# Patient Record
Sex: FEMALE | Race: BLACK OR AFRICAN AMERICAN | Employment: OTHER | URBAN - METROPOLITAN AREA
[De-identification: names, ages, dates, MRNs, and addresses within clinical notes are randomized per-mention and may not be internally consistent; named-entity substitution may affect disease eponyms.]

---

## 2017-01-13 ENCOUNTER — ED HISTORICAL/CONVERTED ENCOUNTER (OUTPATIENT)
Dept: OTHER | Age: 82
End: 2017-01-13

## 2017-02-21 ENCOUNTER — OP HISTORICAL/CONVERTED ENCOUNTER (OUTPATIENT)
Dept: OTHER | Age: 82
End: 2017-02-21

## 2017-05-13 ENCOUNTER — ED HISTORICAL/CONVERTED ENCOUNTER (OUTPATIENT)
Dept: OTHER | Age: 82
End: 2017-05-13

## 2017-07-24 ENCOUNTER — OP HISTORICAL/CONVERTED ENCOUNTER (OUTPATIENT)
Dept: OTHER | Age: 82
End: 2017-07-24

## 2017-08-09 ENCOUNTER — IP HISTORICAL/CONVERTED ENCOUNTER (OUTPATIENT)
Dept: OTHER | Age: 82
End: 2017-08-09

## 2017-08-18 ENCOUNTER — OP HISTORICAL/CONVERTED ENCOUNTER (OUTPATIENT)
Dept: OTHER | Age: 82
End: 2017-08-18

## 2018-04-11 ENCOUNTER — OP HISTORICAL/CONVERTED ENCOUNTER (OUTPATIENT)
Dept: OTHER | Age: 83
End: 2018-04-11

## 2018-04-19 ENCOUNTER — OP HISTORICAL/CONVERTED ENCOUNTER (OUTPATIENT)
Dept: OTHER | Age: 83
End: 2018-04-19

## 2018-12-05 ENCOUNTER — OP HISTORICAL/CONVERTED ENCOUNTER (OUTPATIENT)
Dept: OTHER | Age: 83
End: 2018-12-05

## 2018-12-18 ENCOUNTER — OP HISTORICAL/CONVERTED ENCOUNTER (OUTPATIENT)
Dept: OTHER | Age: 83
End: 2018-12-18

## 2019-01-18 ENCOUNTER — OP HISTORICAL/CONVERTED ENCOUNTER (OUTPATIENT)
Dept: OTHER | Age: 84
End: 2019-01-18

## 2019-01-23 ENCOUNTER — OP HISTORICAL/CONVERTED ENCOUNTER (OUTPATIENT)
Dept: OTHER | Age: 84
End: 2019-01-23

## 2019-03-29 ENCOUNTER — OP HISTORICAL/CONVERTED ENCOUNTER (OUTPATIENT)
Dept: OTHER | Age: 84
End: 2019-03-29

## 2019-05-09 ENCOUNTER — OP HISTORICAL/CONVERTED ENCOUNTER (OUTPATIENT)
Dept: OTHER | Age: 84
End: 2019-05-09

## 2019-05-20 ENCOUNTER — OP HISTORICAL/CONVERTED ENCOUNTER (OUTPATIENT)
Dept: OTHER | Age: 84
End: 2019-05-20

## 2019-06-03 ENCOUNTER — OP HISTORICAL/CONVERTED ENCOUNTER (OUTPATIENT)
Dept: OTHER | Age: 84
End: 2019-06-03

## 2019-10-15 ENCOUNTER — OP HISTORICAL/CONVERTED ENCOUNTER (OUTPATIENT)
Dept: OTHER | Age: 84
End: 2019-10-15

## 2019-10-21 ENCOUNTER — OP HISTORICAL/CONVERTED ENCOUNTER (OUTPATIENT)
Dept: OTHER | Age: 84
End: 2019-10-21

## 2020-01-31 ENCOUNTER — OP HISTORICAL/CONVERTED ENCOUNTER (OUTPATIENT)
Dept: OTHER | Age: 85
End: 2020-01-31

## 2020-01-31 LAB — MAMMOGRAPHY, EXTERNAL: NORMAL

## 2020-06-16 VITALS
BODY MASS INDEX: 23.68 KG/M2 | DIASTOLIC BLOOD PRESSURE: 71 MMHG | HEART RATE: 65 BPM | HEIGHT: 61 IN | WEIGHT: 125.4 LBS | SYSTOLIC BLOOD PRESSURE: 116 MMHG | TEMPERATURE: 97.4 F | OXYGEN SATURATION: 98 %

## 2020-06-16 PROBLEM — Z87.898 HX OF DIZZINESS: Status: ACTIVE | Noted: 2020-06-16

## 2020-06-16 PROBLEM — C50.312: Status: ACTIVE | Noted: 2018-12-18

## 2020-06-16 RX ORDER — METOPROLOL SUCCINATE 100 MG/1
TABLET, EXTENDED RELEASE ORAL DAILY
COMMUNITY

## 2020-06-16 RX ORDER — TRAZODONE HYDROCHLORIDE 50 MG/1
TABLET ORAL
COMMUNITY
End: 2021-04-09 | Stop reason: ALTCHOICE

## 2020-06-16 RX ORDER — ALENDRONATE SODIUM 70 MG/1
70 TABLET ORAL
COMMUNITY

## 2020-06-16 RX ORDER — ASCORBIC ACID 250 MG
TABLET ORAL
COMMUNITY

## 2020-06-16 RX ORDER — CALCIUM CARBONATE 600 MG
600 TABLET ORAL 2 TIMES DAILY
COMMUNITY
End: 2020-10-30 | Stop reason: ALTCHOICE

## 2020-06-16 RX ORDER — LEVOTHYROXINE SODIUM 50 UG/1
TABLET ORAL
COMMUNITY

## 2020-06-16 RX ORDER — CLONAZEPAM 0.5 MG/1
TABLET ORAL
COMMUNITY

## 2020-06-16 RX ORDER — ASPIRIN 81 MG/1
TABLET ORAL DAILY
COMMUNITY
End: 2022-06-09 | Stop reason: ALTCHOICE

## 2020-06-16 RX ORDER — LOSARTAN POTASSIUM 25 MG/1
TABLET ORAL 2 TIMES DAILY
COMMUNITY

## 2020-06-16 RX ORDER — LETROZOLE 2.5 MG/1
2.5 TABLET, FILM COATED ORAL DAILY
COMMUNITY
End: 2021-06-05 | Stop reason: ALTCHOICE

## 2020-06-16 RX ORDER — INDAPAMIDE 2.5 MG/1
TABLET, FILM COATED ORAL DAILY
Status: ON HOLD | COMMUNITY
End: 2020-10-07 | Stop reason: SDUPTHER

## 2020-07-15 ENCOUNTER — OP HISTORICAL/CONVERTED ENCOUNTER (OUTPATIENT)
Dept: OTHER | Age: 85
End: 2020-07-15

## 2020-08-21 ENCOUNTER — OFFICE VISIT (OUTPATIENT)
Dept: SURGERY | Age: 85
End: 2020-08-21
Payer: MEDICARE

## 2020-08-21 VITALS
BODY MASS INDEX: 21.22 KG/M2 | HEIGHT: 61 IN | SYSTOLIC BLOOD PRESSURE: 158 MMHG | WEIGHT: 112.4 LBS | TEMPERATURE: 98.6 F | HEART RATE: 69 BPM | DIASTOLIC BLOOD PRESSURE: 87 MMHG

## 2020-08-21 DIAGNOSIS — C50.312 MALIGNANT NEOPLASM OF LOWER-INNER QUADRANT OF LEFT FEMALE BREAST, UNSPECIFIED ESTROGEN RECEPTOR STATUS (HCC): Primary | ICD-10-CM

## 2020-08-21 PROCEDURE — 99213 OFFICE O/P EST LOW 20 MIN: CPT | Performed by: SURGERY

## 2020-08-21 RX ORDER — ROSUVASTATIN CALCIUM 5 MG/1
TABLET, COATED ORAL
COMMUNITY
End: 2020-10-30 | Stop reason: ALTCHOICE

## 2020-08-21 RX ORDER — CHOLECALCIFEROL TAB 125 MCG (5000 UNIT) 125 MCG
TAB ORAL DAILY
Status: ON HOLD | COMMUNITY
End: 2021-02-21

## 2020-08-21 RX ORDER — MULTIVITAMIN
1 TABLET ORAL DAILY
COMMUNITY
End: 2021-11-05 | Stop reason: ALTCHOICE

## 2020-08-21 RX ORDER — VITAMIN E 1000 UNIT
CAPSULE ORAL
COMMUNITY
End: 2020-10-30 | Stop reason: ALTCHOICE

## 2020-08-21 NOTE — PROGRESS NOTES
1. Have you been to the ER, urgent care clinic since your last visit? Hospitalized since your last visit? No    2. Have you seen or consulted any other health care providers outside of the [address] [place] [address] since your last visit? Include any pap smears or colon screening. No     Patient is here for f/u breast ca. Has had sudden weight loss since last seen. No other complaints.

## 2020-08-21 NOTE — LETTER
8/24/20 Patient: Shaila Koch YOB: 1935 Date of Visit: 8/21/2020 Eloy Calix MD 
[address] [place] [address] VIA Facsimile: [phone] MD Luly Hamilton [address] [id number] VIA Facsimile: [phone] MD [facility] [address] [address] VIA Facsimile: [phone] Dear MD Leandra Bush MD Timoteo Francois, MD, Thank you for referring Ms. George Nunez to [address] for evaluation. My notes for this consultation are attached. If you have questions, please do not hesitate to call me. I look forward to following your patient along with you. Sincerely, Marilia North MD

## 2020-08-25 NOTE — PROGRESS NOTES
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 undergoing endocrine therapy    Medical Oncologist: Miranda Lau    Radiation Oncologist: Oliver Stone    Patient is doing well. no new masses, nipple discharge, adenopathy. underwent left breast radiation. finished it 06.14.2019. complains of left latissimus dorsi pain. no other new breast problems. no new masses. no nipple discharge, adenopathy. No new cancers in the family. says her 's dementia is getting worse. He has become more antagonistic. ROS  Patient reports weight loss (12 lbs - attributes it possibly to stress from taking care of her ) and decreased appetite but reports no fever. She reports muscle aches, muscle weakness, and difficulty walking but reports no arthralgias/joint pain and no back pain. She reports no visual changes. She reports no difficulty hearing. She reports no chest pain. She reports no shortness of breath. She reports no nausea and no vomiting. She reports no hematuria. She reports no abnormal mole. She reports no loss of balance or falls. She reports feeling safe in relationship. She reports no fatigue. She reports no bleeding disorders. She reports no runny nose. She reports no breast pain. She reports no breast mass. She reports no nipple abnormalites. Additionally reports: I personally performed the ROS.       Physical Exam  General Appearance: healthy-appearing. Level of Distress: chronically ill. Ambulation: limited ambulation. Head: normocephalic. Neck: supple and no masses. Lymph Nodes: no cervical LAD, supraclavicular LAD, or axillary LAD. Breast: no masses or abnormal secretions and normal appearance.  Right Breast: no erythema, induration, tenderness, ecchymosis, skin changes, distinct masses, abnormal secretions, or axillary lymphadenopathy and normal nipple appearance. Left Breast: postradiation hyperpigmentation, skin thickening. left latissimus dorsi tender to palpation. Lungs: Respiratory effort: no dyspnea. Back: Thoracolumbar Appearance: normal curvature. Abdomen: soft and no tenderness. Liver: no hepatomegaly. Spleen: no splenomegaly. Skin: Inspection and palpation: no jaundice. Musculoskeletal: no edema. normal motor strength. normal movement of all extremities. Neurologic: Cranial Nerves: grossly intact. Sensation: grossly intact. Psychiatric: good judgement and insight. active and alert and normal mood. 7.15.2020 left dx mmg: BIRADS-2    1.23.2019 Diagnosis  A. Left breast, deep fascia, excision:  No evidence of neoplasm (the specimen includes skeletal muscle). B. Left breast, superior margin, excision:  Breast tissue with no evidence of neoplasm. C. Left breast, medial margin, excision:  Fibroadipose tissue, no evidence of neoplasm. D. Left breast, lateral margin, excision:  Fibroadipose tissue, no evidence of neoplasm. E. Left breast inferior margin, excision:  Fibroadipose tissue, no evidence of neoplasm. F. Left breast, anterior margin, excision:  Fibroadipose tissue, no evidence of neoplasm. G. 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 H. Left axillary sentinel lymph node, biopsy:  No evidence of neoplasm in two lymph nodes. Specimen identification: Left breast, segmental resection with marginal  sampling and left axillary sentinel lymph node biopsy  Tumor size (mm): 9.5 mm  Histologic type: Invasive carcinoma of no special type, ductal NOS .   Histologic grade ([place]):  Glandular/Tubular differentiation: 2  Nuclear pleomorphism: 2  Mitotic rate: 1  Overall Grade: 5 (grade 1)  Tumor Focality: Unifocal  Duct carcinoma in-situ: Present  Extensive DCIS: Not present  Tumor extension:  Skin: Not submitted  Nipple: Not submitted  Skeletal muscle: Present, not involved by neoplasm (specimen A)  Margins:  Invasive carcinoma:  Distance to closest margin (mm): 6 mm (medial)  Duct carcinoma in-situ:  Distance to closest margin (mm): 6 mm (medial)  Lymph nodes:  # of sentinel nodes: 2  # of sentinel/non-sentinel nodes: 2  # with macrometastasis(>2mm): 0  # with micrometastasis(0.2-2mm): 0  # with isolated tumor cells(<0.2mm) 0  # with no tumor cells: 2  Total # nodes with metastasis: 0  Treatment effect: No presurgical treatment given    Lymphovascular invasion: Not identified  Pathologic Stage (AJCC 8 th Edition): pT1b, pN0(sn)    Estrogen receptors (ER-SP1, vent[place]) 99%, strong Positive: a?Y1%  Progesterone receptors (PgR: 1E2, [place]) 98%, strong Positive: a?Y1%  Proliferation marker Ki67 (Ki-67: 30-9, [place]) 3%  Her2 (Pathway 4B5, [place], core biopsy) 1+ Negative: 0,1+  Equivocal: 2+  Positive: 3+  Her2 (Pathway 4B5, [place], current specimen) 1+ Negative: 0,1+  Equivocal: 2+  Positive: 3+  Summary of results:  Positive for estrogen receptors  Positive for progesterone receptors  Low proliferation. Negative for Her2 (by immunohistochemistry, on core biopsy)  Negative for Her2 (by immunohistochemistry, on this specimen)      Assessment / Plan  Status: TIMOTEO, over 1.5 years from diagnosis    A/P: 82 y.o. woman w left breast IDC, ER/NH positive, HER2 negative. Clinical stage I. doing well. She is being followed by Dr Radha Palma with Medical Oncology and Dr Jessica Guerrero with Radiation Oncology. she will need b/l annual mmg in six months. I have ordered the imaging studies. She will return to see me in six months on the same day as the imaging. She was advised to call the office w any questions or concerns. All questions were answered to her satisfaction.  This encounter lasted 20 minutes, and over 50% of this visit was spent in counseling the patient and coordination of care.

## 2020-10-01 ENCOUNTER — TRANSCRIBE ORDER (OUTPATIENT)
Dept: REGISTRATION | Age: 85
End: 2020-10-01

## 2020-10-01 ENCOUNTER — HOSPITAL ENCOUNTER (OUTPATIENT)
Dept: GENERAL RADIOLOGY | Age: 85
Discharge: HOME | End: 2020-10-01
Payer: MEDICARE

## 2020-10-01 DIAGNOSIS — R06.02 SOB (SHORTNESS OF BREATH): ICD-10-CM

## 2020-10-01 DIAGNOSIS — R06.02 SOB (SHORTNESS OF BREATH): Primary | ICD-10-CM

## 2020-10-01 PROCEDURE — 71046 X-RAY EXAM CHEST 2 VIEWS: CPT

## 2020-10-04 ENCOUNTER — HOSPITAL ENCOUNTER (OUTPATIENT)
Age: 85
Setting detail: OBSERVATION
Discharge: HOME | End: 2020-10-07
Attending: EMERGENCY MEDICINE | Admitting: HOSPITALIST
Payer: MEDICARE

## 2020-10-04 DIAGNOSIS — R53.1 GENERALIZED WEAKNESS: ICD-10-CM

## 2020-10-04 DIAGNOSIS — R06.02 SOB (SHORTNESS OF BREATH): ICD-10-CM

## 2020-10-04 DIAGNOSIS — R26.9 GAIT ABNORMALITY: ICD-10-CM

## 2020-10-04 DIAGNOSIS — R07.9 CHEST PAIN, UNSPECIFIED TYPE: Primary | ICD-10-CM

## 2020-10-04 LAB
ALBUMIN SERPL-MCNC: 3.6 G/DL (ref 3.5–5)
ALBUMIN/GLOB SERPL: 0.9 {RATIO} (ref 1.1–2.2)
ALP SERPL-CCNC: 69 U/L (ref 45–117)
ALT SERPL-CCNC: 34 U/L (ref 12–78)
ANION GAP SERPL CALC-SCNC: 6 MMOL/L (ref 5–15)
AST SERPL W P-5'-P-CCNC: 30 U/L (ref 15–37)
BILIRUB SERPL-MCNC: 0.2 MG/DL (ref 0.2–1)
BUN SERPL-MCNC: 50 MG/DL (ref 6–20)
BUN/CREAT SERPL: 52 (ref 12–20)
CA-I BLD-MCNC: 10.7 MG/DL (ref 8.5–10.1)
CHLORIDE SERPL-SCNC: 101 MMOL/L (ref 97–108)
CO2 SERPL-SCNC: 33 MMOL/L (ref 21–32)
CREAT SERPL-MCNC: 0.97 MG/DL (ref 0.55–1.02)
GLOBULIN SER CALC-MCNC: 4.2 G/DL (ref 2–4)
GLUCOSE SERPL-MCNC: 135 MG/DL (ref 65–100)
POTASSIUM SERPL-SCNC: 3.8 MMOL/L (ref 3.5–5.1)
PROT SERPL-MCNC: 7.8 G/DL (ref 6.4–8.2)
SODIUM SERPL-SCNC: 140 MMOL/L (ref 136–145)

## 2020-10-04 PROCEDURE — 85025 COMPLETE CBC W/AUTO DIFF WBC: CPT

## 2020-10-04 PROCEDURE — 83880 ASSAY OF NATRIURETIC PEPTIDE: CPT

## 2020-10-04 PROCEDURE — 80053 COMPREHEN METABOLIC PANEL: CPT

## 2020-10-04 PROCEDURE — 99285 EMERGENCY DEPT VISIT HI MDM: CPT

## 2020-10-04 PROCEDURE — 36415 COLL VENOUS BLD VENIPUNCTURE: CPT

## 2020-10-04 PROCEDURE — 84484 ASSAY OF TROPONIN QUANT: CPT

## 2020-10-05 ENCOUNTER — APPOINTMENT (OUTPATIENT)
Dept: GENERAL RADIOLOGY | Age: 85
End: 2020-10-05
Attending: EMERGENCY MEDICINE
Payer: MEDICARE

## 2020-10-05 PROBLEM — R00.2 PALPITATIONS: Status: ACTIVE | Noted: 2020-10-05

## 2020-10-05 PROBLEM — R42 DIZZINESS: Status: ACTIVE | Noted: 2020-10-05

## 2020-10-05 LAB
BASOPHILS # BLD: 0 K/UL (ref 0–0.1)
BASOPHILS NFR BLD: 0 % (ref 0–1)
BNP SERPL-MCNC: 427 PG/ML
DIFFERENTIAL METHOD BLD: ABNORMAL
EOSINOPHIL # BLD: 0 K/UL (ref 0–0.4)
EOSINOPHIL NFR BLD: 0 % (ref 0–7)
ERYTHROCYTE [DISTWIDTH] IN BLOOD BY AUTOMATED COUNT: 14.6 % (ref 11.5–14.5)
HCT VFR BLD AUTO: 33.6 % (ref 35–47)
HGB BLD-MCNC: 10.7 G/DL (ref 11.5–16)
IMM GRANULOCYTES # BLD AUTO: 0 K/UL (ref 0–0.04)
IMM GRANULOCYTES NFR BLD AUTO: 1 % (ref 0–0.5)
LYMPHOCYTES # BLD: 0.4 K/UL (ref 0.8–3.5)
LYMPHOCYTES NFR BLD: 7 % (ref 12–49)
MCH RBC QN AUTO: 28.1 PG (ref 26–34)
MCHC RBC AUTO-ENTMCNC: 31.8 G/DL (ref 30–36.5)
MCV RBC AUTO: 88.2 FL (ref 80–99)
MONOCYTES # BLD: 0.2 K/UL (ref 0–1)
MONOCYTES NFR BLD: 4 % (ref 5–13)
NEUTS SEG # BLD: 4.8 K/UL (ref 1.8–8)
NEUTS SEG NFR BLD: 88 % (ref 32–75)
PLATELET # BLD AUTO: 308 K/UL (ref 150–400)
PMV BLD AUTO: 10.1 FL (ref 8.9–12.9)
RBC # BLD AUTO: 3.81 M/UL (ref 3.8–5.2)
TROPONIN I SERPL-MCNC: <0.05 NG/ML
WBC # BLD AUTO: 5.4 K/UL (ref 3.6–11)

## 2020-10-05 PROCEDURE — 74011250637 HC RX REV CODE- 250/637: Performed by: EMERGENCY MEDICINE

## 2020-10-05 PROCEDURE — 71045 X-RAY EXAM CHEST 1 VIEW: CPT

## 2020-10-05 PROCEDURE — 74011250636 HC RX REV CODE- 250/636: Performed by: HOSPITALIST

## 2020-10-05 PROCEDURE — 74011250637 HC RX REV CODE- 250/637: Performed by: HOSPITALIST

## 2020-10-05 PROCEDURE — 36415 COLL VENOUS BLD VENIPUNCTURE: CPT

## 2020-10-05 PROCEDURE — 99218 HC RM OBSERVATION: CPT

## 2020-10-05 PROCEDURE — 83540 ASSAY OF IRON: CPT

## 2020-10-05 PROCEDURE — 74011000250 HC RX REV CODE- 250: Performed by: EMERGENCY MEDICINE

## 2020-10-05 PROCEDURE — 82607 VITAMIN B-12: CPT

## 2020-10-05 RX ORDER — LETROZOLE 2.5 MG/1
2.5 TABLET, FILM COATED ORAL DAILY
Status: DISCONTINUED | OUTPATIENT
Start: 2020-10-05 | End: 2020-10-07 | Stop reason: HOSPADM

## 2020-10-05 RX ORDER — LIDOCAINE HYDROCHLORIDE 20 MG/ML
15 SOLUTION OROPHARYNGEAL
Status: COMPLETED | OUTPATIENT
Start: 2020-10-05 | End: 2020-10-05

## 2020-10-05 RX ORDER — TRAZODONE HYDROCHLORIDE 50 MG/1
50 TABLET ORAL
Status: DISCONTINUED | OUTPATIENT
Start: 2020-10-05 | End: 2020-10-07 | Stop reason: HOSPADM

## 2020-10-05 RX ORDER — ASPIRIN 81 MG/1
81 TABLET ORAL DAILY
Status: DISCONTINUED | OUTPATIENT
Start: 2020-10-05 | End: 2020-10-07 | Stop reason: HOSPADM

## 2020-10-05 RX ORDER — ASCORBIC ACID 500 MG
500 TABLET ORAL DAILY
Status: DISCONTINUED | OUTPATIENT
Start: 2020-10-05 | End: 2020-10-07 | Stop reason: HOSPADM

## 2020-10-05 RX ORDER — MAG HYDROX/ALUMINUM HYD/SIMETH 200-200-20
30 SUSPENSION, ORAL (FINAL DOSE FORM) ORAL
Status: COMPLETED | OUTPATIENT
Start: 2020-10-05 | End: 2020-10-05

## 2020-10-05 RX ORDER — CLONAZEPAM 0.5 MG/1
0.5 TABLET ORAL
Status: DISCONTINUED | OUTPATIENT
Start: 2020-10-05 | End: 2020-10-07 | Stop reason: HOSPADM

## 2020-10-05 RX ORDER — SODIUM CHLORIDE 9 MG/ML
100 INJECTION, SOLUTION INTRAVENOUS CONTINUOUS
Status: DISCONTINUED | OUTPATIENT
Start: 2020-10-05 | End: 2020-10-05

## 2020-10-05 RX ORDER — LOSARTAN POTASSIUM 25 MG/1
25 TABLET ORAL 2 TIMES DAILY
Status: DISCONTINUED | OUTPATIENT
Start: 2020-10-05 | End: 2020-10-07 | Stop reason: HOSPADM

## 2020-10-05 RX ORDER — SODIUM CHLORIDE 9 MG/ML
125 INJECTION, SOLUTION INTRAVENOUS CONTINUOUS
Status: DISPENSED | OUTPATIENT
Start: 2020-10-05 | End: 2020-10-05

## 2020-10-05 RX ORDER — CALCIUM CARBONATE/VITAMIN D3 600MG-5MCG
1 TABLET ORAL 2 TIMES DAILY WITH MEALS
Status: DISCONTINUED | OUTPATIENT
Start: 2020-10-05 | End: 2020-10-07 | Stop reason: HOSPADM

## 2020-10-05 RX ORDER — LEVOTHYROXINE SODIUM 25 UG/1
50 TABLET ORAL
Status: DISCONTINUED | OUTPATIENT
Start: 2020-10-05 | End: 2020-10-07 | Stop reason: HOSPADM

## 2020-10-05 RX ORDER — METOPROLOL SUCCINATE 50 MG/1
100 TABLET, EXTENDED RELEASE ORAL DAILY
Status: DISCONTINUED | OUTPATIENT
Start: 2020-10-05 | End: 2020-10-07 | Stop reason: HOSPADM

## 2020-10-05 RX ORDER — ATORVASTATIN CALCIUM 10 MG/1
10 TABLET, FILM COATED ORAL
Status: DISCONTINUED | OUTPATIENT
Start: 2020-10-05 | End: 2020-10-07 | Stop reason: HOSPADM

## 2020-10-05 RX ORDER — SODIUM CHLORIDE 0.9 % (FLUSH) 0.9 %
5-40 SYRINGE (ML) INJECTION EVERY 8 HOURS
Status: DISCONTINUED | OUTPATIENT
Start: 2020-10-05 | End: 2020-10-07 | Stop reason: HOSPADM

## 2020-10-05 RX ORDER — SODIUM CHLORIDE 0.9 % (FLUSH) 0.9 %
5-40 SYRINGE (ML) INJECTION AS NEEDED
Status: DISCONTINUED | OUTPATIENT
Start: 2020-10-05 | End: 2020-10-07 | Stop reason: HOSPADM

## 2020-10-05 RX ADMIN — Medication 1 TABLET: at 11:35

## 2020-10-05 RX ADMIN — ATORVASTATIN CALCIUM 10 MG: 10 TABLET, FILM COATED ORAL at 21:11

## 2020-10-05 RX ADMIN — OXYCODONE HYDROCHLORIDE AND ACETAMINOPHEN 500 MG: 500 TABLET ORAL at 09:00

## 2020-10-05 RX ADMIN — LEVOTHYROXINE SODIUM 50 MCG: 0.03 TABLET ORAL at 11:34

## 2020-10-05 RX ADMIN — METOPROLOL SUCCINATE 100 MG: 50 TABLET, EXTENDED RELEASE ORAL at 17:27

## 2020-10-05 RX ADMIN — Medication 10 ML: at 21:12

## 2020-10-05 RX ADMIN — LETROZOLE 2.5 MG: 2.5 TABLET ORAL at 11:35

## 2020-10-05 RX ADMIN — LETROZOLE 2.5 MG: 2.5 TABLET ORAL at 17:27

## 2020-10-05 RX ADMIN — LIDOCAINE HYDROCHLORIDE 15 ML: 20 SOLUTION ORAL; TOPICAL at 02:49

## 2020-10-05 RX ADMIN — ALUMINUM HYDROXIDE, MAGNESIUM HYDROXIDE, AND SIMETHICONE 30 ML: 200; 200; 20 SUSPENSION ORAL at 02:49

## 2020-10-05 RX ADMIN — ASPIRIN 81 MG: 81 TABLET, COATED ORAL at 11:34

## 2020-10-05 RX ADMIN — Medication 1 TABLET: at 17:27

## 2020-10-05 RX ADMIN — TRAZODONE HYDROCHLORIDE 50 MG: 50 TABLET ORAL at 21:11

## 2020-10-05 NOTE — H&P
History and Physical              Subjective :   Chief Complaint : Dizziness, shortness of breath, palpitations. Source of information : Patient, reliable historian    History of present illness:   82 y.o. female with a recent history of carcinoma breast on treatment, hypertension, hyperlipidemia, hypothyroidism presents to the emergency room complaining of feeling dizzy, shortness of breath. She was doing fine until this evening, she started feeling dizzy when she got up from sitting position, felt short of breath. Feeling palpitations occasionally they do not last long but she feels she did not feel dizzy at that time. Denies any chest pain or tightness but in the emergency room she complained of chest tightness when she tried to get up. Most of the symptoms are when she is doing something, rest improves. States she was a started on Medrol Dosepak, she just started taking today, she thinks that may be causing the problem. She denies any nausea, vomiting but she feels like funny taste in the mouth. She is feeling weak levels also complains of tremors in the hands. States she is feeling better in the emergency room, thought of discharging in the emergency room but when they tried to walk her she was a little unsteady. Also feeling very weak and tired so admitted for evaluation. 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     Past Medical History:   Diagnosis Date    Anxiety 6/16/2020    Arthritis     Depression 6/16/2020    Diverticulitis 6/16/2020    GERD (gastroesophageal reflux disease) 6/16/2020    High cholesterol 6/16/2020    Hx of dizziness 6/16/2020    Hypertension 6/16/2020    Primary cancer of lower-inner quadrant of left female breast ([facility] [address] 12/18/2018    Thyroid disease 6/16/2020     Past Surgical History:   Procedure Laterality Date    HX BREAST LUMPECTOMY Left 01/23/2019 with SLNB    HX HEENT  06/25/2018    Cataract    HX HYSTERECTOMY  1976    HX ORTHOPAEDIC  2014    Left knee    HX ORTHOPAEDIC  08/09/2017    Right knee    HX ORTHOPAEDIC  2013    Right Shoulder     Family History   Problem Relation Age of Onset    Cancer Mother 80        Esophagas    Heart Disease Father     Prostate Cancer Brother     Breast Cancer Maternal Grandmother 71    Breast Cancer Maternal Cousin 58      Social History     Tobacco Use    Smoking status: Never Smoker    Smokeless tobacco: Never Used   Substance Use Topics    Alcohol use: Never     Frequency: Never       Prior to Admission medications    Medication Sig Start Date End Date Taking? Authorizing Provider   rosuvastatin (CRESTOR) 5 mg tablet Take  by mouth nightly. Yes Provider, Historical   calcium-cholecalciferol, D3, (CALTRATE 600+D) tablet Take 1 Tab by mouth daily. Yes Provider, Historical   multivit-min/iron/folic/lutein (CENTRUM SILVER WOMEN PO) Take  by mouth. Yes Provider, Historical   ascorbic acid, vitamin C, (Vitamin C With Precious Hips) 1,000 mg tablet Take  by mouth. Yes Provider, Historical   elderberry fruit (ELDERBERRY PO) Take  by mouth. Yes Provider, Historical   cholecalciferol (Vitamin D3) (5000 Units/125 mcg) tab tablet Take  by mouth daily. Yes Provider, Historical   ZINC PICOLINATE PO Take  by mouth. Yes Provider, Historical   alendronate (FOSAMAX) 70 mg tablet Take  by mouth. Yes Provider, Historical   aspirin delayed-release 81 mg tablet Take  by mouth daily. Yes Provider, Historical   calcium carbonate (CALTREX) 600 mg calcium (1,500 mg) tablet Take 600 mg by mouth two (2) times a day. Yes Provider, Historical   multivitamin, tx-iron-ca-min (THERA-M w/ IRON) 9 mg iron-400 mcg tab tablet Take 1 Tab by mouth daily. Yes Provider, Historical   clonazePAM (KlonoPIN) 0.5 mg tablet Take  by mouth two (2) times daily as needed for Anxiety.    Yes Provider, Historical   indapamide (LOZOL) 2.5 mg tablet Take  by mouth daily. Yes Provider, Historical   letrozole (FEMARA) 2.5 mg tablet Take 2.5 mg by mouth daily. Yes Provider, Historical   losartan (COZAAR) 25 mg tablet Take  by mouth two (2) times a day. Yes Provider, Historical   metoprolol succinate (TOPROL-XL) 100 mg tablet Take  by mouth daily. Yes Provider, Historical   levothyroxine (synthroid) 50 mcg tablet Take  by mouth Daily (before breakfast). Yes Provider, Historical   traZODone (DESYREL) 50 mg tablet Take  by mouth nightly. Yes Provider, Historical   ascorbic acid, vitamin C, (Vitamin C) 250 mg tablet Take  by mouth. Yes Provider, Historical     Allergies   Allergen Reactions    Iodinated Contrast Media Shortness of Breath             Review of Systems:  Constitutional: Appetite is fair, denies weight loss, no fever, no chills, no night sweats. Eye: No recent visual disturbances, no discharge, no double vision. Ear/nose/mouth/throat : No hearing disturbance, no ear pain, no nasal congestion, no sore throat, no trouble swallowing. Respiratory : No trouble breathing, no cough, ++ shortness of breath, no hemoptysis, no wheezing. Cardiovascular : No chest pain, +++ episodic palpitation with activity, ++ racing of heart, no orthopnea, no paroxysmal nocturnal dyspnea, No peripheral edema. Gastrointestinal : No nausea, no vomiting, no diarrhea, No constipation, No heartburn, No abdominal pain. Genitourinary : Has bladder prolapse, sometimes feels like she is not able to urinate. .  Lymphatics : No swollen glands -Neck, axillary, inguinal.  Endocrine : No excessive thirst, no polyuria no cold intolerance, no heat intolerance. Immunologic : No hives, urticaria, no seasonal allergies. Musculoskeletal : She has significant apparatus use, multiple surgeries on the joints. No she has with both ankles with pain, diagnosed with Achilles tendinitis. Integumentary : No rash, no pruritus, no ecchymosis.   Hematology : No petechiae, No easy bruising,  No tendency to bleed easy. Neurology : Denies change in mental status, no headache, no confusion, numbness, tingling. Psychiatric : No mood swings, no anxiety, depression. Vitals:     Patient Vitals for the past 12 hrs:   Temp Pulse Resp BP SpO2   10/04/20 2159 98.4 °F (36.9 °C) 94 18 (!) 177/82 98 %       Physical Exam:   General : Small built, tired but no respiratory distress noted. HEENT : PERRLA, normal oral mucosa, atraumatic normocephalic, Normal ear and nose. Neck : Supple, no JVD, no masses noted, no carotid bruit. Lungs : Breath sounds with good air entry bilaterally, no wheezes or rales, no accessory muscle use. CVS : Rhythm rate regular, S1+, S2+, no murmur or gallop. Abdomen : Soft, nontender, no organomegaly, bowel sounds active. Extremities : No edema noted,  pedal pulses palpable. Musculoskeletal : Fair range of motion, no joint swelling or effusion, muscle tone and power appears normal.   Skin : Moist, warm, skin turgor, no pathological rash. Lymphatic : No cervical lymphadenopathy. Neurological : Awake, alert, oriented to time place person. Likely tremors in both hands, with his light appears tremulous. Psychiatric : Mood and affect appears appropriate to the situation. Data Review:   Recent Results (from the past 24 hour(s))   CBC WITH AUTOMATED DIFF    Collection Time: 10/04/20 11:15 PM   Result Value Ref Range    WBC 5.4 3.6 - 11.0 K/uL    RBC 3.81 3.80 - 5.20 M/uL    HGB 10.7 (L) 11.5 - 16.0 g/dL    HCT 33.6 (L) 35.0 - 47.0 %    MCV 88.2 80.0 - 99.0 FL    MCH 28.1 26.0 - 34.0 PG    MCHC 31.8 30.0 - 36.5 g/dL    RDW 14.6 (H) 11.5 - 14.5 %    PLATELET 915 983 - 270 K/uL    MPV 10.1 8.9 - 12.9 FL    NEUTROPHILS 88 (H) 32 - 75 %    LYMPHOCYTES 7 (L) 12 - 49 %    MONOCYTES 4 (L) 5 - 13 %    EOSINOPHILS 0 0 - 7 %    BASOPHILS 0 0 - 1 %    IMMATURE GRANULOCYTES 1 (H) 0.0 - 0.5 %    ABS. NEUTROPHILS 4.8 1.8 - 8.0 K/UL    ABS. LYMPHOCYTES 0.4 (L) 0.8 - 3.5 K/UL    ABS. MONOCYTES 0.2 0.0 - 1.0 K/UL    ABS. EOSINOPHILS 0.0 0.0 - 0.4 K/UL    ABS. BASOPHILS 0.0 0.0 - 0.1 K/UL    ABS. IMM. GRANS. 0.0 0.00 - 0.04 K/UL    DF AUTOMATED     METABOLIC PANEL, COMPREHENSIVE    Collection Time: 10/04/20 11:15 PM   Result Value Ref Range    Sodium 140 136 - 145 mmol/L    Potassium 3.8 3.5 - 5.1 mmol/L    Chloride 101 97 - 108 mmol/L    CO2 33 (H) 21 - 32 mmol/L    Anion gap 6 5 - 15 mmol/L    Glucose 135 (H) 65 - 100 mg/dL    BUN 50 (H) 6 - 20 mg/dL    Creatinine 0.97 0.55 - 1.02 mg/dL    BUN/Creatinine ratio 52 (H) 12 - 20      GFR est AA >60 >60 ml/min/1.73m2    GFR est non-AA 55 (L) >60 ml/min/1.73m2    Calcium 10.7 (H) 8.5 - 10.1 mg/dL    Bilirubin, total 0.2 0.2 - 1.0 mg/dL    AST (SGOT) 30 15 - 37 U/L    ALT (SGPT) 34 12 - 78 U/L    Alk. phosphatase 69 45 - 117 U/L    Protein, total 7.8 6.4 - 8.2 g/dL    Albumin 3.6 3.5 - 5.0 g/dL    Globulin 4.2 (H) 2.0 - 4.0 g/dL    A-G Ratio 0.9 (L) 1.1 - 2.2     TROPONIN I    Collection Time: 10/04/20 11:15 PM   Result Value Ref Range    Troponin-I, Qt. <0.05 <0.05 ng/mL   BNP    Collection Time: 10/04/20 11:15 PM   Result Value Ref Range    NT pro- <450 pg/mL       Radiologic Studies :   CT Results  (Last 48 hours)    None        CXR Results  (Last 48 hours)               10/05/20 0055  XR CHEST SNGL V Final result    Impression:  Impression:   No acute findings. Narrative:  Study: XR CHEST SNGL V       Clinical indication: chest pain       Comparison: 10/1/2020. Findings:       No consolidative airspace disease, pleural effusion or pneumothorax. Cardiomediastinal contours are within normal limits. No pulmonary edema. No acute osseous abnormality identified. Assessment and Plan :   Dizziness, palpitations associated with ambulation: Etiology unclear, need cardiac evaluation. Also she seems to have signs of dehydration.   Ordered for 2D echocardiogram for cardiac evaluation    Dehydration: Associated with prerenal azotemia and hypercalcemia mild. .  Started on IV fluids normal saline for 10 hours, we will encourage oral fluid intake. Acute kidney injury prerenal azotemia. BUN is 50 with a slightly elevated creatinine. No recent labs but has normal renal functions October 2019. Most likely due to steroid use and poor oral intake. I do not see any diuretics. Benign essential hypertension: With wide pulse pressure, she is only on metoprolol XL and Cozaar. We will continue metoprolol and hold Cozaar until renal functions improved. Will monitor closely. We will check orthostatic changes. Anemia mild: With hydration it may drop further. Most likely related to her recent history of carcinoma breast are chronic disease are vitamin deficiencies. Hypothyroidism: On supplementation, will check TSH. We will also check vitamin D to evaluate if there is any deficiency    Admitted for observation, if her condition does not improve we may need to change to inpatient. Full CODE STATUS, daughter will make decisions in case if she cannot. Home medications reviewed with the nursing staff. Signed By: Ede Matthew MD     October 5, 2020          This dictation was done by dragon, computer voice recognition software. Often unanticipated grammatical, syntax, [place] phones and other interpretive errors are inadvertently transcribed. Please excuse errors that have escaped final proofreading.

## 2020-10-05 NOTE — ED NOTES
Patient stated [facility]. She has had a \"funny taste\" in her mouth and no energy. She also states she wakes up and has \"difficulty breathing\" until she stands up.

## 2020-10-05 NOTE — PROGRESS NOTES
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 She was in her normal health until she started feeling dizzy after standing up from a seated position. She also reports abnormal aftertaste in her mouth and hand tremors. She was recently started on albuterol inhaler by her primary care provider. Also recently finished Z-Grant prescribed from her orthopedist for Achilles tendinitis. She believes this was causing her dizziness. She also reports 15 pound weight loss since February of this year. She reports history of bilateral thyroid lobectomy for nodules and also has a family history of esophageal cancer. She reports hoarse voice-quality in the mornings, but denies dysphagia. In the ED, lab work-up showed mildly elevated proBNP of 427, otherwise unremarkable. Chest x-ray did not show acute cardiopulmonary infiltrates. Cardiology consult placed. TTE ordered, pending. Iron studies, B12 and folate, and TSH pending. Subjective:    Patient seen and examined at bedside in the ED. She reports improvement in shortness of breath and dizziness. She has had an intermittent dry cough for several months. She denies headache, visual disturbances, chest pain, palpitations, productive cough, wheezing, abdominal pain, N/V, diarrhea, or extremity edema.     Current Facility-Administered Medications   Medication Dose Route Frequency    ascorbic acid (vitamin C) (VITAMIN C) tablet 500 mg  500 mg Oral DAILY    aspirin delayed-release tablet 81 mg  81 mg Oral DAILY    calcium-vitamin D 600 mg(1,500mg) -200 unit per tablet 1 Tab  1 Tab Oral BID WITH MEALS    clonazePAM (KlonoPIN) tablet 0.5 mg  0.5 mg Oral BID PRN    letrozole (FEMARA) tablet 2.5 mg  2.5 mg Oral DAILY    levothyroxine (SYNTHROID) tablet 50 mcg  50 mcg Oral ACB    [Held by provider] losartan (COZAAR) tablet 25 mg  25 mg Oral BID    metoprolol succinate (TOPROL-XL) XL tablet 100 mg  100 mg Oral DAILY    atorvastatin (LIPITOR) tablet 10 mg  10 mg Oral QHS    traZODone (DESYREL) tablet 50 mg  50 mg Oral QHS    sodium chloride (NS) flush 5-40 mL  5-40 mL IntraVENous Q8H    sodium chloride (NS) flush 5-40 mL  5-40 mL IntraVENous PRN    acetaminophen (TYLENOL) solution 650 mg  650 mg Oral Q4H PRN    0.9% sodium chloride infusion  125 mL/hr IntraVENous CONTINUOUS     Current Outpatient Medications   Medication Sig    rosuvastatin (CRESTOR) 5 mg tablet Take  by mouth nightly.  calcium-cholecalciferol, D3, (CALTRATE 600+D) tablet Take 1 Tab by mouth daily.  multivit-min/iron/folic/lutein (CENTRUM SILVER WOMEN PO) Take  by mouth.  ascorbic acid, vitamin C, (Vitamin C With Precious Hips) 1,000 mg tablet Take  by mouth.  elderberry fruit (ELDERBERRY PO) Take  by mouth.  cholecalciferol (Vitamin D3) (5000 Units/125 mcg) tab tablet Take  by mouth daily.  ZINC PICOLINATE PO Take  by mouth.  alendronate (FOSAMAX) 70 mg tablet Take  by mouth.  aspirin delayed-release 81 mg tablet Take  by mouth daily.  calcium carbonate (CALTREX) 600 mg calcium (1,500 mg) tablet Take 600 mg by mouth two (2) times a day.  multivitamin, tx-iron-ca-min (THERA-M w/ IRON) 9 mg iron-400 mcg tab tablet Take 1 Tab by mouth daily.  clonazePAM (KlonoPIN) 0.5 mg tablet Take  by mouth two (2) times daily as needed for Anxiety.  indapamide (LOZOL) 2.5 mg tablet Take  by mouth daily.  letrozole (FEMARA) 2.5 mg tablet Take 2.5 mg by mouth daily.  losartan (COZAAR) 25 mg tablet Take  by mouth two (2) times a day.  metoprolol succinate (TOPROL-XL) 100 mg tablet Take  by mouth daily.     levothyroxine (synthroid) 50 mcg tablet Take  by mouth Daily (before breakfast).  traZODone (DESYREL) 50 mg tablet Take  by mouth nightly.  ascorbic acid, vitamin C, (Vitamin C) 250 mg tablet Take  by mouth. Review of Systems  Constitutional: Weight loss, No fatigue, No Weakness  Eyes: No visual disturbances  ENT: No sore throat, voice change  Respiratory: Shortness of Breath, dry cough, No wheezing  Cardiovascular: No chest pain, No palpitations, No extremity edema  Gastrointestinal: No nausea, No vomiting, No diarrhea, No abdominal pain  Genitourinary: No frequency, No dysuria  Integument/breast: No skin lesions  Neurological: No headaches, No dizziness      Objective:     Visit Vitals  BP (!) 177/82 (BP 1 Location: Left arm, BP Patient Position: At rest)   Pulse 94   Temp 98.4 °F (36.9 °C)   Resp 18   Ht 5' 3\" (1.6 m)   Wt 53.1 kg (117 lb)   SpO2 98%   BMI 20.73 kg/m²      O2 Device: Room air    Temp (24hrs), Av.4 °F (36.9 °C), Min:98.4 °F (36.9 °C), Max:98.4 °F (36.9 °C)      No intake/output data recorded. No intake/output data recorded. PHYSICAL EXAM:  Constitutional: Cachectic AA female. In no acute distress. On room air. Skin: Extremities and face reveal no rashes or lesions. HEENT: Atraumatic, normocephalic. Sclerae anicteric. PERRL. No oral ulcers. The neck is supple and no masses. No JVD  Cardiovascular: Regular rate and rhythm. Normal S1/S2. No  Murmur appreciated. Respiratory:  Symmetric chest wall expansion. Clear breath sounds bilaterally with no wheezes, rales, or rhonchi. GI: Abdomen nondistended, soft, and nontender. Normal active bowel sounds. Rectal: Deferred   Musculoskeletal: No pitting edema of the lower legs. Able to move all ext  Neurological:  Patient is alert and oriented x3.  Cranial nerves II-XII grossly intact  Psychiatric: Mood appears appropriate       Data Review    Recent Results (from the past 24 hour(s))   CBC WITH AUTOMATED DIFF    Collection Time: 10/04/20 11:15 PM   Result Value Ref Range    WBC 5.4 3.6 - 11.0 K/uL    RBC 3.81 3.80 - 5.20 M/uL    HGB 10.7 (L) 11.5 - 16.0 g/dL    HCT 33.6 (L) 35.0 - 47.0 %    MCV 88.2 80.0 - 99.0 FL    MCH 28.1 26.0 - 34.0 PG    MCHC 31.8 30.0 - 36.5 g/dL    RDW 14.6 (H) 11.5 - 14.5 %    PLATELET 935 076 - 088 K/uL    MPV 10.1 8.9 - 12.9 FL    NEUTROPHILS 88 (H) 32 - 75 %    LYMPHOCYTES 7 (L) 12 - 49 %    MONOCYTES 4 (L) 5 - 13 %    EOSINOPHILS 0 0 - 7 %    BASOPHILS 0 0 - 1 %    IMMATURE GRANULOCYTES 1 (H) 0.0 - 0.5 %    ABS. NEUTROPHILS 4.8 1.8 - 8.0 K/UL    ABS. LYMPHOCYTES 0.4 (L) 0.8 - 3.5 K/UL    ABS. MONOCYTES 0.2 0.0 - 1.0 K/UL    ABS. EOSINOPHILS 0.0 0.0 - 0.4 K/UL    ABS. BASOPHILS 0.0 0.0 - 0.1 K/UL    ABS. IMM. GRANS. 0.0 0.00 - 0.04 K/UL    DF AUTOMATED     METABOLIC PANEL, COMPREHENSIVE    Collection Time: 10/04/20 11:15 PM   Result Value Ref Range    Sodium 140 136 - 145 mmol/L    Potassium 3.8 3.5 - 5.1 mmol/L    Chloride 101 97 - 108 mmol/L    CO2 33 (H) 21 - 32 mmol/L    Anion gap 6 5 - 15 mmol/L    Glucose 135 (H) 65 - 100 mg/dL    BUN 50 (H) 6 - 20 mg/dL    Creatinine 0.97 0.55 - 1.02 mg/dL    BUN/Creatinine ratio 52 (H) 12 - 20      GFR est AA >60 >60 ml/min/1.73m2    GFR est non-AA 55 (L) >60 ml/min/1.73m2    Calcium 10.7 (H) 8.5 - 10.1 mg/dL    Bilirubin, total 0.2 0.2 - 1.0 mg/dL    AST (SGOT) 30 15 - 37 U/L    ALT (SGPT) 34 12 - 78 U/L    Alk. phosphatase 69 45 - 117 U/L    Protein, total 7.8 6.4 - 8.2 g/dL    Albumin 3.6 3.5 - 5.0 g/dL    Globulin 4.2 (H) 2.0 - 4.0 g/dL    A-G Ratio 0.9 (L) 1.1 - 2.2     TROPONIN I    Collection Time: 10/04/20 11:15 PM   Result Value Ref Range    Troponin-I, Qt. <0.05 <0.05 ng/mL   BNP    Collection Time: 10/04/20 11:15 PM   Result Value Ref Range    NT pro- <450 pg/mL       XR CHEST SNGL V   Final Result   Impression:   No acute findings.              Active Problems:    Palpitations (10/5/2020)      Dizziness (10/5/2020)        Assessment/Plan: Assessment:  1. Dizziness, palpitations  2. Dehydration  3. LESLYE prerenal azotemia  4. Hypertension  5. Anemia  6. Hypothyroidism  7. Weight loss    Plan:  1. Dizziness, palpitations  Resolved. Etiology unclear. Differential includes orthostatic hypotension versus cardiac etiology. Also seems to have signs and symptoms dehydration. 2D echocardiogram for further eval  Cardiology consult placed  Continue cardiac monitoring. 2.  Dehydration  Continue on IV fluids. 3.  LESLYE prerenal azotemia  BUN 50 with slightly elevated creatinine. Most likely secondary to steroid use and poor p.o. intake. Continue to monitor renal functions. 4.  Hypertension  Home Cozaar held until renal functions improved. Continue home metoprolol. Evaluate for orthostatic changes. 5.  Anemia  On multiple supplement. Monitor H/H. We will get vitamin B12 and folate levels with iron panel. 6.  Hypothyroidism  Continue Synthroid. Follow-up TSH level. 7.  Weight loss  15 pound weight loss over the last 7 months. She does have a history of breast cancer. DVT Prophylaxis: Lovenox  Code Status: Full    Care Plan discussed with Patient. Total time spent with patient: >35 minutes.

## 2020-10-05 NOTE — CONSULTS
Consult    NAME: Alan Cobian   :  1935   MRN:  [id number]     Date/Time:  10/5/2020 3:02 PM    Patient PCP: Rebekah Kumari MD  ________________________________________________________________________    This is an inpatient cardiology consultation requested by Dr. Deepa Freeman for dizziness and palpitations. Assessment:     1. Postural dizziness which appears to be due to dehydration/hypovolemia. MI ruled out  2. Palpitations  3. Hypertension        Plan:     1. IV fluids or oral fluids for hydration. 2. Agree with observation on telemetry bed for any cardiac arrhythmias. 3. Echocardiogram for LV function and valvular function. 4. Lexiscan stress Cardiolite scan myocardial ischemia due to patient cardiac risk factors. []        High complexity decision making was performed        Subjective:   CHIEF COMPLAINT:     HISTORY OF PRESENT ILLNESS:     Jelena Vyas is a 82 y.o.  female  with a recent history of left breast carcinoma breast, S/P XRT and chemo therapy treatment, hypertension, hyperlipidemia, hypothyroidism presents to the emergency room complaining of feeling dizzy, shortness of breath and intermittent palpitations. She was doing fine until day of admission when she felt postural illness she tried to get up from sitting position. Patient also felt somewhat short of breath but denied any chest pain nausea, vomiting diaphoresis.       Past Medical History:   Diagnosis Date    Anxiety 2020    Arthritis     Depression 2020    Diverticulitis 2020    GERD (gastroesophageal reflux disease) 2020    High cholesterol 2020    Hx of dizziness 2020    Hypertension 2020    Primary cancer of lower-inner quadrant of left female breast ([address] 2018    Thyroid disease 2020      Past Surgical History:   Procedure Laterality Date    HX BREAST LUMPECTOMY Left 2019    with SLNB    HX HEENT  2018    Cataract    HX HYSTERECTOMY 1976    HX ORTHOPAEDIC  2014    Left knee    HX ORTHOPAEDIC  08/09/2017    Right knee    HX ORTHOPAEDIC  2013    Right Shoulder     Allergies   Allergen Reactions    Iodinated Contrast Media Shortness of Breath      Meds:  See below  Social History     Tobacco Use    Smoking status: Never Smoker    Smokeless tobacco: Never Used   Substance Use Topics    Alcohol use: Never     Frequency: Never      Family History   Problem Relation Age of Onset    Cancer Mother 80        Esophagas    Heart Disease Father     Prostate Cancer Brother     Breast Cancer Maternal Grandmother 71    Breast Cancer Maternal Cousin 58       REVIEW OF SYSTEMS:     []         Unable to obtain  ROS due to ---   [x]         Total of 12 systems reviewed as follows:    Constitutional: negative fever, negative chills, negative weight loss  Eyes:   negative visual changes  ENT:   negative sore throat, tongue or lip swelling  Respiratory:  negative cough, negative wheezing  Cards: As per history of present illness  GI:   negative for nausea, vomiting, diarrhea, and abdominal pain  Genitourinary: negative for frequency, dysuria  Integument:  negative for rash   Hematologic:  negative for easy bruising and gum/nose bleeding  Musculoskel: negative for myalgias,  back pain  Neurological:  negative for headaches, dizziness, vertigo, weakness  Behavl/Psych: negative for feelings of anxiety, depression     Pertinent Positives include :    Objective:      Physical Exam:    Last 24hrs VS reviewed since prior progress note. Most recent are:    Visit Vitals  /65 (BP 1 Location: Left arm, BP Patient Position: Sitting)   Pulse 86   Temp 97.8 °F (36.6 °C)   Resp 17   Ht 5' 3\" (1.6 m)   Wt 53 kg (116 lb 13.5 oz)   SpO2 95%   Breastfeeding No   BMI 20.70 kg/m²     No intake or output data in the 24 hours ending 10/05/20 1502     General Appearance: Well developed, well nourished, alert & oriented x 3,    no acute distress.   Ears/Nose/Mouth/Throat: Pupils equal and round, Hearing grossly normal.  Neck: Supple. JVP within normal limits. Carotids good upstrokes, with no bruit. Chest: Lungs clear to auscultation bilaterally. Cardiovascular: Regular rate and rhythm, S1S2 normal, no murmur, rubs, gallops. Abdomen: Soft, non-tender, bowel sounds are active. No organomegaly. Extremities: No edema bilaterally. Femoral pulses +2, Distal Pulses +1. Skin: Warm and dry. Neuro: CN II-XII grossly intact, Strength and sensation grossly intact. []         Post-cath site without hematoma, bruit, tenderness, or thrill. Distal pulses intact. Data:      Telemetry:    EKG:  []  No new EKG for review. XR CHEST SNGL V   Final Result   Impression:   No acute findings. Prior to Admission medications    Medication Sig Start Date End Date Taking? Authorizing Provider   rosuvastatin (CRESTOR) 5 mg tablet Take  by mouth nightly. Yes Provider, Historical   calcium-cholecalciferol, D3, (CALTRATE 600+D) tablet Take 1 Tab by mouth daily. Yes Provider, Historical   multivit-min/iron/folic/lutein (CENTRUM SILVER WOMEN PO) Take  by mouth. Yes Provider, Historical   ascorbic acid, vitamin C, (Vitamin C With Precious Hips) 1,000 mg tablet Take  by mouth. Yes Provider, Historical   elderberry fruit (ELDERBERRY PO) Take  by mouth. Yes Provider, Historical   cholecalciferol (Vitamin D3) (5000 Units/125 mcg) tab tablet Take  by mouth daily. Yes Provider, Historical   ZINC PICOLINATE PO Take  by mouth. Yes Provider, Historical   alendronate (FOSAMAX) 70 mg tablet Take  by mouth. Yes Provider, Historical   aspirin delayed-release 81 mg tablet Take  by mouth daily. Yes Provider, Historical   calcium carbonate (CALTREX) 600 mg calcium (1,500 mg) tablet Take 600 mg by mouth two (2) times a day. Yes Provider, Historical   multivitamin, tx-iron-ca-min (THERA-M w/ IRON) 9 mg iron-400 mcg tab tablet Take 1 Tab by mouth daily.    Yes Provider, Historical clonazePAM (KlonoPIN) 0.5 mg tablet Take  by mouth two (2) times daily as needed for Anxiety. Yes Provider, Historical   indapamide (LOZOL) 2.5 mg tablet Take  by mouth daily. Yes Provider, Historical   letrozole (FEMARA) 2.5 mg tablet Take 2.5 mg by mouth daily. Yes Provider, Historical   losartan (COZAAR) 25 mg tablet Take  by mouth two (2) times a day. Yes Provider, Historical   metoprolol succinate (TOPROL-XL) 100 mg tablet Take  by mouth daily. Yes Provider, Historical   levothyroxine (synthroid) 50 mcg tablet Take  by mouth Daily (before breakfast). Yes Provider, Historical   traZODone (DESYREL) 50 mg tablet Take  by mouth nightly. Yes Provider, Historical   ascorbic acid, vitamin C, (Vitamin C) 250 mg tablet Take  by mouth. Yes Provider, Historical       Recent Results (from the past 24 hour(s))   CBC WITH AUTOMATED DIFF    Collection Time: 10/04/20 11:15 PM   Result Value Ref Range    WBC 5.4 3.6 - 11.0 K/uL    RBC 3.81 3.80 - 5.20 M/uL    HGB 10.7 (L) 11.5 - 16.0 g/dL    HCT 33.6 (L) 35.0 - 47.0 %    MCV 88.2 80.0 - 99.0 FL    MCH 28.1 26.0 - 34.0 PG    MCHC 31.8 30.0 - 36.5 g/dL    RDW 14.6 (H) 11.5 - 14.5 %    PLATELET 103 529 - 793 K/uL    MPV 10.1 8.9 - 12.9 FL    NEUTROPHILS 88 (H) 32 - 75 %    LYMPHOCYTES 7 (L) 12 - 49 %    MONOCYTES 4 (L) 5 - 13 %    EOSINOPHILS 0 0 - 7 %    BASOPHILS 0 0 - 1 %    IMMATURE GRANULOCYTES 1 (H) 0.0 - 0.5 %    ABS. NEUTROPHILS 4.8 1.8 - 8.0 K/UL    ABS. LYMPHOCYTES 0.4 (L) 0.8 - 3.5 K/UL    ABS. MONOCYTES 0.2 0.0 - 1.0 K/UL    ABS. EOSINOPHILS 0.0 0.0 - 0.4 K/UL    ABS. BASOPHILS 0.0 0.0 - 0.1 K/UL    ABS. IMM.  GRANS. 0.0 0.00 - 0.04 K/UL    DF AUTOMATED     METABOLIC PANEL, COMPREHENSIVE    Collection Time: 10/04/20 11:15 PM   Result Value Ref Range    Sodium 140 136 - 145 mmol/L    Potassium 3.8 3.5 - 5.1 mmol/L    Chloride 101 97 - 108 mmol/L    CO2 33 (H) 21 - 32 mmol/L    Anion gap 6 5 - 15 mmol/L    Glucose 135 (H) 65 - 100 mg/dL    BUN 50 (H) 6 - 20 mg/dL    Creatinine 0.97 0.55 - 1.02 mg/dL    BUN/Creatinine ratio 52 (H) 12 - 20      GFR est AA >60 >60 ml/min/1.73m2    GFR est non-AA 55 (L) >60 ml/min/1.73m2    Calcium 10.7 (H) 8.5 - 10.1 mg/dL    Bilirubin, total 0.2 0.2 - 1.0 mg/dL    AST (SGOT) 30 15 - 37 U/L    ALT (SGPT) 34 12 - 78 U/L    Alk. phosphatase 69 45 - 117 U/L    Protein, total 7.8 6.4 - 8.2 g/dL    Albumin 3.6 3.5 - 5.0 g/dL    Globulin 4.2 (H) 2.0 - 4.0 g/dL    A-G Ratio 0.9 (L) 1.1 - 2.2     TROPONIN I    Collection Time: 10/04/20 11:15 PM   Result Value Ref Range    Troponin-I, Qt. <0.05 <0.05 ng/mL   BNP    Collection Time: 10/04/20 11:15 PM   Result Value Ref Range    NT pro- <450 pg/mL        Echo Results  (Last 48 hours)    None          Patient EKG's laboratory data and echocardiogram was individually reviewed by me. Please send a copy of this dictation to above referring physician.     Fausto Hairston MD

## 2020-10-05 NOTE — ED TRIAGE NOTES
X 2 brief episodes of SOB today, witness states that pt sat up suddenly clutching her chest saying she couldn't breathe.  NO hx lung disorders

## 2020-10-05 NOTE — ROUTINE PROCESS
Report called to ? [facility]  Who is receiving  to rm [phone]. Reported recent VS, alert &oriented, lab and EKG results. Informed her that pt is still eating at this time and will need AM meds then come to room.

## 2020-10-06 ENCOUNTER — APPOINTMENT (OUTPATIENT)
Dept: NUCLEAR MEDICINE | Age: 85
End: 2020-10-06
Attending: INTERNAL MEDICINE
Payer: MEDICARE

## 2020-10-06 ENCOUNTER — APPOINTMENT (OUTPATIENT)
Dept: NON INVASIVE DIAGNOSTICS | Age: 85
End: 2020-10-06
Attending: INTERNAL MEDICINE
Payer: MEDICARE

## 2020-10-06 LAB
ANION GAP SERPL CALC-SCNC: 7 MMOL/L (ref 5–15)
BUN SERPL-MCNC: 29 MG/DL (ref 6–20)
BUN/CREAT SERPL: 30 (ref 12–20)
CA-I BLD-MCNC: 10.6 MG/DL (ref 8.5–10.1)
CHLORIDE SERPL-SCNC: 101 MMOL/L (ref 97–108)
CO2 SERPL-SCNC: 31 MMOL/L (ref 21–32)
CREAT SERPL-MCNC: 0.98 MG/DL (ref 0.55–1.02)
FOLATE SERPL-MCNC: 57.8 NG/ML (ref 5–21)
GLUCOSE SERPL-MCNC: 85 MG/DL (ref 65–100)
IRON SATN MFR SERPL: 19 % (ref 20–50)
IRON SERPL-MCNC: 75 UG/DL (ref 35–150)
POTASSIUM SERPL-SCNC: 4 MMOL/L (ref 3.5–5.1)
SODIUM SERPL-SCNC: 139 MMOL/L (ref 136–145)
STRESS BASELINE DIAS BP: 90 MMHG
STRESS BASELINE HR: 80 BPM
STRESS BASELINE SYS BP: 169 MMHG
STRESS PERCENT HR ACHIEVED: 85 %
STRESS POST PEAK HR: 117 BPM
STRESS STAGE 1 BP: NORMAL MMHG
STRESS STAGE 1 DURATION: NORMAL MIN:SEC
STRESS STAGE 1 HR: 126 BPM
STRESS STAGE 2 DURATION: NORMAL MIN:SEC
STRESS STAGE 2 HR: 118 BPM
STRESS STAGE 3 BP: NORMAL MMHG
STRESS STAGE 3 DURATION: NORMAL MIN:SEC
STRESS STAGE 3 HR: 111 BPM
STRESS STAGE 4 DURATION: NORMAL MIN:SEC
STRESS STAGE 4 HR: 107 BPM
STRESS STAGE 5 BP: NORMAL MMHG
STRESS STAGE 5 DURATION: NORMAL MIN:SEC
STRESS STAGE 5 HR: 98 BPM
STRESS TARGET HR: 138 BPM
TIBC SERPL-MCNC: 392 UG/DL (ref 250–450)
TROPONIN I SERPL-MCNC: <0.05 NG/ML
TSH SERPL DL<=0.05 MIU/L-ACNC: 0.79 UIU/ML (ref 0.36–3.74)
VIT B12 SERPL-MCNC: >2000 PG/ML (ref 193–986)

## 2020-10-06 PROCEDURE — 84443 ASSAY THYROID STIM HORMONE: CPT

## 2020-10-06 PROCEDURE — A9500 TC99M SESTAMIBI: HCPCS

## 2020-10-06 PROCEDURE — 74011250636 HC RX REV CODE- 250/636: Performed by: HOSPITALIST

## 2020-10-06 PROCEDURE — 80048 BASIC METABOLIC PNL TOTAL CA: CPT

## 2020-10-06 PROCEDURE — 82306 VITAMIN D 25 HYDROXY: CPT

## 2020-10-06 PROCEDURE — 84484 ASSAY OF TROPONIN QUANT: CPT

## 2020-10-06 PROCEDURE — 74011250636 HC RX REV CODE- 250/636

## 2020-10-06 PROCEDURE — 36415 COLL VENOUS BLD VENIPUNCTURE: CPT

## 2020-10-06 PROCEDURE — 99218 HC RM OBSERVATION: CPT

## 2020-10-06 PROCEDURE — 74011250637 HC RX REV CODE- 250/637: Performed by: HOSPITALIST

## 2020-10-06 RX ADMIN — Medication 10 ML: at 22:43

## 2020-10-06 RX ADMIN — REGADENOSON 0.4 MG: 0.08 INJECTION, SOLUTION INTRAVENOUS at 12:07

## 2020-10-06 RX ADMIN — OXYCODONE HYDROCHLORIDE AND ACETAMINOPHEN 500 MG: 500 TABLET ORAL at 09:02

## 2020-10-06 RX ADMIN — LEVOTHYROXINE SODIUM 50 MCG: 0.03 TABLET ORAL at 09:02

## 2020-10-06 RX ADMIN — TRAZODONE HYDROCHLORIDE 50 MG: 50 TABLET ORAL at 22:32

## 2020-10-06 RX ADMIN — ATORVASTATIN CALCIUM 10 MG: 10 TABLET, FILM COATED ORAL at 22:32

## 2020-10-06 RX ADMIN — Medication 10 ML: at 14:00

## 2020-10-06 RX ADMIN — Medication 1 TABLET: at 17:11

## 2020-10-06 RX ADMIN — ASPIRIN 81 MG: 81 TABLET, COATED ORAL at 09:02

## 2020-10-06 RX ADMIN — Medication 10 ML: at 06:12

## 2020-10-06 RX ADMIN — Medication 1 TABLET: at 09:02

## 2020-10-06 RX ADMIN — LETROZOLE 2.5 MG: 2.5 TABLET ORAL at 17:12

## 2020-10-06 NOTE — PROGRESS NOTES
Hospitalist Progress Note    Subjective:   Daily Progress Note: 10/6/2020 10:56 AM    Hospital Course: This is an 82-year-old -American female hypertension with a recent history of carcinoma of left breast s/p lumpectomy and radiation, hypertension, hyperlipidemia, anemia, GERD, and recent weight loss who presented to the emergency department with a primary complaint of shortness of breath on exertion and dizziness. She was in her normal health until she started feeling dizzy after standing up from a seated position. She also reports abnormal aftertaste in her mouth and hand tremors. She was recently started on albuterol inhaler by her primary care provider. Also recently finished Z-Grant prescribed from her orthopedist for Achilles tendinitis. She believes this was causing her dizziness. She also reports 15 pound weight loss since February of this year. She reports history of bilateral thyroid lobectomy for nodules and also has a family history of esophageal cancer. She reports hoarse voice-quality in the mornings, but denies dysphagia. In the ED, lab work-up showed mildly elevated proBNP of 427, otherwise unremarkable. Chest x-ray did not show acute cardiopulmonary infiltrates. Cardiology consult placed. TTE ordered, pending. Iron studies, B12 and folate, and TSH pending. Subjective:    Patient seen and examined at bedside. She is feeling much better today. Going for echocardiogram and stress test. Denies headache, dizziness, shortness of breath, cough, chest pain, or palpitations.      Current Facility-Administered Medications   Medication Dose Route Frequency    technetium sestamibi (CARDIOLITE) injection 30 millicurie  30 millicurie IntraVENous ONCE    ascorbic acid (vitamin C) (VITAMIN C) tablet 500 mg  500 mg Oral DAILY    aspirin delayed-release tablet 81 mg  81 mg Oral DAILY    calcium-vitamin D 600 mg(1,500mg) -200 unit per tablet 1 Tab  1 Tab Oral BID WITH MEALS    clonazePAM (KlonoPIN) tablet 0.5 mg  0.5 mg Oral BID PRN    letrozole (FEMARA) tablet 2.5 mg  2.5 mg Oral DAILY    levothyroxine (SYNTHROID) tablet 50 mcg  50 mcg Oral ACB    [Held by provider] losartan (COZAAR) tablet 25 mg  25 mg Oral BID    metoprolol succinate (TOPROL-XL) XL tablet 100 mg  100 mg Oral DAILY    atorvastatin (LIPITOR) tablet 10 mg  10 mg Oral QHS    traZODone (DESYREL) tablet 50 mg  50 mg Oral QHS    sodium chloride (NS) flush 5-40 mL  5-40 mL IntraVENous Q8H    sodium chloride (NS) flush 5-40 mL  5-40 mL IntraVENous PRN    acetaminophen (TYLENOL) solution 650 mg  650 mg Oral Q4H PRN        Review of Systems  Constitutional: No chills, No sweats, No fatigue  Eyes: No visual disturbances  ENT:  No sore throat  Respiratory: No Shortness of Breath, No cough, No wheezing  Cardiovascular: No chest pain, No palpitations, No extremity edema  Gastrointestinal: No nausea, No vomiting, No diarrhea, No abdominal pain  Integument/breast: No skin lesions   Neurological: No headaches, No dizziness      Objective:     Visit Vitals  BP (!) 149/74 (BP 1 Location: Left arm, BP Patient Position: At rest)   Pulse 66   Temp 98 °F (36.7 °C)   Resp 20   Ht 5' 3\" (1.6 m)   Wt 53 kg (116 lb 13.5 oz)   SpO2 97%   Breastfeeding No   BMI 20.70 kg/m²      O2 Device: Room air    Temp (24hrs), Av °F (36.7 °C), Min:97.8 °F (36.6 °C), Max:98.2 °F (36.8 °C)      No intake/output data recorded. No intake/output data recorded. PHYSICAL EXAM:  Constitutional: Frail, elderly AA female. No acute distress  Skin: Extremities and face reveal no rashes. HEENT: Sclerae anicteric. PERRL. No oral ulcers. The neck is supple and no masses. Cardiovascular: Regular rate and rhythm. Normal S1/S2. No murmur or rub. No JVD. Respiratory:  Clear breath sounds bilaterally with no wheezes, rales, or rhonchi. GI: Abdomen nondistended, soft, and nontender. Normal active bowel sounds.   Rectal: Deferred   Musculoskeletal: No pitting edema of the lower legs. Able to move all extremities. Neurological:  Patient is alert and oriented. Cranial nerves II-XII grossly intact  Psychiatric: Mood appears appropriate       Data Review    Recent Results (from the past 24 hour(s))   NUCLEAR CARDIAC STRESS TEST    Collection Time: 10/06/20  9:33 AM   Result Value Ref Range    Target  bpm       XR CHEST SNGL V   Final Result   Impression:   No acute findings. Active Problems:    Palpitations (10/5/2020)      Dizziness (10/5/2020)        Assessment/Plan:     Assessment:  1. Dizziness, palpitations  2. Dehydration  3. LESLYE prerenal azotemia  4. Hypertension  5. Anemia  6. Hypothyroidism  7. Weight loss     Plan:  1. Dizziness, palpitations  Resolved. Etiology unclear. Differential includes orthostatic hypotension versus cardiac etiology. Also seems to have signs and symptoms dehydration. 2D echocardiogram for further eval  Lexiscan Stress test today  Cardiology input appreciated  Continue cardiac monitoring.     2.  Dehydration  Continue on IV fluids.     3. LESLYE prerenal azotemia  BUN 50 with slightly elevated creatinine. Most likely secondary to steroid use and poor p.o. intake. Continue to monitor renal functions.     4. Hypertension  Home Cozaar held until renal functions improved. Continue home metoprolol. Evaluate for orthostatic changes.     5. Anemia  On multiple supplement. Monitor H/H. We will get vitamin B12 and folate levels with iron panel.     6. Hypothyroidism  Continue Synthroid. Follow-up TSH level.     7.  Weight loss  15 pound weight loss over the last 7 months. She does have a history of breast cancer.     DVT Prophylaxis: Lovenox  Code Status: Full     Care Plan discussed with Patient.     Total time spent with patient: >35 minutes.

## 2020-10-06 NOTE — ED PROVIDER NOTES
EMERGENCY DEPARTMENT HISTORY AND PHYSICAL EXAM      Date: 10/4/2020  Patient Name: Cynthia Gonzalez    History of Presenting Illness     Chief Complaint   Patient presents with    Shortness of Breath       History Provided By: Patient    HPI: Cynthia Gonzalez, 82 y.o. female with a past medical history significant Thyroid disease, hypertension, GERD, depression presents to the ED with cc of general fatigue epigastric abdominal discomfort and shortness of breath. Patient states she was started on azithromycin 3 times a day for what she states was an Achilles tendon issue and states she started taking it today. After the second dose she began noticing a queasy feeling in her stomach and mid chest and shortness of breath with exertion. Feels like she cannot get a deep breath in. She took the evening dose and the symptoms worsened again. Overall she states she feels weak and drained. Denies perri pain. Denies nausea or vomiting. There are no other complaints, changes, or physical findings at this time.     PCP: Federica Ling MD    Current Facility-Administered Medications   Medication Dose Route Frequency Provider Last Rate Last Dose    ascorbic acid (vitamin C) (VITAMIN C) tablet 500 mg  500 mg Oral DAILY Lake Fall MD   500 mg at 10/05/20 0900    aspirin delayed-release tablet 81 mg  81 mg Oral DAILY Lake Fall MD   81 mg at 10/05/20 1134    calcium-vitamin D 600 mg(1,500mg) -200 unit per tablet 1 Tab  1 Tab Oral BID WITH MEALS Lake Fall MD   1 Tab at 10/05/20 1727    clonazePAM (KlonoPIN) tablet 0.5 mg  0.5 mg Oral BID PRN Lake Flal MD        letrozole [facility]) tablet 2.5 mg  2.5 mg Oral DAILY Lake Fall MD   2.5 mg at 10/05/20 1727    levothyroxine (SYNTHROID) tablet 50 mcg  50 mcg Oral ACB Lake Fall MD   50 mcg at 10/05/20 1134    [Held by provider] losartan (COZAAR) tablet 25 mg  25 mg Oral BID Lake Fall MD       [address] [place] [address] metoprolol succinate (TOPROL-XL) XL tablet 100 mg  100 mg Oral DAILY Matthew James MD   100 mg at 10/05/20 1727    atorvastatin (LIPITOR) tablet 10 mg  10 mg Oral QHS Matthew James MD   10 mg at 10/05/20 2111    traZODone (DESYREL) tablet 50 mg  50 mg Oral QHS Matthew James MD   50 mg at 10/05/20 2111    sodium chloride (NS) flush 5-40 mL  5-40 mL IntraVENous Q8H Matthew James MD   10 mL at 10/06/20 0612    sodium chloride (NS) flush 5-40 mL  5-40 mL IntraVENous PRN Matthew James MD        acetaminophen (TYLENOL) solution 650 mg  650 mg Oral Q4H PRN Matthew James MD           Past History     Past Medical History:  Past Medical History:   Diagnosis Date    Anxiety 6/16/2020    Arthritis     Depression 6/16/2020    Diverticulitis 6/16/2020    GERD (gastroesophageal reflux disease) 6/16/2020    High cholesterol 6/16/2020    Hx of dizziness 6/16/2020    Hypertension 6/16/2020    Primary cancer of lower-inner quadrant of left female breast ([address] 12/18/2018    Thyroid disease 6/16/2020       Past Surgical History:  Past Surgical History:   Procedure Laterality Date    HX BREAST LUMPECTOMY Left 01/23/2019    with SLNB    HX HEENT  06/25/2018    Cataract    HX HYSTERECTOMY  1976    HX ORTHOPAEDIC  2014    Left knee    HX ORTHOPAEDIC  08/09/2017    Right knee    HX ORTHOPAEDIC  2013    Right Shoulder       Family History:  Family History   Problem Relation Age of Onset    Cancer Mother 80        Esophagas    Heart Disease Father     Prostate Cancer Brother     Breast Cancer Maternal Grandmother 71    Breast Cancer Maternal Cousin 58       Social History:  Social History     Tobacco Use    Smoking status: Never Smoker    Smokeless tobacco: Never Used   Substance Use Topics    Alcohol use: Never     Frequency: Never    Drug use: Never       Allergies:   Allergies   Allergen Reactions    Iodinated Contrast Media Shortness of Breath Review of Systems   *    Review of Systems   Constitutional: Positive for activity change and fatigue. Negative for appetite change and fever. HENT: Negative for congestion, ear pain, sinus pressure, sinus pain and sore throat. Eyes: Negative for redness and visual disturbance. Respiratory: Positive for shortness of breath. Negative for cough and chest tightness. Cardiovascular: Positive for chest pain. Negative for palpitations and leg swelling. Gastrointestinal: Positive for abdominal pain and nausea. Negative for diarrhea and vomiting. Genitourinary: Negative for dysuria and flank pain. Musculoskeletal: Negative for arthralgias, back pain, gait problem and myalgias. Skin: Negative for rash. Neurological: Positive for weakness. Negative for dizziness, speech difficulty, light-headedness, numbness and headaches. Psychiatric/Behavioral: Negative for suicidal ideas. The patient is not nervous/anxious. Physical Exam   *    Physical Exam  Vitals signs and nursing note reviewed. Constitutional:       General: She is not in acute distress. Appearance: Normal appearance. She is not ill-appearing. Comments: Thin, elderly well-appearing female in no distress   HENT:      Head: Normocephalic and atraumatic. Nose: Nose normal.      Mouth/Throat:      Mouth: Mucous membranes are moist.   Eyes:      Pupils: Pupils are equal, round, and reactive to light. Neck:      Musculoskeletal: Normal range of motion and neck supple. Cardiovascular:      Rate and Rhythm: Normal rate and regular rhythm. Pulmonary:      Effort: Pulmonary effort is normal. No tachypnea, bradypnea, accessory muscle usage or respiratory distress. Breath sounds: Normal breath sounds. No decreased breath sounds, wheezing or rhonchi. Chest:      Chest wall: No mass, deformity or tenderness. Abdominal:      General: Abdomen is flat. Bowel sounds are normal.      Palpations: Abdomen is soft. Musculoskeletal: Normal range of motion. Skin:     General: Skin is warm and dry. Capillary Refill: Capillary refill takes less than 2 seconds. Neurological:      General: No focal deficit present. Mental Status: She is alert. Psychiatric:         Mood and Affect: Mood normal.         Diagnostic Study Results     Labs -   No results found for this or any previous visit (from the past 12 hour(s)). Radiologic Studies -   [unfilled]  CT Results  (Last 48 hours)    None        CXR Results  (Last 48 hours)               10/05/20 0055  XR CHEST SNGL V Final result    Impression:  Impression:   No acute findings. Narrative:  Study: XR CHEST SNGL V       Clinical indication: chest pain       Comparison: 10/1/2020. Findings:       No consolidative airspace disease, pleural effusion or pneumothorax. Cardiomediastinal contours are within normal limits. No pulmonary edema. No acute osseous abnormality identified. Medical Decision Making and ED Course   I am the first provider for this patient. I reviewed the vital signs, available nursing notes, past medical history, past surgical history, family history and social history. Vital Signs-Reviewed the patient's vital signs. Patient Vitals for the past 12 hrs:   Temp Pulse Resp BP SpO2   10/06/20 0744 98 °F (36.7 °C) 72 20 (!) 149/74 97 %   10/06/20 0500 98 °F (36.7 °C) 72 20 (!) 154/77 98 %   10/06/20 0400  72      10/06/20 0038 98.1 °F (36.7 °C) 89 18 (!) 143/74 96 %   10/06/20 0000  89      10/05/20 2040     93 %   10/05/20 2000 98.2 °F (36.8 °C) 87 17 (!) 153/77 98 %       Provider Notes (Medical Decision Making):   Patient is a 82-year-old female presenting with generalized weakness and some epigastric discomfort and shortness of breath she associates this with taking azithromycin antibiotic. Clinically seems like potentially gastritis.   However we had discussed symptomatic treatment and follow-up but patient very weak and unsteady on her feet. We will test for COVID will admit for observation. [facility]       ED Course:   Initial assessment performed. The patients presenting problems have been discussed, and they are in agreement with the care plan formulated and outlined with them. I have encouraged them to ask questions as they arise throughout their visit. ED Course as of Oct 06 0751   Mon Oct 05, 2020   0217 Still waiting troponin result. Pt feeling much better. []   4650 Troponin-I, Qt.: <0.05 []   8276 NT pro-BNP: 427 []   0356 NT pro-BNP: 427 [MC]      ED Course User Index  [MC] Tirso House MD               Disposition         Admitted        Diagnosis     Clinical Impression: Weakness, chest pain, angina equivalent, acute viral infection. Attestations:    Mora Barnett MD    Please note that this dictation was completed with Pictorious, the computer voice recognition software. Quite often unanticipated grammatical, syntax, homophones, and other interpretive errors are inadvertently transcribed by the computer software. Please disregard these errors. Please excuse any errors that have escaped final proofreading. Thank you.

## 2020-10-06 NOTE — PROGRESS NOTES
Bedside shift change report given to Cruzito Lim RN (oncoming nurse) by Kelsy Rodney RN (offgoing nurse). Report included the following information SBAR.

## 2020-10-07 VITALS
HEIGHT: 63 IN | RESPIRATION RATE: 20 BRPM | OXYGEN SATURATION: 98 % | BODY MASS INDEX: 20.55 KG/M2 | HEART RATE: 95 BPM | WEIGHT: 116 LBS | SYSTOLIC BLOOD PRESSURE: 141 MMHG | DIASTOLIC BLOOD PRESSURE: 79 MMHG | TEMPERATURE: 98 F

## 2020-10-07 PROBLEM — R00.2 PALPITATIONS: Status: RESOLVED | Noted: 2020-10-05 | Resolved: 2020-10-07

## 2020-10-07 LAB
25(OH)D3 SERPL-MCNC: 82.2 NG/ML (ref 30–100)
ALBUMIN SERPL-MCNC: 3.3 G/DL (ref 3.5–5)
ALBUMIN/GLOB SERPL: 0.8 {RATIO} (ref 1.1–2.2)
ALP SERPL-CCNC: 64 U/L (ref 45–117)
ALT SERPL-CCNC: 31 U/L (ref 12–78)
ANION GAP SERPL CALC-SCNC: 4 MMOL/L (ref 5–15)
AST SERPL W P-5'-P-CCNC: 22 U/L (ref 15–37)
BASOPHILS # BLD: 0.1 K/UL (ref 0–0.1)
BASOPHILS NFR BLD: 1 % (ref 0–1)
BILIRUB SERPL-MCNC: 0.6 MG/DL (ref 0.2–1)
BUN SERPL-MCNC: 30 MG/DL (ref 6–20)
BUN/CREAT SERPL: 30 (ref 12–20)
CA-I BLD-MCNC: 10 MG/DL (ref 8.5–10.1)
CHLORIDE SERPL-SCNC: 102 MMOL/L (ref 97–108)
CO2 SERPL-SCNC: 33 MMOL/L (ref 21–32)
CREAT SERPL-MCNC: 1 MG/DL (ref 0.55–1.02)
DIFFERENTIAL METHOD BLD: ABNORMAL
EOSINOPHIL # BLD: 0 K/UL (ref 0–0.4)
EOSINOPHIL NFR BLD: 1 % (ref 0–7)
ERYTHROCYTE [DISTWIDTH] IN BLOOD BY AUTOMATED COUNT: 14 % (ref 11.5–14.5)
GLOBULIN SER CALC-MCNC: 3.9 G/DL (ref 2–4)
GLUCOSE SERPL-MCNC: 76 MG/DL (ref 65–100)
HCT VFR BLD AUTO: 35.8 % (ref 35–47)
HGB BLD-MCNC: 11.4 G/DL (ref 11.5–16)
IMM GRANULOCYTES # BLD AUTO: 0 K/UL (ref 0–0.04)
IMM GRANULOCYTES NFR BLD AUTO: 0 % (ref 0–0.5)
LYMPHOCYTES # BLD: 0.8 K/UL (ref 0.8–3.5)
LYMPHOCYTES NFR BLD: 19 % (ref 12–49)
MAGNESIUM SERPL-MCNC: 2.1 MG/DL (ref 1.6–2.4)
MCH RBC QN AUTO: 28.6 PG (ref 26–34)
MCHC RBC AUTO-ENTMCNC: 31.8 G/DL (ref 30–36.5)
MCV RBC AUTO: 89.9 FL (ref 80–99)
MONOCYTES # BLD: 0.7 K/UL (ref 0–1)
MONOCYTES NFR BLD: 17 % (ref 5–13)
NEUTS SEG # BLD: 2.6 K/UL (ref 1.8–8)
NEUTS SEG NFR BLD: 62 % (ref 32–75)
PLATELET # BLD AUTO: 305 K/UL (ref 150–400)
PMV BLD AUTO: 10.5 FL (ref 8.9–12.9)
POTASSIUM SERPL-SCNC: 3.6 MMOL/L (ref 3.5–5.1)
PROT SERPL-MCNC: 7.2 G/DL (ref 6.4–8.2)
RBC # BLD AUTO: 3.98 M/UL (ref 3.8–5.2)
SODIUM SERPL-SCNC: 139 MMOL/L (ref 136–145)
WBC # BLD AUTO: 4.2 K/UL (ref 3.6–11)

## 2020-10-07 PROCEDURE — 85025 COMPLETE CBC W/AUTO DIFF WBC: CPT

## 2020-10-07 PROCEDURE — 80053 COMPREHEN METABOLIC PANEL: CPT

## 2020-10-07 PROCEDURE — 74011250637 HC RX REV CODE- 250/637: Performed by: HOSPITALIST

## 2020-10-07 PROCEDURE — 83735 ASSAY OF MAGNESIUM: CPT

## 2020-10-07 PROCEDURE — 94760 N-INVAS EAR/PLS OXIMETRY 1: CPT

## 2020-10-07 PROCEDURE — 36415 COLL VENOUS BLD VENIPUNCTURE: CPT

## 2020-10-07 PROCEDURE — 99218 HC RM OBSERVATION: CPT

## 2020-10-07 RX ORDER — INDAPAMIDE 2.5 MG/1
1.25 TABLET, FILM COATED ORAL DAILY
Qty: 30 TAB | Refills: 1 | Status: SHIPPED | OUTPATIENT
Start: 2020-10-07 | End: 2021-11-05 | Stop reason: ALTCHOICE

## 2020-10-07 RX ADMIN — Medication 30 ML: at 06:43

## 2020-10-07 RX ADMIN — LEVOTHYROXINE SODIUM 50 MCG: 0.03 TABLET ORAL at 06:35

## 2020-10-07 NOTE — ROUTINE PROCESS
Bedside shift change report given to Timi Vasquez RN (oncoming nurse) by Bailee Muse RN (offgoing nurse). Report included the following information SBAR.

## 2020-10-07 NOTE — DISCHARGE SUMMARY
Hospitalist Discharge Summary     Patient ID:    Juan Riddle  [id number]  49 y.o.  1935    Admit date: 10/4/2020    Discharge date : 10/7/2020    Chronic Diagnoses:    Problem List as of 10/7/2020 Date Reviewed: 10/5/2020          Codes Class Noted - Resolved    Dizziness ICD-10-CM: R42  ICD-9-CM: 780.4  10/5/2020 - Present        Anxiety ICD-10-CM: F41.9  ICD-9-CM: 300.00  Unknown - Present        Arthritis ICD-10-CM: M19.90  ICD-9-CM: 716.90  Unknown - Present        Depression ICD-10-CM: F32.9  ICD-9-CM: 311  Unknown - Present        High cholesterol ICD-10-CM: E78.00  ICD-9-CM: 272.0  Unknown - Present        Hypertension ICD-10-CM: I10  ICD-9-CM: 401.9  Unknown - Present        Thyroid disease ICD-10-CM: E07.9  ICD-9-CM: 246. 9  Unknown - Present        GERD (gastroesophageal reflux disease) ICD-10-CM: K21.9  ICD-9-CM: 530.81  Unknown - Present        Diverticulitis ICD-10-CM: K57.92  ICD-9-CM: 562.11  Unknown - Present        Hx of dizziness ICD-10-CM: Z87.898  ICD-9-CM: V13.89  6/16/2020 - Present        Primary cancer of lower-inner quadrant of left female breast ([address] ICD-10-CM: C50.312  ICD-9-CM: 174.3  12/18/2018 - Present        RESOLVED: Palpitations ICD-10-CM: R00.2  ICD-9-CM: 785.1  10/5/2020 - 10/7/2020          22    Final Diagnoses: Active Problems:    Dizziness (10/5/2020)        Reason for Hospitalization: Dizziness, palpitations      Hospital Course:    This is an 74-year-old -American female hypertension with a recent history of carcinoma of left breast s/p lumpectomy and radiation, hypertension, hyperlipidemia, anemia, GERD, and recent weight loss who presented to the emergency department with a primary complaint of shortness of breath on exertion and dizziness.  She was in her normal health until she started feeling dizzy after standing up from a seated position.  She also reports abnormal aftertaste in her mouth and hand tremors.  She was recently started on albuterol inhaler by her primary care provider. Cali Morris recently finished Z-Grant prescribed from her orthopedist for Achilles tendinitis. Itzel Lagos believes this was causing her dizziness.  She also reports 15-pound weight loss since February of this year. Itzel Lagos reports history of bilateral thyroid lobectomy for nodules and also has a family history of esophageal cancer.  She reports hoarse voice-quality in the mornings, but denies dysphagia. In the ED, lab work-up showed mildly elevated proBNP of 427, otherwise unremarkable.  Chest x-ray did not show acute cardiopulmonary infiltrates.  Cardiology consult placed.  Patient had a normal Nuclear Cardiac Stress Test. No further workup cardiac workup per Cardiolgy with transthoracic echocardiogram. Vitamin B12 and folate levels markedly elevated on labs. Patient gets Vitamin B12 injections from her PCP. Advised to follow up with PCP to discuss further medication adjustments. Patient's dizziness and palpitations likely secondary to orthostatic hypotension. Her home Lozol dose was decreased as this is a side effect that can manifest. Patient cleared by cardiology for discharge. Patient also advised to follow up with her Oncologist for further workup regarding recent weight loss. Discharge Medications:   Current Discharge Medication List      CONTINUE these medications which have CHANGED    Details   indapamide (LOZOL) 2.5 mg tablet Take 0.5 Tabs by mouth daily. Qty: 30 Tab, Refills: 1         CONTINUE these medications which have NOT CHANGED    Details   rosuvastatin (CRESTOR) 5 mg tablet Take  by mouth nightly. calcium-cholecalciferol, D3, (CALTRATE 600+D) tablet Take 1 Tab by mouth daily. multivit-min/iron/folic/lutein (CENTRUM SILVER WOMEN PO) Take  by mouth. !! ascorbic acid, vitamin C, (Vitamin C With Precious Hips) 1,000 mg tablet Take  by mouth.      elderberry fruit (ELDERBERRY PO) Take  by mouth.       cholecalciferol (Vitamin D3) (5000 Units/125 mcg) tab tablet Take  by mouth daily. ZINC PICOLINATE PO Take  by mouth. alendronate (FOSAMAX) 70 mg tablet Take  by mouth. aspirin delayed-release 81 mg tablet Take  by mouth daily. calcium carbonate (CALTREX) 600 mg calcium (1,500 mg) tablet Take 600 mg by mouth two (2) times a day. multivitamin, tx-iron-ca-min (THERA-M w/ IRON) 9 mg iron-400 mcg tab tablet Take 1 Tab by mouth daily. clonazePAM (KlonoPIN) 0.5 mg tablet Take  by mouth two (2) times daily as needed for Anxiety. letrozole (FEMARA) 2.5 mg tablet Take 2.5 mg by mouth daily. losartan (COZAAR) 25 mg tablet Take  by mouth two (2) times a day. metoprolol succinate (TOPROL-XL) 100 mg tablet Take  by mouth daily. levothyroxine (synthroid) 50 mcg tablet Take  by mouth Daily (before breakfast). traZODone (DESYREL) 50 mg tablet Take  by mouth nightly. !! ascorbic acid, vitamin C, (Vitamin C) 250 mg tablet Take  by mouth. !! - Potential duplicate medications found. Please discuss with provider. Follow up Care:    1. Rebekah Kumari MD in 1-2 weeks. Follow-up Information     Follow up With Specialties Details Why Contact Info    Rebekah Kumari MD Internal Medicine In 1 week F/u hospital care and to discuss decreasing frequency of Vitamin B12 injections [facility]  [phone]              Patient Follow Up Instructions: Activity: Activity as tolerated  Diet:  Regular Diet    Condition at Discharge:  Stable  __________________________________________________________________    Disposition  Home or Self Care  ____________________________________________________________________    Code Status:  Full Code  ___________________________________________________________________    Discharge Exam:  Patient seen and examined by me on discharge day. Pertinent Findings:  Gen:    Not in distress  Chest: Clear lungs  CVS:   Regular rhythm.   No edema  Abd:  Soft, not distended, not tender  Neuro:  Alert    CONSULTATIONS: Cardiology    Significant Diagnostic Studies:   Recent Results (from the past 24 hour(s))   NUCLEAR CARDIAC STRESS TEST    Collection Time: 10/06/20  1:37 PM   Result Value Ref Range    Target  bpm    Baseline HR 80 bpm    Baseline  mmHg    Percent HR 85 %    Post peak  bpm    Stress Base Diastolic BP 90 mmHg    Stress Stage 1 Duration 1 min min:sec    Stress Stage 1  bpm    Stress Stage 1 /101 mmHg    Stress Stage 2 Duration 2 min min:sec    Stress Stage 2  bpm    Stress Stage 3 Duration 3 min min:sec    Stress Stage 3  bpm    Stress Stage 3 /86 mmHg    Stress Stage 4 Duration 4 min min:sec    Stress Stage 4  bpm    Stress Stage 5 Duration 5 min min:sec    Stress Stage 5 HR 98 bpm    Stress Stage 5 /98 mmHg   TSH 3RD GENERATION    Collection Time: 10/06/20  4:36 PM   Result Value Ref Range    TSH 0.79 0.36 - 1.77 uIU/mL   METABOLIC PANEL, BASIC    Collection Time: 10/06/20  4:36 PM   Result Value Ref Range    Sodium 139 136 - 145 mmol/L    Potassium 4.0 3.5 - 5.1 mmol/L    Chloride 101 97 - 108 mmol/L    CO2 31 21 - 32 mmol/L    Anion gap 7 5 - 15 mmol/L    Glucose 85 65 - 100 mg/dL    BUN 29 (H) 6 - 20 mg/dL    Creatinine 0.98 0.55 - 1.02 mg/dL    BUN/Creatinine ratio 30 (H) 12 - 20      GFR est AA >60 >60 ml/min/1.73m2    GFR est non-AA 54 (L) >60 ml/min/1.73m2    Calcium 10.6 (H) 8.5 - 10.1 mg/dL   TROPONIN I    Collection Time: 10/06/20  4:36 PM   Result Value Ref Range    Troponin-I, Qt. <0.05 <0.05 ng/mL     XR CHEST SNGL V   Final Result   Impression:   No acute findings.                  Signed:  Ranjit Londono PA-C  10/7/2020  8:50 AM

## 2020-10-30 ENCOUNTER — OFFICE VISIT (OUTPATIENT)
Dept: UROLOGY | Age: 85
End: 2020-10-30
Payer: MEDICARE

## 2020-10-30 VITALS — WEIGHT: 120 LBS | TEMPERATURE: 98.2 F | HEIGHT: 61 IN | BODY MASS INDEX: 22.66 KG/M2

## 2020-10-30 DIAGNOSIS — N81.10 VAGINAL PROLAPSE: Primary | ICD-10-CM

## 2020-10-30 DIAGNOSIS — R39.11 HESITANCY OF MICTURITION: ICD-10-CM

## 2020-10-30 DIAGNOSIS — N39.41 URGE INCONTINENCE: ICD-10-CM

## 2020-10-30 LAB
BILIRUB UR QL STRIP: NEGATIVE
GLUCOSE UR-MCNC: NEGATIVE MG/DL
KETONES P FAST UR STRIP-MCNC: NEGATIVE MG/DL
PH UR STRIP: 7 [PH] (ref 4.6–8)
PROT UR QL STRIP: NEGATIVE
SP GR UR STRIP: 1.02 (ref 1–1.03)
UA UROBILINOGEN AMB POC: NORMAL (ref 0.2–1)
URINALYSIS CLARITY POC: CLEAR
URINALYSIS COLOR POC: YELLOW
URINE BLOOD POC: NEGATIVE
URINE LEUKOCYTES POC: NORMAL
URINE NITRITES POC: NEGATIVE

## 2020-10-30 PROCEDURE — 81003 URINALYSIS AUTO W/O SCOPE: CPT | Performed by: UROLOGY

## 2020-10-30 PROCEDURE — 99204 OFFICE O/P NEW MOD 45 MIN: CPT | Performed by: UROLOGY

## 2020-10-30 PROCEDURE — G8427 DOCREV CUR MEDS BY ELIG CLIN: HCPCS | Performed by: UROLOGY

## 2020-10-30 PROCEDURE — G8420 CALC BMI NORM PARAMETERS: HCPCS | Performed by: UROLOGY

## 2020-10-30 NOTE — PROGRESS NOTES
HISTORY OF PRESENT ILLNESS  Kristan Patel is a 82 y.o. female. Chief Complaint   Patient presents with    New Patient    Other     bladder prolapse      She has vaginal prolapse 14 years. She managed with a pessary for 9 years but could no longer manage with her arthritis. She feels a bulge and dryness. It is uncomfortable. She has incontinence, related to urge. She can have hesitancy and be unable to void. It has been about 18 months. It is about the same. She has nocturia x2 and wears a depends in case she cannot get to the toilet in time. It can be every other week when she has an accident. She had a heart evaluation about 3 weeks ago at the hospital.  She can have some shortness of breath with walking. She sees Dr. Salvador Herman for cardiology with an echo. She states they did not find problems with her heart. She thinks she may have anxiety, especially with her  with memory issues. Chronic Conditions Addressed Today     1. Vaginal prolapse - Primary    2. Urge incontinence    3. Hesitancy of micturition            Review of Systems   Respiratory: Positive for shortness of breath (occ dyspnea with walking). Musculoskeletal: Positive for joint pain (Foot pain, torn tendon). All other systems reviewed and are negative.       Past Medical History:   Diagnosis Date    Anxiety 6/16/2020    Arthritis     Depression 6/16/2020    Diverticulitis 6/16/2020    GERD (gastroesophageal reflux disease) 6/16/2020    High cholesterol 6/16/2020    Hx of dizziness 6/16/2020    Hypertension 6/16/2020    Primary cancer of lower-inner quadrant of left female breast ([facility] [address] 12/18/2018    breast     Thyroid disease 6/16/2020      Past Surgical History:   Procedure Laterality Date    HX BREAST LUMPECTOMY Left 01/23/2019    with SLNB    HX HEENT  06/25/2018    Cataract    HX HYSTERECTOMY  1976    HX ORTHOPAEDIC  2014    Left knee    HX ORTHOPAEDIC  08/09/2017    Right knee    HX ORTHOPAEDIC 2013    Right Shoulder     Family History   Problem Relation Age of Onset    Cancer Mother 80        Esophagas    Heart Disease Father     Prostate Cancer Brother     Breast Cancer Maternal Grandmother 71    Breast Cancer Maternal Cousin 58        Physical Exam  Vitals signs reviewed. Constitutional:       General: She is not in acute distress. Appearance: Normal appearance. She is obese. She is not ill-appearing, toxic-appearing or diaphoretic. HENT:      Head: Normocephalic and atraumatic. Nose: Nose normal.      Mouth/Throat:      Mouth: Mucous membranes are moist.      Pharynx: Oropharynx is clear. Eyes:      Extraocular Movements: Extraocular movements intact. Conjunctiva/sclera: Conjunctivae normal.      Pupils: Pupils are equal, round, and reactive to light. Neck:      Musculoskeletal: Normal range of motion. Cardiovascular:      Rate and Rhythm: Normal rate and regular rhythm. Pulmonary:      Effort: Pulmonary effort is normal. No respiratory distress. Breath sounds: Normal breath sounds. Abdominal:      General: Abdomen is flat. Bowel sounds are normal.      Palpations: Abdomen is soft. Genitourinary:     General: Normal vulva. Exam position: Lithotomy position. Pubic Area: No rash. Labia:         Right: No rash, tenderness, lesion or injury. Left: No rash, tenderness or lesion. Urethra: No prolapse, urethral pain, urethral swelling or urethral lesion. Vagina: No erythema or tenderness. Uterus: Absent. Comments: Urethra appears well supported. No obvious descent or prolapse in the lithotomy position. Bladder is recently empty so difficult to assess for cystocele. No obvious enterocele or rectocele  Musculoskeletal: Normal range of motion. General: No swelling or deformity. Lymphadenopathy:      Cervical: No cervical adenopathy. Upper Body:      Right upper body: No supraclavicular adenopathy.       Left upper body: No supraclavicular adenopathy. Skin:     General: Skin is warm and dry. Neurological:      General: No focal deficit present. Mental Status: She is alert and oriented to person, place, and time. Psychiatric:         Mood and Affect: Mood normal.         Behavior: Behavior normal.                     ASSESSMENT and PLAN  Diagnoses and all orders for this visit:    1. Vaginal prolapse  Comments:  She reports significant prolapse intermittently. It was difficult to assess today. Plan on evaluation with cystoscopy. 2. Urge incontinence  Comments:  Plan on cystoscopy and CMG to evaluate further. 3. Hesitancy of micturition  Comments:  Voiding studies to assess for obstruction. Uroflow/PVR.     Other orders  -     AMB POC URINALYSIS DIP STICK AUTO W/O MICRO               Larissa Lopez MD

## 2020-11-04 LAB — BACTERIA UR CULT: NO GROWTH

## 2020-12-10 ENCOUNTER — OFFICE VISIT (OUTPATIENT)
Dept: UROLOGY | Age: 85
End: 2020-12-10
Payer: MEDICARE

## 2020-12-10 VITALS — TEMPERATURE: 97.5 F

## 2020-12-10 DIAGNOSIS — N39.3 STRESS INCONTINENCE: Primary | ICD-10-CM

## 2020-12-10 DIAGNOSIS — R39.11 HESITANCY OF MICTURITION: ICD-10-CM

## 2020-12-10 DIAGNOSIS — N81.10 VAGINAL PROLAPSE: ICD-10-CM

## 2020-12-10 DIAGNOSIS — N36.42 INTRINSIC SPHINCTER DEFICIENCY (ISD): ICD-10-CM

## 2020-12-10 DIAGNOSIS — N39.41 URGE INCONTINENCE: ICD-10-CM

## 2020-12-10 LAB
AVG FLOW RATE POC: 14 ML/SECONDS
BILIRUB UR QL STRIP: NEGATIVE
BILIRUB UR QL STRIP: NEGATIVE
FLOW TIME POC: 13 SECONDS
GLUCOSE UR-MCNC: NEGATIVE MG/DL
GLUCOSE UR-MCNC: NEGATIVE MG/DL
KETONES P FAST UR STRIP-MCNC: NEGATIVE MG/DL
KETONES P FAST UR STRIP-MCNC: NEGATIVE MG/DL
MAX FLOW RATE POC: 22 ML/SECONDS
PH UR STRIP: 6.5 [PH] (ref 4.6–8)
PH UR STRIP: 7 [PH] (ref 4.6–8)
PROT UR QL STRIP: NEGATIVE
PROT UR QL STRIP: NORMAL
PVR POC: 0 CC
SP GR UR STRIP: 1.02 (ref 1–1.03)
SP GR UR STRIP: 1.02 (ref 1–1.03)
TIME TO MAX, POC: 5 SECONDS
UA UROBILINOGEN AMB POC: NORMAL (ref 0.2–1)
UA UROBILINOGEN AMB POC: NORMAL (ref 0.2–1)
URINALYSIS CLARITY POC: CLEAR
URINALYSIS CLARITY POC: CLEAR
URINALYSIS COLOR POC: YELLOW
URINALYSIS COLOR POC: YELLOW
URINE BLOOD POC: NORMAL
URINE BLOOD POC: NORMAL
URINE LEUKOCYTES POC: NEGATIVE
URINE LEUKOCYTES POC: NORMAL
URINE NITRITES POC: NEGATIVE
URINE NITRITES POC: NEGATIVE
VOIDED VOLUME POC: 176 ML
VOIDING TIME POC: 13 SECONDS

## 2020-12-10 PROCEDURE — 51741 ELECTRO-UROFLOWMETRY FIRST: CPT | Performed by: UROLOGY

## 2020-12-10 PROCEDURE — 52000 CYSTOURETHROSCOPY: CPT | Performed by: UROLOGY

## 2020-12-10 PROCEDURE — 51798 US URINE CAPACITY MEASURE: CPT | Performed by: UROLOGY

## 2020-12-10 PROCEDURE — 51725 SIMPLE CYSTOMETROGRAM: CPT | Performed by: UROLOGY

## 2020-12-10 PROCEDURE — 81003 URINALYSIS AUTO W/O SCOPE: CPT | Performed by: UROLOGY

## 2020-12-10 RX ORDER — OXYBUTYNIN CHLORIDE 10 MG/1
10 TABLET, EXTENDED RELEASE ORAL DAILY
Qty: 30 TAB | Refills: 2 | Status: SHIPPED | OUTPATIENT
Start: 2020-12-10 | End: 2021-04-09 | Stop reason: ALTCHOICE

## 2020-12-10 NOTE — PROGRESS NOTES
HISTORY OF PRESENT ILLNESS  Tammy Holt is a 82 y.o. female. Chief Complaint   Patient presents with    Cystoscopy    Urinary Incontinence     She is here for further evaluation of her genital prolapse, and her incontinence. She notes no changes to her urinary symptoms. She reported vaginal prolapse 14 years. She managed with a pessary for 9 years but could no longer manage with her arthritis. She noted a bulge and dryness. Exam on 10/30/2020: Urethra appeared well supported. No obvious descent or prolapse in the lithotomy position. Bladder was recently emptied at the time of the exam, so it was difficult to assess for cystocele. No obvious enterocele or rectocele. She has incontinence, related to urge. She can have hesitancy and be unable to void at times. It has been ongoing for about 18 months. She wears a Depends in case she cannot get to the toilet in time. It can be every other week when she has an accident. Chronic Conditions Addressed Today     1. Vaginal prolapse     Overview      She reports vaginal prolapse 14 years. She managed with a pessary for 9 years but could no longer manage with her arthritis. She feels a bulge and dryness. 10/30/2020: Urethra appears well supported. No obvious descent or prolapse in the lithotomy position. Bladder is recently empty so difficult to assess for cystocele. No obvious enterocele or rectocele. 2. Urge incontinence     Overview      She has incontinence, related to urge. She can have hesitancy and be unable to void. It has been about 18 months. She wears a Depends in case she cannot get to the toilet in time. It can be every other week when she has an accident. Review of Systems   All other systems reviewed and are negative.       Past Medical History:   Diagnosis Date    Anxiety 6/16/2020    Arthritis     Depression 6/16/2020    Diverticulitis 6/16/2020    GERD (gastroesophageal reflux disease) 6/16/2020    High cholesterol 6/16/2020    Hx of dizziness 6/16/2020    Hypertension 6/16/2020    Primary cancer of lower-inner quadrant of left female breast ([address] 12/18/2018    breast     Thyroid disease 6/16/2020      Past Surgical History:   Procedure Laterality Date    HX BREAST LUMPECTOMY Left 01/23/2019    with SLNB    HX HEENT  06/25/2018    Cataract    HX HYSTERECTOMY  1976    HX ORTHOPAEDIC  2014    Left knee    HX ORTHOPAEDIC  08/09/2017    Right knee    HX ORTHOPAEDIC  2013    Right Shoulder    HX UROLOGICAL  12/10/2020    cystoscopy      Family History   Problem Relation Age of Onset    Cancer Mother 80        Esophagas    Heart Disease Father     Prostate Cancer Brother     Breast Cancer Maternal Grandmother 71    Breast Cancer Maternal Cousin 58        Physical Exam  Vitals signs reviewed. Constitutional:       General: She is not in acute distress. Appearance: Normal appearance. She is obese. She is not ill-appearing, toxic-appearing or diaphoretic. HENT:      Head: Normocephalic and atraumatic. Nose: Nose normal.   Eyes:      Conjunctiva/sclera: Conjunctivae normal.      Pupils: Pupils are equal, round, and reactive to light. Neck:      Musculoskeletal: Normal range of motion. Pulmonary:      Effort: Pulmonary effort is normal. No respiratory distress. Breath sounds: Normal breath sounds. Abdominal:      General: Abdomen is flat. Bowel sounds are normal.      Palpations: Abdomen is soft. Genitourinary:     General: Normal vulva. Exam position: Lithotomy position. Pubic Area: No rash. Labia:         Right: No rash, tenderness, lesion or injury. Left: No rash, tenderness or lesion. Urethra: Prolapse (some prolapse and flat caruncle) present. No urethral pain, urethral swelling or urethral lesion. Vagina: Prolapsed vaginal walls (Out the introitus at rest with squamous changes. Reduces well) present.       Uterus: Absent. Comments: No urethral hypermobility. Trace leak with cough and strain  Skin:     General: Skin is warm and dry. Neurological:      General: No focal deficit present. Mental Status: She is alert and oriented to person, place, and time. Psychiatric:         Mood and Affect: Mood normal.           Cystoscopy - Female    Findings:  Initial residual: minimal, 40cc  Anterior urethra: prolapse noted and caruncle noted, flat  Bladder neck: Normal appearing  Bladder mucosa: Intact, no bas fond deformity, did not descend with strain  Trabeculation: Mild  Diverticula: none  Ureteral orifices: normal, efflux clear urine    CMG    Initial urge at (cc): 150   Strong urge at (cc): 175  Findings: Leakage noted around the scope with uninhibited contraction    Uroflow/ PVR    Max Flow: 24  ml/sec    Avg flow: 13 ml/sec    Voided Volume:  176ml    Residual Volume: 0ml    Shape of the curve: Normal bell shaped curve              ASSESSMENT and PLAN  Diagnoses and all orders for this visit:    1. Stress incontinence  Assessment & Plan:  She has mild week with stress or strain. She is advised to do Kegel exercises. 2. Vaginal prolapse  Assessment & Plan:  She has significant vaginal vault prolapse with a mild cystocele and possible enterocele. Her prolapse is out the introitus at rest after standing and walking around. If this bothers her we discussed a robotic sacrocolpopexy. Discussed the risks and benefits. She will consider this. 3. Urge incontinence  Assessment & Plan:  She has urge incontinence. She appears to be emptying her bladder. We discussed starting Ditropan XL. The patient was instructed on the dosing of the medication and reason for taking it. We discussed the common and serious risks. The patient is advised to be cautious of side effects with a new medication. She has some baseline dry eyes. If the side effects are intolerable she can stop the medication.   Rx given for Ditropan XL. 4. Intrinsic sphincter deficiency (ISD)  Assessment & Plan:  Mild YEIMI without hypermobility. I encouraged Kegels      5. Hesitancy of micturition  Assessment & Plan:  She had similar symptoms this morning but had a relatively empty bladder.       Other orders  -     AMB POC URINALYSIS DIP STICK AUTO W/O MICRO             William Yu MD

## 2020-12-10 NOTE — PATIENT INSTRUCTIONS
Kegel Exercise For Men     Kegel exercises in men are used to strengthen the pelvic floor just as they are for women. They are referred to as kegels or pelvic floor exercises. The muscle is similar in both men and women, stretching from the pubic bone to the tailbone and forming a hammock- like floor that supports the organs of the pelvis and contributes to the function of the sphincter. The exercise is recommended as a first step for treating urinary incontinence. Once you have become accustomed to tightening the muscle, Kegel exercises can be done anytime, anywhere. How do I locate the right muscles?  One of the easiest ways to locate your muscles is during urination. Once your flow begins, try to stop it completely. The muscle that you feel tightening is the urinary sphincter muscle or pelvic floor. Don't tense the muscles in your buttocks, legs or abdomen and don't hold your breath.  When you can slow or stop the flow of urine, you've successfully located these muscles. When you perform the exercise correctly, You should be able to feel or see penis or the testicles lift.  Some men find these muscles by imaging that they are trying to stop the passage of gas. If you don't get it, you may try inserting a finger (use lubrication) into the anus and try to  your finger. If you are able to do that, you've found the right muscle     When you're first starting, it may be easier to do Kegel exercises lying down, so your muscles aren't fighting against gravity. It may also be easier to contract the muscles for just two or three seconds at first. There are several ways to perform Kegel exercises. At first you may need to perform these exercises while sitting or lying down. As the muscles strengthen you can progress to exercise standing up. Like any activity, start with what you can achieve and progress from there.  Remember to use your muscles whenever you exert yourself during your daily activities.  Do NOT hold your breath   Do NOT push down instead of squeeze and lift    Do NOT pull your tummy in tightly    Do NOT tighten your buttocks and thighs     In order to improve strength of the muscle, you use a squeeze, hold, release pattern. You hold for several seconds and release. Gradually, you would like to be able to build up to a hold of  squeeze, hold and release 5 to 10 times in a row. When you perform the exercise correctly, you should be able to feel or see penis or the testicles lift. This takes time for some men. But, if you practice the routine regularly, you should notice an improvement in 4-6 weeks. A few good contractions are more beneficial than many half- hearted ones and good results take time and effort. Your doctor recommends a goal of 5 sets of 5 contractions daily. Remember them when you wake, when you sleep and every meal.     Remember when to use the muscles when you need them most. That is, always tighten before you cough, sneeze, lift, bend or get up out of a chair, etc.     Your doctor does not recommend interrupting your flow as a way to practice kegels. It is only a method to help identify the muscles. The urinary sphincter muscles matter more than the anal sphincter muscles. Some men can isolate these forward muscles rather than squeezing the entire pelvic floor. Hints:    Keep your weight within a healthy range for your height and age   Drink enough water to keep the urine light in color    Seek medical advice for chronic cough    Develop good bowel habits     If you have trouble performing Kegel exercises on your own, you may need help. Improved results can be achieved by seeking help from physiotherapist (with training in continence) who will design an individual training program especially suited to you.  Call your doctor if you would like to participate in a training program with a physical therapist.

## 2020-12-10 NOTE — ASSESSMENT & PLAN NOTE
She has significant vaginal vault prolapse with a mild cystocele and possible enterocele. Her prolapse is out the introitus at rest after standing and walking around. If this bothers her we discussed a robotic sacrocolpopexy. Discussed the risks and benefits. She will consider this.

## 2020-12-10 NOTE — ASSESSMENT & PLAN NOTE
She has urge incontinence. She appears to be emptying her bladder. We discussed starting Ditropan XL. The patient was instructed on the dosing of the medication and reason for taking it. We discussed the common and serious risks. The patient is advised to be cautious of side effects with a new medication. She has some baseline dry eyes. If the side effects are intolerable she can stop the medication. Rx given for Ditropan XL.

## 2021-01-06 ENCOUNTER — TRANSCRIBE ORDER (OUTPATIENT)
Dept: SCHEDULING | Age: 86
End: 2021-01-06

## 2021-01-06 DIAGNOSIS — R06.02 SOB (SHORTNESS OF BREATH): Primary | ICD-10-CM

## 2021-01-06 DIAGNOSIS — J11.00 INFLUENZA AND PNEUMONIA: ICD-10-CM

## 2021-01-06 DIAGNOSIS — R07.9 CHEST PAIN: ICD-10-CM

## 2021-01-18 ENCOUNTER — HOSPITAL ENCOUNTER (OUTPATIENT)
Dept: CT IMAGING | Age: 86
Discharge: HOME | End: 2021-01-18
Attending: INTERNAL MEDICINE
Payer: MEDICARE

## 2021-01-18 DIAGNOSIS — R06.02 SOB (SHORTNESS OF BREATH): ICD-10-CM

## 2021-01-18 DIAGNOSIS — R07.9 CHEST PAIN: ICD-10-CM

## 2021-01-18 PROCEDURE — 71250 CT THORAX DX C-: CPT

## 2021-02-11 PROBLEM — N36.8 URETHRAL PROLAPSE: Status: ACTIVE | Noted: 2021-02-11

## 2021-02-20 ENCOUNTER — HOSPITAL ENCOUNTER (OUTPATIENT)
Age: 86
Setting detail: OBSERVATION
Discharge: HOME HEALTH | End: 2021-02-28
Attending: EMERGENCY MEDICINE | Admitting: INTERNAL MEDICINE
Payer: MEDICARE

## 2021-02-20 ENCOUNTER — APPOINTMENT (OUTPATIENT)
Dept: GENERAL RADIOLOGY | Age: 86
End: 2021-02-20
Attending: EMERGENCY MEDICINE
Payer: MEDICARE

## 2021-02-20 DIAGNOSIS — N39.0 URINARY TRACT INFECTION WITHOUT HEMATURIA, SITE UNSPECIFIED: ICD-10-CM

## 2021-02-20 DIAGNOSIS — N17.9 AKI (ACUTE KIDNEY INJURY) (HCC): Primary | ICD-10-CM

## 2021-02-20 LAB
ALBUMIN SERPL-MCNC: 3.9 G/DL (ref 3.5–5)
ALBUMIN/GLOB SERPL: 1 {RATIO} (ref 1.1–2.2)
ALP SERPL-CCNC: 69 U/L (ref 45–117)
ALT SERPL-CCNC: 29 U/L (ref 12–78)
ANION GAP SERPL CALC-SCNC: 8 MMOL/L (ref 5–15)
APPEARANCE UR: ABNORMAL
APTT PPP: 26.1 SEC (ref 23–35.7)
AST SERPL W P-5'-P-CCNC: 21 U/L (ref 15–37)
BACTERIA URNS QL MICRO: ABNORMAL /HPF
BASOPHILS # BLD: 0 K/UL (ref 0–0.1)
BASOPHILS NFR BLD: 0 % (ref 0–1)
BILIRUB SERPL-MCNC: 0.4 MG/DL (ref 0.2–1)
BILIRUB UR QL: NEGATIVE
BNP SERPL-MCNC: 216 PG/ML
BUN SERPL-MCNC: 49 MG/DL (ref 6–20)
BUN/CREAT SERPL: 31 (ref 12–20)
CA-I BLD-MCNC: 10.9 MG/DL (ref 8.5–10.1)
CHLORIDE SERPL-SCNC: 105 MMOL/L (ref 97–108)
CK SERPL-CCNC: 156 NG/ML (ref 26–192)
CO2 SERPL-SCNC: 27 MMOL/L (ref 21–32)
COLOR UR: ABNORMAL
CREAT SERPL-MCNC: 1.56 MG/DL (ref 0.55–1.02)
DIFFERENTIAL METHOD BLD: ABNORMAL
EOSINOPHIL # BLD: 0 K/UL (ref 0–0.4)
EOSINOPHIL NFR BLD: 0 % (ref 0–7)
ERYTHROCYTE [DISTWIDTH] IN BLOOD BY AUTOMATED COUNT: 14.4 % (ref 11.5–14.5)
GLOBULIN SER CALC-MCNC: 4.1 G/DL (ref 2–4)
GLUCOSE SERPL-MCNC: 189 MG/DL (ref 65–100)
GLUCOSE UR STRIP.AUTO-MCNC: NEGATIVE MG/DL
HCT VFR BLD AUTO: 35.3 % (ref 35–47)
HGB BLD-MCNC: 11.1 G/DL (ref 11.5–16)
HGB UR QL STRIP: NEGATIVE
HYALINE CASTS URNS QL MICRO: ABNORMAL /LPF (ref 0–5)
IMM GRANULOCYTES # BLD AUTO: 0 K/UL (ref 0–0.04)
IMM GRANULOCYTES NFR BLD AUTO: 0 % (ref 0–0.5)
INR PPP: 1 (ref 0.9–1.1)
KETONES UR QL STRIP.AUTO: 5 MG/DL
LACTATE SERPL-SCNC: 1.7 MMOL/L (ref 0.4–2)
LEUKOCYTE ESTERASE UR QL STRIP.AUTO: ABNORMAL
LYMPHOCYTES # BLD: 0.7 K/UL (ref 0.8–3.5)
LYMPHOCYTES NFR BLD: 12 % (ref 12–49)
MAGNESIUM SERPL-MCNC: 2.4 MG/DL (ref 1.6–2.4)
MCH RBC QN AUTO: 28.4 PG (ref 26–34)
MCHC RBC AUTO-ENTMCNC: 31.4 G/DL (ref 30–36.5)
MCV RBC AUTO: 90.3 FL (ref 80–99)
MONOCYTES # BLD: 0.1 K/UL (ref 0–1)
MONOCYTES NFR BLD: 2 % (ref 5–13)
MUCOUS THREADS URNS QL MICRO: ABNORMAL /LPF
NEUTS SEG # BLD: 4.9 K/UL (ref 1.8–8)
NEUTS SEG NFR BLD: 86 % (ref 32–75)
NITRITE UR QL STRIP.AUTO: NEGATIVE
PH UR STRIP: 5 [PH] (ref 5–8)
PLATELET # BLD AUTO: 372 K/UL (ref 150–400)
PMV BLD AUTO: 10.7 FL (ref 8.9–12.9)
POTASSIUM SERPL-SCNC: 3.8 MMOL/L (ref 3.5–5.1)
PROCALCITONIN SERPL-MCNC: <0.05 NG/ML
PROT SERPL-MCNC: 8 G/DL (ref 6.4–8.2)
PROT UR STRIP-MCNC: 30 MG/DL
PROTHROMBIN TIME: 13.7 SEC (ref 11.9–14.7)
RBC # BLD AUTO: 3.91 M/UL (ref 3.8–5.2)
RBC #/AREA URNS HPF: ABNORMAL /HPF (ref 0–5)
SODIUM SERPL-SCNC: 140 MMOL/L (ref 136–145)
SP GR UR REFRACTOMETRY: 1.02 (ref 1–1.03)
THERAPEUTIC RANGE,PTTT: NORMAL SEC (ref 68–109)
TROPONIN I SERPL-MCNC: <0.05 NG/ML
TSH SERPL DL<=0.05 MIU/L-ACNC: 0.74 UIU/ML (ref 0.36–3.74)
UA: UC IF INDICATED,UAUC: ABNORMAL
UROBILINOGEN UR QL STRIP.AUTO: 0.1 EU/DL (ref 0.1–1)
WBC # BLD AUTO: 5.7 K/UL (ref 3.6–11)
WBC URNS QL MICRO: ABNORMAL /HPF (ref 0–4)

## 2021-02-20 PROCEDURE — 85730 THROMBOPLASTIN TIME PARTIAL: CPT

## 2021-02-20 PROCEDURE — 84443 ASSAY THYROID STIM HORMONE: CPT

## 2021-02-20 PROCEDURE — 87086 URINE CULTURE/COLONY COUNT: CPT

## 2021-02-20 PROCEDURE — 85025 COMPLETE CBC W/AUTO DIFF WBC: CPT

## 2021-02-20 PROCEDURE — 80053 COMPREHEN METABOLIC PANEL: CPT

## 2021-02-20 PROCEDURE — 93005 ELECTROCARDIOGRAM TRACING: CPT

## 2021-02-20 PROCEDURE — 74011000250 HC RX REV CODE- 250: Performed by: EMERGENCY MEDICINE

## 2021-02-20 PROCEDURE — 87040 BLOOD CULTURE FOR BACTERIA: CPT

## 2021-02-20 PROCEDURE — 82550 ASSAY OF CK (CPK): CPT

## 2021-02-20 PROCEDURE — 83880 ASSAY OF NATRIURETIC PEPTIDE: CPT

## 2021-02-20 PROCEDURE — 99218 HC RM OBSERVATION: CPT

## 2021-02-20 PROCEDURE — 81001 URINALYSIS AUTO W/SCOPE: CPT

## 2021-02-20 PROCEDURE — 84484 ASSAY OF TROPONIN QUANT: CPT

## 2021-02-20 PROCEDURE — 96374 THER/PROPH/DIAG INJ IV PUSH: CPT

## 2021-02-20 PROCEDURE — 74011250636 HC RX REV CODE- 250/636: Performed by: EMERGENCY MEDICINE

## 2021-02-20 PROCEDURE — 65270000029 HC RM PRIVATE

## 2021-02-20 PROCEDURE — 99285 EMERGENCY DEPT VISIT HI MDM: CPT

## 2021-02-20 PROCEDURE — 71045 X-RAY EXAM CHEST 1 VIEW: CPT

## 2021-02-20 PROCEDURE — 84145 PROCALCITONIN (PCT): CPT

## 2021-02-20 PROCEDURE — 83605 ASSAY OF LACTIC ACID: CPT

## 2021-02-20 PROCEDURE — 36415 COLL VENOUS BLD VENIPUNCTURE: CPT

## 2021-02-20 PROCEDURE — 83735 ASSAY OF MAGNESIUM: CPT

## 2021-02-20 PROCEDURE — 74011250637 HC RX REV CODE- 250/637: Performed by: HOSPITALIST

## 2021-02-20 PROCEDURE — 85610 PROTHROMBIN TIME: CPT

## 2021-02-20 RX ORDER — LOSARTAN POTASSIUM 50 MG/1
25 TABLET ORAL 2 TIMES DAILY
Status: DISCONTINUED | OUTPATIENT
Start: 2021-02-20 | End: 2021-02-28 | Stop reason: HOSPADM

## 2021-02-20 RX ORDER — LEVOTHYROXINE SODIUM 25 UG/1
50 TABLET ORAL
Status: DISCONTINUED | OUTPATIENT
Start: 2021-02-21 | End: 2021-02-23

## 2021-02-20 RX ORDER — ACETAMINOPHEN 650 MG/1
650 SUPPOSITORY RECTAL
Status: DISCONTINUED | OUTPATIENT
Start: 2021-02-20 | End: 2021-02-28 | Stop reason: HOSPADM

## 2021-02-20 RX ORDER — LETROZOLE 2.5 MG/1
2.5 TABLET, FILM COATED ORAL DAILY
Status: DISCONTINUED | OUTPATIENT
Start: 2021-02-21 | End: 2021-02-28 | Stop reason: HOSPADM

## 2021-02-20 RX ORDER — ACETAMINOPHEN 325 MG/1
650 TABLET ORAL
Status: DISCONTINUED | OUTPATIENT
Start: 2021-02-20 | End: 2021-02-28 | Stop reason: HOSPADM

## 2021-02-20 RX ORDER — METOPROLOL SUCCINATE 50 MG/1
100 TABLET, EXTENDED RELEASE ORAL DAILY
Status: DISCONTINUED | OUTPATIENT
Start: 2021-02-21 | End: 2021-02-28 | Stop reason: HOSPADM

## 2021-02-20 RX ORDER — ASPIRIN 81 MG/1
81 TABLET ORAL DAILY
Status: DISCONTINUED | OUTPATIENT
Start: 2021-02-21 | End: 2021-02-28 | Stop reason: HOSPADM

## 2021-02-20 RX ORDER — ONDANSETRON 2 MG/ML
4 INJECTION INTRAMUSCULAR; INTRAVENOUS
Status: DISCONTINUED | OUTPATIENT
Start: 2021-02-20 | End: 2021-02-28 | Stop reason: HOSPADM

## 2021-02-20 RX ORDER — ONDANSETRON 4 MG/1
4 TABLET, ORALLY DISINTEGRATING ORAL
Status: DISCONTINUED | OUTPATIENT
Start: 2021-02-20 | End: 2021-02-28 | Stop reason: HOSPADM

## 2021-02-20 RX ORDER — TRAZODONE HYDROCHLORIDE 50 MG/1
50 TABLET ORAL
Status: DISCONTINUED | OUTPATIENT
Start: 2021-02-20 | End: 2021-02-28 | Stop reason: HOSPADM

## 2021-02-20 RX ORDER — SODIUM CHLORIDE 0.9 % (FLUSH) 0.9 %
5-40 SYRINGE (ML) INJECTION EVERY 8 HOURS
Status: DISCONTINUED | OUTPATIENT
Start: 2021-02-20 | End: 2021-02-28 | Stop reason: HOSPADM

## 2021-02-20 RX ORDER — ZINC GLUCONATE 50 MG
1 TABLET ORAL DAILY
Status: DISCONTINUED | OUTPATIENT
Start: 2021-02-21 | End: 2021-02-28 | Stop reason: HOSPADM

## 2021-02-20 RX ORDER — SODIUM CHLORIDE 9 MG/ML
50 INJECTION, SOLUTION INTRAVENOUS CONTINUOUS
Status: DISCONTINUED | OUTPATIENT
Start: 2021-02-20 | End: 2021-02-24

## 2021-02-20 RX ORDER — SODIUM CHLORIDE 0.9 % (FLUSH) 0.9 %
5-40 SYRINGE (ML) INJECTION AS NEEDED
Status: DISCONTINUED | OUTPATIENT
Start: 2021-02-20 | End: 2021-02-28 | Stop reason: HOSPADM

## 2021-02-20 RX ORDER — CALCIUM CARBONATE/VITAMIN D3 600MG-5MCG
1 TABLET ORAL 2 TIMES DAILY WITH MEALS
Status: DISCONTINUED | OUTPATIENT
Start: 2021-02-21 | End: 2021-02-28 | Stop reason: HOSPADM

## 2021-02-20 RX ORDER — CLONAZEPAM 0.5 MG/1
0.25 TABLET ORAL
Status: DISCONTINUED | OUTPATIENT
Start: 2021-02-20 | End: 2021-02-28 | Stop reason: HOSPADM

## 2021-02-20 RX ADMIN — SODIUM CHLORIDE 50 ML/HR: 9 INJECTION, SOLUTION INTRAVENOUS at 19:54

## 2021-02-20 RX ADMIN — LOSARTAN POTASSIUM 25 MG: 50 TABLET, FILM COATED ORAL at 22:32

## 2021-02-20 RX ADMIN — CEFTRIAXONE SODIUM 1 G: 1 INJECTION, POWDER, FOR SOLUTION INTRAMUSCULAR; INTRAVENOUS at 19:14

## 2021-02-20 NOTE — ED TRIAGE NOTES
Pt sent from home by family due to increasing weakness. Pt c/o SOB x2 months states she's been seen by her PCP multiple times. Started on lasix for 3+ pitting edema in lower extremities \"a week or so ago\" per pt.

## 2021-02-20 NOTE — ED PROVIDER NOTES
EMERGENCY DEPARTMENT HISTORY AND PHYSICAL EXAM      Date: 2/20/2021  Patient Name: Xu Bess    History of Presenting Illness     Chief Complaint   Patient presents with    Fatigue       History Provided By: Patient    HPI: Xu Bess, 82 y.o. female with a past medical history significant hypertension presents to the ED with chief complaint of Fatigue  . 61-year-old female with a history of hypertension she notes that she has been feeling very fatigued. No energy. She had been prescribed diuretics for her lower extremity edema recently by her primary care physician. She denies any shortness of breath or coughing. No chest pain. Just overall feels weak and tired cannot prescribe her symptoms any further. There are no other complaints, changes, or physical findings at this time. PCP: Khushboo Lee MD    Current Facility-Administered Medications   Medication Dose Route Frequency Provider Last Rate Last Admin    cefTRIAXone (ROCEPHIN) 1 g in sterile water (preservative free) 10 mL IV syringe  1 g IntraVENous NOW Ash Dela Cruz MD        0.9% sodium chloride infusion  50 mL/hr IntraVENous CONTINUOUS Ash Dela Cruz MD         Current Outpatient Medications   Medication Sig Dispense Refill    oxybutynin chloride XL (DITROPAN XL) 10 mg CR tablet Take 1 Tab by mouth daily. 30 Tab 2    indapamide (LOZOL) 2.5 mg tablet Take 0.5 Tabs by mouth daily. 30 Tab 1    calcium-cholecalciferol, D3, (CALTRATE 600+D) tablet Take 1 Tab by mouth daily.  multivit-min/iron/folic/lutein (CENTRUM SILVER WOMEN PO) Take  by mouth.  elderberry fruit (ELDERBERRY PO) Take  by mouth.  cholecalciferol (Vitamin D3) (5000 Units/125 mcg) tab tablet Take  by mouth daily.  ZINC PICOLINATE PO Take  by mouth.  alendronate (FOSAMAX) 70 mg tablet Take  by mouth.  aspirin delayed-release 81 mg tablet Take  by mouth daily.       clonazePAM (KlonoPIN) 0.5 mg tablet Take  by mouth two (2) times daily as needed for Anxiety.  letrozole (FEMARA) 2.5 mg tablet Take 2.5 mg by mouth daily.  losartan (COZAAR) 25 mg tablet Take  by mouth two (2) times a day.  metoprolol succinate (TOPROL-XL) 100 mg tablet Take  by mouth daily.  levothyroxine (synthroid) 50 mcg tablet Take  by mouth Daily (before breakfast).  traZODone (DESYREL) 50 mg tablet Take  by mouth nightly.  ascorbic acid, vitamin C, (Vitamin C) 250 mg tablet Take  by mouth. Past History     Past Medical History:  Past Medical History:   Diagnosis Date    Anxiety 6/16/2020    Arthritis     Depression 6/16/2020    Diverticulitis 6/16/2020    GERD (gastroesophageal reflux disease) 6/16/2020    High cholesterol 6/16/2020    Hx of dizziness 6/16/2020    Hypertension 6/16/2020    Primary cancer of lower-inner quadrant of left female breast ([facility] [address] 12/18/2018    breast     Thyroid disease 6/16/2020       Past Surgical History:  Past Surgical History:   Procedure Laterality Date    HX BREAST LUMPECTOMY Left 01/23/2019    with SLNB    HX HEENT  06/25/2018    Cataract    HX HYSTERECTOMY  1976    HX ORTHOPAEDIC  2014    Left knee    HX ORTHOPAEDIC  08/09/2017    Right knee    HX ORTHOPAEDIC  2013    Right Shoulder    HX UROLOGICAL  12/10/2020    cystoscopy        Family History:  Family History   Problem Relation Age of Onset    Cancer Mother 80        Esophagas    Heart Disease Father     Prostate Cancer Brother     Breast Cancer Maternal Grandmother 71    Breast Cancer Maternal Cousin 58       Social History:  Social History     Tobacco Use    Smoking status: Never Smoker    Smokeless tobacco: Never Used   Substance Use Topics    Alcohol use: Not Currently     Frequency: Never    Drug use: Never       Allergies: Allergies   Allergen Reactions    Iodinated Contrast Media Shortness of Breath         Review of Systems   Review of Systems   Constitutional: Positive for fatigue.  Negative for chills and fever. HENT: Negative. Negative for congestion, nosebleeds and sore throat. Eyes: Negative. Negative for pain, discharge and visual disturbance. Respiratory: Negative. Negative for cough, chest tightness and shortness of breath. Cardiovascular: Negative for chest pain, palpitations and leg swelling. Gastrointestinal: Negative for abdominal pain, blood in stool, constipation, diarrhea, nausea and vomiting. Endocrine: Negative. Genitourinary: Negative. Negative for difficulty urinating, dysuria, pelvic pain and vaginal bleeding. Musculoskeletal: Negative. Negative for arthralgias, back pain and myalgias. Skin: Negative. Negative for rash and wound. Allergic/Immunologic: Negative. Neurological: Positive for weakness. Negative for dizziness, syncope, numbness and headaches. Hematological: Negative. Psychiatric/Behavioral: Negative. Negative for agitation, confusion and suicidal ideas. All other systems reviewed and are negative. Physical Exam   Physical Exam  Vitals signs and nursing note reviewed. Exam conducted with a chaperone present. Constitutional:       Appearance: Normal appearance. She is normal weight. She is ill-appearing. HENT:      Head: Normocephalic and atraumatic. Nose: Nose normal.      Mouth/Throat:      Mouth: Mucous membranes are moist.      Pharynx: Oropharynx is clear. Eyes:      Extraocular Movements: Extraocular movements intact. Conjunctiva/sclera: Conjunctivae normal.      Pupils: Pupils are equal, round, and reactive to light. Neck:      Musculoskeletal: Normal range of motion and neck supple. Cardiovascular:      Rate and Rhythm: Regular rhythm. Tachycardia present. Pulses: Normal pulses. Heart sounds: Normal heart sounds. Pulmonary:      Effort: Pulmonary effort is normal. No respiratory distress. Breath sounds: Normal breath sounds. Abdominal:      General: Abdomen is flat.  Bowel sounds are normal. There is no distension. Palpations: Abdomen is soft. Tenderness: There is no abdominal tenderness. There is no guarding. Musculoskeletal: Normal range of motion. General: No swelling, tenderness, deformity or signs of injury. Right lower leg: No edema. Left lower leg: No edema. Skin:     General: Skin is warm and dry. Capillary Refill: Capillary refill takes less than 2 seconds. Findings: No lesion or rash. Neurological:      General: No focal deficit present. Mental Status: She is alert and oriented to person, place, and time. Mental status is at baseline. Cranial Nerves: No cranial nerve deficit. Psychiatric:         Mood and Affect: Mood normal.         Behavior: Behavior normal.         Thought Content: Thought content normal.         Judgment: Judgment normal.         Diagnostic Study Results     Labs -     Recent Results (from the past 12 hour(s))   CBC WITH AUTOMATED DIFF    Collection Time: 02/20/21  4:10 PM   Result Value Ref Range    WBC 5.7 3.6 - 11.0 K/uL    RBC 3.91 3.80 - 5.20 M/uL    HGB 11.1 (L) 11.5 - 16.0 g/dL    HCT 35.3 35.0 - 47.0 %    MCV 90.3 80.0 - 99.0 FL    MCH 28.4 26.0 - 34.0 PG    MCHC 31.4 30.0 - 36.5 g/dL    RDW 14.4 11.5 - 14.5 %    PLATELET 653 023 - 453 K/uL    MPV 10.7 8.9 - 12.9 FL    NEUTROPHILS 86 (H) 32 - 75 %    LYMPHOCYTES 12 12 - 49 %    MONOCYTES 2 (L) 5 - 13 %    EOSINOPHILS 0 0 - 7 %    BASOPHILS 0 0 - 1 %    IMMATURE GRANULOCYTES 0 0.0 - 0.5 %    ABS. NEUTROPHILS 4.9 1.8 - 8.0 K/UL    ABS. LYMPHOCYTES 0.7 (L) 0.8 - 3.5 K/UL    ABS. MONOCYTES 0.1 0.0 - 1.0 K/UL    ABS. EOSINOPHILS 0.0 0.0 - 0.4 K/UL    ABS. BASOPHILS 0.0 0.0 - 0.1 K/UL    ABS. IMM.  GRANS. 0.0 0.00 - 0.04 K/UL    DF AUTOMATED     METABOLIC PANEL, COMPREHENSIVE    Collection Time: 02/20/21  4:10 PM   Result Value Ref Range    Sodium 140 136 - 145 mmol/L    Potassium 3.8 3.5 - 5.1 mmol/L    Chloride 105 97 - 108 mmol/L    CO2 27 21 - 32 mmol/L    Anion gap 8 5 - 15 mmol/L    Glucose 189 (H) 65 - 100 mg/dL    BUN 49 (H) 6 - 20 mg/dL    Creatinine 1.56 (H) 0.55 - 1.02 mg/dL    BUN/Creatinine ratio 31 (H) 12 - 20      GFR est AA 38 (L) >60 ml/min/1.73m2    GFR est non-AA 32 (L) >60 ml/min/1.73m2    Calcium 10.9 (H) 8.5 - 10.1 mg/dL    Bilirubin, total 0.4 0.2 - 1.0 mg/dL    AST (SGOT) 21 15 - 37 U/L    ALT (SGPT) 29 12 - 78 U/L    Alk.  phosphatase 69 45 - 117 U/L    Protein, total 8.0 6.4 - 8.2 g/dL    Albumin 3.9 3.5 - 5.0 g/dL    Globulin 4.1 (H) 2.0 - 4.0 g/dL    A-G Ratio 1.0 (L) 1.1 - 2.2     TROPONIN I    Collection Time: 02/20/21  4:10 PM   Result Value Ref Range    Troponin-I, Qt. <0.05 <0.05 ng/mL   CK W/ REFLX CKMB    Collection Time: 02/20/21  4:10 PM   Result Value Ref Range    .0 26 - 192 ng/mL   BNP    Collection Time: 02/20/21  4:10 PM   Result Value Ref Range    NT pro- <450 pg/mL   PROTHROMBIN TIME + INR    Collection Time: 02/20/21  4:10 PM   Result Value Ref Range    Prothrombin time 13.7 11.9 - 14.7 sec    INR 1.0 0.9 - 1.1     PTT    Collection Time: 02/20/21  4:10 PM   Result Value Ref Range    aPTT 26.1 23.0 - 35.7 sec    aPTT, therapeutic range   68 - 109 sec   PROCALCITONIN    Collection Time: 02/20/21  4:10 PM   Result Value Ref Range    Procalcitonin <0.05 (H) 0 ng/mL   LACTIC ACID    Collection Time: 02/20/21  4:10 PM   Result Value Ref Range    Lactic acid 1.7 0.4 - 2.0 mmol/L   TSH 3RD GENERATION    Collection Time: 02/20/21  4:10 PM   Result Value Ref Range    TSH 0.74 0.36 - 3.74 uIU/mL   MAGNESIUM    Collection Time: 02/20/21  4:10 PM   Result Value Ref Range    Magnesium 2.4 1.6 - 2.4 mg/dL   URINALYSIS W/ REFLEX CULTURE    Collection Time: 02/20/21  6:05 PM    Specimen: Urine   Result Value Ref Range    Color Yellow/Straw      Appearance Turbid (A) Clear      Specific gravity 1.020 1.003 - 1.030      pH (UA) 5.0 5.0 - 8.0      Protein 30 (A) Negative mg/dL    Glucose Negative Negative mg/dL    Ketone 5 (A) Negative mg/dL Bilirubin Negative Negative      Blood Negative Negative      Urobilinogen 0.1 0.1 - 1.0 EU/dL    Nitrites Negative Negative      Leukocyte Esterase Moderate (A) Negative      WBC 10-20 0 - 4 /hpf    RBC 20-50 0 - 5 /hpf    Bacteria 1+ (A) Negative /hpf    UA:UC IF INDICATED Urine Culture Ordered (A) Culture not indicated by UA result      Mucus Trace (A) Negative /lpf    Hyaline cast 5-10 0 - 5 /lpf       Radiologic Studies -   XR CHEST SNGL V   Final Result   Stable appearance of the chest compared to 5 October 2020        CT Results  (Last 48 hours)    None        CXR Results  (Last 48 hours)               02/20/21 1608  XR CHEST SNGL V Final result    Impression:  Stable appearance of the chest compared to 5 October 2020       Narrative:  History: Shortness of breath       Single view of the chest was obtained. Heart size is normal. Lungs appear clear. No effusions or pneumothorax. Surgical clips are seen in the left axilla. No   acute bony abnormality is seen. Medical Decision Making and ED Course   I am the first provider for this patient. I reviewed the vital signs, available nursing notes, past medical history, past surgical history, family history and social history. Vital Signs-Reviewed the patient's vital signs. Patient Vitals for the past 12 hrs:   Temp Pulse Resp BP SpO2   02/20/21 1810 99 °F (37.2 °C) (!) 108 18 (!) 143/64 99 %   02/20/21 1547 98.3 °F (36.8 °C) (!) 111 18 (!) 149/69 98 %       EKG interpretation:   EG at 1549. Sinus tachycardia rate of 108. No ST changes. No T wave inversions. Reason rule out dysrhythmia. Interpreted by ER physician. Records Reviewed: Previous Hospital chart. EMS run report      ED Course:   Initial assessment performed. The patients presenting problems have been discussed, and they are in agreement with the care plan formulated and outlined with them.   I have encouraged them to ask questions as they arise throughout their visit. Orders Placed This Encounter    CULTURE, BLOOD, PAIRED     Standing Status:   Standing     Number of Occurrences:   1    CULTURE, URINE     Standing Status:   Standing     Number of Occurrences:   1    XR CHEST SNGL V     Standing Status:   Standing     Number of Occurrences:   1     Order Specific Question:   Transport     Answer:   BED [2]     Order Specific Question:   Reason for Exam     Answer:   sob    CBC WITH AUTOMATED DIFF     Standing Status:   Standing     Number of Occurrences:   1    METABOLIC PANEL, COMPREHENSIVE     Standing Status:   Standing     Number of Occurrences:   1    TROPONIN I     Standing Status:   Standing     Number of Occurrences:   1    CK W/REFLEX CKMB     Standing Status:   Standing     Number of Occurrences:   1    BNP     Standing Status:   Standing     Number of Occurrences:   1    PROTHROMBIN TIME + INR     Standing Status:   Standing     Number of Occurrences:   1    PTT     Standing Status:   Standing     Number of Occurrences:   1    URINALYSIS W/ REFLEX CULTURE     Standing Status:   Standing     Number of Occurrences:   1    PROCALCITONIN     Standing Status:   Standing     Number of Occurrences:   1    LACTIC ACID     Standing Status:   Standing     Number of Occurrences:   1    TSH 3RD GENERATION     Standing Status:   Standing     Number of Occurrences:   1    MAGNESIUM     Standing Status:   Standing     Number of Occurrences:   1    NOTIFY PROVIDER: SPECIFY Notify provider on pt's arrival to floor ONE TIME STAT     Standing Status:   Standing     Number of Occurrences:   1     Order Specific Question:   Please describe the test or procedure you would like to order.      Answer:   Notify provider on pt's arrival to floor    EKG 12 LEAD INITIAL     Standing Status:   Standing     Number of Occurrences:   1     Order Specific Question:   Reason for Exam:     Answer:   weakness    EKG, 12 LEAD, INITIAL     Standing Status:   Standing     Number of Occurrences:   1     Order Specific Question:   Reason for Exam:     Answer:   sob    cefTRIAXone (ROCEPHIN) 1 g in sterile water (preservative free) 10 mL IV syringe     Order Specific Question:   Antibiotic Indications     Answer:   Urinary Tract Infection    0.9% sodium chloride infusion    IP CONSULT TO HOSPITALIST     Standing Status:   Standing     Number of Occurrences:   1     Order Specific Question:   Reason for Consult: Answer:   TO ADMIT     Order Specific Question:   Did you call or speak to the consulting provider? Answer: Yes              CONSULTANTS:  Consults  aron admit    Provider Notes (Medical Decision Making):   53-year-old female presents with fatigue. Patient does not meet sepsis criteria but does have significant weakness triggered by a urinary tract infection will need inpatient treatment with antibiotics IV. Procedures                       Disposition       Emergency Department Disposition:  Admitted      Diagnosis     Clinical Impression:   1. LESLYE (acute kidney injury) ([facility] [address]    2. Urinary tract infection without hematuria, site unspecified        Attestations:    Debbie Lopez MD    Please note that this dictation was completed with [employer], the computer voice recognition software. Quite often unanticipated grammatical, syntax, homophones, and other interpretive errors are inadvertently transcribed by the computer software. Please disregard these errors. Please excuse any errors that have escaped final proofreading. Thank you.

## 2021-02-21 ENCOUNTER — APPOINTMENT (OUTPATIENT)
Dept: GENERAL RADIOLOGY | Age: 86
End: 2021-02-21
Attending: PHYSICIAN ASSISTANT
Payer: MEDICARE

## 2021-02-21 LAB
ALBUMIN SERPL-MCNC: 3.1 G/DL (ref 3.5–5)
ALBUMIN SERPL-MCNC: 3.1 G/DL (ref 3.5–5)
ALBUMIN/GLOB SERPL: 0.9 {RATIO} (ref 1.1–2.2)
ALP SERPL-CCNC: 60 U/L (ref 45–117)
ALT SERPL-CCNC: 23 U/L (ref 12–78)
ANION GAP SERPL CALC-SCNC: 5 MMOL/L (ref 5–15)
ANION GAP SERPL CALC-SCNC: 6 MMOL/L (ref 5–15)
AST SERPL W P-5'-P-CCNC: 28 U/L (ref 15–37)
ATRIAL RATE: 108 BPM
BILIRUB SERPL-MCNC: 0.4 MG/DL (ref 0.2–1)
BUN SERPL-MCNC: 24 MG/DL (ref 6–20)
BUN SERPL-MCNC: 26 MG/DL (ref 6–20)
BUN/CREAT SERPL: 27 (ref 12–20)
BUN/CREAT SERPL: 29 (ref 12–20)
CA-I BLD-MCNC: 9.5 MG/DL (ref 8.5–10.1)
CA-I BLD-MCNC: 9.6 MG/DL (ref 8.5–10.1)
CALCULATED P AXIS, ECG09: 70 DEGREES
CALCULATED R AXIS, ECG10: 64 DEGREES
CALCULATED T AXIS, ECG11: 85 DEGREES
CHLORIDE SERPL-SCNC: 109 MMOL/L (ref 97–108)
CHLORIDE SERPL-SCNC: 110 MMOL/L (ref 97–108)
CO2 SERPL-SCNC: 29 MMOL/L (ref 21–32)
CO2 SERPL-SCNC: 30 MMOL/L (ref 21–32)
CREAT SERPL-MCNC: 0.89 MG/DL (ref 0.55–1.02)
CREAT SERPL-MCNC: 0.9 MG/DL (ref 0.55–1.02)
DIAGNOSIS, 93000: NORMAL
ERYTHROCYTE [DISTWIDTH] IN BLOOD BY AUTOMATED COUNT: 14.2 % (ref 11.5–14.5)
GLOBULIN SER CALC-MCNC: 3.6 G/DL (ref 2–4)
GLUCOSE SERPL-MCNC: 96 MG/DL (ref 65–100)
GLUCOSE SERPL-MCNC: 97 MG/DL (ref 65–100)
HCT VFR BLD AUTO: 33.3 % (ref 35–47)
HGB BLD-MCNC: 10.2 G/DL (ref 11.5–16)
MCH RBC QN AUTO: 27.6 PG (ref 26–34)
MCHC RBC AUTO-ENTMCNC: 30.6 G/DL (ref 30–36.5)
MCV RBC AUTO: 90.2 FL (ref 80–99)
P-R INTERVAL, ECG05: 144 MS
PHOSPHATE SERPL-MCNC: 2.1 MG/DL (ref 2.6–4.7)
PLATELET # BLD AUTO: 302 K/UL (ref 150–400)
PMV BLD AUTO: 10.4 FL (ref 8.9–12.9)
POTASSIUM SERPL-SCNC: 3.7 MMOL/L (ref 3.5–5.1)
POTASSIUM SERPL-SCNC: 3.7 MMOL/L (ref 3.5–5.1)
PROT SERPL-MCNC: 6.7 G/DL (ref 6.4–8.2)
Q-T INTERVAL, ECG07: 314 MS
QRS DURATION, ECG06: 66 MS
QTC CALCULATION (BEZET), ECG08: 420 MS
RBC # BLD AUTO: 3.69 M/UL (ref 3.8–5.2)
SODIUM SERPL-SCNC: 144 MMOL/L (ref 136–145)
SODIUM SERPL-SCNC: 145 MMOL/L (ref 136–145)
T4 FREE SERPL-MCNC: 1 NG/DL (ref 0.8–1.5)
URATE SERPL-MCNC: 3.9 MG/DL (ref 2.6–6)
VENTRICULAR RATE, ECG03: 108 BPM
WBC # BLD AUTO: 4.2 K/UL (ref 3.6–11)

## 2021-02-21 PROCEDURE — 96376 TX/PRO/DX INJ SAME DRUG ADON: CPT

## 2021-02-21 PROCEDURE — 74011250636 HC RX REV CODE- 250/636: Performed by: EMERGENCY MEDICINE

## 2021-02-21 PROCEDURE — 36415 COLL VENOUS BLD VENIPUNCTURE: CPT

## 2021-02-21 PROCEDURE — 80053 COMPREHEN METABOLIC PANEL: CPT

## 2021-02-21 PROCEDURE — 73590 X-RAY EXAM OF LOWER LEG: CPT

## 2021-02-21 PROCEDURE — 84439 ASSAY OF FREE THYROXINE: CPT

## 2021-02-21 PROCEDURE — 94760 N-INVAS EAR/PLS OXIMETRY 1: CPT

## 2021-02-21 PROCEDURE — 84550 ASSAY OF BLOOD/URIC ACID: CPT

## 2021-02-21 PROCEDURE — 65270000029 HC RM PRIVATE

## 2021-02-21 PROCEDURE — 74011250637 HC RX REV CODE- 250/637: Performed by: HOSPITALIST

## 2021-02-21 PROCEDURE — 74011250636 HC RX REV CODE- 250/636: Performed by: PHYSICIAN ASSISTANT

## 2021-02-21 PROCEDURE — 85027 COMPLETE CBC AUTOMATED: CPT

## 2021-02-21 PROCEDURE — 80069 RENAL FUNCTION PANEL: CPT

## 2021-02-21 PROCEDURE — 74011250637 HC RX REV CODE- 250/637: Performed by: FAMILY MEDICINE

## 2021-02-21 PROCEDURE — 99218 HC RM OBSERVATION: CPT

## 2021-02-21 PROCEDURE — 74011000250 HC RX REV CODE- 250: Performed by: EMERGENCY MEDICINE

## 2021-02-21 RX ORDER — OXYBUTYNIN CHLORIDE 10 MG/1
10 TABLET, EXTENDED RELEASE ORAL DAILY
Status: DISCONTINUED | OUTPATIENT
Start: 2021-02-22 | End: 2021-02-22 | Stop reason: CLARIF

## 2021-02-21 RX ORDER — FACIAL-BODY WIPES
10 EACH TOPICAL DAILY PRN
Status: COMPLETED | OUTPATIENT
Start: 2021-02-21 | End: 2021-02-21

## 2021-02-21 RX ORDER — ROSUVASTATIN CALCIUM 5 MG/1
5 TABLET, COATED ORAL EVERY OTHER DAY
COMMUNITY
End: 2021-04-09 | Stop reason: ALTCHOICE

## 2021-02-21 RX ADMIN — BISACODYL 10 MG: 10 SUPPOSITORY RECTAL at 22:47

## 2021-02-21 RX ADMIN — Medication 1 TABLET: at 16:30

## 2021-02-21 RX ADMIN — Medication 1 TABLET: at 08:41

## 2021-02-21 RX ADMIN — CEFTRIAXONE 1 G: 1 INJECTION, POWDER, FOR SOLUTION INTRAMUSCULAR; INTRAVENOUS at 22:11

## 2021-02-21 RX ADMIN — Medication 1 CAPSULE: at 08:38

## 2021-02-21 RX ADMIN — ASPIRIN 81 MG: 81 TABLET, COATED ORAL at 08:41

## 2021-02-21 RX ADMIN — LOSARTAN POTASSIUM 25 MG: 50 TABLET, FILM COATED ORAL at 08:38

## 2021-02-21 RX ADMIN — Medication 10 ML: at 14:00

## 2021-02-21 RX ADMIN — LOSARTAN POTASSIUM 25 MG: 50 TABLET, FILM COATED ORAL at 22:10

## 2021-02-21 RX ADMIN — LEVOTHYROXINE SODIUM 50 MCG: 0.03 TABLET ORAL at 08:38

## 2021-02-21 RX ADMIN — Medication 50 MG: at 08:41

## 2021-02-21 RX ADMIN — CEFTRIAXONE 1 G: 1 INJECTION, POWDER, FOR SOLUTION INTRAMUSCULAR; INTRAVENOUS at 08:37

## 2021-02-21 RX ADMIN — SODIUM CHLORIDE 500 ML: 9 INJECTION, SOLUTION INTRAVENOUS at 13:47

## 2021-02-21 RX ADMIN — METOPROLOL SUCCINATE 100 MG: 50 TABLET, EXTENDED RELEASE ORAL at 08:41

## 2021-02-21 RX ADMIN — LETROZOLE 2.5 MG: 2.5 TABLET ORAL at 08:41

## 2021-02-21 NOTE — PROGRESS NOTES
Reason for Admission:   UTI                  RUR Score:     N/A             PCP: First and Last name:     Rommel Horne MD        Name of Practice:    Are you a current patient: Yes/No: yes   Approximate date of last visit: 2/3/2021   Can you participate in a virtual visit if needed:     Do you (patient/family) have any concerns for transition/discharge? Plan for utilizing home health:   yes    Current Advanced Directive/Advance Care Plan:      Healthcare Decision Maker:   Click here to complete [place] including selection of the Healthcare Decision Maker Relationship (ie \"Primary\")            Transition of Care Plan:     Home with Jatin Angel    The patient takes care of her  who has Alzheimer. The patient trying to get more help at home. The patient mentioned that they are not qualified for medicaid yet. Will follow for screening the patient by [facility]  for personal care.

## 2021-02-21 NOTE — PROGRESS NOTES
Hospitalist Progress Note    Subjective:   Daily Progress Note: 2/21/2021 8:26 AM    Patient is feeling better today. Feels like she has regained some strength but wont know for sure until she gets up and starts walking. She reports that her right ankle is still sore. Current Facility-Administered Medications   Medication Dose Route Frequency    0.9% sodium chloride infusion  50 mL/hr IntraVENous CONTINUOUS    aspirin delayed-release tablet 81 mg  81 mg Oral DAILY    calcium-vitamin D 600 mg(1,500mg) -200 unit per tablet 1 Tab  1 Tab Oral BID WITH MEALS    clonazePAM (KlonoPIN) tablet 0.25 mg  0.25 mg Oral BID PRN    letrozole (FEMARA) tablet 2.5 mg  2.5 mg Oral DAILY    levothyroxine (SYNTHROID) tablet 50 mcg  50 mcg Oral ACB    losartan (COZAAR) tablet 25 mg  25 mg Oral BID    metoprolol succinate (TOPROL-XL) XL tablet 100 mg  100 mg Oral DAILY    Multivitamins with Min No.7-FA (V-C FORTE) capsule 1 Cap  1 Cap Oral DAILY    traZODone (DESYREL) tablet 50 mg  50 mg Oral QHS PRN    zinc gluconate tablet 50 mg  1 Tab Oral DAILY    sodium chloride (NS) flush 5-40 mL  5-40 mL IntraVENous Q8H    sodium chloride (NS) flush 5-40 mL  5-40 mL IntraVENous PRN    acetaminophen (TYLENOL) tablet 650 mg  650 mg Oral Q6H PRN    Or    acetaminophen (TYLENOL) suppository 650 mg  650 mg Rectal Q6H PRN    ondansetron (ZOFRAN ODT) tablet 4 mg  4 mg Oral Q6H PRN    Or    ondansetron (ZOFRAN) injection 4 mg  4 mg IntraVENous Q6H PRN    cefTRIAXone (ROCEPHIN) 1 g in sterile water (preservative free) 10 mL IV syringe  1 g IntraVENous Q12H        Review of Systems  Review of Systems   Constitutional: Positive for malaise/fatigue. Negative for chills and fever. HENT: Negative. Eyes: Negative. Respiratory: Negative for cough, shortness of breath and wheezing. Cardiovascular: Negative for chest pain, palpitations and leg swelling. Gastrointestinal: Negative for abdominal pain, diarrhea, nausea and vomiting. Genitourinary: Negative for dysuria, frequency and urgency. Neurological: Positive for weakness. Negative for dizziness and headaches. Psychiatric/Behavioral: Negative. Objective:     Visit Vitals  BP (!) 144/71   Pulse 82   Temp 98.7 °F (37.1 °C)   Resp 18   Ht 5' 1\" (1.549 m)   Wt 53.2 kg (117 lb 4.6 oz)   SpO2 99%   BMI 22.16 kg/m²      O2 Device: Room air    Temp (24hrs), Av.6 °F (37 °C), Min:98.3 °F (36.8 °C), Max:99 °F (37.2 °C)      No intake/output data recorded. No intake/output data recorded. Recent Results (from the past 24 hour(s))   CBC WITH AUTOMATED DIFF    Collection Time: 21  4:10 PM   Result Value Ref Range    WBC 5.7 3.6 - 11.0 K/uL    RBC 3.91 3.80 - 5.20 M/uL    HGB 11.1 (L) 11.5 - 16.0 g/dL    HCT 35.3 35.0 - 47.0 %    MCV 90.3 80.0 - 99.0 FL    MCH 28.4 26.0 - 34.0 PG    MCHC 31.4 30.0 - 36.5 g/dL    RDW 14.4 11.5 - 14.5 %    PLATELET 117 624 - 143 K/uL    MPV 10.7 8.9 - 12.9 FL    NEUTROPHILS 86 (H) 32 - 75 %    LYMPHOCYTES 12 12 - 49 %    MONOCYTES 2 (L) 5 - 13 %    EOSINOPHILS 0 0 - 7 %    BASOPHILS 0 0 - 1 %    IMMATURE GRANULOCYTES 0 0.0 - 0.5 %    ABS. NEUTROPHILS 4.9 1.8 - 8.0 K/UL    ABS. LYMPHOCYTES 0.7 (L) 0.8 - 3.5 K/UL    ABS. MONOCYTES 0.1 0.0 - 1.0 K/UL    ABS. EOSINOPHILS 0.0 0.0 - 0.4 K/UL    ABS. BASOPHILS 0.0 0.0 - 0.1 K/UL    ABS. IMM.  GRANS. 0.0 0.00 - 0.04 K/UL    DF AUTOMATED     METABOLIC PANEL, COMPREHENSIVE    Collection Time: 21  4:10 PM   Result Value Ref Range    Sodium 140 136 - 145 mmol/L    Potassium 3.8 3.5 - 5.1 mmol/L    Chloride 105 97 - 108 mmol/L    CO2 27 21 - 32 mmol/L    Anion gap 8 5 - 15 mmol/L    Glucose 189 (H) 65 - 100 mg/dL    BUN 49 (H) 6 - 20 mg/dL    Creatinine 1.56 (H) 0.55 - 1.02 mg/dL    BUN/Creatinine ratio 31 (H) 12 - 20      GFR est AA 38 (L) >60 ml/min/1.73m2    GFR est non-AA 32 (L) >60 ml/min/1.73m2    Calcium 10.9 (H) 8.5 - 10.1 mg/dL    Bilirubin, total 0.4 0.2 - 1.0 mg/dL    AST (SGOT) 21 15 - 37 U/L    ALT (SGPT) 29 12 - 78 U/L    Alk.  phosphatase 69 45 - 117 U/L    Protein, total 8.0 6.4 - 8.2 g/dL    Albumin 3.9 3.5 - 5.0 g/dL    Globulin 4.1 (H) 2.0 - 4.0 g/dL    A-G Ratio 1.0 (L) 1.1 - 2.2     TROPONIN I    Collection Time: 02/20/21  4:10 PM   Result Value Ref Range    Troponin-I, Qt. <0.05 <0.05 ng/mL   CK W/ REFLX CKMB    Collection Time: 02/20/21  4:10 PM   Result Value Ref Range    .0 26 - 192 ng/mL   BNP    Collection Time: 02/20/21  4:10 PM   Result Value Ref Range    NT pro- <450 pg/mL   PROTHROMBIN TIME + INR    Collection Time: 02/20/21  4:10 PM   Result Value Ref Range    Prothrombin time 13.7 11.9 - 14.7 sec    INR 1.0 0.9 - 1.1     PTT    Collection Time: 02/20/21  4:10 PM   Result Value Ref Range    aPTT 26.1 23.0 - 35.7 sec    aPTT, therapeutic range   68 - 109 sec   PROCALCITONIN    Collection Time: 02/20/21  4:10 PM   Result Value Ref Range    Procalcitonin <0.05 (H) 0 ng/mL   LACTIC ACID    Collection Time: 02/20/21  4:10 PM   Result Value Ref Range    Lactic acid 1.7 0.4 - 2.0 mmol/L   TSH 3RD GENERATION    Collection Time: 02/20/21  4:10 PM   Result Value Ref Range    TSH 0.74 0.36 - 3.74 uIU/mL   MAGNESIUM    Collection Time: 02/20/21  4:10 PM   Result Value Ref Range    Magnesium 2.4 1.6 - 2.4 mg/dL   URINALYSIS W/ REFLEX CULTURE    Collection Time: 02/20/21  6:05 PM    Specimen: Urine   Result Value Ref Range    Color Yellow/Straw      Appearance Turbid (A) Clear      Specific gravity 1.020 1.003 - 1.030      pH (UA) 5.0 5.0 - 8.0      Protein 30 (A) Negative mg/dL    Glucose Negative Negative mg/dL    Ketone 5 (A) Negative mg/dL    Bilirubin Negative Negative      Blood Negative Negative      Urobilinogen 0.1 0.1 - 1.0 EU/dL    Nitrites Negative Negative      Leukocyte Esterase Moderate (A) Negative      WBC 10-20 0 - 4 /hpf    RBC 20-50 0 - 5 /hpf    Bacteria 1+ (A) Negative /hpf    UA:UC IF INDICATED Urine Culture Ordered (A) Culture not indicated by UA result Mucus Trace (A) Negative /lpf    Hyaline cast 5-10 0 - 5 /lpf        XR CHEST SNGL V   Final Result   Stable appearance of the chest compared to 5 October 2020           PHYSICAL EXAM:    Physical Exam  Constitutional:       General: She is not in acute distress. Appearance: Normal appearance. She is normal weight. She is not ill-appearing. HENT:      Head: Normocephalic and atraumatic. Nose: Nose normal.   Eyes:      Extraocular Movements: Extraocular movements intact. Conjunctiva/sclera: Conjunctivae normal.   Neck:      Musculoskeletal: Normal range of motion. Cardiovascular:      Rate and Rhythm: Normal rate and regular rhythm. Pulses: Normal pulses. Heart sounds: Normal heart sounds. No murmur. No friction rub. No gallop. Pulmonary:      Effort: Pulmonary effort is normal. No respiratory distress. Breath sounds: Normal breath sounds. No wheezing. Abdominal:      General: Abdomen is flat. There is no distension. Palpations: Abdomen is soft. Tenderness: There is no abdominal tenderness. There is no guarding. Musculoskeletal: Normal range of motion. Skin:     General: Skin is warm and dry. Neurological:      General: No focal deficit present. Mental Status: She is alert and oriented to person, place, and time. Cranial Nerves: No cranial nerve deficit. Motor: Weakness present.       Comments: 4/5 strength lower extremities   Psychiatric:         Mood and Affect: Mood normal.         Behavior: Behavior normal.          Data Review    Recent Results (from the past 24 hour(s))   CBC WITH AUTOMATED DIFF    Collection Time: 02/20/21  4:10 PM   Result Value Ref Range    WBC 5.7 3.6 - 11.0 K/uL    RBC 3.91 3.80 - 5.20 M/uL    HGB 11.1 (L) 11.5 - 16.0 g/dL    HCT 35.3 35.0 - 47.0 %    MCV 90.3 80.0 - 99.0 FL    MCH 28.4 26.0 - 34.0 PG    MCHC 31.4 30.0 - 36.5 g/dL    RDW 14.4 11.5 - 14.5 %    PLATELET 774 942 - 721 K/uL    MPV 10.7 8.9 - 12.9 FL NEUTROPHILS 86 (H) 32 - 75 %    LYMPHOCYTES 12 12 - 49 %    MONOCYTES 2 (L) 5 - 13 %    EOSINOPHILS 0 0 - 7 %    BASOPHILS 0 0 - 1 %    IMMATURE GRANULOCYTES 0 0.0 - 0.5 %    ABS. NEUTROPHILS 4.9 1.8 - 8.0 K/UL    ABS. LYMPHOCYTES 0.7 (L) 0.8 - 3.5 K/UL    ABS. MONOCYTES 0.1 0.0 - 1.0 K/UL    ABS. EOSINOPHILS 0.0 0.0 - 0.4 K/UL    ABS. BASOPHILS 0.0 0.0 - 0.1 K/UL    ABS. IMM. GRANS. 0.0 0.00 - 0.04 K/UL    DF AUTOMATED     METABOLIC PANEL, COMPREHENSIVE    Collection Time: 02/20/21  4:10 PM   Result Value Ref Range    Sodium 140 136 - 145 mmol/L    Potassium 3.8 3.5 - 5.1 mmol/L    Chloride 105 97 - 108 mmol/L    CO2 27 21 - 32 mmol/L    Anion gap 8 5 - 15 mmol/L    Glucose 189 (H) 65 - 100 mg/dL    BUN 49 (H) 6 - 20 mg/dL    Creatinine 1.56 (H) 0.55 - 1.02 mg/dL    BUN/Creatinine ratio 31 (H) 12 - 20      GFR est AA 38 (L) >60 ml/min/1.73m2    GFR est non-AA 32 (L) >60 ml/min/1.73m2    Calcium 10.9 (H) 8.5 - 10.1 mg/dL    Bilirubin, total 0.4 0.2 - 1.0 mg/dL    AST (SGOT) 21 15 - 37 U/L    ALT (SGPT) 29 12 - 78 U/L    Alk.  phosphatase 69 45 - 117 U/L    Protein, total 8.0 6.4 - 8.2 g/dL    Albumin 3.9 3.5 - 5.0 g/dL    Globulin 4.1 (H) 2.0 - 4.0 g/dL    A-G Ratio 1.0 (L) 1.1 - 2.2     TROPONIN I    Collection Time: 02/20/21  4:10 PM   Result Value Ref Range    Troponin-I, Qt. <0.05 <0.05 ng/mL   CK W/ REFLX CKMB    Collection Time: 02/20/21  4:10 PM   Result Value Ref Range    .0 26 - 192 ng/mL   BNP    Collection Time: 02/20/21  4:10 PM   Result Value Ref Range    NT pro- <450 pg/mL   PROTHROMBIN TIME + INR    Collection Time: 02/20/21  4:10 PM   Result Value Ref Range    Prothrombin time 13.7 11.9 - 14.7 sec    INR 1.0 0.9 - 1.1     PTT    Collection Time: 02/20/21  4:10 PM   Result Value Ref Range    aPTT 26.1 23.0 - 35.7 sec    aPTT, therapeutic range   68 - 109 sec   PROCALCITONIN    Collection Time: 02/20/21  4:10 PM   Result Value Ref Range    Procalcitonin <0.05 (H) 0 ng/mL   LACTIC ACID Collection Time: 02/20/21  4:10 PM   Result Value Ref Range    Lactic acid 1.7 0.4 - 2.0 mmol/L   TSH 3RD GENERATION    Collection Time: 02/20/21  4:10 PM   Result Value Ref Range    TSH 0.74 0.36 - 3.74 uIU/mL   MAGNESIUM    Collection Time: 02/20/21  4:10 PM   Result Value Ref Range    Magnesium 2.4 1.6 - 2.4 mg/dL   URINALYSIS W/ REFLEX CULTURE    Collection Time: 02/20/21  6:05 PM    Specimen: Urine   Result Value Ref Range    Color Yellow/Straw      Appearance Turbid (A) Clear      Specific gravity 1.020 1.003 - 1.030      pH (UA) 5.0 5.0 - 8.0      Protein 30 (A) Negative mg/dL    Glucose Negative Negative mg/dL    Ketone 5 (A) Negative mg/dL    Bilirubin Negative Negative      Blood Negative Negative      Urobilinogen 0.1 0.1 - 1.0 EU/dL    Nitrites Negative Negative      Leukocyte Esterase Moderate (A) Negative      WBC 10-20 0 - 4 /hpf    RBC 20-50 0 - 5 /hpf    Bacteria 1+ (A) Negative /hpf    UA:UC IF INDICATED Urine Culture Ordered (A) Culture not indicated by UA result      Mucus Trace (A) Negative /lpf    Hyaline cast 5-10 0 - 5 /lpf        Assessment/Plan:     Active Problems:    Acute kidney injury ([facility] [address] (2/20/2021)      [facility]2/20[facility])      Hospital course: Patient is an 82year old female with a past medical history of hypertension, carcinoma of the breast, hyperlipidemia, chronic vaginal prolapse, and hypothyroidism who presented to the ED with generalized weakness/fatigue and two ground level falls with some right ankle pain. Patient was recently started on lasix as outpatient for leg edema. Preliminary labs showed LESLYE and acute cystitis. Patient started on Rocephin. Chest X-ray showed no acute pathology. Patient was started on IV fluids and her lasix was held. Urine and blood cultures pending. X-Ray of ankle, tibia, fibula pending. Impression:  1. Acute Kidney Injury  2. Acute Cystitis  3. Right ankle pain  4. Hypothyroidism  5. Essential hypertension  6.  History of breast cancer  7. Weakness    Plan    1. Acute Kidney Injury  Continue IV fluids 50ml/hr  May need additional fluids  Last creatinine 1.56, BUN 49  Continue holding lasix    2. Acute Cystitis  UA revealed moderate LE and 1+ bacteria  Urine culture pending  Continue Rocephin    3. Right ankle pain  Order right ankle X-ray   Order right tibia/fibula x-ray    4. Hypothyroidism  TSH 0.74  Continue Synthroid    5. Essential hypertension  Continue metoprolol with holding parameters of HR < 60  Continue Losartan    6. History of breast cancer  Chest x-ray showed stable appearance of the chest compared to 10/05/2020, no acute pathology  Continue Femara    7. Weakness  PT/OT  Consult case management for potential rehab following discharge    CODE STATUS: FULL CODE    DVT prophylaxis: Lovenox  Ulcer Prophylaxis: not indicated    Care Plan discussed with: Patient/Family    Total time spent with patient: 38 minutes.

## 2021-02-21 NOTE — H&P
History and Physical              Subjective :   Chief Complaint : Generalized weakness, acute kidney injury on lab work    Source of information : Patient not a great historian, previous medical records and ED provider. History of present illness:   82 y.o. female with multiple medical problems, hypertension, hyperlipidemia, carcinoma of the breast presents to the emergency room due to generalized weakness since she was started on furosemide for lower extremity edema. 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 Family noticed that she has been very weak and unable to take care of herself. She is having trouble ambulating. Also not eating drinking well. No fever or chills. No cough or trouble breathing. Laboratory data suggest mild acute kidney injury with volume contraction. She also has significant vaginal prolapse that on follow-up with urology. She does have trouble with recurrent urine infection. Today urine analysis also suggest that may be infection signs.     Past Medical History:   Diagnosis Date    Anxiety 6/16/2020    Arthritis     Depression 6/16/2020    Diverticulitis 6/16/2020    GERD (gastroesophageal reflux disease) 6/16/2020    High cholesterol 6/16/2020    Hx of dizziness 6/16/2020    Hypertension 6/16/2020    Primary cancer of lower-inner quadrant of left female breast ([facility] [address] 12/18/2018    breast     Thyroid disease 6/16/2020     Past Surgical History:   Procedure Laterality Date    HX BREAST LUMPECTOMY Left 01/23/2019    with SLNB    HX HEENT  06/25/2018    Cataract    HX HYSTERECTOMY  1976    HX ORTHOPAEDIC  2014    Left knee    HX ORTHOPAEDIC  08/09/2017    Right knee    HX ORTHOPAEDIC  2013    Right Shoulder    HX UROLOGICAL  12/10/2020    cystoscopy      Family History   Problem Relation Age of Onset    Cancer Mother 80        Esophagas    Heart Disease Father     Prostate Cancer Brother     Breast Cancer Maternal Grandmother 71    Breast Cancer Maternal Cousin 58      Social History     Tobacco Use    Smoking status: Never Smoker    Smokeless tobacco: Never Used   Substance Use Topics    Alcohol use: Not Currently     Frequency: Never       Prior to Admission medications    Medication Sig Start Date End Date Taking? Authorizing Provider   oxybutynin chloride XL (DITROPAN XL) 10 mg CR tablet Take 1 Tab by mouth daily. 12/10/20   Justin Lowe MD   indapamide (LOZOL) 2.5 mg tablet Take 0.5 Tabs by mouth daily. 10/7/20   [facility] Leiva   calcium-cholecalciferol, D3, (CALTRATE 600+D) tablet Take 1 Tab by mouth daily. Provider, Historical   multivit-min/iron/folic/lutein (CENTRUM SILVER WOMEN PO) Take  by mouth. Provider, Historical   elderberry fruit (ELDERBERRY PO) Take  by mouth. Provider, Historical   cholecalciferol (Vitamin D3) (5000 Units/125 mcg) tab tablet Take  by mouth daily. Provider, Historical   ZINC PICOLINATE PO Take  by mouth. Provider, Historical   alendronate (FOSAMAX) 70 mg tablet Take  by mouth. Provider, Historical   aspirin delayed-release 81 mg tablet Take  by mouth daily. Provider, Historical   clonazePAM (KlonoPIN) 0.5 mg tablet Take  by mouth two (2) times daily as needed for Anxiety. Provider, Historical   letrozole (FEMARA) 2.5 mg tablet Take 2.5 mg by mouth daily. Provider, Historical   losartan (COZAAR) 25 mg tablet Take  by mouth two (2) times a day. Provider, Historical   metoprolol succinate (TOPROL-XL) 100 mg tablet Take  by mouth daily. Provider, Historical   levothyroxine (synthroid) 50 mcg tablet Take  by mouth Daily (before breakfast). Provider, Historical   traZODone (DESYREL) 50 mg tablet Take  by mouth nightly. Provider, Historical   ascorbic acid, vitamin C, (Vitamin C) 250 mg tablet Take  by mouth.     Provider, Historical     Allergies   Allergen Reactions    Iodinated Contrast Media Shortness of Breath             Review of Systems:  Constitutional: Appetite is not good, denies weight loss, no fever, no chills, no night sweats. Eye: No recent visual disturbances, no discharge, no double vision. Ear/nose/mouth/throat : No hearing disturbance, no ear pain, no nasal congestion, no sore throat, no trouble swallowing. Respiratory : No trouble breathing, no cough, ++ shortness of breath with activity, no hemoptysis, no wheezing. Cardiovascular : No chest pain, no palpitation, no orthopnea, ++o peripheral edema. Gastrointestinal : No nausea, no vomiting, ++ constipation,  No abdominal pain. Genitourinary : No dysuria, no hematuria, +++ increased frequency, ++ incontinence. Lymphatics : No swollen glands -Neck, axillary, inguinal.  Endocrine : She is unable to answer. Immunologic :  No seasonal allergies. Musculoskeletal : No joint swelling, ++ joint pain, No effusion. Integumentary : No rash, No pruritus, No ecchymosis. Hematology : No petechiae, No easy bruising,  No tendency to bleed easy. Neurology : Denies change in mental status,  No confusion,   Psychiatric : Unable to answer the question. Vitals:     Patient Vitals for the past 12 hrs:   Temp Pulse Resp BP SpO2   02/20/21 1810 99 °F (37.2 °C) (!) 108 18 (!) 143/64 99 %   02/20/21 1547 98.3 °F (36.8 °C) (!) 111 18 (!) 149/69 98 %       Physical Exam:   General : Looks weak and tired, no respiratory distress noted   HEENT : PERRLA, dry oral mucosa, atraumatic normocephalic, Normal ear and nose. Neck : Supple, no JVD, no masses noted, no carotid bruit. Lungs : Breath sounds with moderate air entry bilaterally, no wheezes or rales, no accessory muscle use. CVS : Rhythm rate regular, S1+, S2+, no murmur or gallop. Abdomen : Soft, nontender, , bowel sounds active. Extremities : +++  edema noted,  pedal pulses not palpable. Musculoskeletal : Decreased range of motion, no joint swelling or effusion, muscle tone and power appears decreased. .   Skin : Dry, poor skin turgor, no pathological rash.  Lymphatic : No cervical lymphadenopathy. Neurological : Awake, alert, oriented to time place person. No neurological deficits. Psychiatric : Mood and affect appears appropriate to the situation. Data Review:   Recent Results (from the past 24 hour(s))   CBC WITH AUTOMATED DIFF    Collection Time: 02/20/21  4:10 PM   Result Value Ref Range    WBC 5.7 3.6 - 11.0 K/uL    RBC 3.91 3.80 - 5.20 M/uL    HGB 11.1 (L) 11.5 - 16.0 g/dL    HCT 35.3 35.0 - 47.0 %    MCV 90.3 80.0 - 99.0 FL    MCH 28.4 26.0 - 34.0 PG    MCHC 31.4 30.0 - 36.5 g/dL    RDW 14.4 11.5 - 14.5 %    PLATELET 941 029 - 248 K/uL    MPV 10.7 8.9 - 12.9 FL    NEUTROPHILS 86 (H) 32 - 75 %    LYMPHOCYTES 12 12 - 49 %    MONOCYTES 2 (L) 5 - 13 %    EOSINOPHILS 0 0 - 7 %    BASOPHILS 0 0 - 1 %    IMMATURE GRANULOCYTES 0 0.0 - 0.5 %    ABS. NEUTROPHILS 4.9 1.8 - 8.0 K/UL    ABS. LYMPHOCYTES 0.7 (L) 0.8 - 3.5 K/UL    ABS. MONOCYTES 0.1 0.0 - 1.0 K/UL    ABS. EOSINOPHILS 0.0 0.0 - 0.4 K/UL    ABS. BASOPHILS 0.0 0.0 - 0.1 K/UL    ABS. IMM. GRANS. 0.0 0.00 - 0.04 K/UL    DF AUTOMATED     METABOLIC PANEL, COMPREHENSIVE    Collection Time: 02/20/21  4:10 PM   Result Value Ref Range    Sodium 140 136 - 145 mmol/L    Potassium 3.8 3.5 - 5.1 mmol/L    Chloride 105 97 - 108 mmol/L    CO2 27 21 - 32 mmol/L    Anion gap 8 5 - 15 mmol/L    Glucose 189 (H) 65 - 100 mg/dL    BUN 49 (H) 6 - 20 mg/dL    Creatinine 1.56 (H) 0.55 - 1.02 mg/dL    BUN/Creatinine ratio 31 (H) 12 - 20      GFR est AA 38 (L) >60 ml/min/1.73m2    GFR est non-AA 32 (L) >60 ml/min/1.73m2    Calcium 10.9 (H) 8.5 - 10.1 mg/dL    Bilirubin, total 0.4 0.2 - 1.0 mg/dL    AST (SGOT) 21 15 - 37 U/L    ALT (SGPT) 29 12 - 78 U/L    Alk.  phosphatase 69 45 - 117 U/L    Protein, total 8.0 6.4 - 8.2 g/dL    Albumin 3.9 3.5 - 5.0 g/dL    Globulin 4.1 (H) 2.0 - 4.0 g/dL    A-G Ratio 1.0 (L) 1.1 - 2.2     TROPONIN I    Collection Time: 02/20/21  4:10 PM   Result Value Ref Range    Troponin-I, Qt. <0.05 <0.05 ng/mL   CK W/ REFLX CKMB    Collection Time: 02/20/21  4:10 PM   Result Value Ref Range    .0 26 - 192 ng/mL   BNP    Collection Time: 02/20/21  4:10 PM   Result Value Ref Range    NT pro- <450 pg/mL   PROTHROMBIN TIME + INR    Collection Time: 02/20/21  4:10 PM   Result Value Ref Range    Prothrombin time 13.7 11.9 - 14.7 sec    INR 1.0 0.9 - 1.1     PTT    Collection Time: 02/20/21  4:10 PM   Result Value Ref Range    aPTT 26.1 23.0 - 35.7 sec    aPTT, therapeutic range   68 - 109 sec   PROCALCITONIN    Collection Time: 02/20/21  4:10 PM   Result Value Ref Range    Procalcitonin <0.05 (H) 0 ng/mL   LACTIC ACID    Collection Time: 02/20/21  4:10 PM   Result Value Ref Range    Lactic acid 1.7 0.4 - 2.0 mmol/L   TSH 3RD GENERATION    Collection Time: 02/20/21  4:10 PM   Result Value Ref Range    TSH 0.74 0.36 - 3.74 uIU/mL   MAGNESIUM    Collection Time: 02/20/21  4:10 PM   Result Value Ref Range    Magnesium 2.4 1.6 - 2.4 mg/dL   URINALYSIS W/ REFLEX CULTURE    Collection Time: 02/20/21  6:05 PM    Specimen: Urine   Result Value Ref Range    Color Yellow/Straw      Appearance Turbid (A) Clear      Specific gravity 1.020 1.003 - 1.030      pH (UA) 5.0 5.0 - 8.0      Protein 30 (A) Negative mg/dL    Glucose Negative Negative mg/dL    Ketone 5 (A) Negative mg/dL    Bilirubin Negative Negative      Blood Negative Negative      Urobilinogen 0.1 0.1 - 1.0 EU/dL    Nitrites Negative Negative      Leukocyte Esterase Moderate (A) Negative      WBC 10-20 0 - 4 /hpf    RBC 20-50 0 - 5 /hpf    Bacteria 1+ (A) Negative /hpf    UA:UC IF INDICATED Urine Culture Ordered (A) Culture not indicated by UA result      Mucus Trace (A) Negative /lpf    Hyaline cast 5-10 0 - 5 /lpf       Radiologic Studies :     CXR Results  (Last 48 hours)               02/20/21 1608  XR CHEST SNGL V Final result    Impression:  Stable appearance of the chest compared to 5 October 2020       Narrative:  History: Shortness of breath Single view of the chest was obtained. Heart size is normal. Lungs appear clear. No effusions or pneumothorax. Surgical clips are seen in the left axilla. No   acute bony abnormality is seen. Assessment and Plan :     Acute kidney injury: Started on IV fluids as she looks volume contracted, already received IV fluids. Due to her age will be careful with the rate of the fluid. Suspected urinary tract infection: Urine culture sent, started on ceftriaxone in the emergency room which we will continue    Benign essential hypertension: We will continue home medications with holding parameters    Hypothyroidism: On supplementation, TSH seems suppressed, we will check a free T4 to make sure if she is compensated well or need to suggest meant    Vaginal prolapse for long time, has a cystocele. Has issues with recurrent urinary tract infection, on follow-up with urology. History of carcinoma breast: On adjuvant therapy with the Femara which we will continue    Admitted to medical floor, full CODE STATUS, will encourage oral intake. Home medications reviewed and verified. Has children who will make decisions for her in case if she cannot. CC : Saadia Tuttle MD  Signed By: Lachelle Lane MD     February 20, 2021      This dictation was done by dragon, computer voice recognition software. Often unanticipated grammatical, syntax, [place] phones and other interpretive errors are inadvertently transcribed. Please excuse errors that have escaped final proofreading.

## 2021-02-21 NOTE — PROGRESS NOTES
Bedside shift change report given to Mamta Allen (oncoming nurse) by Yaneth Rosa (offgoing nurse). Report included the following information SBAR.

## 2021-02-21 NOTE — ROUTINE PROCESS
TRANSFER - OUT REPORT: 
 
Verbal report given to Jossue Mcclain RN(name) on Romana Brownie  being transferred to (unit) for routine progression of care Report consisted of patients Situation, Background, Assessment and  
Recommendations(SBAR). Information from the following report(s) SBAR was reviewed with the receiving nurse. Lines:  
Peripheral IV 02/20/21 Left Antecubital (Active) Opportunity for questions and clarification was provided. Patient transported with: 
 [employer]

## 2021-02-21 NOTE — PROGRESS NOTES
Admission Skin Assessment- Two nurse skin assessment performed by . Skin intact. Patient does have boggy heels.

## 2021-02-22 LAB
ALBUMIN SERPL-MCNC: 3 G/DL (ref 3.5–5)
ALBUMIN/GLOB SERPL: 0.9 {RATIO} (ref 1.1–2.2)
ALP SERPL-CCNC: 54 U/L (ref 45–117)
ALT SERPL-CCNC: 21 U/L (ref 12–78)
ANION GAP SERPL CALC-SCNC: 4 MMOL/L (ref 5–15)
AST SERPL W P-5'-P-CCNC: 27 U/L (ref 15–37)
BACTERIA SPEC CULT: NORMAL
BASOPHILS # BLD: 0 K/UL (ref 0–0.1)
BASOPHILS NFR BLD: 0 % (ref 0–1)
BILIRUB SERPL-MCNC: 0.5 MG/DL (ref 0.2–1)
BUN SERPL-MCNC: 16 MG/DL (ref 6–20)
BUN/CREAT SERPL: 21 (ref 12–20)
CA-I BLD-MCNC: 8.9 MG/DL (ref 8.5–10.1)
CHLORIDE SERPL-SCNC: 108 MMOL/L (ref 97–108)
CO2 SERPL-SCNC: 30 MMOL/L (ref 21–32)
COLONY COUNT,CNT: NORMAL
CREAT SERPL-MCNC: 0.76 MG/DL (ref 0.55–1.02)
DIFFERENTIAL METHOD BLD: ABNORMAL
EOSINOPHIL # BLD: 0 K/UL (ref 0–0.4)
EOSINOPHIL NFR BLD: 1 % (ref 0–7)
ERYTHROCYTE [DISTWIDTH] IN BLOOD BY AUTOMATED COUNT: 14.1 % (ref 11.5–14.5)
GLOBULIN SER CALC-MCNC: 3.4 G/DL (ref 2–4)
GLUCOSE SERPL-MCNC: 98 MG/DL (ref 65–100)
HCT VFR BLD AUTO: 32.4 % (ref 35–47)
HGB BLD-MCNC: 9.9 G/DL (ref 11.5–16)
IMM GRANULOCYTES # BLD AUTO: 0 K/UL (ref 0–0.04)
IMM GRANULOCYTES NFR BLD AUTO: 0 % (ref 0–0.5)
LYMPHOCYTES # BLD: 0.6 K/UL (ref 0.8–3.5)
LYMPHOCYTES NFR BLD: 14 % (ref 12–49)
MCH RBC QN AUTO: 27.5 PG (ref 26–34)
MCHC RBC AUTO-ENTMCNC: 30.6 G/DL (ref 30–36.5)
MCV RBC AUTO: 90 FL (ref 80–99)
MONOCYTES # BLD: 0.7 K/UL (ref 0–1)
MONOCYTES NFR BLD: 16 % (ref 5–13)
NEUTS SEG # BLD: 3.1 K/UL (ref 1.8–8)
NEUTS SEG NFR BLD: 69 % (ref 32–75)
PLATELET # BLD AUTO: 304 K/UL (ref 150–400)
PMV BLD AUTO: 10.6 FL (ref 8.9–12.9)
POTASSIUM SERPL-SCNC: 3.6 MMOL/L (ref 3.5–5.1)
PROT SERPL-MCNC: 6.4 G/DL (ref 6.4–8.2)
RBC # BLD AUTO: 3.6 M/UL (ref 3.8–5.2)
SODIUM SERPL-SCNC: 142 MMOL/L (ref 136–145)
SPECIAL REQUESTS,SREQ: NORMAL
WBC # BLD AUTO: 4.5 K/UL (ref 3.6–11)

## 2021-02-22 PROCEDURE — 97162 PT EVAL MOD COMPLEX 30 MIN: CPT

## 2021-02-22 PROCEDURE — 97530 THERAPEUTIC ACTIVITIES: CPT

## 2021-02-22 PROCEDURE — 74011000250 HC RX REV CODE- 250: Performed by: EMERGENCY MEDICINE

## 2021-02-22 PROCEDURE — 36415 COLL VENOUS BLD VENIPUNCTURE: CPT

## 2021-02-22 PROCEDURE — 97165 OT EVAL LOW COMPLEX 30 MIN: CPT

## 2021-02-22 PROCEDURE — 74011250637 HC RX REV CODE- 250/637: Performed by: HOSPITALIST

## 2021-02-22 PROCEDURE — 80053 COMPREHEN METABOLIC PANEL: CPT

## 2021-02-22 PROCEDURE — 74011250636 HC RX REV CODE- 250/636: Performed by: EMERGENCY MEDICINE

## 2021-02-22 PROCEDURE — 85025 COMPLETE CBC W/AUTO DIFF WBC: CPT

## 2021-02-22 PROCEDURE — 96376 TX/PRO/DX INJ SAME DRUG ADON: CPT

## 2021-02-22 PROCEDURE — 74011250637 HC RX REV CODE- 250/637: Performed by: PHYSICIAN ASSISTANT

## 2021-02-22 PROCEDURE — 65270000029 HC RM PRIVATE

## 2021-02-22 PROCEDURE — 99218 HC RM OBSERVATION: CPT

## 2021-02-22 RX ORDER — OXYBUTYNIN CHLORIDE 5 MG/1
10 TABLET, EXTENDED RELEASE ORAL DAILY
Status: DISCONTINUED | OUTPATIENT
Start: 2021-02-22 | End: 2021-02-28 | Stop reason: HOSPADM

## 2021-02-22 RX ORDER — ROSUVASTATIN CALCIUM 5 MG/1
5 TABLET, COATED ORAL EVERY OTHER DAY
Status: CANCELLED | OUTPATIENT
Start: 2021-02-22

## 2021-02-22 RX ADMIN — LOSARTAN POTASSIUM 25 MG: 50 TABLET, FILM COATED ORAL at 09:00

## 2021-02-22 RX ADMIN — LETROZOLE 2.5 MG: 2.5 TABLET ORAL at 09:00

## 2021-02-22 RX ADMIN — ASPIRIN 81 MG: 81 TABLET, COATED ORAL at 09:00

## 2021-02-22 RX ADMIN — LEVOTHYROXINE SODIUM 50 MCG: 0.03 TABLET ORAL at 07:59

## 2021-02-22 RX ADMIN — CEFTRIAXONE 1 G: 1 INJECTION, POWDER, FOR SOLUTION INTRAMUSCULAR; INTRAVENOUS at 21:00

## 2021-02-22 RX ADMIN — Medication 1 CAPSULE: at 09:00

## 2021-02-22 RX ADMIN — Medication 1 TABLET: at 09:28

## 2021-02-22 RX ADMIN — Medication 1 TABLET: at 18:01

## 2021-02-22 RX ADMIN — SODIUM CHLORIDE 50 ML/HR: 9 INJECTION, SOLUTION INTRAVENOUS at 19:34

## 2021-02-22 RX ADMIN — Medication 10 ML: at 21:01

## 2021-02-22 RX ADMIN — OXYBUTYNIN CHLORIDE 10 MG: 5 TABLET, EXTENDED RELEASE ORAL at 08:02

## 2021-02-22 RX ADMIN — Medication 50 MG: at 09:00

## 2021-02-22 RX ADMIN — METOPROLOL SUCCINATE 100 MG: 50 TABLET, EXTENDED RELEASE ORAL at 09:00

## 2021-02-22 RX ADMIN — CEFTRIAXONE 1 G: 1 INJECTION, POWDER, FOR SOLUTION INTRAMUSCULAR; INTRAVENOUS at 09:36

## 2021-02-22 RX ADMIN — LOSARTAN POTASSIUM 25 MG: 50 TABLET, FILM COATED ORAL at 21:00

## 2021-02-22 RX ADMIN — Medication 10 ML: at 14:21

## 2021-02-22 NOTE — PROGRESS NOTES
Hospitalist Progress Note    Subjective:   Daily Progress Note: 2/22/2021 8:26 AM    Patient is feeling better today. Feels like she has regained some strength but wont know for sure until she gets up and starts walking. She reports that her right ankle is still sore. Current Facility-Administered Medications   Medication Dose Route Frequency    oxybutynin chloride XL (DITROPAN XL) tablet 10 mg  10 mg Oral DAILY    lactulose (CHRONULAC) 10 gram/15 mL solution 45 mL  30 g Oral BID    0.9% sodium chloride infusion  50 mL/hr IntraVENous CONTINUOUS    aspirin delayed-release tablet 81 mg  81 mg Oral DAILY    calcium-vitamin D 600 mg(1,500mg) -200 unit per tablet 1 Tab  1 Tab Oral BID WITH MEALS    clonazePAM (KlonoPIN) tablet 0.25 mg  0.25 mg Oral BID PRN    letrozole (FEMARA) tablet 2.5 mg  2.5 mg Oral DAILY    levothyroxine (SYNTHROID) tablet 50 mcg  50 mcg Oral ACB    losartan (COZAAR) tablet 25 mg  25 mg Oral BID    metoprolol succinate (TOPROL-XL) XL tablet 100 mg  100 mg Oral DAILY    Multivitamins with Min No.7-FA (V-C FORTE) capsule 1 Cap  1 Cap Oral DAILY    traZODone (DESYREL) tablet 50 mg  50 mg Oral QHS PRN    zinc gluconate tablet 50 mg  1 Tab Oral DAILY    sodium chloride (NS) flush 5-40 mL  5-40 mL IntraVENous Q8H    sodium chloride (NS) flush 5-40 mL  5-40 mL IntraVENous PRN    acetaminophen (TYLENOL) tablet 650 mg  650 mg Oral Q6H PRN    Or    acetaminophen (TYLENOL) suppository 650 mg  650 mg Rectal Q6H PRN    ondansetron (ZOFRAN ODT) tablet 4 mg  4 mg Oral Q6H PRN    Or    ondansetron (ZOFRAN) injection 4 mg  4 mg IntraVENous Q6H PRN    cefTRIAXone (ROCEPHIN) 1 g in sterile water (preservative free) 10 mL IV syringe  1 g IntraVENous Q12H        Review of Systems  Review of Systems   Constitutional: Positive for malaise/fatigue. Negative for chills and fever. HENT: Negative. Eyes: Negative. Respiratory: Negative for cough, shortness of breath and wheezing. Cardiovascular: Negative for chest pain, palpitations and leg swelling. Gastrointestinal: Negative for abdominal pain, diarrhea, nausea and vomiting. Genitourinary: Negative for dysuria, frequency and urgency. Neurological: Positive for weakness. Negative for dizziness and headaches. Psychiatric/Behavioral: Negative. Objective:     Visit Vitals  BP (!) 145/78   Pulse 73   Temp 98.4 °F (36.9 °C)   Resp 18   Ht 5' 1\" (1.549 m)   Wt 53.2 kg (117 lb 4.6 oz)   SpO2 98%   BMI 22.16 kg/m²      O2 Device: Room air    Temp (24hrs), Av.9 °F (37.2 °C), Min:98.3 °F (36.8 °C), Max:99.5 °F (37.5 °C)      No intake/output data recorded.  1901 -  0700  In: -   Out: 100 [Urine:100]    Recent Results (from the past 24 hour(s))   CBC WITH AUTOMATED DIFF    Collection Time: 21  9:32 AM   Result Value Ref Range    WBC 4.5 3.6 - 11.0 K/uL    RBC 3.60 (L) 3.80 - 5.20 M/uL    HGB 9.9 (L) 11.5 - 16.0 g/dL    HCT 32.4 (L) 35.0 - 47.0 %    MCV 90.0 80.0 - 99.0 FL    MCH 27.5 26.0 - 34.0 PG    MCHC 30.6 30.0 - 36.5 g/dL    RDW 14.1 11.5 - 14.5 %    PLATELET 715 959 - 907 K/uL    MPV 10.6 8.9 - 12.9 FL    NEUTROPHILS 69 32 - 75 %    LYMPHOCYTES 14 12 - 49 %    MONOCYTES 16 (H) 5 - 13 %    EOSINOPHILS 1 0 - 7 %    BASOPHILS 0 0 - 1 %    IMMATURE GRANULOCYTES 0 0.0 - 0.5 %    ABS. NEUTROPHILS 3.1 1.8 - 8.0 K/UL    ABS. LYMPHOCYTES 0.6 (L) 0.8 - 3.5 K/UL    ABS. MONOCYTES 0.7 0.0 - 1.0 K/UL    ABS. EOSINOPHILS 0.0 0.0 - 0.4 K/UL    ABS. BASOPHILS 0.0 0.0 - 0.1 K/UL    ABS. IMM.  GRANS. 0.0 0.00 - 0.04 K/UL    DF AUTOMATED     METABOLIC PANEL, COMPREHENSIVE    Collection Time: 21  9:32 AM   Result Value Ref Range    Sodium 142 136 - 145 mmol/L    Potassium 3.6 3.5 - 5.1 mmol/L    Chloride 108 97 - 108 mmol/L    CO2 30 21 - 32 mmol/L    Anion gap 4 (L) 5 - 15 mmol/L    Glucose 98 65 - 100 mg/dL    BUN 16 6 - 20 mg/dL    Creatinine 0.76 0.55 - 1.02 mg/dL    BUN/Creatinine ratio 21 (H) 12 - 20      GFR est AA >60 >60 ml/min/1.73m2    GFR est non-AA >60 >60 ml/min/1.73m2    Calcium 8.9 8.5 - 10.1 mg/dL    Bilirubin, total 0.5 0.2 - 1.0 mg/dL    AST (SGOT) 27 15 - 37 U/L    ALT (SGPT) 21 12 - 78 U/L    Alk. phosphatase 54 45 - 117 U/L    Protein, total 6.4 6.4 - 8.2 g/dL    Albumin 3.0 (L) 3.5 - 5.0 g/dL    Globulin 3.4 2.0 - 4.0 g/dL    A-G Ratio 0.9 (L) 1.1 - 2.2          XR TIB/FIB RT   Final Result   No evidence of acute fracture, dislocation, or radiodense foreign   body. XR CHEST SNGL V   Final Result   Stable appearance of the chest compared to 5 October 2020           PHYSICAL EXAM:    Physical Exam  Constitutional:       General: She is not in acute distress. Appearance: Normal appearance. She is normal weight. She is not ill-appearing. HENT:      Head: Normocephalic and atraumatic. Nose: Nose normal.   Eyes:      Extraocular Movements: Extraocular movements intact. Conjunctiva/sclera: Conjunctivae normal.   Neck:      Musculoskeletal: Normal range of motion. Cardiovascular:      Rate and Rhythm: Normal rate and regular rhythm. Pulses: Normal pulses. Heart sounds: Normal heart sounds. No murmur. No friction rub. No gallop. Pulmonary:      Effort: Pulmonary effort is normal. No respiratory distress. Breath sounds: Normal breath sounds. No wheezing. Abdominal:      General: Abdomen is flat. There is no distension. Palpations: Abdomen is soft. Tenderness: There is no abdominal tenderness. There is no guarding. Musculoskeletal: Normal range of motion. Skin:     General: Skin is warm and dry. Neurological:      General: No focal deficit present. Mental Status: She is alert and oriented to person, place, and time. Cranial Nerves: No cranial nerve deficit. Motor: Weakness present.       Comments: 4/5 strength lower extremities   Psychiatric:         Mood and Affect: Mood normal.         Behavior: Behavior normal.          Data Review    Recent Results (from the past 24 hour(s))   CBC WITH AUTOMATED DIFF    Collection Time: 02/22/21  9:32 AM   Result Value Ref Range    WBC 4.5 3.6 - 11.0 K/uL    RBC 3.60 (L) 3.80 - 5.20 M/uL    HGB 9.9 (L) 11.5 - 16.0 g/dL    HCT 32.4 (L) 35.0 - 47.0 %    MCV 90.0 80.0 - 99.0 FL    MCH 27.5 26.0 - 34.0 PG    MCHC 30.6 30.0 - 36.5 g/dL    RDW 14.1 11.5 - 14.5 %    PLATELET 930 101 - 350 K/uL    MPV 10.6 8.9 - 12.9 FL    NEUTROPHILS 69 32 - 75 %    LYMPHOCYTES 14 12 - 49 %    MONOCYTES 16 (H) 5 - 13 %    EOSINOPHILS 1 0 - 7 %    BASOPHILS 0 0 - 1 %    IMMATURE GRANULOCYTES 0 0.0 - 0.5 %    ABS. NEUTROPHILS 3.1 1.8 - 8.0 K/UL    ABS. LYMPHOCYTES 0.6 (L) 0.8 - 3.5 K/UL    ABS. MONOCYTES 0.7 0.0 - 1.0 K/UL    ABS. EOSINOPHILS 0.0 0.0 - 0.4 K/UL    ABS. BASOPHILS 0.0 0.0 - 0.1 K/UL    ABS. IMM. GRANS. 0.0 0.00 - 0.04 K/UL    DF AUTOMATED     METABOLIC PANEL, COMPREHENSIVE    Collection Time: 02/22/21  9:32 AM   Result Value Ref Range    Sodium 142 136 - 145 mmol/L    Potassium 3.6 3.5 - 5.1 mmol/L    Chloride 108 97 - 108 mmol/L    CO2 30 21 - 32 mmol/L    Anion gap 4 (L) 5 - 15 mmol/L    Glucose 98 65 - 100 mg/dL    BUN 16 6 - 20 mg/dL    Creatinine 0.76 0.55 - 1.02 mg/dL    BUN/Creatinine ratio 21 (H) 12 - 20      GFR est AA >60 >60 ml/min/1.73m2    GFR est non-AA >60 >60 ml/min/1.73m2    Calcium 8.9 8.5 - 10.1 mg/dL    Bilirubin, total 0.5 0.2 - 1.0 mg/dL    AST (SGOT) 27 15 - 37 U/L    ALT (SGPT) 21 12 - 78 U/L    Alk.  phosphatase 54 45 - 117 U/L    Protein, total 6.4 6.4 - 8.2 g/dL    Albumin 3.0 (L) 3.5 - 5.0 g/dL    Globulin 3.4 2.0 - 4.0 g/dL    A-G Ratio 0.9 (L) 1.1 - 2.2          Assessment/Plan:     Active Problems:    Acute kidney injury ([facility] (2/20/2021)      LESLYE (acute kidney injury) ([facility] (2/20/2021)      Hospital course: Patient is an 82year old female with a past medical history of hypertension, carcinoma of the breast, hyperlipidemia, chronic vaginal prolapse, and hypothyroidism who presented to the ED with generalized weakness/fatigue and two ground level falls with some right ankle pain. Patient was recently started on lasix as outpatient for leg edema. Preliminary labs showed LESLYE and acute cystitis. Patient started on Rocephin. Chest X-ray showed no acute pathology. Patient was started on IV fluids and her lasix was held. Urine and blood cultures pending. X-Ray of  Tibia/fibula negative. Impression:  1. Acute Kidney Injury  2. Acute Cystitis  3. Right ankle pain  4. Hypothyroidism  5. Essential hypertension  6. History of breast cancer  7. Weakness    Plan    1. Acute Kidney Injury  Continue IV fluids 50ml/hr  May need additional fluids  Creatinine improved  Continue holding lasix    2. Acute Cystitis  UA revealed moderate LE and 1+ bacteria  Urine culture pending, 1000,000 colony, ? contaminate  Continue Rocephin    3. Right ankle pain  Right tibia/fibula x-ray negative    4. Hypothyroidism  TSH 0.74  Continue Synthroid    5. Essential hypertension  Continue metoprolol with holding parameters of HR < 60  Continue Losartan    6. History of breast cancer  Chest x-ray showed stable appearance of the chest compared to 10/05/2020, no acute pathology  Continue Femara    7. Weakness  PT/OT awaiting evaluation  Consult case management for potential rehab following discharge    CODE STATUS: FULL CODE    DVT prophylaxis: Lovenox  Ulcer Prophylaxis: not indicated    Care Plan discussed with: Patient/Family    Discussed case with daughter Beverley Anne, [phone]    Total time spent with patient: 35 minutes.

## 2021-02-22 NOTE — PROGRESS NOTES
Problem: Mobility Impaired (Adult and Pediatric)  Goal: *Acute Goals and Plan of Care (Insert Text)  Description: Patient will move from supine to sit and sit to supine , scoot up and down, and roll side to side in bed with independence within 7 day(s). Patient will transfer from bed to chair and chair to bed with independence using the least restrictive device within 7 day(s). Patient will improve static standing balance to supervision within 1 week(s). Patient will ambulate 10 feet with independence with least restrictive device within 1 weeks. Outcome: Not Met   PHYSICAL THERAPY EVALUATION  Patient: Mouna Byrd (9 y.o. female)  Date: 2/22/2021  Primary Diagnosis: LESLYE (acute kidney injury) [facility]) [N17.9]        Precautions:   Fall    ASSESSMENT  82year old female with a past medical history of hypertension, carcinoma of the breast, hyperlipidemia, chronic vaginal prolapse, and hypothyroidism who presented to the ED with generalized weakness/fatigue and two ground level falls with right ankle pain. Family noticed she has been weak and decreased ability to care for herself w/difficulty walking, not drinking or eating well. Patient was recently started on lasix as outpatient for leg edema. Preliminary labs showed LESLYE and acute cystitis. Patient started on Rocephin. Chest X-ray showed no acute pathology. Right ankle xray negative for fracture. Based on the objective data described below, the patient presents with decreased functional mob, transfers, self care d/t following deficits: right ankle pain, decreased general strength, endurance, balance, pt reporting being in bed for 2 days in hospital has made her more weak. Pt axox4, rating right ankle pain \"3\". Agreeable to eval. Pt on bedpan when arrived. Pt reporting having frequent stools and sits on bedpan for periods of time. She states RN tried to help her get on [facility] earlier but \"didn't go well\".  Pt agreeable to trying to transfer to [facility] w/therapy. Discussed not wanting to stay on bedpan for long periods of time d/t increase risk of skin breakdown. Pt rolling w/mod A to take out bedpan and to get cleaned. Pt supine>sit mod A and increased time, cues. Pt having difficulty following directions at times. Pt able to perform simple tasks. Pt sit<>stand transfer from EOB to [facility] w/ max A x2 and increased time. Pt w/posterior lean and needing constant max cues to lean forward and take steps around to [facility]. Patient sitting at eob keeping her les in air. Difficulty bending her knees. Able but appears not following the process of mobility. Pt had small amount loose stool. Once back on  EOB, pt sit>supine w/min A and increased time, cues. See below for full PLOF. Pt reports spouse has Alzheimers and she is his main caregiver. Pt reports being IND PTA. SNF upon DC  Patient will benefit from skilled therapy intervention to address the above noted impairments. PLAN :  Recommendations and Planned Interventions: bed mobility training, transfer training, gait training, therapeutic exercises, patient and family training/education, and therapeutic activities      Frequency/Duration: Patient will be followed by physical therapy:  5 times a week to address goals. Recommendation for discharge: (in order for the patient to meet his/her long term goals)  Therapy up to 5 days/week in SNF setting    This discharge recommendation:  Has been made in collaboration with the attending provider and/or case management    IF patient discharges home will need the following DME: hospital bed, rolling walker, and wheelchair         SUBJECTIVE:   Patient stated I am doing ok.     OBJECTIVE DATA SUMMARY:   HISTORY:    Past Medical History:   Diagnosis Date    Anxiety 6/16/2020    Arthritis     Breast cancer ([facility]     Breast cancer ([facility]     Depression 6/16/2020    Diverticulitis 6/16/2020    GERD (gastroesophageal reflux disease) 6/16/2020    High cholesterol 6/16/2020 Hx of dizziness 6/16/2020    Hypertension 6/16/2020    Primary cancer of lower-inner quadrant of left female breast ([facility] 12/18/2018    breast     Thyroid disease 6/16/2020     Past Surgical History:   Procedure Laterality Date    HX BREAST LUMPECTOMY Left 01/23/2019    with SLNB    HX HEENT  06/25/2018    Cataract    HX HYSTERECTOMY  1976    HX ORTHOPAEDIC  2014    Left knee    HX ORTHOPAEDIC  08/09/2017    Right knee    HX ORTHOPAEDIC  2013    Right Shoulder    HX UROLOGICAL  12/10/2020    cystoscopy        Personal factors and/or comorbidities impacting plan of care:   Home Situation  Home Environment: Private residence  # Steps to Enter: 2  Rails to Enter: Yes  Hand Rails : Bilateral  One/Two Story Residence: One story  Living Alone: No  Support Systems: Family member(s), Spouse/Significant Other/Partner(pt is caregiver for spouse who has alzheimers)  Patient Expects to be Discharged to[de-identified] Private residence  Current DME Used/Available at Home: Cane, straight    EXAMINATION/PRESENTATION/DECISION MAKING:   Critical Behavior:  Neurologic State: Alert  Orientation Level: Oriented X4  Cognition: Decreased attention/concentration, Follows commands     Hearing:     Range Of Motion:  AROM: Generally decreased, functional                       Strength:    Strength: Generally decreased, functional                    Tone & Sensation:   Tone: Normal              Sensation: Intact               Coordination:  Coordination: Generally decreased, functional  Vision:      Functional Mobility:  Bed Mobility:  Rolling: Moderate assistance  Supine to Sit: Moderate assistance;Assist x2  Sit to Supine: Moderate assistance;Assist x2  Scooting: Moderate assistance;Assist x2  Transfers:  Sit to Stand: Maximum assistance; Additional time;Assist x2  Stand to Sit: Moderate assistance; Additional time;Assist x2                       Balance:   Sitting: Intact  Standing: Impaired;Pull to stand; With support  Standing - Static: Constant support;Poor  Standing - Dynamic : Constant support;Poor  Ambulation/Gait Training:  Distance (ft): 6 Feet (ft)  Assistive Device: Gait belt;Walker, rolling  Ambulation - Level of Assistance: Maximum assistance;Assist x2     Gait Description (WDL): Exceptions to WDL           Base of Support: Narrowed     Speed/Aparna: Slow  Step Length: Left shortened;Right shortened                        Therapeutic Exercises: heel slides perform in bed and laq in sitting to loosen up her les    Functional Measure:  [address] [place] [address] AM-PAC 6 Clicks         Basic Mobility Inpatient Short Form  How much difficulty does the patient currently have. .. Unable A Lot A Little None   1. Turning over in bed (including adjusting bedclothes, sheets and blankets)? [] 1   [x] 2   [] 3   [] 4   2. Sitting down on and standing up from a chair with arms ( e.g., wheelchair, bedside commode, etc.)   [] 1   [x] 2   [] 3   [] 4   3. Moving from lying on back to sitting on the side of the bed? [] 1   [x] 2   [] 3   [] 4          How much help from another person does the patient currently need. .. Total A Lot A Little None   4. Moving to and from a bed to a chair (including a wheelchair)? [] 1   [x] 2   [] 3   [] 4   5. Need to walk in hospital room? [x] 1   [] 2   [] 3   [] 4   6. Climbing 3-5 steps with a railing? [x] 1   [] 2   [] 3   [] 4   © 2007, Trustees of [address] [place] [address], under license to "[employer]. All rights reserved     Score:  Initial: 10/24 Most Recent: X (Date: 02/22/2021 )   Interpretation of Tool:  Represents activities that are increasingly more difficult (i.e. Bed mobility, Transfers, Gait).   Score 24 23 22-20 19-15 14-10 9-7 6   Modifier CH CI CJ CK CL CM CN           Physical Therapy Evaluation Charge Determination   History Examination Presentation Decision-Making   LOW Complexity : Zero comorbidities / personal factors that will impact the outcome / POC LOW Complexity : 1-2 Standardized tests and measures addressing body structure, function, activity limitation and / or participation in recreation  LOW Complexity : Stable, uncomplicated  LOW Complexity : FOTO score of       Based on the above components, the patient evaluation is determined to be of the following complexity level: LOW     Pain Rating:  3-4/10    Activity Tolerance:   Fair  Please refer to the flowsheet for vital signs taken during this treatment. After treatment patient left in no apparent distress:   Supine in bed, Call bell within reach, and Bed / chair alarm activated    COMMUNICATION/EDUCATION:   The patients plan of care was discussed with: Occupational therapist.     Fall prevention education was provided and the patient/caregiver indicated understanding., Patient/family have participated as able in goal setting and plan of care. , and Patient/family agree to work toward stated goals and plan of care.     Thank you for this referral.  Mehrdad Martinez PT   Time Calculation: 34 mins

## 2021-02-22 NOTE — PROGRESS NOTES
Problem: Self Care Deficits Care Plan (Adult)  Goal: *Acute Goals and Plan of Care (Insert Text)  Description: Pt will be supervision/set-up sup<->sit in prep for EOB ADL's  Pt will be moderate assistance   LB dressing EOB level  Pt will be minimal assistance  sit<-> prep for toilet transfer  Pt will be minimal assistance BSC/toilet transfer with LRAD  Pt will be minimal assistance toileting/cloth mgmt LRAD  Pt will be contact guard assist  grooming standing sink  Pt will be minimal assistance bathing sitting/standing sink LRAD  Pt will be MI lester UE HEP in prep for self care tasks      Outcome: Not Met     Problem: Patient Education: Go to Patient Education Activity  Goal: Patient/Family Education  Description: Pt will be MI lester UE HEP in prep for self care tasks    Outcome: Not Met   OCCUPATIONAL THERAPY EVALUATION  Patient: Db Major (89 y.o. female)  Date: 2/22/2021  Primary Diagnosis: LESLYE (acute kidney injury) ([facility] [address] [N17.9]        Precautions:  Fall    ASSESSMENT    82year old female with a past medical history of hypertension, carcinoma of the breast, hyperlipidemia, chronic vaginal prolapse, and hypothyroidism who presented to the ED with generalized weakness/fatigue and two ground level falls with right ankle pain. Family noticed she has been weak and decreased ability to care for herself w/difficulty walking, not drinking or eating well. Patient was recently started on lasix as outpatient for leg edema. Preliminary labs showed LESLYE and acute cystitis. Patient started on Rocephin. Chest X-ray showed no acute pathology. Right ankle xray negative for fracture.       Based on the objective data described below, the patient presents with decreased functional mob, transfers, self care d/t following deficits: right ankle pain, decreased general strength, endurance, balance, pt reporting being in bed for 2 days in hospital has made her more weak. Pt axox4, rating right ankle pain \"3\".  Agreeable to erwin. Pt on bedpan when arrived. Pt reporting having frequent stools and sits on bedpan for periods of time. She states RN tried to help her get on [facility] earlier but \"didn't go well\". Pt agreeable to trying to transfer to [facility] w/therapy. Discussed not wanting to stay on bedpan for long periods of time d/t increase risk of skin breakdown. Pt rolling w/mod A to take out bedpan and to get cleaned. Pt supine>sit mod A and increased time, cues. Pt having difficulty following directions at times. Pt max A don socks at EOB. Pt able to perform simple seated grooming w/ set up/SBA. Pt sit<>stand transfer from EOB to [facility] w/ max A x2 and increased time. Pt w/posterior lean and needing constant max cues to lean forward and take steps around to [facility]. Pt had small amount loose stool. Once back on  EOB, pt sit>supine w/min A and increased time, cues. See below for full PLOF. Pt reports spouse has Alzheimers and she is his main caregiver. Pt reports being IND PTA. Patient will benefit from skilled therapy intervention to address the above noted impairments. PLAN :  Recommendations and Planned Interventions: self care training, functional mobility training, therapeutic exercise, balance training, therapeutic activities, endurance activities, patient education and home safety training    Frequency/Duration: Patient will be followed by occupational therapy 5 times a week to address goals. Recommendation for discharge: (in order for the patient to meet his/her long term goals)  IRF    This discharge recommendation:  Has been made in collaboration with the attending provider and/or case management    IF patient discharges home will need the following DME: AE: long handled bathing, AE: long handled dressing, bedside commode, shower chair and walker: rolling       SUBJECTIVE:   Patient stated I've been in this bed for 2days.     OBJECTIVE DATA SUMMARY:   HISTORY:   Past Medical History:   Diagnosis Date    Anxiety 6/16/2020    Arthritis     Breast cancer ([facility] [address]     Breast cancer ([facility] [address]     Depression 6/16/2020    Diverticulitis 6/16/2020    GERD (gastroesophageal reflux disease) 6/16/2020    High cholesterol 6/16/2020    Hx of dizziness 6/16/2020    Hypertension 6/16/2020    Primary cancer of lower-inner quadrant of left female breast ([facility] [address] 12/18/2018    breast     Thyroid disease 6/16/2020     Past Surgical History:   Procedure Laterality Date    HX BREAST LUMPECTOMY Left 01/23/2019    with SLNB    HX HEENT  06/25/2018    Cataract    HX HYSTERECTOMY  1976    HX ORTHOPAEDIC  2014    Left knee    HX ORTHOPAEDIC  08/09/2017    Right knee    HX ORTHOPAEDIC  2013    Right Shoulder    HX UROLOGICAL  12/10/2020    cystoscopy        Expanded or extensive additional review of patient history:     Home Situation  Home Environment: Private residence  # Steps to Enter: 2  Rails to Enter: Yes  Hand Rails : Bilateral  One/Two Story Residence: One story  Living Alone: No  Support Systems: Family member(s), Spouse/Significant Other/Partner(pt is caregiver for spouse who has alzheimers)  Patient Expects to be Discharged to[de-identified] Private residence  Current DME Used/Available at Home: Cane, straight    PLOF: Pt IND for ADLS/IADLS, IND w/cane occasionally with mobility prior to admission. EXAMINATION OF PERFORMANCE DEFICITS:  Cognitive/Behavioral Status:  Neurologic State: Alert  Orientation Level: Oriented X4  Cognition: Decreased attention/concentration; Follows commands               Edema: left ankle swollen                             Range of Motion:    AROM: Within functional limits                         Strength:    Strength: Generally decreased, functional                Coordination:  Coordination: Generally decreased, functional            Tone & Sensation:    Tone: Normal  Sensation: Intact                      Balance:  Sitting: Intact  Standing: Impaired;Pull to stand; With support  Standing - Static: Constant support;Poor  Standing - Dynamic : Constant support;Poor    Functional Mobility and Transfers for ADLs:  Bed Mobility:  Rolling: Moderate assistance  Supine to Sit: Moderate assistance; Additional time  Sit to Supine: Minimum assistance; Additional time  Scooting: Moderate assistance    Transfers:  Sit to Stand: Maximum assistance; Additional time;Assist x2  Stand to Sit: Moderate assistance; Additional time;Assist x2  Toilet Transfer : Maximum assistance;Assist x2(BS)    ADL Assessment:       Oral Facial Hygiene/Grooming: Supervision;Setup                   Toileting: Maximum assistance                ADL Intervention and task modifications:       Grooming  Grooming Assistance: Set-up; Supervision  Position Performed: Seated edge of bed                   Lower Body Dressing Assistance  Socks: Maximum assistance  Leg Crossed Method Used: No  Position Performed: Seated edge of bed    Toileting  Toileting Assistance: Maximum assistance  Bowel Hygiene: Maximum assistance         [address] [facility] [address] AM-PACTM \"6 Clicks\"                                                       Daily Activity Inpatient Short Form  How much help from another person does the patient currently need. .. Total; A Lot A Little None   1. Putting on and taking off regular lower body clothing? [x]  1 []  2 []  3 []  4   2. Bathing (including washing, rinsing, drying)? []  1 [x]  2 []  3 []  4   3. Toileting, which includes using toilet, bedpan or urinal? [x] 1 []  2 []  3 []  4   4. Putting on and taking off regular upper body clothing? []  1 []  2 [x]  3 []  4   5. Taking care of personal grooming such as brushing teeth? []  1 []  2 []  3 [x]  4   6. Eating meals? []  1 []  2 []  3 [x]  4   © 2007, Trustees of [address] [place] [address] [address] [address], under license to [employer]. All rights reserved     Score: 15/24     Interpretation of Tool:  Represents clinically-significant functional categories (i.e. Activities of daily living).   Percentage of Impairment CH    0% CI    1-19% CJ    20-39% CK    40-59% CL    60-79% CM    80-99% CN     100%   [facility]  Score 6-24 24 23 20-22 15-19 10-14 7-9 6        Occupational Therapy Evaluation Charge Determination   History Examination Decision-Making   LOW Complexity : Brief history review  LOW Complexity : 1-3 performance deficits relating to physical, cognitive , or psychosocial skils that result in activity limitations and / or participation restrictions  MEDIUM Complexity : Patient may present with comorbidities that affect occupational performnce. Miniml to moderate modification of tasks or assistance (eg, physical or verbal ) with assesment(s) is necessary to enable patient to complete evaluation       Based on the above components, the patient evaluation is determined to be of the following complexity level: LOW   Pain Rating:  3/10 right ankle    Activity Tolerance:   Fair  Please refer to the flowsheet for vital signs taken during this treatment. After treatment patient left in no apparent distress:    Supine in bed, Call bell within reach and Bed / chair alarm activated    COMMUNICATION/EDUCATION:   The patients plan of care was discussed with: Physical therapist.     Home safety education was provided and the patient/caregiver indicated understanding. and Patient/family agree to work toward stated goals and plan of care. This patients plan of care is appropriate for delegation to [facility].     Thank you for this referral.  Jerome Dove  Time Calculation: 41 mins

## 2021-02-22 NOTE — PROGRESS NOTES
Patient requesting stool softener for constipation. Dr. Rosette Castaneda notified and order received for Dulcolax suppository.

## 2021-02-23 LAB
ALBUMIN SERPL-MCNC: 2.8 G/DL (ref 3.5–5)
ALBUMIN/GLOB SERPL: 0.8 {RATIO} (ref 1.1–2.2)
ALP SERPL-CCNC: 59 U/L (ref 45–117)
ALT SERPL-CCNC: 21 U/L (ref 12–78)
ANION GAP SERPL CALC-SCNC: 3 MMOL/L (ref 5–15)
AST SERPL W P-5'-P-CCNC: 21 U/L (ref 15–37)
BASOPHILS # BLD: 0 K/UL (ref 0–0.1)
BASOPHILS NFR BLD: 0 % (ref 0–1)
BILIRUB SERPL-MCNC: 0.4 MG/DL (ref 0.2–1)
BUN SERPL-MCNC: 13 MG/DL (ref 6–20)
BUN/CREAT SERPL: 19 (ref 12–20)
CA-I BLD-MCNC: 9.8 MG/DL (ref 8.5–10.1)
CHLORIDE SERPL-SCNC: 108 MMOL/L (ref 97–108)
CO2 SERPL-SCNC: 28 MMOL/L (ref 21–32)
COVID-19 RAPID TEST, COVR: NOT DETECTED
CREAT SERPL-MCNC: 0.67 MG/DL (ref 0.55–1.02)
DIFFERENTIAL METHOD BLD: ABNORMAL
EOSINOPHIL # BLD: 0.1 K/UL (ref 0–0.4)
EOSINOPHIL NFR BLD: 1 % (ref 0–7)
ERYTHROCYTE [DISTWIDTH] IN BLOOD BY AUTOMATED COUNT: 13.9 % (ref 11.5–14.5)
GLOBULIN SER CALC-MCNC: 3.3 G/DL (ref 2–4)
GLUCOSE SERPL-MCNC: 93 MG/DL (ref 65–100)
HCT VFR BLD AUTO: 31.5 % (ref 35–47)
HGB BLD-MCNC: 9.8 G/DL (ref 11.5–16)
IMM GRANULOCYTES # BLD AUTO: 0 K/UL (ref 0–0.04)
IMM GRANULOCYTES NFR BLD AUTO: 0 % (ref 0–0.5)
LYMPHOCYTES # BLD: 0.7 K/UL (ref 0.8–3.5)
LYMPHOCYTES NFR BLD: 12 % (ref 12–49)
MCH RBC QN AUTO: 27.8 PG (ref 26–34)
MCHC RBC AUTO-ENTMCNC: 31.1 G/DL (ref 30–36.5)
MCV RBC AUTO: 89.2 FL (ref 80–99)
MONOCYTES # BLD: 0.8 K/UL (ref 0–1)
MONOCYTES NFR BLD: 15 % (ref 5–13)
NEUTS SEG # BLD: 4 K/UL (ref 1.8–8)
NEUTS SEG NFR BLD: 72 % (ref 32–75)
PLATELET # BLD AUTO: 274 K/UL (ref 150–400)
PMV BLD AUTO: 10.5 FL (ref 8.9–12.9)
POTASSIUM SERPL-SCNC: 3.4 MMOL/L (ref 3.5–5.1)
PROT SERPL-MCNC: 6.1 G/DL (ref 6.4–8.2)
RBC # BLD AUTO: 3.53 M/UL (ref 3.8–5.2)
SARS-COV-2, COV2: NORMAL
SODIUM SERPL-SCNC: 139 MMOL/L (ref 136–145)
SPECIMEN SOURCE: NORMAL
WBC # BLD AUTO: 5.6 K/UL (ref 3.6–11)

## 2021-02-23 PROCEDURE — 96376 TX/PRO/DX INJ SAME DRUG ADON: CPT

## 2021-02-23 PROCEDURE — 99218 HC RM OBSERVATION: CPT

## 2021-02-23 PROCEDURE — 97530 THERAPEUTIC ACTIVITIES: CPT

## 2021-02-23 PROCEDURE — 74011250636 HC RX REV CODE- 250/636: Performed by: EMERGENCY MEDICINE

## 2021-02-23 PROCEDURE — 74011000250 HC RX REV CODE- 250: Performed by: EMERGENCY MEDICINE

## 2021-02-23 PROCEDURE — 74011250637 HC RX REV CODE- 250/637: Performed by: HOSPITALIST

## 2021-02-23 PROCEDURE — 74011250637 HC RX REV CODE- 250/637: Performed by: PHYSICIAN ASSISTANT

## 2021-02-23 PROCEDURE — 87635 SARS-COV-2 COVID-19 AMP PRB: CPT

## 2021-02-23 PROCEDURE — 65270000029 HC RM PRIVATE

## 2021-02-23 PROCEDURE — 36415 COLL VENOUS BLD VENIPUNCTURE: CPT

## 2021-02-23 PROCEDURE — 80053 COMPREHEN METABOLIC PANEL: CPT

## 2021-02-23 PROCEDURE — 85025 COMPLETE CBC W/AUTO DIFF WBC: CPT

## 2021-02-23 RX ORDER — DOCUSATE SODIUM 100 MG/1
100 CAPSULE, LIQUID FILLED ORAL 2 TIMES DAILY
Status: DISCONTINUED | OUTPATIENT
Start: 2021-02-23 | End: 2021-02-28 | Stop reason: HOSPADM

## 2021-02-23 RX ORDER — SENNOSIDES 8.6 MG/1
1 TABLET ORAL DAILY
Status: DISCONTINUED | OUTPATIENT
Start: 2021-02-23 | End: 2021-02-28 | Stop reason: HOSPADM

## 2021-02-23 RX ORDER — LEVOTHYROXINE SODIUM 25 UG/1
50 TABLET ORAL
Status: DISCONTINUED | OUTPATIENT
Start: 2021-02-24 | End: 2021-02-28 | Stop reason: HOSPADM

## 2021-02-23 RX ORDER — POTASSIUM CHLORIDE 750 MG/1
20 TABLET, FILM COATED, EXTENDED RELEASE ORAL ONCE
Status: COMPLETED | OUTPATIENT
Start: 2021-02-23 | End: 2021-02-23

## 2021-02-23 RX ADMIN — CEFTRIAXONE 1 G: 1 INJECTION, POWDER, FOR SOLUTION INTRAMUSCULAR; INTRAVENOUS at 21:14

## 2021-02-23 RX ADMIN — Medication 10 ML: at 21:15

## 2021-02-23 RX ADMIN — LEVOTHYROXINE SODIUM 50 MCG: 0.03 TABLET ORAL at 05:31

## 2021-02-23 RX ADMIN — OXYBUTYNIN CHLORIDE 10 MG: 5 TABLET, EXTENDED RELEASE ORAL at 11:51

## 2021-02-23 RX ADMIN — LOSARTAN POTASSIUM 25 MG: 50 TABLET, FILM COATED ORAL at 21:14

## 2021-02-23 RX ADMIN — POTASSIUM CHLORIDE 20 MEQ: 750 TABLET, FILM COATED, EXTENDED RELEASE ORAL at 09:40

## 2021-02-23 RX ADMIN — SENNOSIDES 8.6 MG: 8.6 TABLET, FILM COATED ORAL at 09:40

## 2021-02-23 RX ADMIN — Medication 1 TABLET: at 18:46

## 2021-02-23 RX ADMIN — Medication 10 ML: at 13:25

## 2021-02-23 RX ADMIN — LOSARTAN POTASSIUM 25 MG: 50 TABLET, FILM COATED ORAL at 09:41

## 2021-02-23 RX ADMIN — DOCUSATE SODIUM 100 MG: 100 CAPSULE, LIQUID FILLED ORAL at 21:14

## 2021-02-23 RX ADMIN — Medication 1 TABLET: at 09:40

## 2021-02-23 RX ADMIN — Medication 1 CAPSULE: at 09:40

## 2021-02-23 RX ADMIN — CEFTRIAXONE 1 G: 1 INJECTION, POWDER, FOR SOLUTION INTRAMUSCULAR; INTRAVENOUS at 09:41

## 2021-02-23 RX ADMIN — METOPROLOL SUCCINATE 100 MG: 50 TABLET, EXTENDED RELEASE ORAL at 09:40

## 2021-02-23 RX ADMIN — ASPIRIN 81 MG: 81 TABLET, COATED ORAL at 09:40

## 2021-02-23 RX ADMIN — Medication 50 MG: at 09:40

## 2021-02-23 RX ADMIN — LETROZOLE 2.5 MG: 2.5 TABLET ORAL at 09:40

## 2021-02-23 RX ADMIN — DOCUSATE SODIUM 100 MG: 100 CAPSULE, LIQUID FILLED ORAL at 09:40

## 2021-02-23 NOTE — PROGRESS NOTES
CM spoke to patient at bedside regarding recommended for SNF. Patient signed a choice for SNF \"no preference\" (patient preferred to have referrals sent to [address] area).     CM messaged Corbin Roll PA via [employer] for [employer] test.

## 2021-02-23 NOTE — PROGRESS NOTES
PHYSICAL THERAPY TREATMENT  Patient: Blas Yarbrough (68 y.o. female)  Date: 2/23/2021  Diagnosis: LESLYE (acute kidney injury) ([facility] [N17.9] <principal problem not specified>       Precautions: Fall  Chart, physical therapy assessment, plan of care and goals were reviewed. ASSESSMENT  Patient continues with skilled PT services and is progressing towards goals. Pt semi supine in bed upon PT/OT arrival and agreeable to session. Performed sup>sit with mod A. Scooting required mod A. Performed STS with mod A x 2. Pt able to perform bed>chair transfer with RW and mod A x 2 and VC for direction. Performed seated therex while in recliner, educated on seated and semi supine therex, pt verbalized understanding (see further details below). Current Level of Function Impacting Discharge (mobility/balance): assistance required for all mobility    Other factors to consider for discharge: PLOF, severity of deficits, time since onset         PLAN :  Patient continues to benefit from skilled intervention to address the above impairments. Continue treatment per established plan of care. to address goals. Recommendation for discharge: (in order for the patient to meet his/her long term goals)  Therapy 3 hours per day 5-7 days per week    This discharge recommendation:  Has been made in collaboration with the attending provider and/or case management    IF patient discharges home will need the following DME: to be determined (TBD)       SUBJECTIVE:   Patient stated i'm feeling pretty good today, much better.     OBJECTIVE DATA SUMMARY:   Critical Behavior:  Neurologic State: Alert  Orientation Level: Oriented X4  Cognition: Appropriate decision making, Follows commands     Functional Mobility Training:  Bed Mobility:  Rolling: Moderate assistance  Supine to Sit: Moderate assistance  Sit to Supine: Moderate assistance  Scooting: Moderate assistance    Transfers:  Sit to Stand:  Moderate assistance;Assist x2  Stand to Sit: Moderate assistance;Assist x2    Balance:  Sitting: Impaired; With support  Sitting - Static: Fair (occasional)  Sitting - Dynamic: Fair (occasional)  Standing: Impaired;Pull to stand; With support  Standing - Static: Constant support; Fair  Standing - Dynamic : Constant support; Fair    Ambulation/Gait Training:  Distance (ft): 3 Feet (ft)  Assistive Device: Gait belt;Walker, rolling  Ambulation - Level of Assistance: Moderate assistance;Assist x2  Base of Support: Narrowed  Speed/Aparna: Slow  Step Length: Left shortened;Right shortened      Therapeutic Exercises:   1 x 10 AP  1 x 10 LAQ  1 x 10 Marches    Pain Ratin/10    Activity Tolerance:   Fair and requires rest breaks  Please refer to the flowsheet for vital signs taken during this treatment. After treatment patient left in no apparent distress:   Sitting in chair and Call bell within reach    COMMUNICATION/COLLABORATION:   The patients plan of care was discussed with: Occupational therapist and Occupational therapy assistant. Problem: Mobility Impaired (Adult and Pediatric)  Goal: *Acute Goals and Plan of Care (Insert Text)  Description: Patient will move from supine to sit and sit to supine , scoot up and down, and roll side to side in bed with independence within 7 day(s). Patient will transfer from bed to chair and chair to bed with independence using the least restrictive device within 7 day(s). Patient will improve static standing balance to supervision within 1 week(s). Patient will ambulate 10 feet with independence with least restrictive device within 1 weeks.        Outcome: Progressing Towards Goal       Grazyna Farah PTA   Time Calculation: 27 mins

## 2021-02-23 NOTE — PROGRESS NOTES
Problem: Falls - Risk of  Goal: *Absence of Falls  Description: Document Shelby Flow Fall Risk and appropriate interventions in the flowsheet. Outcome: Progressing Towards Goal  Note: Fall Risk Interventions:  Mobility Interventions: Bed/chair exit alarm, Patient to call before getting OOB, PT Consult for mobility concerns, PT Consult for assist device competence         Medication Interventions: Bed/chair exit alarm, Evaluate medications/consider consulting pharmacy, Patient to call before getting OOB    Elimination Interventions: Bed/chair exit alarm, Call light in reach              Problem: Patient Education: Go to Patient Education Activity  Goal: Patient/Family Education  Outcome: Progressing Towards Goal     Problem: Pressure Injury - Risk of  Goal: *Prevention of pressure injury  Description: Document Jamel Scale and appropriate interventions in the flowsheet.   Outcome: Progressing Towards Goal  Note: Pressure Injury Interventions:  Sensory Interventions: Assess changes in LOC, Assess need for specialty bed, Avoid rigorous massage over bony prominences    Moisture Interventions: Absorbent underpads, Apply protective barrier, creams and emollients, Assess need for specialty bed, Maintain skin hydration (lotion/cream), Minimize layers, Moisture barrier    Activity Interventions: Increase time out of bed, Pressure redistribution bed/mattress(bed type), PT/OT evaluation    Mobility Interventions: HOB 30 degrees or less, Pressure redistribution bed/mattress (bed type), PT/OT evaluation    Nutrition Interventions: Document food/fluid/supplement intake    Friction and Shear Interventions: HOB 30 degrees or less, Lift sheet, Lift team/patient mobility team, Minimize layers                Problem: Patient Education: Go to Patient Education Activity  Goal: Patient/Family Education  Outcome: Progressing Towards Goal     Problem: Patient Education: Go to Patient Education Activity  Goal: Patient/Family Education  Description: Pt will be MI lester UE HEP in prep for self care tasks    Outcome: Progressing Towards Goal     Problem: Patient Education: Go to Patient Education Activity  Goal: Patient/Family Education  Outcome: Progressing Towards Goal

## 2021-02-23 NOTE — PROGRESS NOTES
OCCUPATIONAL THERAPY TREATMENT  Patient: Ivory Nath (72 y.o. female)  Date: 2/23/2021  Diagnosis: LESLYE (acute kidney injury) ([facility] [address] [N17.9] <principal problem not specified>       Precautions: Fall  Chart, occupational therapy assessment, plan of care, and goals were reviewed. ASSESSMENT  Patient continues with skilled OT services and is progressing towards goals. Upon arrival for PT/OT session pt semi supine in bed and willing to participate in tx session. Pt completed semi supine<>EOB with ModA, scooting required ModA. Pt attempted donning of socks, requiring MaxA for completion. Bed<>chair transfer completed using RW with ModA x 2 and VC for direction. Pt performed TE while seated in chair. BUE AROM completed: shoulder flexion, elbow flexion, and scapular protraction/retraction \"rows\" x20 each. Pt tolerated well with minimal rest breaks. Seated grooming tasks completed: brushing teeth and washing face with Setup. Pt appreciated therapy help and left seated in chair with call bell and phone within reach. Continue to progress towards goals. Current Level of Function Impacting Discharge (ADLs): assistance needed for all mobility    Other factors to consider for discharge: PLOF, severity of deficits, time since onset         PLAN :  Patient continues to benefit from skilled intervention to address the above impairments. Continue treatment per established plan of care. to address goals.     Recommend with staff: Encourage participation in bed level ADLs    Recommend next OT session: Bedside commode transfer, LB dressing    Recommendation for discharge: (in order for the patient to meet his/her long term goals)  IRF    This discharge recommendation:  Has been made in collaboration with the attending provider and/or case management    IF patient discharges home will need the following DME:  AE: long handled bathing, AE: long handled dressing, bedside commode, shower chair and walker: rolling SUBJECTIVE:   Patient stated it feels really good to be sitting up.     OBJECTIVE DATA SUMMARY:   Cognitive/Behavioral Status:  Neurologic State: Alert     Cognition: Appropriate decision making; Follows commands             Functional Mobility and Transfers for ADLs:  Bed Mobility:  Rolling: Moderate assistance  Supine to Sit: Moderate assistance  Sit to Supine: Moderate assistance  Scooting: Moderate assistance    Transfers:  Sit to Stand: Moderate assistance;Assist x2     Bed to Chair: Moderate assistance;Assist x2    Balance:  Sitting: Impaired; With support  Sitting - Static: Fair (occasional)  Sitting - Dynamic: Fair (occasional)  Standing: Impaired;Pull to stand; With support  Standing - Static: Constant support; Fair  Standing - Dynamic : Constant support; Fair    ADL Intervention:       Grooming  Position Performed: Seated in chair  Washing Face: Set-up  Brushing Teeth: Set-up       Lower Body Dressing Assistance  Socks: Maximum assistance  Leg Crossed Method Used: No  Position Performed: Seated edge of bed          Therapeutic Exercises:   See assessment for details    Pain:  0/10    Activity Tolerance:   Fair and requires rest breaks  Please refer to the flowsheet for vital signs taken during this treatment. After treatment patient left in no apparent distress:   Sitting in chair and Call bell within reach    COMMUNICATION/COLLABORATION:   The patients plan of care was discussed with: Physical therapy assistant, Occupational therapist, and Registered nurse. [place] with PT for increased safety for pt and clinician.     VALERY Celis  Time Calculation: 37 mins   Problem: Self Care Deficits Care Plan (Adult)  Goal: *Acute Goals and Plan of Care (Insert Text)  Description: Pt will be supervision/set-up sup<->sit in prep for EOB ADL's  Pt will be moderate assistance   LB dressing EOB level  Pt will be minimal assistance  sit<-> prep for toilet transfer  Pt will be minimal assistance BSC/toilet transfer with LRAD  Pt will be minimal assistance toileting/cloth mgmt LRAD  Pt will be contact guard assist  grooming standing sink  Pt will be minimal assistance bathing sitting/standing sink LRAD  Pt will be MI lester UE HEP in prep for self care tasks      Outcome: Progressing Towards Goal

## 2021-02-23 NOTE — PROGRESS NOTES
Hospitalist Progress Note    Subjective:   Daily Progress Note: 2/23/2021 8:07 AM    Danielle Gregorio is an 82year old female with a past medical history of hypertension, carcinoma of the breast, hyperlipidemia, chronic vaginal prolapse, and hypothyroidism who presented to the ED with generalized weakness/fatigue and two ground level falls with some right ankle pain. Patient was recently started on lasix as outpatient for leg edema. Preliminary labs showed LESLYE and acute cystitis. Patient started on Rocephin. Chest X-ray showed no acute pathology. Patient was started on IV fluids and her lasix was held. Urine and blood cultures pending. X-Ray of  Tibia/fibula negative. Subjective: Patient says she still feels weak. Worked with therapy yesterday and felt unsteady. Denies any chest pain or SOB. Says she is constipated.      Current Facility-Administered Medications   Medication Dose Route Frequency    oxybutynin chloride XL (DITROPAN XL) tablet 10 mg  10 mg Oral DAILY    lactulose (CHRONULAC) 10 gram/15 mL solution 45 mL  30 g Oral BID    0.9% sodium chloride infusion  50 mL/hr IntraVENous CONTINUOUS    aspirin delayed-release tablet 81 mg  81 mg Oral DAILY    calcium-vitamin D 600 mg(1,500mg) -200 unit per tablet 1 Tab  1 Tab Oral BID WITH MEALS    clonazePAM (KlonoPIN) tablet 0.25 mg  0.25 mg Oral BID PRN    letrozole (FEMARA) tablet 2.5 mg  2.5 mg Oral DAILY    levothyroxine (SYNTHROID) tablet 50 mcg  50 mcg Oral ACB    losartan (COZAAR) tablet 25 mg  25 mg Oral BID    metoprolol succinate (TOPROL-XL) XL tablet 100 mg  100 mg Oral DAILY    Multivitamins with Min No.7-FA (V-C FORTE) capsule 1 Cap  1 Cap Oral DAILY    traZODone (DESYREL) tablet 50 mg  50 mg Oral QHS PRN    zinc gluconate tablet 50 mg  1 Tab Oral DAILY    sodium chloride (NS) flush 5-40 mL  5-40 mL IntraVENous Q8H    sodium chloride (NS) flush 5-40 mL  5-40 mL IntraVENous PRN    acetaminophen (TYLENOL) tablet 650 mg  650 mg Oral Q6H PRN    Or    acetaminophen (TYLENOL) suppository 650 mg  650 mg Rectal Q6H PRN    ondansetron (ZOFRAN ODT) tablet 4 mg  4 mg Oral Q6H PRN    Or    ondansetron (ZOFRAN) injection 4 mg  4 mg IntraVENous Q6H PRN    cefTRIAXone (ROCEPHIN) 1 g in sterile water (preservative free) 10 mL IV syringe  1 g IntraVENous Q12H        Review of Systems  Constitutional: No fevers, No chills, No sweats, No fatigue,+++ Weakness  Eyes: No redness  Ears, nose, mouth, throat, and face: No nasal congestion, No sore throat, No voice change  Respiratory: No Shortness of Breath, No cough, No wheezing  Cardiovascular: No chest pain, No palpitations, No extremity edema  Gastrointestinal: No nausea, No vomiting, No diarrhea, No abdominal pain  Genitourinary: No frequency, No dysuria, No hematuria  Integument/breast: No skin lesion(s)   Neurological: No Confusion, No headaches, No dizziness      Objective:     Visit Vitals  /79   Pulse 71   Temp 99.1 °F (37.3 °C)   Resp 17   Ht 5' 1\" (1.549 m)   Wt 53.2 kg (117 lb 4.6 oz)   SpO2 98%   BMI 22.16 kg/m²      O2 Device: Room air    Temp (24hrs), Av.8 °F (37.1 °C), Min:98.4 °F (36.9 °C), Max:99.1 °F (37.3 °C)      No intake/output data recorded.  1901 -  0700  In: -   Out: 100 [Urine:100]    PHYSICAL EXAM:  Constitutional: No acute distress  Skin: Extremities and face reveal no rashes. HEENT: Sclerae anicteric. Extra-occular muscles are intact. No oral ulcers. The neck is supple and no masses. Cardiovascular: Regular rate and rhythm. Respiratory:  Clear breath sounds bilaterally with no wheezes, rales, or rhonchi. GI: Abdomen nondistended, soft, and nontender. Normal active bowel sounds. Musculoskeletal: No pitting edema of the lower legs. Able to move all ext  Neurological:  Patient is alert and oriented.  Cranial nerves II-XII grossly intact  Psychiatric: Mood appears appropriate       Data Review    Recent Results (from the past 24 hour(s))   CBC WITH AUTOMATED DIFF    Collection Time: 02/22/21  9:32 AM   Result Value Ref Range    WBC 4.5 3.6 - 11.0 K/uL    RBC 3.60 (L) 3.80 - 5.20 M/uL    HGB 9.9 (L) 11.5 - 16.0 g/dL    HCT 32.4 (L) 35.0 - 47.0 %    MCV 90.0 80.0 - 99.0 FL    MCH 27.5 26.0 - 34.0 PG    MCHC 30.6 30.0 - 36.5 g/dL    RDW 14.1 11.5 - 14.5 %    PLATELET 621 316 - 760 K/uL    MPV 10.6 8.9 - 12.9 FL    NEUTROPHILS 69 32 - 75 %    LYMPHOCYTES 14 12 - 49 %    MONOCYTES 16 (H) 5 - 13 %    EOSINOPHILS 1 0 - 7 %    BASOPHILS 0 0 - 1 %    IMMATURE GRANULOCYTES 0 0.0 - 0.5 %    ABS. NEUTROPHILS 3.1 1.8 - 8.0 K/UL    ABS. LYMPHOCYTES 0.6 (L) 0.8 - 3.5 K/UL    ABS. MONOCYTES 0.7 0.0 - 1.0 K/UL    ABS. EOSINOPHILS 0.0 0.0 - 0.4 K/UL    ABS. BASOPHILS 0.0 0.0 - 0.1 K/UL    ABS. IMM. GRANS. 0.0 0.00 - 0.04 K/UL    DF AUTOMATED     METABOLIC PANEL, COMPREHENSIVE    Collection Time: 02/22/21  9:32 AM   Result Value Ref Range    Sodium 142 136 - 145 mmol/L    Potassium 3.6 3.5 - 5.1 mmol/L    Chloride 108 97 - 108 mmol/L    CO2 30 21 - 32 mmol/L    Anion gap 4 (L) 5 - 15 mmol/L    Glucose 98 65 - 100 mg/dL    BUN 16 6 - 20 mg/dL    Creatinine 0.76 0.55 - 1.02 mg/dL    BUN/Creatinine ratio 21 (H) 12 - 20      GFR est AA >60 >60 ml/min/1.73m2    GFR est non-AA >60 >60 ml/min/1.73m2    Calcium 8.9 8.5 - 10.1 mg/dL    Bilirubin, total 0.5 0.2 - 1.0 mg/dL    AST (SGOT) 27 15 - 37 U/L    ALT (SGPT) 21 12 - 78 U/L    Alk.  phosphatase 54 45 - 117 U/L    Protein, total 6.4 6.4 - 8.2 g/dL    Albumin 3.0 (L) 3.5 - 5.0 g/dL    Globulin 3.4 2.0 - 4.0 g/dL    A-G Ratio 0.9 (L) 1.1 - 2.2     CBC WITH AUTOMATED DIFF    Collection Time: 02/23/21  6:45 AM   Result Value Ref Range    WBC 5.6 3.6 - 11.0 K/uL    RBC 3.53 (L) 3.80 - 5.20 M/uL    HGB 9.8 (L) 11.5 - 16.0 g/dL    HCT 31.5 (L) 35.0 - 47.0 %    MCV 89.2 80.0 - 99.0 FL    MCH 27.8 26.0 - 34.0 PG    MCHC 31.1 30.0 - 36.5 g/dL    RDW 13.9 11.5 - 14.5 %    PLATELET 321 106 - 770 K/uL    MPV 10.5 8.9 - 12.9 FL    NEUTROPHILS 72 32 - 75 %    LYMPHOCYTES 12 12 - 49 %    MONOCYTES 15 (H) 5 - 13 %    EOSINOPHILS 1 0 - 7 %    BASOPHILS 0 0 - 1 %    IMMATURE GRANULOCYTES 0 0.0 - 0.5 %    ABS. NEUTROPHILS 4.0 1.8 - 8.0 K/UL    ABS. LYMPHOCYTES 0.7 (L) 0.8 - 3.5 K/UL    ABS. MONOCYTES 0.8 0.0 - 1.0 K/UL    ABS. EOSINOPHILS 0.1 0.0 - 0.4 K/UL    ABS. BASOPHILS 0.0 0.0 - 0.1 K/UL    ABS. IMM. GRANS. 0.0 0.00 - 0.04 K/UL    DF AUTOMATED     METABOLIC PANEL, COMPREHENSIVE    Collection Time: 02/23/21  6:45 AM   Result Value Ref Range    Sodium 139 136 - 145 mmol/L    Potassium 3.4 (L) 3.5 - 5.1 mmol/L    Chloride 108 97 - 108 mmol/L    CO2 28 21 - 32 mmol/L    Anion gap 3 (L) 5 - 15 mmol/L    Glucose 93 65 - 100 mg/dL    BUN 13 6 - 20 mg/dL    Creatinine 0.67 0.55 - 1.02 mg/dL    BUN/Creatinine ratio 19 12 - 20      GFR est AA >60 >60 ml/min/1.73m2    GFR est non-AA >60 >60 ml/min/1.73m2    Calcium 9.8 8.5 - 10.1 mg/dL    Bilirubin, total 0.4 0.2 - 1.0 mg/dL    AST (SGOT) 21 15 - 37 U/L    ALT (SGPT) 21 12 - 78 U/L    Alk. phosphatase 59 45 - 117 U/L    Protein, total 6.1 (L) 6.4 - 8.2 g/dL    Albumin 2.8 (L) 3.5 - 5.0 g/dL    Globulin 3.3 2.0 - 4.0 g/dL    A-G Ratio 0.8 (L) 1.1 - 2.2         Radiology review: XRay    Assessment:   1. Acute Kidney Injury  2. Acute Cystitis  3. Right ankle pain  4. Hypothyroidism  5. Essential hypertension  6. History of breast cancer  7. Weakness    Plan:    1. Acute Kidney Injury  On IV fluids 50ml/hr  Creatinine improved --> 0.67  Continue holding lasix  Potassium 3. 4. we will replenish      2. Acute Cystitis  UA revealed moderate LE and 1+ bacteria  Urine culture pending, 1000,000 colony, possible contaminate  Continue Rocephin     3. Right ankle pain  Right tibia/fibula x-ray negative     4. Hypothyroidism  TSH 0.74  Continue Synthroid     5. Essential hypertension  Continue metoprolol with holding parameters of HR < 60  Continue Losartan     6.  History of breast cancer  Chest x-ray showed stable appearance of the chest compared to 10/05/2020, no acute pathology  Continue Femara     7. Weakness  PT/OT awaiting evaluation  Consult case management for potential rehab following discharge    8. Constipated  Start stool softeners      CODE STATUS: FULL CODE     DVT prophylaxis: Lovenox  Ulcer Prophylaxis: not indicated      Daughter Fredi ruiz, [address] discussed with: Patient/Family, Nurse and     Total time spent with patient: 34 minutes.

## 2021-02-24 ENCOUNTER — APPOINTMENT (OUTPATIENT)
Dept: CT IMAGING | Age: 86
End: 2021-02-24
Attending: PHYSICIAN ASSISTANT
Payer: MEDICARE

## 2021-02-24 LAB
ALBUMIN SERPL-MCNC: 2.7 G/DL (ref 3.5–5)
ALBUMIN/GLOB SERPL: 0.8 {RATIO} (ref 1.1–2.2)
ALP SERPL-CCNC: 62 U/L (ref 45–117)
ALT SERPL-CCNC: 23 U/L (ref 12–78)
ANION GAP SERPL CALC-SCNC: 4 MMOL/L (ref 5–15)
AST SERPL W P-5'-P-CCNC: 20 U/L (ref 15–37)
BASOPHILS # BLD: 0 K/UL (ref 0–0.1)
BASOPHILS NFR BLD: 0 % (ref 0–1)
BILIRUB SERPL-MCNC: 0.3 MG/DL (ref 0.2–1)
BUN SERPL-MCNC: 11 MG/DL (ref 6–20)
BUN/CREAT SERPL: 13 (ref 12–20)
CA-I BLD-MCNC: 9.7 MG/DL (ref 8.5–10.1)
CHLORIDE SERPL-SCNC: 105 MMOL/L (ref 97–108)
CO2 SERPL-SCNC: 32 MMOL/L (ref 21–32)
CREAT SERPL-MCNC: 0.86 MG/DL (ref 0.55–1.02)
DIFFERENTIAL METHOD BLD: ABNORMAL
EOSINOPHIL # BLD: 0 K/UL (ref 0–0.4)
EOSINOPHIL NFR BLD: 1 % (ref 0–7)
ERYTHROCYTE [DISTWIDTH] IN BLOOD BY AUTOMATED COUNT: 13.7 % (ref 11.5–14.5)
GLOBULIN SER CALC-MCNC: 3.6 G/DL (ref 2–4)
GLUCOSE SERPL-MCNC: 108 MG/DL (ref 65–100)
HCT VFR BLD AUTO: 32.7 % (ref 35–47)
HGB BLD-MCNC: 10.3 G/DL (ref 11.5–16)
IMM GRANULOCYTES # BLD AUTO: 0 K/UL (ref 0–0.04)
IMM GRANULOCYTES NFR BLD AUTO: 0 % (ref 0–0.5)
LYMPHOCYTES # BLD: 0.4 K/UL (ref 0.8–3.5)
LYMPHOCYTES NFR BLD: 9 % (ref 12–49)
MCH RBC QN AUTO: 27.9 PG (ref 26–34)
MCHC RBC AUTO-ENTMCNC: 31.5 G/DL (ref 30–36.5)
MCV RBC AUTO: 88.6 FL (ref 80–99)
MONOCYTES # BLD: 0.6 K/UL (ref 0–1)
MONOCYTES NFR BLD: 14 % (ref 5–13)
NEUTS SEG # BLD: 3.4 K/UL (ref 1.8–8)
NEUTS SEG NFR BLD: 76 % (ref 32–75)
PLATELET # BLD AUTO: 296 K/UL (ref 150–400)
PMV BLD AUTO: 11 FL (ref 8.9–12.9)
POTASSIUM SERPL-SCNC: 3.4 MMOL/L (ref 3.5–5.1)
PROT SERPL-MCNC: 6.3 G/DL (ref 6.4–8.2)
RBC # BLD AUTO: 3.69 M/UL (ref 3.8–5.2)
SODIUM SERPL-SCNC: 141 MMOL/L (ref 136–145)
WBC # BLD AUTO: 4.4 K/UL (ref 3.6–11)

## 2021-02-24 PROCEDURE — 99218 HC RM OBSERVATION: CPT

## 2021-02-24 PROCEDURE — 85025 COMPLETE CBC W/AUTO DIFF WBC: CPT

## 2021-02-24 PROCEDURE — 74011250637 HC RX REV CODE- 250/637: Performed by: HOSPITALIST

## 2021-02-24 PROCEDURE — 97116 GAIT TRAINING THERAPY: CPT

## 2021-02-24 PROCEDURE — 97530 THERAPEUTIC ACTIVITIES: CPT

## 2021-02-24 PROCEDURE — 80053 COMPREHEN METABOLIC PANEL: CPT

## 2021-02-24 PROCEDURE — 65270000029 HC RM PRIVATE

## 2021-02-24 PROCEDURE — 70450 CT HEAD/BRAIN W/O DYE: CPT

## 2021-02-24 PROCEDURE — 74011250637 HC RX REV CODE- 250/637: Performed by: PHYSICIAN ASSISTANT

## 2021-02-24 PROCEDURE — 36415 COLL VENOUS BLD VENIPUNCTURE: CPT

## 2021-02-24 PROCEDURE — 97110 THERAPEUTIC EXERCISES: CPT

## 2021-02-24 RX ORDER — POTASSIUM CHLORIDE 20 MEQ/1
20 TABLET, EXTENDED RELEASE ORAL ONCE
Status: COMPLETED | OUTPATIENT
Start: 2021-02-24 | End: 2021-02-24

## 2021-02-24 RX ORDER — AMOXICILLIN AND CLAVULANATE POTASSIUM 875; 125 MG/1; MG/1
1 TABLET, FILM COATED ORAL EVERY 12 HOURS
Status: DISCONTINUED | OUTPATIENT
Start: 2021-02-24 | End: 2021-02-27

## 2021-02-24 RX ADMIN — Medication 1 CAPSULE: at 09:00

## 2021-02-24 RX ADMIN — LEVOTHYROXINE SODIUM 50 MCG: 0.03 TABLET ORAL at 06:28

## 2021-02-24 RX ADMIN — ASPIRIN 81 MG: 81 TABLET, COATED ORAL at 10:27

## 2021-02-24 RX ADMIN — OXYBUTYNIN CHLORIDE 10 MG: 5 TABLET, EXTENDED RELEASE ORAL at 10:28

## 2021-02-24 RX ADMIN — DOCUSATE SODIUM 100 MG: 100 CAPSULE, LIQUID FILLED ORAL at 10:27

## 2021-02-24 RX ADMIN — METOPROLOL SUCCINATE 100 MG: 50 TABLET, EXTENDED RELEASE ORAL at 10:27

## 2021-02-24 RX ADMIN — Medication 1 TABLET: at 17:20

## 2021-02-24 RX ADMIN — Medication 10 ML: at 17:21

## 2021-02-24 RX ADMIN — Medication 50 MG: at 10:27

## 2021-02-24 RX ADMIN — LACTULOSE 45 ML: 20 SOLUTION ORAL at 10:28

## 2021-02-24 RX ADMIN — Medication 10 ML: at 20:28

## 2021-02-24 RX ADMIN — AMOXICILLIN AND CLAVULANATE POTASSIUM 1 TABLET: 875; 125 TABLET, FILM COATED ORAL at 20:27

## 2021-02-24 RX ADMIN — POTASSIUM CHLORIDE 20 MEQ: 1500 TABLET, EXTENDED RELEASE ORAL at 17:20

## 2021-02-24 RX ADMIN — Medication 1 TABLET: at 10:27

## 2021-02-24 RX ADMIN — LOSARTAN POTASSIUM 25 MG: 50 TABLET, FILM COATED ORAL at 20:27

## 2021-02-24 RX ADMIN — SENNOSIDES 8.6 MG: 8.6 TABLET, FILM COATED ORAL at 10:27

## 2021-02-24 RX ADMIN — AMOXICILLIN AND CLAVULANATE POTASSIUM 1 TABLET: 875; 125 TABLET, FILM COATED ORAL at 10:27

## 2021-02-24 RX ADMIN — LETROZOLE 2.5 MG: 2.5 TABLET ORAL at 10:27

## 2021-02-24 RX ADMIN — LOSARTAN POTASSIUM 25 MG: 50 TABLET, FILM COATED ORAL at 10:27

## 2021-02-24 NOTE — PROGRESS NOTES
PHYSICAL THERAPY TREATMENT  Patient: Blas Yarbrough (89 y.o. female)  Date: 2/24/2021  Diagnosis: [facility] ([facility] [N17.9] <principal problem not specified>       Precautions: Fall  Chart, physical therapy assessment, plan of care and goals were reviewed. ASSESSMENT  Patient continues with skilled PT services and is progressing towards goals. Patient demos improved mobility this visit and was able to progress overall mobility with less assistance today. Patient performed bed mobility with mod A, transfers min  Ax2, and ambulate mod A x2. Increased distance to approx. 10 ft to the bathroom and 20 ft to the chair with no poor balance and heavy post lean. Req cueing to weight shift more anteriorly throughout standing/ambulation session. Demos unsteady gait with static standing at sink while washing hands req cueing. Educated LE therex standing to practice and encourage improved balance. Will continue to further progress mobility next visit. Rec IRF at discharge. Current Level of Function Impacting Discharge (mobility/balance): unsteady gait, weakness    Other factors to consider for discharge: assistance and support at home. PLAN :  Patient continues to benefit from skilled intervention to address the above impairments. Continue treatment per established plan of care. to address goals.     Recommendation for discharge: (in order for the patient to meet his/her long term goals)  Therapy 3 hours per day 5-7 days per week    This discharge recommendation:  Has been made in collaboration with the attending provider and/or case management    IF patient discharges home will need the following DME: to be determined (TBD)       SUBJECTIVE:   Patient stated I    OBJECTIVE DATA SUMMARY:   Critical Behavior:  Neurologic State: Alert, Eyes open spontaneously  Orientation Level: Oriented X4  Cognition: Decreased attention/concentration     Functional Mobility Training:  Bed Mobility:  Rolling: Minimum assistance  Supine to Sit: Moderate assistance    Transfers:  Sit to Stand: Minimum assistance;Assist x2  Stand to Sit: Minimum assistance;Assist x2      Balance:  Sitting: Impaired; With support  Sitting - Static: Fair (occasional)  Sitting - Dynamic: Fair (occasional)  Standing: Impaired; With support  Standing - Static: Constant support; Fair  Standing - Dynamic : Constant support; Fair  Ambulation/Gait Training:  Distance (ft): 30 Feet (ft)(10ft, 20 ft)  Assistive Device: Gait belt;Walker, rolling  Ambulation - Level of Assistance: Moderate assistance;Assist x2;Minimal assistance  Base of Support: Narrowed  Speed/Aparna: Slow  Step Length: Left shortened;Right shortened         Therapeutic Exercises:   Standing marches 5x bilaterally, weight shifting right/left, single leg stance bilaterally, approx 10 sec each side. Pain Ratin/10     Activity Tolerance:   Fair and requires rest breaks  Please refer to the flowsheet for vital signs taken during this treatment. After treatment patient left in no apparent distress:   Sitting in chair and Call bell within reach    COMMUNICATION/COLLABORATION:   The patients plan of care was discussed with: Occupational therapy assistant and Registered nurse. Cotx with YUKI for increased safety. Digna Lara   Time Calculation: 27 mins         Problem: Mobility Impaired (Adult and Pediatric)  Goal: *Acute Goals and Plan of Care (Insert Text)  Description: Patient will move from supine to sit and sit to supine , scoot up and down, and roll side to side in bed with independence within 7 day(s). Patient will transfer from bed to chair and chair to bed with independence using the least restrictive device within 7 day(s). Patient will improve static standing balance to supervision within 1 week(s). Patient will ambulate 10 feet with independence with least restrictive device within 1 weeks.        Outcome: Progressing Towards Goal

## 2021-02-24 NOTE — PROGRESS NOTES
Bedside and Verbal shift change report given to Aida Stroud RN (oncoming nurse) by Anabel Atwood LPN  (offgoing nurse). Report included the following information SBAR, Intake/Output and MAR.

## 2021-02-24 NOTE — PROGRESS NOTES
OCCUPATIONAL THERAPY TREATMENT  Patient: Fany Saab (14 y.o. female)  Date: 2/24/2021  Diagnosis: [facility] [N17.9] <principal problem not specified>       Precautions: Fall  Chart, occupational therapy assessment, plan of care, and goals were reviewed. ASSESSMENT  Patient continues with skilled OT services and is progressing towards goals. Pt. Received semi-supine in bed and agreeable to therapy session. Pt. Performed bed mobility with mod A . Pt requesting to use commode. Pt. Required Min A x2 for functional transfers and mobility w/ RW. Pt required increase time to perform functional ambulation. V/c's for hand placement and posture during ambulation- pt demonstrating heavy posterior lean pt able to correct at time. Pt. Performed toilet hygiene with SBA. Pt. Performed weight shifting exercise and Ue therx while standing to increased balance and in an attempt for pt to self correct heavy posterior lean . PLAN :  Patient continues to benefit from skilled intervention to address the above impairments. Continue treatment per established plan of care. to address goals. Recommendation for discharge: (in order for the patient to meet his/her long term goals)  Therapy 3 hours per day 5-7 days per week    This discharge recommendation:  Has been made in collaboration with the attending provider and/or case management    IF patient discharges home will need the following DME: TBD       SUBJECTIVE:   Patient stated I would like to try and get to to the commode.     OBJECTIVE DATA SUMMARY:   Cognitive/Behavioral Status:  Neurologic State: Alert  Orientation Level: Oriented X4  Cognition: Decreased attention/concentration    Functional Mobility and Transfers for ADLs:  Bed Mobility:  Rolling: Minimum assistance  Supine to Sit: Moderate assistance    Transfers:  Sit to Stand: Minimum assistance;Assist x2  Functional Transfers  Bathroom Mobility: Minimum assistance(x2)  Bed to Chair: Minimum assistance;Assist x2    Balance:  Sitting: Impaired; With support  Sitting - Static: Fair (occasional)  Sitting - Dynamic: Fair (occasional)  Standing: Impaired; With support  Standing - Static: Constant support; Fair  Standing - Dynamic : Constant support; Fair    ADL Intervention:    Grooming  Grooming Assistance: Minimum assistance  Position Performed: Standing  Washing Hands: Stand-by assistance    Toileting  Toileting Assistance: Stand-by assistance  Bladder Hygiene: Stand-by assistance  Bowel Hygiene: Stand-by assistance         Therapeutic Exercises:   Exercise Sets Reps AROM AAROM PROM Self PROM Comments   Shoulder flex/ext 1 10 [x] [] [] []    Weight shifting  1 7 [x] [] [] []       [] [] [] []       [] [] [] []          Pain:  0/ 10 pain     Activity Tolerance:   Good  Please refer to the flowsheet for vital signs taken during this treatment. After treatment patient left in no apparent distress:   Sitting in chair, Heels elevated for pressure relief, and Bed / chair alarm activated    COMMUNICATION/COLLABORATION:   The patients plan of care was discussed with: Physical therapy assistant and Registered nurse. Co-tx with pta for increased safety with transfers and mobility. RN made aware of pts IV bleeding.       Ailyn Redding  Time Calculation: 27 mins    Problem: Self Care Deficits Care Plan (Adult)  Goal: *Acute Goals and Plan of Care (Insert Text)  Description: Pt will be supervision/set-up sup<->sit in prep for EOB ADL's  Pt will be moderate assistance   LB dressing EOB level  Pt will be minimal assistance  sit<-> prep for toilet transfer  Pt will be minimal assistance BSC/toilet transfer with LRAD  Pt will be minimal assistance toileting/cloth mgmt LRAD  Pt will be contact guard assist  grooming standing sink  Pt will be minimal assistance bathing sitting/standing sink LRAD  Pt will be MI lester UE HEP in prep for self care tasks      Outcome: Progressing Towards Goal

## 2021-02-24 NOTE — PROGRESS NOTES
Problem: Falls - Risk of  Goal: *Absence of Falls  Description: Document Earma Kevin Fall Risk and appropriate interventions in the flowsheet. Outcome: Progressing Towards Goal  Note: Fall Risk Interventions:  Mobility Interventions: Bed/chair exit alarm, Patient to call before getting OOB, PT Consult for mobility concerns, PT Consult for assist device competence         Medication Interventions: Bed/chair exit alarm, Evaluate medications/consider consulting pharmacy, Patient to call before getting OOB, Teach patient to arise slowly    Elimination Interventions: Bed/chair exit alarm, Call light in reach, Patient to call for help with toileting needs              Problem: Patient Education: Go to Patient Education Activity  Goal: Patient/Family Education  Outcome: Progressing Towards Goal     Problem: Pressure Injury - Risk of  Goal: *Prevention of pressure injury  Description: Document Jamel Scale and appropriate interventions in the flowsheet.   Outcome: Progressing Towards Goal  Note: Pressure Injury Interventions:  Sensory Interventions: Assess changes in LOC, Assess need for specialty bed, Avoid rigorous massage over bony prominences, Float heels, Discuss PT/OT consult with provider, Keep linens dry and wrinkle-free, Maintain/enhance activity level, Minimize linen layers, Monitor skin under medical devices, Pressure redistribution bed/mattress (bed type)    Moisture Interventions: Absorbent underpads, Apply protective barrier, creams and emollients, Assess need for specialty bed, Maintain skin hydration (lotion/cream), Minimize layers, Moisture barrier    Activity Interventions: Increase time out of bed, Pressure redistribution bed/mattress(bed type), PT/OT evaluation    Mobility Interventions: HOB 30 degrees or less, Pressure redistribution bed/mattress (bed type), PT/OT evaluation    Nutrition Interventions: Document food/fluid/supplement intake    Friction and Shear Interventions: Lift sheet, Minimize layers                Problem: Patient Education: Go to Patient Education Activity  Goal: Patient/Family Education  Outcome: Progressing Towards Goal     Problem: Patient Education: Go to Patient Education Activity  Goal: Patient/Family Education  Description: Pt will be MI lester UE HEP in prep for self care tasks    Outcome: Progressing Towards Goal     Problem: Patient Education: Go to Patient Education Activity  Goal: Patient/Family Education  Outcome: Progressing Towards Goal

## 2021-02-24 NOTE — PROGRESS NOTES
Hospitalist Progress Note    Subjective:   Daily Progress Note: 2/24/2021 8:07 AM    Lida Maldonado is an 82year old female with a past medical history of hypertension, carcinoma of the breast, hyperlipidemia, chronic vaginal prolapse, and hypothyroidism who presented to the ED with generalized weakness/fatigue and two ground level falls with some right ankle pain. Patient was recently started on lasix as outpatient for leg edema. Preliminary labs showed LESLYE and acute cystitis. Patient started on Rocephin. Chest X-ray showed no acute pathology. Patient was started on IV fluids and her lasix was held. Urine and blood cultures pending. X-Ray of  Tibia/fibula negative. Subjective: Patient says her legs hurt a little today. Denies any chest pain or SOB.      Current Facility-Administered Medications   Medication Dose Route Frequency    amoxicillin-clavulanate (AUGMENTIN) 875-125 mg per tablet 1 Tab  1 Tab Oral Q12H    senna (SENOKOT) tablet 8.6 mg  1 Tab Oral DAILY    docusate sodium (COLACE) capsule 100 mg  100 mg Oral BID    levothyroxine (SYNTHROID) tablet 50 mcg  50 mcg Oral 6am    oxybutynin chloride XL (DITROPAN XL) tablet 10 mg  10 mg Oral DAILY    lactulose (CHRONULAC) 10 gram/15 mL solution 45 mL  30 g Oral BID    aspirin delayed-release tablet 81 mg  81 mg Oral DAILY    calcium-vitamin D 600 mg(1,500mg) -200 unit per tablet 1 Tab  1 Tab Oral BID WITH MEALS    clonazePAM (KlonoPIN) tablet 0.25 mg  0.25 mg Oral BID PRN    letrozole (FEMARA) tablet 2.5 mg  2.5 mg Oral DAILY    losartan (COZAAR) tablet 25 mg  25 mg Oral BID    metoprolol succinate (TOPROL-XL) XL tablet 100 mg  100 mg Oral DAILY    Multivitamins with Min No.7-FA (V-C FORTE) capsule 1 Cap  1 Cap Oral DAILY    traZODone (DESYREL) tablet 50 mg  50 mg Oral QHS PRN    zinc gluconate tablet 50 mg  1 Tab Oral DAILY    sodium chloride (NS) flush 5-40 mL  5-40 mL IntraVENous Q8H    sodium chloride (NS) flush 5-40 mL  5-40 mL IntraVENous PRN    acetaminophen (TYLENOL) tablet 650 mg  650 mg Oral Q6H PRN    Or    acetaminophen (TYLENOL) suppository 650 mg  650 mg Rectal Q6H PRN    ondansetron (ZOFRAN ODT) tablet 4 mg  4 mg Oral Q6H PRN    Or    ondansetron (ZOFRAN) injection 4 mg  4 mg IntraVENous Q6H PRN        Review of Systems  Constitutional: No fevers, No chills, No sweats, No fatigue,+++ Weakness  Eyes: No redness  Ears, nose, mouth, throat, and face: No nasal congestion, No sore throat, No voice change  Respiratory: No Shortness of Breath, No cough, No wheezing  Cardiovascular: No chest pain, No palpitations, No extremity edema  Gastrointestinal: No nausea, No vomiting, No diarrhea, No abdominal pain  Genitourinary: No frequency, No dysuria, No hematuria  Integument/breast: No skin lesion(s)   Neurological: No Confusion, No headaches, No dizziness      Objective:     Visit Vitals  BP (!) 153/69   Pulse 70   Temp 98 °F (36.7 °C)   Resp 17   Ht 5' 1\" (1.549 m)   Wt 53.2 kg (117 lb 4.6 oz)   SpO2 93%   BMI 22.16 kg/m²      O2 Device: Room air    Temp (24hrs), Av.5 °F (36.9 °C), Min:98 °F (36.7 °C), Max:98.8 °F (37.1 °C)      No intake/output data recorded.  1901 -  0700  In: -   Out: 350 [Urine:350]    PHYSICAL EXAM:  Constitutional: No acute distress  Skin: Extremities and face reveal no rashes. HEENT: Sclerae anicteric. Extra-occular muscles are intact. No oral ulcers. Cardiovascular: Regular rate and rhythm. Respiratory:  Clear breath sounds bilaterally with no wheezes, rales, or rhonchi. GI: Abdomen nondistended, soft, and nontender. Normal active bowel sounds. Musculoskeletal: No pitting edema of the lower legs. Able to move all ext  Neurological:  Patient is alert and oriented.  Cranial nerves II-XII grossly intact  Psychiatric: Mood appears appropriate       Data Review    Recent Results (from the past 24 hour(s))   SARS-COV-2    Collection Time: 21  3:40 PM   Result Value Ref Range    SARS-CoV-2 Please find results under separate order     COVID-19 RAPID TEST    Collection Time: 02/23/21  3:40 PM   Result Value Ref Range    Specimen source Nasopharyngeal      COVID-19 rapid test Not Detected Not Detected         Radiology review: XRay    Assessment:   1. Acute Kidney Injury  2. Acute Cystitis  3. Right ankle pain  4. Hypothyroidism  5. Essential hypertension  6. History of breast cancer  7. Weakness    Plan:    1. Acute Kidney Injury  On IV fluids 50ml/hr, Stopped on 2/24/2021  Creatinine improved --> 0.67  Continue holding lasix  Potassium 3.4 yesterday.      2. Acute Cystitis  UA revealed moderate LE and 1+ bacteria  Urine culture pending, 1000,000 colony, possible contaminate  On Rocephin --> stop and start Augmentin for 3 days     3. Right ankle pain  Right tibia/fibula x-ray negative     4. Hypothyroidism  TSH 0.74  Continue Synthroid     5. Essential hypertension  Continue metoprolol with holding parameters of HR < 60  Continue Losartan     6. History of breast cancer  Chest x-ray showed stable appearance of the chest compared to 10/05/2020, no acute pathology  Continue Femara     7. Weakness  PT/OT   CM for rehab placement     8. Constipated  Start stool softeners      CODE STATUS: FULL CODE     DVT prophylaxis: Lovenox  Ulcer Prophylaxis: not indicated      Daughter Fredi ruiz, [address] discussed with: Patient/Family, Nurse and     Total time spent with patient: 33 minutes.

## 2021-02-24 NOTE — PROGRESS NOTES
CM spoke to patient daughter Garrett Fox [phone]) regarding discharge planning for patient. Daughter reported that she and patient do not want patient to go SNF. First choice is IRF and if patient is not accepted to IRF then [facility]. Daughter is visiting from [place] and she wanted to bring her father who has [employer] to visit patient with her. NAYA spoke to nursing supervisor Michelle Montiel and she stated that daughter and father could visit for one hour.

## 2021-02-25 LAB
ALBUMIN SERPL-MCNC: 2.6 G/DL (ref 3.5–5)
ALBUMIN/GLOB SERPL: 0.7 {RATIO} (ref 1.1–2.2)
ALP SERPL-CCNC: 60 U/L (ref 45–117)
ALT SERPL-CCNC: 20 U/L (ref 12–78)
ANION GAP SERPL CALC-SCNC: 6 MMOL/L (ref 5–15)
AST SERPL W P-5'-P-CCNC: 21 U/L (ref 15–37)
BASOPHILS # BLD: 0 K/UL (ref 0–0.1)
BASOPHILS NFR BLD: 0 % (ref 0–1)
BILIRUB SERPL-MCNC: 0.4 MG/DL (ref 0.2–1)
BUN SERPL-MCNC: 11 MG/DL (ref 6–20)
BUN/CREAT SERPL: 18 (ref 12–20)
CA-I BLD-MCNC: 9.4 MG/DL (ref 8.5–10.1)
CHLORIDE SERPL-SCNC: 107 MMOL/L (ref 97–108)
CO2 SERPL-SCNC: 28 MMOL/L (ref 21–32)
CREAT SERPL-MCNC: 0.62 MG/DL (ref 0.55–1.02)
DIFFERENTIAL METHOD BLD: ABNORMAL
EOSINOPHIL # BLD: 0.1 K/UL (ref 0–0.4)
EOSINOPHIL NFR BLD: 2 % (ref 0–7)
ERYTHROCYTE [DISTWIDTH] IN BLOOD BY AUTOMATED COUNT: 13.5 % (ref 11.5–14.5)
GLOBULIN SER CALC-MCNC: 3.6 G/DL (ref 2–4)
GLUCOSE SERPL-MCNC: 76 MG/DL (ref 65–100)
HCT VFR BLD AUTO: 31.6 % (ref 35–47)
HGB BLD-MCNC: 9.7 G/DL (ref 11.5–16)
IMM GRANULOCYTES # BLD AUTO: 0 K/UL (ref 0–0.04)
IMM GRANULOCYTES NFR BLD AUTO: 0 % (ref 0–0.5)
LYMPHOCYTES # BLD: 0.7 K/UL (ref 0.8–3.5)
LYMPHOCYTES NFR BLD: 14 % (ref 12–49)
MCH RBC QN AUTO: 27.2 PG (ref 26–34)
MCHC RBC AUTO-ENTMCNC: 30.7 G/DL (ref 30–36.5)
MCV RBC AUTO: 88.8 FL (ref 80–99)
MONOCYTES # BLD: 0.7 K/UL (ref 0–1)
MONOCYTES NFR BLD: 13 % (ref 5–13)
NEUTS SEG # BLD: 3.5 K/UL (ref 1.8–8)
NEUTS SEG NFR BLD: 71 % (ref 32–75)
PLATELET # BLD AUTO: 318 K/UL (ref 150–400)
PMV BLD AUTO: 11.3 FL (ref 8.9–12.9)
POTASSIUM SERPL-SCNC: 3.6 MMOL/L (ref 3.5–5.1)
PROT SERPL-MCNC: 6.2 G/DL (ref 6.4–8.2)
RBC # BLD AUTO: 3.56 M/UL (ref 3.8–5.2)
SODIUM SERPL-SCNC: 141 MMOL/L (ref 136–145)
WBC # BLD AUTO: 5 K/UL (ref 3.6–11)

## 2021-02-25 PROCEDURE — 74011250637 HC RX REV CODE- 250/637: Performed by: HOSPITALIST

## 2021-02-25 PROCEDURE — 74011250637 HC RX REV CODE- 250/637: Performed by: PHYSICIAN ASSISTANT

## 2021-02-25 PROCEDURE — 97116 GAIT TRAINING THERAPY: CPT

## 2021-02-25 PROCEDURE — 80053 COMPREHEN METABOLIC PANEL: CPT

## 2021-02-25 PROCEDURE — 99218 HC RM OBSERVATION: CPT

## 2021-02-25 PROCEDURE — 36415 COLL VENOUS BLD VENIPUNCTURE: CPT

## 2021-02-25 PROCEDURE — 97530 THERAPEUTIC ACTIVITIES: CPT

## 2021-02-25 PROCEDURE — 85025 COMPLETE CBC W/AUTO DIFF WBC: CPT

## 2021-02-25 RX ADMIN — AMOXICILLIN AND CLAVULANATE POTASSIUM 1 TABLET: 875; 125 TABLET, FILM COATED ORAL at 20:00

## 2021-02-25 RX ADMIN — LETROZOLE 2.5 MG: 2.5 TABLET ORAL at 09:27

## 2021-02-25 RX ADMIN — OXYBUTYNIN CHLORIDE 10 MG: 5 TABLET, EXTENDED RELEASE ORAL at 16:49

## 2021-02-25 RX ADMIN — Medication 1 TABLET: at 09:26

## 2021-02-25 RX ADMIN — Medication 1 TABLET: at 16:49

## 2021-02-25 RX ADMIN — ASPIRIN 81 MG: 81 TABLET, COATED ORAL at 09:26

## 2021-02-25 RX ADMIN — Medication 50 MG: at 09:27

## 2021-02-25 RX ADMIN — AMOXICILLIN AND CLAVULANATE POTASSIUM 1 TABLET: 875; 125 TABLET, FILM COATED ORAL at 09:26

## 2021-02-25 RX ADMIN — DOCUSATE SODIUM 100 MG: 100 CAPSULE, LIQUID FILLED ORAL at 09:27

## 2021-02-25 RX ADMIN — DOCUSATE SODIUM 100 MG: 100 CAPSULE, LIQUID FILLED ORAL at 20:00

## 2021-02-25 RX ADMIN — Medication 5 ML: at 14:00

## 2021-02-25 RX ADMIN — LOSARTAN POTASSIUM 25 MG: 50 TABLET, FILM COATED ORAL at 09:27

## 2021-02-25 RX ADMIN — LEVOTHYROXINE SODIUM 50 MCG: 0.03 TABLET ORAL at 05:04

## 2021-02-25 RX ADMIN — LOSARTAN POTASSIUM 25 MG: 50 TABLET, FILM COATED ORAL at 20:00

## 2021-02-25 RX ADMIN — METOPROLOL SUCCINATE 100 MG: 50 TABLET, EXTENDED RELEASE ORAL at 09:27

## 2021-02-25 RX ADMIN — Medication 1 CAPSULE: at 09:26

## 2021-02-25 RX ADMIN — SENNOSIDES 8.6 MG: 8.6 TABLET, FILM COATED ORAL at 09:27

## 2021-02-25 NOTE — PROGRESS NOTES
PHYSICAL THERAPY TREATMENT  Patient: Angelo Wan (25 y.o. female)  Date: 2/25/2021  Diagnosis: LESLYE (acute kidney [facility]) ([facility] [N17.9] <principal problem not specified>       Precautions: Fall  Chart, physical therapy assessment, plan of care and goals were reviewed. ASSESSMENT  Patient continues with skilled PT services and is progressing towards goals. Patient semi supine in bed upon PT/OT arrival and agreeable to session. Performed bed mobility with mod A. STS required min A x 2 and RW for balance upon standing. Upon standing patient demo'd posterior lean, however able to correct with VC and tactile cues. Ambulated with RW and min A x 2, required VC to increase step length. Pt seated in recliner and declining participation with therex 2/2 wanting to eat her breakfast. Assisted patient with food set up, see OT note for details. Current Level of Function Impacting Discharge (mobility/balance): assistance required for all mobility    Other factors to consider for discharge: PLOF, severity of deficits, time since onset, family support. PLAN :  Patient continues to benefit from skilled intervention to address the above impairments. Continue treatment per established plan of care. to address goals. Recommendation for discharge: (in order for the patient to meet his/her long term goals)  Therapy 3 hours per day 5-7 days per week    This discharge recommendation:  Has been made in collaboration with the attending provider and/or case management    IF patient discharges home will need the following DME: to be determined (TBD)       SUBJECTIVE:   Patient stated i'll eat sitting up in the chair.      OBJECTIVE DATA SUMMARY:   Critical Behavior:  Neurologic State: Alert  Orientation Level: Oriented X4  Cognition: Follows commands     Functional Mobility Training:  Bed Mobility:  Rolling: Minimum assistance  Supine to Sit: Moderate assistance    Transfers:  Sit to Stand: Minimum assistance;Assist x2  Stand to Sit: Minimum assistance;Assist x2  Bed to Chair: Minimum assistance;Assist x2    Balance:  Sitting: Impaired; With support  Sitting - Static: Good (unsupported)  Sitting - Dynamic: Fair (occasional)  Standing: Impaired; With support  Standing - Static: Constant support; Fair  Standing - Dynamic : Constant support; Fair    Ambulation/Gait Training:  Distance (ft): 30 Feet (ft)  Assistive Device: Gait belt;Walker, rolling  Ambulation - Level of Assistance: Minimal assistance;Assist x2  Base of Support: Narrowed  Speed/Aparna: Slow  Step Length: Left shortened;Right shortened      Therapeutic Exercises:   Declined, requesting to eat breakfast.     Pain Ratin/10    Activity Tolerance:   Fair  Please refer to the flowsheet for vital signs taken during this treatment. After treatment patient left in no apparent distress:   Sitting in chair and Call bell within reach    COMMUNICATION/COLLABORATION:   The patients plan of care was discussed with: Occupational therapist and Registered nurse. OT/PT sessions occurred together for increased patient and clinician safety    Problem: Mobility Impaired (Adult and Pediatric)  Goal: *Acute Goals and Plan of Care (Insert Text)  Description: Patient will move from supine to sit and sit to supine , scoot up and down, and roll side to side in bed with independence within 7 day(s). Patient will transfer from bed to chair and chair to bed with independence using the least restrictive device within 7 day(s). Patient will improve static standing balance to supervision within 1 week(s). Patient will ambulate 10 feet with independence with least restrictive device within 1 weeks.        Outcome: Progressing Towards Goal       Sadia Zambrano PTA   Time Calculation: 19 mins

## 2021-02-25 NOTE — PROGRESS NOTES
OCCUPATIONAL THERAPY TREATMENT  Patient: Shereen Kimble (28 y.o. female)  Date: 2/25/2021  Diagnosis: LESLYE (acute kidney injury) ([facility] [N17.9] <principal problem not specified>       Precautions: Fall  Chart, occupational therapy assessment, plan of care, and goals were reviewed. ASSESSMENT  Patient continues with skilled OT services and is progressing towards goals. Patient semi supine in bed upon arrival and agreeable to therapy session. Pt. Performed bed mobility with mod A. Sit-> stand required min A x 2 and RW for balance upon standing. Upon standing patient demo'd posterior lean, however able to correct with v/c's and tactile cues proved from therapist. Pt. Performed functional ambulation with RW and min A x 2, required VC to increase step length to increase balance. Pt performed chair transfer with min A x2. Pt set-up for breakfast- increased assistance with cutting up food and opening container. PLAN :  Patient continues to benefit from skilled intervention to address the above impairments. Continue treatment per established plan of care. to address goals. Recommendation for discharge: (in order for the patient to meet his/her long term goals)  Therapy 3 hours per day 5-7 days per week    This discharge recommendation:  Has been made in collaboration with the attending provider and/or case management    IF patient discharges home will need the following DME: TBD       SUBJECTIVE:   Patient stated  I need help opening my orange juice.     OBJECTIVE DATA SUMMARY:   Cognitive/Behavioral Status:  Neurologic State: Alert  Orientation Level: Oriented X4  Cognition: Follows commands    Functional Mobility and Transfers for ADLs:  Bed Mobility:  Rolling: Minimum assistance  Supine to Sit: Moderate assistance    Transfers:  Sit to Stand: Minimum assistance;Assist x2  Bed to Chair: Minimum assistance;Assist x2    Balance:  Sitting: Impaired; With support  Sitting - Static: Good (unsupported)  Sitting - Dynamic: Fair (occasional)  Standing: Impaired; With support  Standing - Static: Constant support; Fair  Standing - Dynamic : Constant support; Fair    Therapeutic Exercises:    Pt. Educated on continuing UE therex throughout the day. Pain:  0/ 10 pain reported     Activity Tolerance:   Good  Please refer to the flowsheet for vital signs taken during this treatment. After treatment patient left in no apparent distress:   Sitting in chair, Heels elevated for pressure relief and Call bell within reach    COMMUNICATION/COLLABORATION:   The patients plan of care was discussed with: Physical therapy assistant. Co-tx with PTA for increased safety with transfers.      Ryanne Notch  Time Calculation: 19 mins    Problem: Self Care Deficits Care Plan (Adult)  Goal: *Acute Goals and Plan of Care (Insert Text)  Description: Pt will be supervision/set-up sup<->sit in prep for EOB ADL's  Pt will be moderate assistance   LB dressing EOB level  Pt will be minimal assistance  sit<-> prep for toilet transfer  Pt will be minimal assistance BSC/toilet transfer with LRAD  Pt will be minimal assistance toileting/cloth mgmt LRAD  Pt will be contact guard assist  grooming standing sink  Pt will be minimal assistance bathing sitting/standing sink LRAD  Pt will be MI lester UE HEP in prep for self care tasks      Outcome: Progressing Towards Goal

## 2021-02-25 NOTE — PROGRESS NOTES
Hospitalist Progress Note    Subjective:   Daily Progress Note: 2/25/2021 8:07 AM    Lida Maldonado is an 82year old female with a past medical history of hypertension, carcinoma of the breast, hyperlipidemia, chronic vaginal prolapse, and hypothyroidism who presented to the ED with generalized weakness/fatigue and two ground level falls with some right ankle pain. Patient was recently started on lasix as outpatient for leg edema. Preliminary labs showed LESLYE and acute cystitis. Patient started on Rocephin. Chest X-ray showed no acute pathology. Patient was started on IV fluids and her lasix was held. Urine and blood cultures pending. X-Ray of  Tibia/fibula negative. CT of head showed cortical volume loss with associated ventricular system dilation and small vessel ischemic disease. Subjective: Patient says she is a little weak. No chest pain or SOB.      Current Facility-Administered Medications   Medication Dose Route Frequency    amoxicillin-clavulanate (AUGMENTIN) 875-125 mg per tablet 1 Tab  1 Tab Oral Q12H    senna (SENOKOT) tablet 8.6 mg  1 Tab Oral DAILY    docusate sodium (COLACE) capsule 100 mg  100 mg Oral BID    levothyroxine (SYNTHROID) tablet 50 mcg  50 mcg Oral 6am    oxybutynin chloride XL (DITROPAN XL) tablet 10 mg  10 mg Oral DAILY    lactulose (CHRONULAC) 10 gram/15 mL solution 45 mL  30 g Oral BID    aspirin delayed-release tablet 81 mg  81 mg Oral DAILY    calcium-vitamin D 600 mg(1,500mg) -200 unit per tablet 1 Tab  1 Tab Oral BID WITH MEALS    clonazePAM (KlonoPIN) tablet 0.25 mg  0.25 mg Oral BID PRN    letrozole (FEMARA) tablet 2.5 mg  2.5 mg Oral DAILY    losartan (COZAAR) tablet 25 mg  25 mg Oral BID    metoprolol succinate (TOPROL-XL) XL tablet 100 mg  100 mg Oral DAILY    Multivitamins with Min No.7-FA (V-C FORTE) capsule 1 Cap  1 Cap Oral DAILY    traZODone (DESYREL) tablet 50 mg  50 mg Oral QHS PRN    zinc gluconate tablet 50 mg  1 Tab Oral DAILY    sodium chloride (NS) flush 5-40 mL  5-40 mL IntraVENous Q8H    sodium chloride (NS) flush 5-40 mL  5-40 mL IntraVENous PRN    acetaminophen (TYLENOL) tablet 650 mg  650 mg Oral Q6H PRN    Or    acetaminophen (TYLENOL) suppository 650 mg  650 mg Rectal Q6H PRN    ondansetron (ZOFRAN ODT) tablet 4 mg  4 mg Oral Q6H PRN    Or    ondansetron (ZOFRAN) injection 4 mg  4 mg IntraVENous Q6H PRN        Review of Systems  Constitutional: No fevers, No chills, No sweats, No fatigue,+++ Weakness  Eyes: No redness  Ears, nose, mouth, throat, and face: No nasal congestion, No sore throat, No voice change  Respiratory: No Shortness of Breath, No cough, No wheezing  Cardiovascular: No chest pain, No palpitations, No extremity edema  Gastrointestinal: No nausea, No vomiting, No diarrhea, No abdominal pain  Genitourinary: No frequency, No dysuria, No hematuria  Integument/breast: No skin lesion(s)   Neurological: No Confusion, No headaches, No dizziness      Objective:     Visit Vitals  BP (!) 154/64   Pulse 72   Temp 99.6 °F (37.6 °C)   Resp 20   Ht 5' 1\" (1.549 m)   Wt 53.2 kg (117 lb 4.6 oz)   SpO2 98%   BMI 22.16 kg/m²      O2 Device: Room air    Temp (24hrs), Av.7 °F (37.1 °C), Min:98.2 °F (36.8 °C), Max:99.6 °F (37.6 °C)      No intake/output data recorded. 1901 -  0700  In: 240 [P.O.:240]  Out: 350 [Urine:350]    PHYSICAL EXAM:  Constitutional: No acute distress  Skin: Extremities and face reveal no rashes. HEENT: Sclerae anicteric. Extra-occular muscles are intact. No oral ulcers. Cardiovascular: RRR  Respiratory:  Clear breath sounds bilaterally with no wheezes, rales, or rhonchi. GI: Abdomen nondistended, soft, and nontender. Normal active bowel sounds. Musculoskeletal: No pitting edema of the lower legs. Able to move all ext  Neurological:  Patient is alert and oriented.  Cranial nerves II-XII grossly intact  Psychiatric: Mood appears appropriate       Data Review    Recent Results (from the past 24 hour(s))   CBC WITH AUTOMATED DIFF    Collection Time: 02/24/21 10:05 AM   Result Value Ref Range    WBC 4.4 3.6 - 11.0 K/uL    RBC 3.69 (L) 3.80 - 5.20 M/uL    HGB 10.3 (L) 11.5 - 16.0 g/dL    HCT 32.7 (L) 35.0 - 47.0 %    MCV 88.6 80.0 - 99.0 FL    MCH 27.9 26.0 - 34.0 PG    MCHC 31.5 30.0 - 36.5 g/dL    RDW 13.7 11.5 - 14.5 %    PLATELET 337 076 - 925 K/uL    MPV 11.0 8.9 - 12.9 FL    NEUTROPHILS 76 (H) 32 - 75 %    LYMPHOCYTES 9 (L) 12 - 49 %    MONOCYTES 14 (H) 5 - 13 %    EOSINOPHILS 1 0 - 7 %    BASOPHILS 0 0 - 1 %    IMMATURE GRANULOCYTES 0 0.0 - 0.5 %    ABS. NEUTROPHILS 3.4 1.8 - 8.0 K/UL    ABS. LYMPHOCYTES 0.4 (L) 0.8 - 3.5 K/UL    ABS. MONOCYTES 0.6 0.0 - 1.0 K/UL    ABS. EOSINOPHILS 0.0 0.0 - 0.4 K/UL    ABS. BASOPHILS 0.0 0.0 - 0.1 K/UL    ABS. IMM. GRANS. 0.0 0.00 - 0.04 K/UL    DF AUTOMATED     METABOLIC PANEL, COMPREHENSIVE    Collection Time: 02/24/21 10:05 AM   Result Value Ref Range    Sodium 141 136 - 145 mmol/L    Potassium 3.4 (L) 3.5 - 5.1 mmol/L    Chloride 105 97 - 108 mmol/L    CO2 32 21 - 32 mmol/L    Anion gap 4 (L) 5 - 15 mmol/L    Glucose 108 (H) 65 - 100 mg/dL    BUN 11 6 - 20 mg/dL    Creatinine 0.86 0.55 - 1.02 mg/dL    BUN/Creatinine ratio 13 12 - 20      GFR est AA >60 >60 ml/min/1.73m2    GFR est non-AA >60 >60 ml/min/1.73m2    Calcium 9.7 8.5 - 10.1 mg/dL    Bilirubin, total 0.3 0.2 - 1.0 mg/dL    AST (SGOT) 20 15 - 37 U/L    ALT (SGPT) 23 12 - 78 U/L    Alk. phosphatase 62 45 - 117 U/L    Protein, total 6.3 (L) 6.4 - 8.2 g/dL    Albumin 2.7 (L) 3.5 - 5.0 g/dL    Globulin 3.6 2.0 - 4.0 g/dL    A-G Ratio 0.8 (L) 1.1 - 2.2         Radiology review: XRay    Assessment:   1. Acute Kidney Injury  2. Acute Cystitis  3. Right ankle pain  4. Hypothyroidism  5. Essential hypertension  6. History of breast cancer  7. Weakness    Plan:    1. Acute Kidney Injury  On IV fluids 50ml/hr, Stopped on 2/24/2021  Creatinine improved --> 0.86  Continue holding lasix  Potassium 3.4.     2.  Acute Cystitis  UA revealed moderate LE and 1+ bacteria  Urine culture pending, 1000,000 colony, possible contaminate  On Rocephin --> stop and start Augmentin for 3 days     3. Right ankle pain  Right tibia/fibula x-ray negative     4. Hypothyroidism  TSH 0.74  Continue Synthroid     5. Essential hypertension  Continue metoprolol with holding parameters of HR < 60  Continue Losartan     6. History of breast cancer  Chest x-ray showed stable appearance of the chest compared to 10/05/2020, no acute pathology  Continue Femara     7. Weakness  PT/OT   CM for rehab placement     8. Constipated  Start stool softeners     9. Confusion   CT of head shows cortical volume loss. NO acute process.      CODE STATUS: FULL CODE     DVT prophylaxis: Lovenox  Ulcer Prophylaxis: not indicated      Daughter Fredi ruiz, [address] discussed with: Patient/Family, Nurse and     Total time spent with patient: 36 minutes.

## 2021-02-25 NOTE — PROGRESS NOTES
Problem: Falls - Risk of  Goal: *Absence of Falls  Description: Document Starleen Freeze Fall Risk and appropriate interventions in the flowsheet. Outcome: Progressing Towards Goal  Note: Fall Risk Interventions:  Mobility Interventions: Patient to call before getting OOB, PT Consult for mobility concerns, PT Consult for assist device competence    Mentation Interventions: Adequate sleep, hydration, pain control, Bed/chair exit alarm, Evaluate medications/consider consulting pharmacy, Increase mobility, More frequent rounding, Reorient patient    Medication Interventions: Evaluate medications/consider consulting pharmacy, Patient to call before getting OOB    Elimination Interventions: Bed/chair exit alarm, Call light in reach, Patient to call for help with toileting needs    History of Falls Interventions: Room close to nurse's station         Problem: Patient Education: Go to Patient Education Activity  Goal: Patient/Family Education  Outcome: Progressing Towards Goal     Problem: Pressure Injury - Risk of  Goal: *Prevention of pressure injury  Description: Document Jamel Scale and appropriate interventions in the flowsheet.   Outcome: Progressing Towards Goal  Note: Pressure Injury Interventions:  Sensory Interventions: Assess changes in LOC, Assess need for specialty bed, Avoid rigorous massage over bony prominences, Float heels, Keep linens dry and wrinkle-free, Maintain/enhance activity level, Minimize linen layers, Monitor skin under medical devices    Moisture Interventions: Absorbent underpads, Apply protective barrier, creams and emollients, Assess need for specialty bed, Limit adult briefs, Maintain skin hydration (lotion/cream), Minimize layers, Moisture barrier    Activity Interventions: Increase time out of bed, Pressure redistribution bed/mattress(bed type), PT/OT evaluation    Mobility Interventions: HOB 30 degrees or less, Pressure redistribution bed/mattress (bed type), PT/OT evaluation    Nutrition Interventions: Document food/fluid/supplement intake    Friction and Shear Interventions: Lift sheet, Minimize layers                Problem: Patient Education: Go to Patient Education Activity  Goal: Patient/Family Education  Outcome: Progressing Towards Goal     Problem: Patient Education: Go to Patient Education Activity  Goal: Patient/Family Education  Description: Pt will be MI lester UE HEP in prep for self care tasks    Outcome: Progressing Towards Goal     Problem: Patient Education: Go to Patient Education Activity  Goal: Patient/Family Education  Outcome: Progressing Towards Goal

## 2021-02-25 NOTE — PROGRESS NOTES
Comprehensive Nutrition Assessment    Type and Reason for Visit: Initial(poor PO)    Nutrition Recommendations/Plan:   Continue Regular diet  Continue Ensure Enlive BID    Document all PO intakes in EMR    Continue bowel regimen to promote regular BMs      Nutrition Assessment:  Weakness, GLF pta. Dx LESLYE, acute cystitis. Pt overall stable, CM working on placement. Regular diet ordered. PO intakes documented as 25%. Spoke with pt, who reports more like ~50%. RN endorses intakes 25-50%. Labs reviewed. Meds: lactulose, docusate, Ca-Vit D, MVI. Malnutrition Assessment:  Malnutrition Status: Moderate malnutrition    Context:  Acute illness     Findings of the 6 clinical characteristics of malnutrition:   Energy Intake:  7 - 50% or less of est energy requirements for 5 or more days  Weight Loss:  1.0 - 5% over one month     Body Fat Loss:  No significant body fat loss,     Muscle Mass Loss:  1 - Mild muscle mass loss,    Fluid Accumulation:  No significant fluid accumulation,      Estimated Daily Nutrient Needs:  Energy (kcal): 1500kcal (28kcal/kg); Weight Used for Energy Requirements: Current  Protein (g): 53g (1g/kg); Weight Used for Protein Requirements: Current  Fluid (ml/day): 1500mL; Method Used for Fluid Requirements: 1 ml/kcal      Nutrition Related Findings:  NFPE finding mild muscle wasting. Pt reports constipation pta, now improved with bowel regimen. Denies hx dysphagia, n/v. Last BM yesterday. No edema. Wounds:    None       Current Nutrition Therapies:  DIET REGULAR  DIET NUTRITIONAL SUPPLEMENTS Lunch, Dinner; Ensure [employer]    Anthropometric Measures:  · Height:  5' 1\" (154.9 cm)  · Current Body Wt:  53.2 kg (117 lb 4.6 oz)(2/20)   · Usual Body Wt:  58.1 kg (128 lb)     · BMI Category:  Normal weight (BMI 22.0-24.9) age over 72     Pt reports recent wt loss from 128lb to 114lb (14lb, 6.4kg)-5% BW loss over past few months.     Nutrition Diagnosis:   · Inadequate protein-energy intake related to early satiety as evidenced by intake 26-50%      Nutrition Interventions:   Food and/or Nutrient Delivery: Continue current diet, Continue oral nutrition supplement  Nutrition Education and Counseling: No recommendations at this time  Coordination of Nutrition Care: Continue to monitor while inpatient    Goals:  Meet >75% EENs in 7 days, Wt maintenance +/- 0.5kg per week       Nutrition Monitoring and Evaluation:   Behavioral-Environmental Outcomes: None identified  Food/Nutrient Intake Outcomes: Food and nutrient intake, Supplement intake  Physical Signs/Symptoms Outcomes: Weight    Discharge Planning:     Too soon to determine     Electronically signed by Blas Mullen on 2/26/2021 at 11:49 AM    Contact:

## 2021-02-26 LAB
ALBUMIN SERPL-MCNC: 2.6 G/DL (ref 3.5–5)
ALBUMIN/GLOB SERPL: 0.7 {RATIO} (ref 1.1–2.2)
ALP SERPL-CCNC: 59 U/L (ref 45–117)
ALT SERPL-CCNC: 21 U/L (ref 12–78)
ANION GAP SERPL CALC-SCNC: 5 MMOL/L (ref 5–15)
AST SERPL W P-5'-P-CCNC: 19 U/L (ref 15–37)
BASOPHILS # BLD: 0 K/UL (ref 0–0.1)
BASOPHILS NFR BLD: 0 % (ref 0–1)
BILIRUB SERPL-MCNC: 0.3 MG/DL (ref 0.2–1)
BUN SERPL-MCNC: 15 MG/DL (ref 6–20)
BUN/CREAT SERPL: 21 (ref 12–20)
CA-I BLD-MCNC: 9.3 MG/DL (ref 8.5–10.1)
CHLORIDE SERPL-SCNC: 108 MMOL/L (ref 97–108)
CO2 SERPL-SCNC: 29 MMOL/L (ref 21–32)
CREAT SERPL-MCNC: 0.71 MG/DL (ref 0.55–1.02)
DIFFERENTIAL METHOD BLD: ABNORMAL
EOSINOPHIL # BLD: 0.2 K/UL (ref 0–0.4)
EOSINOPHIL NFR BLD: 3 % (ref 0–7)
ERYTHROCYTE [DISTWIDTH] IN BLOOD BY AUTOMATED COUNT: 13.7 % (ref 11.5–14.5)
GLOBULIN SER CALC-MCNC: 3.7 G/DL (ref 2–4)
GLUCOSE SERPL-MCNC: 86 MG/DL (ref 65–100)
HCT VFR BLD AUTO: 31.8 % (ref 35–47)
HGB BLD-MCNC: 10.1 G/DL (ref 11.5–16)
IMM GRANULOCYTES # BLD AUTO: 0 K/UL (ref 0–0.04)
IMM GRANULOCYTES NFR BLD AUTO: 0 % (ref 0–0.5)
LYMPHOCYTES # BLD: 0.5 K/UL (ref 0.8–3.5)
LYMPHOCYTES NFR BLD: 10 % (ref 12–49)
MCH RBC QN AUTO: 28.3 PG (ref 26–34)
MCHC RBC AUTO-ENTMCNC: 31.8 G/DL (ref 30–36.5)
MCV RBC AUTO: 89.1 FL (ref 80–99)
MONOCYTES # BLD: 0.6 K/UL (ref 0–1)
MONOCYTES NFR BLD: 12 % (ref 5–13)
NEUTS SEG # BLD: 3.8 K/UL (ref 1.8–8)
NEUTS SEG NFR BLD: 75 % (ref 32–75)
PLATELET # BLD AUTO: 328 K/UL (ref 150–400)
PMV BLD AUTO: 10.5 FL (ref 8.9–12.9)
POTASSIUM SERPL-SCNC: 3.8 MMOL/L (ref 3.5–5.1)
PROT SERPL-MCNC: 6.3 G/DL (ref 6.4–8.2)
RBC # BLD AUTO: 3.57 M/UL (ref 3.8–5.2)
SODIUM SERPL-SCNC: 142 MMOL/L (ref 136–145)
WBC # BLD AUTO: 5.1 K/UL (ref 3.6–11)

## 2021-02-26 PROCEDURE — 97530 THERAPEUTIC ACTIVITIES: CPT

## 2021-02-26 PROCEDURE — 36415 COLL VENOUS BLD VENIPUNCTURE: CPT

## 2021-02-26 PROCEDURE — 74011250637 HC RX REV CODE- 250/637: Performed by: PHYSICIAN ASSISTANT

## 2021-02-26 PROCEDURE — 80053 COMPREHEN METABOLIC PANEL: CPT

## 2021-02-26 PROCEDURE — 99218 HC RM OBSERVATION: CPT

## 2021-02-26 PROCEDURE — 74011250637 HC RX REV CODE- 250/637: Performed by: HOSPITALIST

## 2021-02-26 PROCEDURE — 85025 COMPLETE CBC W/AUTO DIFF WBC: CPT

## 2021-02-26 RX ADMIN — METOPROLOL SUCCINATE 100 MG: 50 TABLET, EXTENDED RELEASE ORAL at 09:09

## 2021-02-26 RX ADMIN — LOSARTAN POTASSIUM 25 MG: 50 TABLET, FILM COATED ORAL at 09:09

## 2021-02-26 RX ADMIN — Medication 50 MG: at 09:08

## 2021-02-26 RX ADMIN — DOCUSATE SODIUM 100 MG: 100 CAPSULE, LIQUID FILLED ORAL at 21:07

## 2021-02-26 RX ADMIN — Medication 10 ML: at 21:15

## 2021-02-26 RX ADMIN — ASPIRIN 81 MG: 81 TABLET, COATED ORAL at 09:08

## 2021-02-26 RX ADMIN — DOCUSATE SODIUM 100 MG: 100 CAPSULE, LIQUID FILLED ORAL at 09:08

## 2021-02-26 RX ADMIN — Medication 5 ML: at 14:00

## 2021-02-26 RX ADMIN — LETROZOLE 2.5 MG: 2.5 TABLET ORAL at 09:08

## 2021-02-26 RX ADMIN — LACTULOSE 45 ML: 20 SOLUTION ORAL at 21:09

## 2021-02-26 RX ADMIN — LOSARTAN POTASSIUM 25 MG: 50 TABLET, FILM COATED ORAL at 21:07

## 2021-02-26 RX ADMIN — OXYBUTYNIN CHLORIDE 10 MG: 5 TABLET, EXTENDED RELEASE ORAL at 09:11

## 2021-02-26 RX ADMIN — Medication 1 CAPSULE: at 09:08

## 2021-02-26 RX ADMIN — AMOXICILLIN AND CLAVULANATE POTASSIUM 1 TABLET: 875; 125 TABLET, FILM COATED ORAL at 21:07

## 2021-02-26 RX ADMIN — Medication 1 TABLET: at 18:58

## 2021-02-26 RX ADMIN — AMOXICILLIN AND CLAVULANATE POTASSIUM 1 TABLET: 875; 125 TABLET, FILM COATED ORAL at 09:08

## 2021-02-26 RX ADMIN — Medication 1 TABLET: at 09:08

## 2021-02-26 RX ADMIN — LEVOTHYROXINE SODIUM 50 MCG: 0.03 TABLET ORAL at 05:28

## 2021-02-26 NOTE — PROGRESS NOTES
Problem: Falls - Risk of  Goal: *Absence of Falls  Description: Document Ammy Rios Fall Risk and appropriate interventions in the flowsheet. Outcome: Progressing Towards Goal  Note: Fall Risk Interventions:  Mobility Interventions: Bed/chair exit alarm, Patient to call before getting OOB, PT Consult for mobility concerns, PT Consult for assist device competence    Mentation Interventions: Bed/chair exit alarm, Increase mobility, More frequent rounding, Reorient patient    Medication Interventions: Evaluate medications/consider consulting pharmacy, Patient to call before getting OOB    Elimination Interventions: Call light in reach, Patient to call for help with toileting needs    History of Falls Interventions: Bed/chair exit alarm, Evaluate medications/consider consulting pharmacy, Room close to nurse's station         Problem: Patient Education: Go to Patient Education Activity  Goal: Patient/Family Education  Outcome: Progressing Towards Goal     Problem: Pressure Injury - Risk of  Goal: *Prevention of pressure injury  Description: Document Jamel Scale and appropriate interventions in the flowsheet.   Outcome: Progressing Towards Goal  Note: Pressure Injury Interventions:  Sensory Interventions: Assess changes in LOC, Assess need for specialty bed, Avoid rigorous massage over bony prominences, Keep linens dry and wrinkle-free, Maintain/enhance activity level, Minimize linen layers, Monitor skin under medical devices    Moisture Interventions: Absorbent underpads, Limit adult briefs, Maintain skin hydration (lotion/cream), Minimize layers    Activity Interventions: Increase time out of bed, Pressure redistribution bed/mattress(bed type), PT/OT evaluation    Mobility Interventions: HOB 30 degrees or less, Pressure redistribution bed/mattress (bed type), PT/OT evaluation    Nutrition Interventions: Document food/fluid/supplement intake    Friction and Shear Interventions: HOB 30 degrees or less, Lift sheet, Minimize layers                Problem: Patient Education: Go to Patient Education Activity  Goal: Patient/Family Education  Outcome: Progressing Towards Goal     Problem: Patient Education: Go to Patient Education Activity  Goal: Patient/Family Education  Description: Pt will be MI lester UE HEP in prep for self care tasks    Outcome: Progressing Towards Goal     Problem: Patient Education: Go to Patient Education Activity  Goal: Patient/Family Education  Outcome: Progressing Towards Goal

## 2021-02-26 NOTE — ROUTINE PROCESS
Bedside and verbal shift change report given to Emi Warner RN (oncoming nurse) by Bing Ace RN (offgoing nurse). Report included the following information SBAR, Kardex, Intake/Output, MAR, Recent Results, and Quality Measures.

## 2021-02-26 NOTE — PROGRESS NOTES
Hospitalist Progress Note    Subjective:   Daily Progress Note: 2/26/2021 8:07 AM    Ky Wilson is an 82year old female with a past medical history of hypertension, carcinoma of the breast, hyperlipidemia, chronic vaginal prolapse, and hypothyroidism who presented to the ED with generalized weakness/fatigue and two ground level falls with some right ankle pain. Patient was recently started on lasix as outpatient for leg edema. Preliminary labs showed LESLYE and acute cystitis. Patient started on Rocephin. Chest X-ray showed no acute pathology. Patient was started on IV fluids and her lasix was held. Urine and blood cultures pending. X-Ray of  Tibia/fibula negative. CT of head showed cortical volume loss with associated ventricular system dilation and small vessel ischemic disease. Subjective: Patient says she is a little weak. Denies any pain.      Current Facility-Administered Medications   Medication Dose Route Frequency    amoxicillin-clavulanate (AUGMENTIN) 875-125 mg per tablet 1 Tab  1 Tab Oral Q12H    senna (SENOKOT) tablet 8.6 mg  1 Tab Oral DAILY    docusate sodium (COLACE) capsule 100 mg  100 mg Oral BID    levothyroxine (SYNTHROID) tablet 50 mcg  50 mcg Oral 6am    oxybutynin chloride XL (DITROPAN XL) tablet 10 mg  10 mg Oral DAILY    lactulose (CHRONULAC) 10 gram/15 mL solution 45 mL  30 g Oral BID    aspirin delayed-release tablet 81 mg  81 mg Oral DAILY    calcium-vitamin D 600 mg(1,500mg) -200 unit per tablet 1 Tab  1 Tab Oral BID WITH MEALS    clonazePAM (KlonoPIN) tablet 0.25 mg  0.25 mg Oral BID PRN    letrozole (FEMARA) tablet 2.5 mg  2.5 mg Oral DAILY    losartan (COZAAR) tablet 25 mg  25 mg Oral BID    metoprolol succinate (TOPROL-XL) XL tablet 100 mg  100 mg Oral DAILY    Multivitamins with Min No.7-FA (V-C FORTE) capsule 1 Cap  1 Cap Oral DAILY    traZODone (DESYREL) tablet 50 mg  50 mg Oral QHS PRN    zinc gluconate tablet 50 mg  1 Tab Oral DAILY    sodium chloride (NS) flush 5-40 mL  5-40 mL IntraVENous Q8H    sodium chloride (NS) flush 5-40 mL  5-40 mL IntraVENous PRN    acetaminophen (TYLENOL) tablet 650 mg  650 mg Oral Q6H PRN    Or    acetaminophen (TYLENOL) suppository 650 mg  650 mg Rectal Q6H PRN    ondansetron (ZOFRAN ODT) tablet 4 mg  4 mg Oral Q6H PRN    Or    ondansetron (ZOFRAN) injection 4 mg  4 mg IntraVENous Q6H PRN        Review of Systems  Constitutional: No fevers, No chills, No sweats, No fatigue,+++ Weakness  Eyes: No redness  Ears, nose, mouth, throat, and face: No nasal congestion, No sore throat, No voice change  Respiratory: No Shortness of Breath, No cough, No wheezing  Cardiovascular: No chest pain, No palpitations, No extremity edema  Gastrointestinal: No nausea, No vomiting, No diarrhea, No abdominal pain  Genitourinary: No frequency, No dysuria, No hematuria  Integument/breast: No skin lesion(s)   Neurological: No Confusion, No headaches, No dizziness      Objective:     Visit Vitals  BP (!) 145/70   Pulse 73   Temp 98.2 °F (36.8 °C)   Resp 16   Ht 5' 1\" (1.549 m)   Wt 53.2 kg (117 lb 4.6 oz)   SpO2 100%   BMI 22.16 kg/m²      O2 Device: Room air    Temp (24hrs), Av.6 °F (37 °C), Min:98.2 °F (36.8 °C), Max:99.4 °F (37.4 °C)      No intake/output data recorded. No intake/output data recorded. PHYSICAL EXAM:  Constitutional: No acute distress  Skin: Extremities and face reveal no rashes. HEENT: Sclerae anicteric. Extra-occular muscles are intact. No oral ulcers. Cardiovascular: RRR  Respiratory:  Clear breath sounds bilaterally with no wheezes, rales, or rhonchi. GI: Abdomen nondistended, soft, and nontender. Normal active bowel sounds. Musculoskeletal: No pitting edema of the lower legs. Able to move all ext  Neurological:  Patient is alert and oriented.  Cranial nerves II-XII grossly intact  Psychiatric: Mood appears appropriate       Data Review    No results found for this or any previous visit (from the past 24 hour(s)). Radiology review: XRay    Assessment:   1. Acute Kidney Injury  2. Acute Cystitis  3. Right ankle pain  4. Hypothyroidism  5. Essential hypertension  6. History of breast cancer  7. Weakness    Plan:    1. Acute Kidney Injury  On IV fluids 50ml/hr, Stopped on 2/24/2021  Creatinine improved --> 0.62  Continue holding lasix       2. Acute Cystitis  UA revealed moderate LE and 1+ bacteria  Urine culture pending, 1000,000 colony, possible contaminate  On Rocephin --> stop and start Augmentin for 3 days. We will stop after tonight dose      3. Right ankle pain  Right tibia/fibula x-ray negative     4. Hypothyroidism  TSH 0.74  Continue Synthroid     5. Essential hypertension  Continue metoprolol with holding parameters of HR < 60  Continue Losartan     6. History of breast cancer  Chest x-ray showed stable appearance of the chest compared to 10/05/2020, no acute pathology  Continue Femara     7. Weakness  PT/OT   CM for rehab placement - waiting for bed and insurance auth for IRF. 8. Constipated  Start stool softeners     9. Confusion   CT of head shows cortical volume loss. NO acute process.      CODE STATUS: FULL CODE     DVT prophylaxis: Lovenox  Ulcer Prophylaxis: not indicated      Daughter Fredi ruiz, [address] discussed with: Patient/Family, Nurse and     Total time spent with patient: 34 minutes.

## 2021-02-26 NOTE — PROGRESS NOTES
PHYSICAL THERAPY TREATMENT  Patient: Fany Saab (12 y.o. female)  Date: 2/26/2021  Diagnosis: LESLYE (acute kidney injury) ([facility] [N17.9] <principal problem not specified>       Precautions: Fall  Chart, physical therapy assessment, plan of care and goals were reviewed. ASSESSMENT  Patient continues with skilled PT services and is progressing towards goals. Pt. Semi supine in bed upon arrival and agreeable to therapy session. Initiated session with Rolling bilateral, bridging, bed mobility and transfers. Attempted sit to stand x3 with bilateral knee flexion, hip flexion, and posterior weight shift. Required Mod a For sit to stand then able to ambulate 5' with RW at [facility] a due to posterior lean. Tolerated seated LE TE and educated for practice. Recommend DC to IRF. Johnny Wang Current Level of Function Impacting Discharge (mobility/balance): Balance, Strength, endurance    Other factors to consider for discharge: PMH         PLAN :  Patient continues to benefit from skilled intervention to address the above impairments. Continue treatment per established plan of care. to address goals. Recommendation for discharge: (in order for the patient to meet his/her long term goals)  Therapy 3 hours per day 5-7 days per week    This discharge recommendation:  Has been made in collaboration with the attending provider and/or case management    IF patient discharges home will need the following DME: rolling walker       SUBJECTIVE:   Patient stated IM OKAY.     OBJECTIVE DATA SUMMARY:   Critical Behavior:  Neurologic State: Alert  Orientation Level: Oriented X4  Cognition: Follows commands     Functional Mobility Training:  Bed Mobility:  Rolling: Setup  Supine to Sit: Stand-by assistance  Sit to Supine: Stand-by assistance  Scooting: Stand-by assistance        Transfers:  Sit to Stand:  Moderate assistance  Stand to Sit: Contact guard assistance  Stand Pivot Transfers: Contact guard assistance     Bed to Chair: Contact guard assistance                    Balance:  Sitting: Intact; With support; Without support  Sitting - Static: Good (unsupported)  Sitting - Dynamic: Good (unsupported)  Standing: Impaired;Pull to stand; With support  Standing - Static: Constant support; Fair  Standing - Dynamic : Constant support; Fair  Ambulation/Gait Training:  Distance (ft): 5 Feet (ft)  Assistive Device: Walker, rolling;Gait belt  Ambulation - Level of Assistance: Minimal assistance                 Base of Support: Narrowed     Speed/Aparna: Slow  Step Length: Left shortened;Right shortened                    Stairs: Therapeutic Exercises:   Therapeutic Exercises:       EXERCISE   Sets   Reps   Active Active Assist   Passive Self ROM   Comments   Ankle Pumps  10 [x] [] [] []    Quad Sets/Glut Sets   [] [] [] []    Hamstring Sets   [] [] [] []    Short Arc Quads   [] [] [] []    Heel Slides   [] [] [] []    Straight Leg Raises  10 [x] [] [] []    Hip abd/add  10 [x] [] [] []    Long Arc Quads  10 [x] [] [] []    Marching  10 [x] [] [] []    BRIDGE  10 [x] [] [] []        Pain Ratin    Activity Tolerance:   Fair  Please refer to the flowsheet for vital signs taken during this treatment. After treatment patient left in no apparent distress:   Supine in bed    COMMUNICATION/COLLABORATION:   The patients plan of care was discussed with: Physical therapy assistant.      Russ Hernandez PTA   Time Calculation: 39 mins

## 2021-02-26 NOTE — PROGRESS NOTES
CM met with patient in reference to [facility] letter. She verbalized understanding and signed with an \"x\", as she stated she was having difficulty writing at this time.

## 2021-02-26 NOTE — WOUND CARE
Brief wound care consult for assessment of red area of bilateral inner thighs and keith area. Area deep red. Patient describes as burning. Jamel score 18. Patient does get up out of bed. Will obtain order for desitin with nystatin. No further wound care needs at this time.

## 2021-02-27 LAB
BACTERIA SPEC CULT: NORMAL
SPECIAL REQUESTS,SREQ: NORMAL

## 2021-02-27 PROCEDURE — 99218 HC RM OBSERVATION: CPT

## 2021-02-27 PROCEDURE — 74011250637 HC RX REV CODE- 250/637: Performed by: HOSPITALIST

## 2021-02-27 PROCEDURE — 74011250637 HC RX REV CODE- 250/637: Performed by: PHYSICIAN ASSISTANT

## 2021-02-27 RX ORDER — SAME BUTANEDISULFONATE/BETAINE 400-600 MG
250 POWDER IN PACKET (EA) ORAL 2 TIMES DAILY
Status: DISCONTINUED | OUTPATIENT
Start: 2021-02-27 | End: 2021-02-28 | Stop reason: HOSPADM

## 2021-02-27 RX ADMIN — LOSARTAN POTASSIUM 25 MG: 50 TABLET, FILM COATED ORAL at 11:48

## 2021-02-27 RX ADMIN — ASPIRIN 81 MG: 81 TABLET, COATED ORAL at 11:49

## 2021-02-27 RX ADMIN — LEVOTHYROXINE SODIUM 50 MCG: 0.03 TABLET ORAL at 07:09

## 2021-02-27 RX ADMIN — Medication 10 ML: at 07:13

## 2021-02-27 RX ADMIN — METOPROLOL SUCCINATE 100 MG: 50 TABLET, EXTENDED RELEASE ORAL at 11:48

## 2021-02-27 RX ADMIN — Medication 1 TABLET: at 11:49

## 2021-02-27 RX ADMIN — Medication 250 MG: at 22:15

## 2021-02-27 RX ADMIN — DOCUSATE SODIUM 100 MG: 100 CAPSULE, LIQUID FILLED ORAL at 11:49

## 2021-02-27 RX ADMIN — Medication 10 ML: at 16:40

## 2021-02-27 RX ADMIN — LOSARTAN POTASSIUM 25 MG: 50 TABLET, FILM COATED ORAL at 22:15

## 2021-02-27 RX ADMIN — Medication 1 TABLET: at 17:54

## 2021-02-27 RX ADMIN — Medication 250 MG: at 11:49

## 2021-02-27 RX ADMIN — Medication 10 ML: at 22:15

## 2021-02-27 RX ADMIN — LETROZOLE 2.5 MG: 2.5 TABLET ORAL at 11:49

## 2021-02-27 RX ADMIN — DOCUSATE SODIUM 100 MG: 100 CAPSULE, LIQUID FILLED ORAL at 22:15

## 2021-02-27 RX ADMIN — Medication 50 MG: at 11:49

## 2021-02-27 RX ADMIN — OXYBUTYNIN CHLORIDE 10 MG: 5 TABLET, EXTENDED RELEASE ORAL at 11:49

## 2021-02-27 RX ADMIN — Medication 1 CAPSULE: at 11:49

## 2021-02-27 NOTE — ROUTINE PROCESS
Bedside verbal shift report given to Anabel CARTER RN (oncoming nurse) to include SBAR, Kardex, MAR, and updates.

## 2021-02-27 NOTE — PROGRESS NOTES
CM notes patient was declined for IRF and spoke with the patient and her daughter in reference to their choice for [facility] vs SNF. Patient is agreeable to whichever decision her daugther makes. CM spoke with the patients' daughter and she stated she will speak with her aunt and call CM back with a decision. CM will await return call and continue to follow. CM received return call and the decision was made for [facility] with no preference for agency. The patient is requesting a rolling walker and an bedside commode. CM explained that her insurance may not cover the commode and that it could be an OOP cost. She verbalized understanding and requested that it still be ordered. CM also explained the patients' daughter that the DME company is not open on Saturday and that the DME can be picked up on Monday. She verbalized understanding. Referrals sent via [employer]. CM will follow for acceptance to [facility] agency. Patient is also requesting a COVID test prior to discharge. CM notified provider via [address] [place] [address].     CM will follow

## 2021-02-27 NOTE — PROGRESS NOTES
Hospitalist Progress Note    Subjective:   Daily Progress Note: 2/27/2021 8:07 AM    Brennan Cuenca is an 82year old female with a past medical history of hypertension, carcinoma of the breast, hyperlipidemia, chronic vaginal prolapse, and hypothyroidism who presented to the ED with generalized weakness/fatigue and two ground level falls with some right ankle pain. Patient was recently started on lasix as outpatient for leg edema. Preliminary labs showed LESLYE and acute cystitis. Patient started on Rocephin. Chest X-ray showed no acute pathology. Patient was started on IV fluids and her lasix was held. Urine and blood cultures negative. X-Ray of  Tibia/fibula negative. CT of head showed cortical volume loss with associated ventricular system dilation and small vessel ischemic disease. Subjective: Patient is slowly getting better. Has some loose stools overnight.      Current Facility-Administered Medications   Medication Dose Route Frequency    desitin/nystatin (1:1) topical compound   Topical PRN    amoxicillin-clavulanate (AUGMENTIN) 875-125 mg per tablet 1 Tab  1 Tab Oral Q12H    senna (SENOKOT) tablet 8.6 mg  1 Tab Oral DAILY    docusate sodium (COLACE) capsule 100 mg  100 mg Oral BID    levothyroxine (SYNTHROID) tablet 50 mcg  50 mcg Oral 6am    oxybutynin chloride XL (DITROPAN XL) tablet 10 mg  10 mg Oral DAILY    lactulose (CHRONULAC) 10 gram/15 mL solution 45 mL  30 g Oral BID    aspirin delayed-release tablet 81 mg  81 mg Oral DAILY    calcium-vitamin D 600 mg(1,500mg) -200 unit per tablet 1 Tab  1 Tab Oral BID WITH MEALS    clonazePAM (KlonoPIN) tablet 0.25 mg  0.25 mg Oral BID PRN    letrozole (FEMARA) tablet 2.5 mg  2.5 mg Oral DAILY    losartan (COZAAR) tablet 25 mg  25 mg Oral BID    metoprolol succinate (TOPROL-XL) XL tablet 100 mg  100 mg Oral DAILY    Multivitamins with Min No.7-FA (V-C FORTE) capsule 1 Cap  1 Cap Oral DAILY    traZODone (DESYREL) tablet 50 mg  50 mg Oral QHS PRN    zinc gluconate tablet 50 mg  1 Tab Oral DAILY    sodium chloride (NS) flush 5-40 mL  5-40 mL IntraVENous Q8H    sodium chloride (NS) flush 5-40 mL  5-40 mL IntraVENous PRN    acetaminophen (TYLENOL) tablet 650 mg  650 mg Oral Q6H PRN    Or    acetaminophen (TYLENOL) suppository 650 mg  650 mg Rectal Q6H PRN    ondansetron (ZOFRAN ODT) tablet 4 mg  4 mg Oral Q6H PRN    Or    ondansetron (ZOFRAN) injection 4 mg  4 mg IntraVENous Q6H PRN        Review of Systems  Constitutional: No fevers, No chills, No sweats, No fatigue,+++ Weakness  Eyes: No redness  Ears, nose, mouth, throat, and face: No nasal congestion, No sore throat, No voice change  Respiratory: No Shortness of Breath, No cough, No wheezing  Cardiovascular: No chest pain, No palpitations, No extremity edema  Gastrointestinal: No nausea, No vomiting, +diarrhea, No abdominal pain  Genitourinary: No frequency, No dysuria, No hematuria  Integument/breast: No skin lesion(s)   Neurological: No Confusion, No headaches, No dizziness      Objective:     Visit Vitals  /67   Pulse 73   Temp 98.8 °F (37.1 °C)   Resp 18   Ht 5' 1\" (1.549 m)   Wt 53.2 kg (117 lb 4.6 oz)   SpO2 96%   BMI 22.16 kg/m²      O2 Device: Room air    Temp (24hrs), Av.6 °F (37 °C), Min:98.5 °F (36.9 °C), Max:98.8 °F (37.1 °C)      No intake/output data recorded. 1901 -  0700  In: 5 [P.O.:420]  Out: 703 [Urine:702]    PHYSICAL EXAM:  Constitutional: No acute distress  Skin: Extremities and face reveal no rashes. HEENT: Sclerae anicteric. Extra-occular muscles are intact. No oral ulcers. Cardiovascular: RRR  Respiratory:  Clear breath sounds bilaterally  GI: Abdomen nondistended, soft, and nontender. Normal active bowel sounds. Musculoskeletal: No pitting edema of the lower legs. Able to move all ext  Neurological:  Patient is alert and oriented.  Cranial nerves II-XII grossly intact  Psychiatric: Mood appears appropriate       Data Review    Recent Results (from the past 24 hour(s))   CBC WITH AUTOMATED DIFF    Collection Time: 02/26/21  8:54 AM   Result Value Ref Range    WBC 5.1 3.6 - 11.0 K/uL    RBC 3.57 (L) 3.80 - 5.20 M/uL    HGB 10.1 (L) 11.5 - 16.0 g/dL    HCT 31.8 (L) 35.0 - 47.0 %    MCV 89.1 80.0 - 99.0 FL    MCH 28.3 26.0 - 34.0 PG    MCHC 31.8 30.0 - 36.5 g/dL    RDW 13.7 11.5 - 14.5 %    PLATELET 454 470 - 424 K/uL    MPV 10.5 8.9 - 12.9 FL    NEUTROPHILS 75 32 - 75 %    LYMPHOCYTES 10 (L) 12 - 49 %    MONOCYTES 12 5 - 13 %    EOSINOPHILS 3 0 - 7 %    BASOPHILS 0 0 - 1 %    IMMATURE GRANULOCYTES 0 0.0 - 0.5 %    ABS. NEUTROPHILS 3.8 1.8 - 8.0 K/UL    ABS. LYMPHOCYTES 0.5 (L) 0.8 - 3.5 K/UL    ABS. MONOCYTES 0.6 0.0 - 1.0 K/UL    ABS. EOSINOPHILS 0.2 0.0 - 0.4 K/UL    ABS. BASOPHILS 0.0 0.0 - 0.1 K/UL    ABS. IMM. GRANS. 0.0 0.00 - 0.04 K/UL    DF AUTOMATED     METABOLIC PANEL, COMPREHENSIVE    Collection Time: 02/26/21  8:54 AM   Result Value Ref Range    Sodium 142 136 - 145 mmol/L    Potassium 3.8 3.5 - 5.1 mmol/L    Chloride 108 97 - 108 mmol/L    CO2 29 21 - 32 mmol/L    Anion gap 5 5 - 15 mmol/L    Glucose 86 65 - 100 mg/dL    BUN 15 6 - 20 mg/dL    Creatinine 0.71 0.55 - 1.02 mg/dL    BUN/Creatinine ratio 21 (H) 12 - 20      GFR est AA >60 >60 ml/min/1.73m2    GFR est non-AA >60 >60 ml/min/1.73m2    Calcium 9.3 8.5 - 10.1 mg/dL    Bilirubin, total 0.3 0.2 - 1.0 mg/dL    AST (SGOT) 19 15 - 37 U/L    ALT (SGPT) 21 12 - 78 U/L    Alk. phosphatase 59 45 - 117 U/L    Protein, total 6.3 (L) 6.4 - 8.2 g/dL    Albumin 2.6 (L) 3.5 - 5.0 g/dL    Globulin 3.7 2.0 - 4.0 g/dL    A-G Ratio 0.7 (L) 1.1 - 2.2         Radiology review: XRay    Assessment:   1. Acute Kidney Injury  2. Acute Cystitis  3. Right ankle pain  4. Hypothyroidism  5. Essential hypertension  6. History of breast cancer  7. Weakness    Plan:    1. Acute Kidney Injury  On IV fluids 50ml/hr, Stopped on 2/24/2021  Creatinine improved --> 0.71  Continue holding lasix       2.  Acute Cystitis  UA revealed moderate LE and 1+ bacteria  Urine culture pending, 1000,000 colony, possible contaminate  On Rocephin --> stop and completed 3 day course of  Augmentin for 3 days. No need for further antibiotics at this time    3. Right ankle pain  Right tibia/fibula x-ray negative     4. Hypothyroidism  TSH 0.74  Continue Synthroid     5. Essential hypertension  Continue metoprolol with holding parameters of HR < 60  Continue Losartan     6. History of breast cancer  Chest x-ray showed stable appearance of the chest compared to 10/05/2020, no acute pathology  Continue Femara     7. Weakness  PT/OT   CM for rehab placement - waiting for bed and insurance auth for IRF. 8. Constipated  Now with loose stools  Stop lactulose     9. Confusion   CT of head shows cortical volume loss. NO acute process.      CODE STATUS: FULL CODE     DVT prophylaxis: Lovenox  Ulcer Prophylaxis: not indicated      Daughter Fredi ruiz, 89Bert[address]ov[address] discussed with: Patient/Family, Nurse and     Total time spent with patient: 33 minutes.

## 2021-02-28 VITALS
OXYGEN SATURATION: 96 % | HEART RATE: 73 BPM | SYSTOLIC BLOOD PRESSURE: 130 MMHG | TEMPERATURE: 98.3 F | BODY MASS INDEX: 22.14 KG/M2 | WEIGHT: 117.28 LBS | DIASTOLIC BLOOD PRESSURE: 60 MMHG | HEIGHT: 61 IN | RESPIRATION RATE: 18 BRPM

## 2021-02-28 LAB
ALBUMIN SERPL-MCNC: 2.6 G/DL (ref 3.5–5)
ALBUMIN/GLOB SERPL: 0.7 {RATIO} (ref 1.1–2.2)
ALP SERPL-CCNC: 61 U/L (ref 45–117)
ALT SERPL-CCNC: 25 U/L (ref 12–78)
ANION GAP SERPL CALC-SCNC: 3 MMOL/L (ref 5–15)
AST SERPL W P-5'-P-CCNC: 18 U/L (ref 15–37)
BASOPHILS # BLD: 0 K/UL (ref 0–0.1)
BASOPHILS NFR BLD: 0 % (ref 0–1)
BILIRUB SERPL-MCNC: 0.2 MG/DL (ref 0.2–1)
BUN SERPL-MCNC: 19 MG/DL (ref 6–20)
BUN/CREAT SERPL: 24 (ref 12–20)
CA-I BLD-MCNC: 9.9 MG/DL (ref 8.5–10.1)
CHLORIDE SERPL-SCNC: 107 MMOL/L (ref 97–108)
CO2 SERPL-SCNC: 33 MMOL/L (ref 21–32)
CREAT SERPL-MCNC: 0.78 MG/DL (ref 0.55–1.02)
DIFFERENTIAL METHOD BLD: ABNORMAL
EOSINOPHIL # BLD: 0.2 K/UL (ref 0–0.4)
EOSINOPHIL NFR BLD: 3 % (ref 0–7)
ERYTHROCYTE [DISTWIDTH] IN BLOOD BY AUTOMATED COUNT: 13.9 % (ref 11.5–14.5)
GLOBULIN SER CALC-MCNC: 3.7 G/DL (ref 2–4)
GLUCOSE SERPL-MCNC: 77 MG/DL (ref 65–100)
HCT VFR BLD AUTO: 31.9 % (ref 35–47)
HGB BLD-MCNC: 9.8 G/DL (ref 11.5–16)
IMM GRANULOCYTES # BLD AUTO: 0 K/UL (ref 0–0.04)
IMM GRANULOCYTES NFR BLD AUTO: 0 % (ref 0–0.5)
LYMPHOCYTES # BLD: 0.7 K/UL (ref 0.8–3.5)
LYMPHOCYTES NFR BLD: 11 % (ref 12–49)
MCH RBC QN AUTO: 27.5 PG (ref 26–34)
MCHC RBC AUTO-ENTMCNC: 30.7 G/DL (ref 30–36.5)
MCV RBC AUTO: 89.6 FL (ref 80–99)
MONOCYTES # BLD: 0.7 K/UL (ref 0–1)
MONOCYTES NFR BLD: 11 % (ref 5–13)
NEUTS SEG # BLD: 5 K/UL (ref 1.8–8)
NEUTS SEG NFR BLD: 75 % (ref 32–75)
PLATELET # BLD AUTO: 350 K/UL (ref 150–400)
PMV BLD AUTO: 10.9 FL (ref 8.9–12.9)
POTASSIUM SERPL-SCNC: 3.8 MMOL/L (ref 3.5–5.1)
PROT SERPL-MCNC: 6.3 G/DL (ref 6.4–8.2)
RBC # BLD AUTO: 3.56 M/UL (ref 3.8–5.2)
SODIUM SERPL-SCNC: 143 MMOL/L (ref 136–145)
WBC # BLD AUTO: 6.7 K/UL (ref 3.6–11)

## 2021-02-28 PROCEDURE — 80053 COMPREHEN METABOLIC PANEL: CPT

## 2021-02-28 PROCEDURE — 36415 COLL VENOUS BLD VENIPUNCTURE: CPT

## 2021-02-28 PROCEDURE — 74011250637 HC RX REV CODE- 250/637: Performed by: PHYSICIAN ASSISTANT

## 2021-02-28 PROCEDURE — 85025 COMPLETE CBC W/AUTO DIFF WBC: CPT

## 2021-02-28 PROCEDURE — 74011250637 HC RX REV CODE- 250/637: Performed by: HOSPITALIST

## 2021-02-28 PROCEDURE — 99218 HC RM OBSERVATION: CPT

## 2021-02-28 RX ORDER — SAME BUTANEDISULFONATE/BETAINE 400-600 MG
250 POWDER IN PACKET (EA) ORAL 2 TIMES DAILY
Qty: 14 CAP | Refills: 0 | Status: SHIPPED | OUTPATIENT
Start: 2021-02-28 | End: 2021-03-07

## 2021-02-28 RX ADMIN — LOSARTAN POTASSIUM 25 MG: 50 TABLET, FILM COATED ORAL at 09:33

## 2021-02-28 RX ADMIN — LEVOTHYROXINE SODIUM 50 MCG: 0.03 TABLET ORAL at 05:06

## 2021-02-28 RX ADMIN — OXYBUTYNIN CHLORIDE 10 MG: 5 TABLET, EXTENDED RELEASE ORAL at 12:53

## 2021-02-28 RX ADMIN — Medication 50 MG: at 09:33

## 2021-02-28 RX ADMIN — METOPROLOL SUCCINATE 100 MG: 50 TABLET, EXTENDED RELEASE ORAL at 09:34

## 2021-02-28 RX ADMIN — ACETAMINOPHEN 650 MG: 325 TABLET, FILM COATED ORAL at 05:06

## 2021-02-28 RX ADMIN — Medication 1 TABLET: at 09:33

## 2021-02-28 RX ADMIN — Medication 1 CAPSULE: at 09:34

## 2021-02-28 RX ADMIN — LETROZOLE 2.5 MG: 2.5 TABLET ORAL at 09:34

## 2021-02-28 RX ADMIN — SENNOSIDES 8.6 MG: 8.6 TABLET, FILM COATED ORAL at 09:34

## 2021-02-28 RX ADMIN — ASPIRIN 81 MG: 81 TABLET, COATED ORAL at 09:33

## 2021-02-28 RX ADMIN — DOCUSATE SODIUM 100 MG: 100 CAPSULE, LIQUID FILLED ORAL at 09:34

## 2021-02-28 RX ADMIN — Medication 10 ML: at 05:06

## 2021-02-28 RX ADMIN — Medication 250 MG: at 09:34

## 2021-02-28 NOTE — DISCHARGE INSTRUCTIONS
Patient Education        Needle Biopsy of the Kidney: What to Expect at Home  Your Recovery     A kidney biopsy is a test to take a sample (biopsy) of kidney. The doctor puts a long needle through your back (flank) into the kidney. Another doctor will look at the kidney tissue with a microscope to check for problems. After the test, you will be told to lie down on your back for several hours. After this, you should avoid strenuous activity for the next 2 to 3 days. It's normal to feel some soreness in the area of the biopsy for 2 to 3 days. You may have a small amount of bleeding on the bandage after the test. You may notice some blood in your urine after the test. This should go away within 12 to 24 hours. If it doesn't, call your doctor. This care sheet gives you a general idea about how long it will take for you to recover. But each person recovers at a different pace. Follow the steps below to feel better as quickly as possible. How can you care for yourself at home? Activity    · Avoid lifting anything that would make you strain. This may include heavy grocery bags and milk containers, a heavy briefcase or backpack, cat litter or dog food bags, a vacuum , or a child.     · Avoid strenuous activities, such as bicycle riding, jogging, weight lifting, or aerobic exercise, until your doctor says it is okay.     · Try to walk each day. Start by walking a little more than you did the day before. Bit by bit, increase the amount you walk. Walking boosts blood flow and helps prevent pneumonia and constipation.     · Rest when you feel tired. Getting enough sleep will help you recover.     · Ask your doctor when it is okay for you to have sex. Diet    · You can eat your normal diet. If your stomach is upset, try bland, low-fat foods like plain rice, broiled chicken, toast, and yogurt.     · Drink plenty of fluids to avoid becoming dehydrated.    Medicines    · Your doctor will tell you if and when you can restart your medicines. He or she will also give you instructions about taking any new medicines.     · If you take aspirin or some other blood thinner, ask your doctor if and when to start taking it again. Make sure that you understand exactly what your doctor wants you to do.     · Do not take aspirin or anti-inflammatory medicines for a week after the biopsy. Incision care    · Keep a bandage over the puncture site for the first 1 or 2 days. Follow-up care is a key part of your treatment and safety. Be sure to make and go to all appointments, and call your doctor if you are having problems. It's also a good idea to know your test results and keep a list of the medicines you take. When should you call for help? Call 911 anytime you think you may need emergency care. For example, call if:    · You passed out (lost consciousness). Call your doctor now or seek immediate medical care if:    · You have signs of infection, such as:  ? Increased pain, swelling, warmth, or redness. ? Red streaks leading from the puncture site. ? Pus draining from the puncture site. ? A fever.     · Bright red blood has soaked through the bandage over the puncture site.     · You have new or worse pain at the puncture site. Watch closely for any changes in your health, and call your doctor if:    · You have blood in your urine for more than 1 day. Where can you learn more? Go to http://www.gray.com/  Enter S675 in the search box to learn more about \"Needle Biopsy of the Kidney: What to Expect at Home. \"  Current as of: April 15, 2020               Content Version: 12.6  © 2006-2020 [employer]. Care instructions adapted under license by [employer] (which disclaims liability or warranty for this information).  If you have questions about a medical condition or this instruction, always ask your healthcare professional. Nicola Ramirez disclaims any warranty or liability for your use of this information. Patient Education        Acute Kidney Injury: Care Instructions  Your Care Instructions     Acute kidney injury (LESLYE) is a sudden decrease in kidney function. This can happen over a period of hours, days or, in some cases, weeks. LESLYE used to be called acute renal failure, but kidney failure doesn't always happen with LESLYE. Common causes of LESLYE are dehydration, blood loss, and medicines. When LESLYE happens, the kidneys have trouble removing waste and excess fluids from the body. The waste and fluids build up and become harmful. Kidney function may return to normal if the cause of LESLYE is treated quickly. Your chance of a full recovery depends on what caused the problem, how quickly the cause was treated, and what other medical problems you have. A machine may be used to help your kidneys remove waste and fluids for a short period of time. This is called dialysis. Follow-up care is a key part of your treatment and safety. Be sure to make and go to all appointments, and call your doctor if you are having problems. It's also a good idea to know your test results and keep a list of the medicines you take. How can you care for yourself at home? · Talk to your doctor about how much fluid you should drink. · Eat a balanced diet. Talk to your doctor or a dietitian about what type of diet may be best for you. You may need to limit sodium, potassium, and phosphorus. · If you need dialysis, follow the instructions and schedule for dialysis that your doctor gives you. · Do not smoke. Smoking can make your condition worse. If you need help quitting, talk to your doctor about stop-smoking programs and medicines. These can increase your chances of quitting for good. · Do not drink alcohol. · Review all of your medicines with your doctor.  Do not take any medicines, including nonsteroidal anti-inflammatory drugs (NSAIDs) such as ibuprofen (Advil, Motrin) or naproxen (Aleve), unless your doctor says it is safe for you to do so. · Make sure that anyone treating you for any health problem knows that you have had LESLYE. When should you call for help? Call 911 anytime you think you may need emergency care. For example, call if:    · You passed out (lost consciousness). Call your doctor now or seek immediate medical care if:    · You have new or worse nausea and vomiting.     · You have much less urine than normal, or you have no urine.     · You are feeling confused or cannot think clearly.     · You have new or more blood in your urine.     · You have new swelling.     · You are dizzy or lightheaded, or feel like you may faint. Watch closely for changes in your health, and be sure to contact your doctor if:    · You do not get better as expected. Where can you learn more? Go to http://www.gray.com/  Enter B724 in the search box to learn more about \"Acute Kidney Injury: Care Instructions. \"  Current as of: April 15, 2020               Content Version: 12.6  © 2405-3831 [employer]. Care instructions adapted under license by [employer] (which disclaims liability or warranty for this information). If you have questions about a medical condition or this instruction, always ask your healthcare professional. [address] any warranty or liability for your use of this information. Patient Education        Urinary Tract Infection in Women: Care Instructions  Your Care Instructions     A urinary tract infection, or UTI, is a general term for an infection anywhere between the kidneys and the urethra (where urine comes out). Most UTIs are bladder infections. They often cause pain or burning when you urinate. UTIs are caused by bacteria and can be cured with antibiotics. Be sure to complete your treatment so that the infection goes away. Follow-up care is a key part of your treatment and safety.  Be sure to make and go to all appointments, and call your doctor if you are having problems. It's also a good idea to know your test results and keep a list of the medicines you take.  How can you care for yourself at home?  · Take your antibiotics as directed. Do not stop taking them just because you feel better. You need to take the full course of antibiotics.  · Drink extra water and other fluids for the next day or two. This may help wash out the bacteria that are causing the infection. (If you have kidney, heart, or liver disease and have to limit fluids, talk with your doctor before you increase your fluid intake.)  · Avoid drinks that are carbonated or have caffeine. They can irritate the bladder.  · Urinate often. Try to empty your bladder each time.  · To relieve pain, take a hot bath or lay a heating pad set on low over your lower belly or genital area. Never go to sleep with a heating pad in place.  To prevent UTIs  · Drink plenty of water each day. This helps you urinate often, which clears bacteria from your system. (If you have kidney, heart, or liver disease and have to limit fluids, talk with your doctor before you increase your fluid intake.)  · Urinate when you need to.  · Urinate right after you have sex.  · Change sanitary pads often.  · Avoid douches, bubble baths, feminine hygiene sprays, and other feminine hygiene products that have deodorants.  · After going to the bathroom, wipe from front to back.  When should you call for help?   Call your doctor now or seek immediate medical care if:    · Symptoms such as fever, chills, nausea, or vomiting get worse or appear for the first time.     · You have new pain in your back just below your rib cage. This is called flank pain.     · There is new blood or pus in your urine.     · You have any problems with your antibiotic medicine.   Watch closely for changes in your health, and be sure to contact your doctor if:    · You are not getting better after taking an  antibiotic for 2 days.     · Your symptoms go away but then come back. Where can you learn more? Go to http://www.gray.com/  Enter A167 in the search box to learn more about \"Urinary Tract Infection in Women: Care Instructions. \"  Current as of: June 29, 2020               Content Version: 12.6  © 2017-0932 [employer]. Care instructions adapted under license by [employer] (which disclaims liability or warranty for this information). If you have questions about a medical condition or this instruction, always ask your healthcare professional. [place] [address] any warranty or liability for your use of this information. Patient Education        Learning About Continuous Renal Replacement Therapy (CRRT)  What is CRRT? Continuous renal replacement therapy (CRRT) is a type of dialysis. Dialysis does the work of your kidneys when you have a serious kidney injury (also known as acute renal failure). You get CRRT for several days or weeks. It filters wastes, such as urea, from the blood. It also removes extra fluid from your body. It helps restore the right balance of chemicals in the blood. You may stay in intensive care while you get CRRT. Why is it used? You may get CRRT if your kidneys aren't working the way they should and you need a slower, gentler type of dialysis. CRRT may be used for kidney failure from an injury, an illness, or a reaction to medicine. Other organs such as the liver, heart, and lungs may not work as well either. Shock can cause this. So can sepsis, a serious reaction to an infection. CRRT filters your blood over longer periods of time than other types of dialysis. It can also filter out more wastes and fluids than other types. How is it done? You will be connected to a CRRT machine to filter your blood. Your doctor will place a tube (catheter) in a vein in your neck or groin.  It will connect to the dialyzer on the CRRT machine. That's where waste products and extra fluid are removed. Your blood is slowly pumped from your body into the dialyzer. Your filtered blood is then pumped back into your body. Follow-up care is a key part of your treatment and safety. Be sure to make and go to all appointments, and call your doctor if you are having problems. It's also a good idea to know your test results and keep a list of the medicines you take. Where can you learn more? Go to http://www.gray.com/  Enter C225 in the search box to learn more about \"Learning About Continuous Renal Replacement Therapy (CRRT). \"  Current as of: April 15, 2020               Content Version: 12.6  © 2006-2020 [employer]. Care instructions adapted under license by [employer] (which disclaims liability or warranty for this information). If you have questions about a medical condition or this instruction, always ask your healthcare professional. [place] [address] any warranty or liability for your use of this information.

## 2021-02-28 NOTE — PROGRESS NOTES
Patient has been accepted with [facility] 3/1/2021. Medicare pt has received, reviewed, and signed 2nd IM letter informing them of their right to appeal the discharge. Signed copied has been placed on pt bedside chart. Patient clear to d/c from  to home with HH.    01:00 PM - CM contacted [facility] to inquire about bedside commode and walker, they will deliver to patient's bedside. CM attempted to contact patient's daughter, Lisa Núñez [phone], to notify however no answer at this time. Detailed VM left explaining DME.

## 2021-02-28 NOTE — PROGRESS NOTES
Bedside and Verbal shift change report given to Ang Mclain RN (oncoming nurse) by Swapna Jacob LPN (offgoing nurse). Report included the following information Intake/Output, MAR and Accordion.

## 2021-02-28 NOTE — DISCHARGE SUMMARY
Hospitalist Discharge Summary     Patient ID:    Jesus Rivera  [id number]  50 y.o.  1935    Admit date: 2/20/2021    Discharge date : 2/28/2021    Chronic Diagnoses:    Problem List as of 2/28/2021 Date Reviewed: 2/20/2021          Codes Class Noted - Resolved    Acute kidney injury [facility]) ICD-10-CM: N17.9  ICD-9-CM: 584.9  2/20/2021 - Present        LESLYE (acute kidney injury) [facility]) ICD-10-CM: N17.9  ICD-9-CM: 584.9  2/20/2021 - Present        Urethral prolapse ICD-10-CM: N36.8  ICD-9-CM: 599.5  2/11/2021 - Present    Overview Signed 2/11/2021  9:57 AM by Terrance Cannon NP     12/10/2020: Urethra: Prolapse (some prolapse and flat caruncle) present. No urethral pain, urethral swelling or urethral lesion. Stress incontinence ICD-10-CM: N39.3  ICD-9-CM: [id number]  12/10/2020 - Present    Overview Signed 12/10/2020 11:19 AM by Danilo Patel MD     She has mild leak with strain without mobility             Intrinsic sphincter deficiency (ISD) ICD-10-CM: N36.42  ICD-9-CM: 599.82  12/10/2020 - Present    Overview Signed 2/11/2021  9:59 AM by Terrance Cannon NP     12/10/2020: Mild YEIMI without hypermobility. She is continued to be encouraged on kegel exercises. Vaginal prolapse ICD-10-CM: N81.10  ICD-9-CM: 618.00  10/30/2020 - Present    Overview Addendum 2/11/2021  9:58 AM by Terrance Cannon NP     She reports vaginal prolapse 14 years. She managed with a pessary for 9 years but could no longer manage with her arthritis. She feels a bulge and dryness. 10/30/2020: Urethra appears well supported. No obvious descent or prolapse in the lithotomy position. 12/10/20 exam:   Mild cystocele. Prolapse out the introitus at rest after standing. Vagina: Prolapsed vaginal walls (Out the introitus at rest with squamous changes. Reduces well) present.               Urge incontinence ICD-10-CM: N39.41  ICD-9-CM: 788.31  10/30/2020 - Present    Overview Addendum 2/11/2021  9:59 AM by Diane Fu NP     She has incontinence, related to urge more than stress.   She can have hesitancy and be unable to void.  I suspect this is mainly due to dehydration. It has been about 18 months.   She wears a Depends in case she cannot get to the toilet in time.  It can be every other week when she has an accident.      12/10/2020: Ditropan XL started.               Hesitancy of micturition ICD-10-CM: R39.11  ICD-9-CM: 788.64  10/30/2020 - Present        Dizziness ICD-10-CM: R42  ICD-9-CM: 780.4  10/5/2020 - Present        Anxiety ICD-10-CM: F41.9  ICD-9-CM: 300.00  Unknown - Present        Arthritis ICD-10-CM: M19.90  ICD-9-CM: 716.90  Unknown - Present        Depression ICD-10-CM: F32.9  ICD-9-CM: 311  Unknown - Present        High cholesterol ICD-10-CM: E78.00  ICD-9-CM: 272.0  Unknown - Present        Hypertension ICD-10-CM: I10  ICD-9-CM: 401.9  Unknown - Present        Thyroid disease ICD-10-CM: E07.9  ICD-9-CM: 246.9  Unknown - Present        GERD (gastroesophageal reflux disease) ICD-10-CM: K21.9  ICD-9-CM: 530.81  Unknown - Present        Diverticulitis ICD-10-CM: K57.92  ICD-9-CM: 562.11  Unknown - Present        Hx of dizziness ICD-10-CM: Z87.898  ICD-9-CM: V13.89  6/16/2020 - Present        Primary cancer of lower-inner quadrant of left female breast (HCC) ICD-10-CM: C50.312  ICD-9-CM: 174.3  12/18/2018 - Present        RESOLVED: Palpitations ICD-10-CM: R00.2  ICD-9-CM: 785.1  10/5/2020 - 10/7/2020              Final Diagnoses:   1. Acute Kidney Injury - resolved  2. Acute Cystitis  3. Right ankle pain  4. Hypothyroidism  5. Essential hypertension  6. History of breast cancer  7. Weakness    Reason for Hospitalization:      Hospital Course: Patient is an 85 year old female with a past medical history of hypertension, carcinoma of the breast, hyperlipidemia, chronic vaginal prolapse, and hypothyroidism who presented to the ED with generalized weakness/fatigue and two ground  level falls with some right ankle pain. Patient was recently started on lasix as outpatient for leg edema. Preliminary labs showed LESLYE and acute cystitis. Patient started on Rocephin. Completed course of antibiotics. Chest X-ray showed no acute pathology. Patient was started on IV fluids and her lasix was held. Urine and blood cultures negative. X-Ray of  Tibia/fibula negative. CT of head showed cortical volume loss with associated ventricular system dilation and small vessel ischemic disease. Patient  unfortunately was declined from IRF. Family at this time declines SNF placement and would like to take her home with Home health. Rolling walker and bedside commode DME has been sent to . Discharge Medications:   Current Discharge Medication List      START taking these medications    Details   Saccharomyces boulardii (FLORASTOR) 250 mg capsule Take 1 Cap by mouth two (2) times a day for 7 days. Qty: 14 Cap, Refills: 0         CONTINUE these medications which have NOT CHANGED    Details   rosuvastatin (CRESTOR) 5 mg tablet Take 5 mg by mouth every other day. oxybutynin chloride XL (DITROPAN XL) 10 mg CR tablet Take 1 Tab by mouth daily. Qty: 30 Tab, Refills: 2      indapamide (LOZOL) 2.5 mg tablet Take 0.5 Tabs by mouth daily. Qty: 30 Tab, Refills: 1      calcium-cholecalciferol, D3, (CALTRATE 600+D) tablet Take 1 Tab by mouth daily. multivit-min/iron/folic/lutein (CENTRUM SILVER WOMEN PO) Take  by mouth.      elderberry fruit (ELDERBERRY PO) Take  by mouth. ZINC PICOLINATE PO Take  by mouth. alendronate (FOSAMAX) 70 mg tablet Take 70 mg by mouth every seven (7) days. aspirin delayed-release 81 mg tablet Take  by mouth daily. clonazePAM (KlonoPIN) 0.5 mg tablet Take  by mouth two (2) times daily as needed for Anxiety. letrozole (FEMARA) 2.5 mg tablet Take 2.5 mg by mouth daily. losartan (COZAAR) 25 mg tablet Take  by mouth two (2) times a day.       metoprolol succinate (TOPROL-XL) 100 mg tablet Take  by mouth daily. levothyroxine (synthroid) 50 mcg tablet Take  by mouth Daily (before breakfast). traZODone (DESYREL) 50 mg tablet Take  by mouth nightly. ascorbic acid, vitamin C, (Vitamin C) 250 mg tablet Take  by mouth. Follow up Care:    1. Shaun Catherine MD in 1-2 weeks. Follow-up Information     Follow up With Specialties Details Why Ameya Abernathy MD Internal Medicine In 2 weeks  [facility]  [phone]              Patient Follow Up Instructions: Activity: Activity as tolerated  Diet:  Regular Diet    Condition at Discharge:  Stable  __________________________________________________________________    Disposition  Home with family and home health services  ____________________________________________________________________    Code Status: Full Code  ___________________________________________________________________    Discharge Exam:  Patient seen and examined by me on discharge day. Pertinent Findings:  Gen:    Not in distress  Chest: Clear lungs  CVS:   Regular rhythm. No edema  Abd:  Soft, not distended, not tender  Neuro:  Alert    CONSULTATIONS: None    Significant Diagnostic Studies:   Recent Labs     02/26/21  0854   WBC 5.1   HGB 10.1*   HCT 31.8*        Recent Labs     02/26/21  0854      K 3.8      CO2 29   BUN 15   CREA 0.71   GLU 86   CA 9.3     Recent Labs     02/26/21  0854   ALT 21   AP 59   TBILI 0.3   TP 6.3*   ALB 2.6*   GLOB 3.7     No results for input(s): INR, PTP, APTT, INREXT in the last 72 hours. No results for input(s): FE, TIBC, PSAT, FERR in the last 72 hours. No results for input(s): PH, PCO2, PO2 in the last 72 hours. No results for input(s): CPK, CKMB in the last 72 hours.     No lab exists for component: TROPONINI  No results found for: GLUCPOC      Total Time: >30 min     Signed:  Mauri Cui PA-C  2/28/2021  8:00 AM

## 2021-03-17 ENCOUNTER — HOSPITAL ENCOUNTER (OUTPATIENT)
Dept: MAMMOGRAPHY | Age: 86
Discharge: HOME | End: 2021-03-17
Attending: SURGERY
Payer: MEDICARE

## 2021-03-17 DIAGNOSIS — C50.312 MALIGNANT NEOPLASM OF LOWER-INNER QUADRANT OF LEFT FEMALE BREAST, UNSPECIFIED ESTROGEN RECEPTOR STATUS (HCC): ICD-10-CM

## 2021-03-17 PROCEDURE — 77062 BREAST TOMOSYNTHESIS BI: CPT

## 2021-04-09 ENCOUNTER — OFFICE VISIT (OUTPATIENT)
Dept: UROLOGY | Age: 86
End: 2021-04-09
Payer: MEDICARE

## 2021-04-09 VITALS
HEIGHT: 61 IN | WEIGHT: 115 LBS | SYSTOLIC BLOOD PRESSURE: 102 MMHG | TEMPERATURE: 97.2 F | HEART RATE: 73 BPM | OXYGEN SATURATION: 96 % | BODY MASS INDEX: 21.71 KG/M2 | RESPIRATION RATE: 12 BRPM | DIASTOLIC BLOOD PRESSURE: 59 MMHG

## 2021-04-09 DIAGNOSIS — N36.42 INTRINSIC SPHINCTER DEFICIENCY (ISD): ICD-10-CM

## 2021-04-09 DIAGNOSIS — N17.9 ACUTE KIDNEY INJURY (HCC): ICD-10-CM

## 2021-04-09 DIAGNOSIS — R39.11 HESITANCY OF MICTURITION: ICD-10-CM

## 2021-04-09 DIAGNOSIS — N81.10 VAGINAL PROLAPSE: Primary | ICD-10-CM

## 2021-04-09 DIAGNOSIS — N39.41 URGE INCONTINENCE: ICD-10-CM

## 2021-04-09 DIAGNOSIS — N39.3 STRESS INCONTINENCE: ICD-10-CM

## 2021-04-09 LAB
BILIRUB UR QL STRIP: NEGATIVE
GLUCOSE UR-MCNC: NEGATIVE MG/DL
KETONES P FAST UR STRIP-MCNC: NEGATIVE MG/DL
PH UR STRIP: 7 [PH] (ref 4.6–8)
PROT UR QL STRIP: NEGATIVE
SP GR UR STRIP: 1.01 (ref 1–1.03)
UA UROBILINOGEN AMB POC: NORMAL (ref 0.2–1)
URINALYSIS CLARITY POC: CLEAR
URINALYSIS COLOR POC: YELLOW
URINE BLOOD POC: NORMAL
URINE LEUKOCYTES POC: NORMAL
URINE NITRITES POC: NEGATIVE

## 2021-04-09 PROCEDURE — 1101F PT FALLS ASSESS-DOCD LE1/YR: CPT | Performed by: UROLOGY

## 2021-04-09 PROCEDURE — 1090F PRES/ABSN URINE INCON ASSESS: CPT | Performed by: UROLOGY

## 2021-04-09 PROCEDURE — 99214 OFFICE O/P EST MOD 30 MIN: CPT | Performed by: UROLOGY

## 2021-04-09 PROCEDURE — G8427 DOCREV CUR MEDS BY ELIG CLIN: HCPCS | Performed by: UROLOGY

## 2021-04-09 PROCEDURE — G8752 SYS BP LESS 140: HCPCS | Performed by: UROLOGY

## 2021-04-09 PROCEDURE — G9717 DOC PT DX DEP/BP F/U NT REQ: HCPCS | Performed by: UROLOGY

## 2021-04-09 PROCEDURE — G8754 DIAS BP LESS 90: HCPCS | Performed by: UROLOGY

## 2021-04-09 PROCEDURE — G8400 PT W/DXA NO RESULTS DOC: HCPCS | Performed by: UROLOGY

## 2021-04-09 PROCEDURE — G8536 NO DOC ELDER MAL SCRN: HCPCS | Performed by: UROLOGY

## 2021-04-09 PROCEDURE — G8420 CALC BMI NORM PARAMETERS: HCPCS | Performed by: UROLOGY

## 2021-04-09 PROCEDURE — 81003 URINALYSIS AUTO W/O SCOPE: CPT | Performed by: UROLOGY

## 2021-04-09 RX ORDER — MICONAZOLE NITRATE 2 %
1 CREAM WITH APPLICATOR VAGINAL
Qty: 45 G | Refills: 0 | Status: SHIPPED | OUTPATIENT
Start: 2021-04-09

## 2021-04-09 NOTE — PROGRESS NOTES
Chief Complaint   Patient presents with    Follow-up    Acute Kidney Injury    Stress Incontinence     PROLAPSE BLADDER    Urinary Frequency       1. Have you been to the ER, urgent care clinic since your last visit? Hospitalized since your last visit? No    2. Have you seen or consulted any other health care providers outside of the [address] [place] [address] since your last visit? Include any pap smears or colon screening.  No    Visit Vitals  BP (!) 102/59 (BP 1 Location: Left upper arm, BP Patient Position: Sitting, BP Cuff Size: Adult)   Pulse 73   Temp 97.2 °F (36.2 °C) (Temporal)   Resp 12   Ht 5' 1\" (1.549 m)   Wt 115 lb (52.2 kg)   SpO2 96%   BMI 21.73 kg/m²

## 2021-04-09 NOTE — LETTER
4/9/2021 Patient: El Bryant YOB: 1935 Date of Visit: 4/9/2021 George Michelle MD 
[address] [facility] [address] [address] Fax: [phone] Dear George Michelle MD, Thank you for referring Ms. Rickie Mayes to [place] [address] for evaluation. My notes for this consultation are attached. If you have questions, please do not hesitate to call me. I look forward to following your patient along with you.  
 
 
Sincerely, 
 
Prudencio Juarez MD

## 2021-04-09 NOTE — PROGRESS NOTES
HISTORY OF PRESENT ILLNESS  El Bryant is a 82 y.o. female. Chief Complaint   Patient presents with    Follow-up    Acute Kidney Injury    Stress Incontinence     PROLAPSE BLADDER    Urinary Frequency     She has a past medical history of hypertension, dyslipidemia, thyroid disease, GERD, diverticulitis, breast cancer, anxiety and depression. She was in the hospital February of this year with LESLYE, fatigue and falls. She states she slid down the bed. She was diagnosed with cystitis with mixed gwendolyn on urine culture. She states they thought she may have had a kidney infection. She had no fevers or flank pain. WBC was normal.     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 She was considering further surgical management for this. She has a hard time voiding when it prolapses. She feels burning on the skin. She can leak a few drops with cough or sneeze. Her urgency is intermittent. She had side effects with oxybutynin. It made her feel jittery so she stopped. She feels she can manage without medication. She uses a depends at night for protection. She feels she has itching and excoriation on the prolapsed skin. She denies internal itching. Chronic Conditions Addressed Today     1. Vaginal prolapse - Primary     Overview      She reports vaginal prolapse 14 years. She managed with a pessary for 9 years but could no longer manage with her arthritis. She feels a bulge and dryness. 10/30/2020: Urethra appears well supported. No obvious descent or prolapse in the lithotomy position. 12/10/20 exam:   Mild cystocele. Prolapse out the introitus at rest after standing. Vagina: Prolapsed vaginal walls (Out the introitus at rest with squamous changes. Reduces well) present. She will consider sacrocolpopexy. Current Assessment & Plan      She has vaginal vault prolapse. She cannot manage with a pessary at this point. We had discussed sacrocolpopexy, robotically. The patient was counseled on the risks, benefits and expected course of surgery. Surgery has risks of bleeding, infection, injury, pain, death or other consequences. Perioperative medications and antibiotic use were discussed including the potential for reactions and side effects. Some specific risks of surgery were discussed as well. Risks include worse incontinence, constipation. Diverticulitis risks infecting the mesh material.      She was present with her daughter. They will consider treatment when the daughter is next in town. Relevant Orders     AMB POC URINALYSIS DIP STICK AUTO W/O MICRO    2. Urge incontinence     Overview      She has incontinence, related to urge more than stress. She can have hesitancy and be unable to void. I suspect this is mainly due to dehydration. It has been about 18 months. She wears a Depends in case she cannot get to the toilet in time. It can be every other week when she has an accident. 12/10/2020: Ditropan XL started. Current Assessment & Plan      I had started her on Ditropan in December but she could not tolerate it due to \"jitters\". She can manage without medication. Relevant Orders     AMB POC URINALYSIS DIP STICK AUTO W/O MICRO    3. Hesitancy of micturition     Overview      12/10/21: She has some feelings of hesitancy but can empty. Relevant Orders     AMB POC URINALYSIS DIP STICK AUTO W/O MICRO    4. Stress incontinence     Overview      She has mild leak with strain without mobility. She was advised on kegels on 12/10/21. Current Assessment & Plan      Likely more ISD than hypermobility. She was advised on Kegel exercises in December. Relevant Orders     AMB POC URINALYSIS DIP STICK AUTO W/O MICRO    5. Intrinsic sphincter deficiency (ISD)     Overview      12/10/2020: Mild YEIMI without hypermobility. She is continued to be encouraged on kegel exercises.           Current Assessment & Plan ISD and stress incontinence. It is mild. She was advised Kegel's. Incontinence may worsen with correcting vaginal prolapse. She may need urethral bulking if not improving. 6. Acute kidney injury ([address]     Relevant Orders     AMB POC URINALYSIS DIP STICK AUTO W/O MICRO          ROS    Physical Exam  Vitals signs reviewed. Constitutional:       General: She is not in acute distress. Appearance: Normal appearance. She is obese. She is not ill-appearing, toxic-appearing or diaphoretic. HENT:      Head: Normocephalic and atraumatic. Nose: Nose normal.   Eyes:      Conjunctiva/sclera: Conjunctivae normal.      Pupils: Pupils are equal, round, and reactive to light. Neck:      Musculoskeletal: Normal range of motion. Pulmonary:      Effort: Pulmonary effort is normal. No respiratory distress. Breath sounds: Normal breath sounds. Neurological:      General: No focal deficit present. Mental Status: She is alert and oriented to person, place, and time. Gait: Gait abnormal (walks with a cane). Psychiatric:         Mood and Affect: Mood normal.                   ASSESSMENT and PLAN  Diagnoses and all orders for this visit:    1. Vaginal prolapse  Assessment & Plan:  She has vaginal vault prolapse. She cannot manage with a pessary at this point. We had discussed sacrocolpopexy, robotically. The patient was counseled on the risks, benefits and expected course of surgery. Surgery has risks of bleeding, infection, injury, pain, death or other consequences. Perioperative medications and antibiotic use were discussed including the potential for reactions and side effects. Some specific risks of surgery were discussed as well. Risks include worse incontinence, constipation. Diverticulitis risks infecting the mesh material.      She was present with her daughter. They will consider treatment when the daughter is next in town.      Orders:  -     AMB POC URINALYSIS DIP STICK AUTO W/O MICRO    2. Urge incontinence  Assessment & Plan:  I had started her on Ditropan in December but she could not tolerate it due to \"jitters\". She can manage without medication. Orders:  -     AMB POC URINALYSIS DIP STICK AUTO W/O MICRO    3. Hesitancy of micturition  -     AMB POC URINALYSIS DIP STICK AUTO W/O MICRO    4. Stress incontinence  Assessment & Plan:  Likely more ISD than hypermobility. She was advised on Kegel exercises in December. Orders:  -     AMB POC URINALYSIS DIP STICK AUTO W/O MICRO    5. Acute kidney injury ([facility] [address]  -     AMB POC URINALYSIS DIP STICK AUTO W/O MICRO    6. Intrinsic sphincter deficiency (ISD)  Assessment & Plan:  ISD and stress incontinence. It is mild. She was advised Kegel's. Incontinence may worsen with correcting vaginal prolapse. She may need urethral bulking if not improving. Other orders  -     miconazole (Monistat 7) 2 % vaginal cream; Insert 1 Applicator into vagina nightly.              Reinaldo Nick MD

## 2021-04-09 NOTE — ASSESSMENT & PLAN NOTE
ISD and stress incontinence. It is mild. She was advised Kegel's. Incontinence may worsen with correcting vaginal prolapse. She may need urethral bulking if not improving.

## 2021-04-09 NOTE — ASSESSMENT & PLAN NOTE
She has vaginal vault prolapse. She cannot manage with a pessary at this point. We had discussed sacrocolpopexy, robotically. The patient was counseled on the risks, benefits and expected course of surgery. Surgery has risks of bleeding, infection, injury, pain, death or other consequences. Perioperative medications and antibiotic use were discussed including the potential for reactions and side effects. Some specific risks of surgery were discussed as well. Risks include worse incontinence, constipation. Diverticulitis risks infecting the mesh material.      She was present with her daughter. They will consider treatment when the daughter is next in town.

## 2021-06-04 ENCOUNTER — HOSPITAL ENCOUNTER (EMERGENCY)
Age: 86
Discharge: HOME | End: 2021-06-05
Attending: EMERGENCY MEDICINE
Payer: MEDICARE

## 2021-06-04 DIAGNOSIS — E87.6 HYPOKALEMIA: Primary | ICD-10-CM

## 2021-06-04 DIAGNOSIS — R53.1 WEAKNESS: ICD-10-CM

## 2021-06-04 PROCEDURE — 99284 EMERGENCY DEPT VISIT MOD MDM: CPT

## 2021-06-05 ENCOUNTER — APPOINTMENT (OUTPATIENT)
Dept: GENERAL RADIOLOGY | Age: 86
End: 2021-06-05
Attending: EMERGENCY MEDICINE
Payer: MEDICARE

## 2021-06-05 VITALS
HEIGHT: 61 IN | DIASTOLIC BLOOD PRESSURE: 85 MMHG | SYSTOLIC BLOOD PRESSURE: 175 MMHG | RESPIRATION RATE: 18 BRPM | TEMPERATURE: 98.1 F | HEART RATE: 83 BPM | OXYGEN SATURATION: 98 % | WEIGHT: 120 LBS | BODY MASS INDEX: 22.66 KG/M2

## 2021-06-05 LAB
ALBUMIN SERPL-MCNC: 3.7 G/DL (ref 3.5–5)
ALBUMIN/GLOB SERPL: 1 {RATIO} (ref 1.1–2.2)
ALP SERPL-CCNC: 63 U/L (ref 45–117)
ALT SERPL-CCNC: 27 U/L (ref 12–78)
ANION GAP SERPL CALC-SCNC: 7 MMOL/L (ref 5–15)
APPEARANCE UR: CLEAR
AST SERPL W P-5'-P-CCNC: 25 U/L (ref 15–37)
BACTERIA URNS QL MICRO: NEGATIVE /HPF
BASOPHILS # BLD: 0 K/UL (ref 0–0.1)
BASOPHILS NFR BLD: 1 % (ref 0–1)
BILIRUB SERPL-MCNC: 0.4 MG/DL (ref 0.2–1)
BILIRUB UR QL: NEGATIVE
BNP SERPL-MCNC: 305 PG/ML
BUN SERPL-MCNC: 31 MG/DL (ref 6–20)
BUN/CREAT SERPL: 32 (ref 12–20)
CA-I BLD-MCNC: 10.2 MG/DL (ref 8.5–10.1)
CHLORIDE SERPL-SCNC: 103 MMOL/L (ref 97–108)
CO2 SERPL-SCNC: 28 MMOL/L (ref 21–32)
COLOR UR: NORMAL
CREAT SERPL-MCNC: 0.97 MG/DL (ref 0.55–1.02)
DIFFERENTIAL METHOD BLD: ABNORMAL
EOSINOPHIL # BLD: 0.1 K/UL (ref 0–0.4)
EOSINOPHIL NFR BLD: 2 % (ref 0–7)
ERYTHROCYTE [DISTWIDTH] IN BLOOD BY AUTOMATED COUNT: 13.5 % (ref 11.5–14.5)
GLOBULIN SER CALC-MCNC: 3.7 G/DL (ref 2–4)
GLUCOSE SERPL-MCNC: 97 MG/DL (ref 65–100)
GLUCOSE UR STRIP.AUTO-MCNC: NEGATIVE MG/DL
HCT VFR BLD AUTO: 34.1 % (ref 35–47)
HGB BLD-MCNC: 10.6 G/DL (ref 11.5–16)
HGB UR QL STRIP: NEGATIVE
HYALINE CASTS URNS QL MICRO: NORMAL /LPF (ref 0–5)
IMM GRANULOCYTES # BLD AUTO: 0 K/UL (ref 0–0.04)
IMM GRANULOCYTES NFR BLD AUTO: 0 % (ref 0–0.5)
KETONES UR QL STRIP.AUTO: NEGATIVE MG/DL
LEUKOCYTE ESTERASE UR QL STRIP.AUTO: NEGATIVE
LYMPHOCYTES # BLD: 0.6 K/UL (ref 0.8–3.5)
LYMPHOCYTES NFR BLD: 15 % (ref 12–49)
MAGNESIUM SERPL-MCNC: 2.3 MG/DL (ref 1.6–2.4)
MCH RBC QN AUTO: 27.7 PG (ref 26–34)
MCHC RBC AUTO-ENTMCNC: 31.1 G/DL (ref 30–36.5)
MCV RBC AUTO: 89.3 FL (ref 80–99)
MONOCYTES # BLD: 0.7 K/UL (ref 0–1)
MONOCYTES NFR BLD: 18 % (ref 5–13)
MUCOUS THREADS URNS QL MICRO: NEGATIVE /LPF
NEUTS SEG # BLD: 2.5 K/UL (ref 1.8–8)
NEUTS SEG NFR BLD: 64 % (ref 32–75)
NITRITE UR QL STRIP.AUTO: NEGATIVE
NRBC # BLD: 0 K/UL (ref 0–0.01)
NRBC BLD-RTO: 0 PER 100 WBC
PH UR STRIP: 5 [PH] (ref 5–8)
PLATELET # BLD AUTO: 349 K/UL (ref 150–400)
PMV BLD AUTO: 9.6 FL (ref 8.9–12.9)
POTASSIUM SERPL-SCNC: 3.3 MMOL/L (ref 3.5–5.1)
PROT SERPL-MCNC: 7.4 G/DL (ref 6.4–8.2)
PROT UR STRIP-MCNC: NEGATIVE MG/DL
RBC # BLD AUTO: 3.82 M/UL (ref 3.8–5.2)
RBC #/AREA URNS HPF: NORMAL /HPF (ref 0–5)
SODIUM SERPL-SCNC: 138 MMOL/L (ref 136–145)
SP GR UR REFRACTOMETRY: 1.02 (ref 1–1.03)
TROPONIN I SERPL-MCNC: <0.05 NG/ML
TSH SERPL DL<=0.05 MIU/L-ACNC: 0.91 UIU/ML (ref 0.36–3.74)
UA: UC IF INDICATED,UAUC: NORMAL
UROBILINOGEN UR QL STRIP.AUTO: 0.1 EU/DL (ref 0.1–1)
WBC # BLD AUTO: 3.8 K/UL (ref 3.6–11)
WBC URNS QL MICRO: NORMAL /HPF (ref 0–4)

## 2021-06-05 PROCEDURE — 36415 COLL VENOUS BLD VENIPUNCTURE: CPT

## 2021-06-05 PROCEDURE — 84484 ASSAY OF TROPONIN QUANT: CPT

## 2021-06-05 PROCEDURE — 80053 COMPREHEN METABOLIC PANEL: CPT

## 2021-06-05 PROCEDURE — 83880 ASSAY OF NATRIURETIC PEPTIDE: CPT

## 2021-06-05 PROCEDURE — 74011250637 HC RX REV CODE- 250/637: Performed by: EMERGENCY MEDICINE

## 2021-06-05 PROCEDURE — 71045 X-RAY EXAM CHEST 1 VIEW: CPT

## 2021-06-05 PROCEDURE — 83735 ASSAY OF MAGNESIUM: CPT

## 2021-06-05 PROCEDURE — 85025 COMPLETE CBC W/AUTO DIFF WBC: CPT

## 2021-06-05 PROCEDURE — 84443 ASSAY THYROID STIM HORMONE: CPT

## 2021-06-05 PROCEDURE — 81001 URINALYSIS AUTO W/SCOPE: CPT

## 2021-06-05 RX ORDER — POTASSIUM CHLORIDE 750 MG/1
30 TABLET, FILM COATED, EXTENDED RELEASE ORAL
Status: COMPLETED | OUTPATIENT
Start: 2021-06-05 | End: 2021-06-05

## 2021-06-05 RX ADMIN — POTASSIUM CHLORIDE 30 MEQ: 750 TABLET, EXTENDED RELEASE ORAL at 02:25

## 2021-06-05 NOTE — DISCHARGE INSTRUCTIONS
Your potassium was mildly low today (it was 3.3, normal is > 3.5), we gave her potassium pill in the ED to correct it. Make sure you are supervised by family at home 24/7. See PCP on Monday or Tuesday for followup. Return to ED for chest pain, shortness of breath, high fevers, difficulty eating, falls, any other severe concerning signs.

## 2021-06-05 NOTE — ED NOTES
Delay in pt discharge due to pt not having keys to get back into house and daughter is in [place] attempting to set up transportation for pt. Transportation should arrive between [phone].  Paige Singh, pt daughter can be contacted at [phone]

## 2021-06-05 NOTE — ED PROVIDER NOTES
HPI   Chief Complaint   Patient presents with    Extremity Weakness    Peripheral Edema   82-year-old female with history of breast cancer, depression, GERD, hypertension, urethral prolapse presents with extremity weakness and peripheral edema. Patient reports over the past 2 days she has had new onset bilateral lower leg edema, worsening gradual onset severe constant whole body weakness. This morning when she woke up she was able to walk a little bit with her cane but this afternoon she was not able to walk at all due to her weakness. Patient denies any fevers, poor appetite, chest pain, shortness of breath, dysuria.     Past Medical History:   Diagnosis Date    Anxiety 6/16/2020    Arthritis     [facility] ([facility] [address]     Breast cancer ([facility]     Depression 6/16/2020    Diverticulitis 6/16/2020    GERD (gastroesophageal reflux disease) 6/16/2020    High cholesterol 6/16/2020    Hx of dizziness 6/16/2020    Hypertension 6/16/2020    Primary cancer of lower-inner quadrant of left female breast ([facility] [address] 12/18/2018    breast     Thyroid disease 6/16/2020       Past Surgical History:   Procedure Laterality Date    HX BREAST LUMPECTOMY Left 01/23/2019    with SLNB    HX HEENT  06/25/2018    Cataract    HX HYSTERECTOMY  1976    HX ORTHOPAEDIC  2014    Left knee    HX ORTHOPAEDIC  08/09/2017    Right knee    HX ORTHOPAEDIC  2013    Right Shoulder    HX UROLOGICAL  12/10/2020    cystoscopy          Family History:   Problem Relation Age of Onset    Cancer Mother 80        Esophagas    Heart Disease Father     Prostate Cancer Brother     Breast Cancer Maternal Grandmother 71    Breast Cancer Maternal Cousin 58       Social History     Socioeconomic History    Marital status:      Spouse name: Not on file    Number of children: Not on file    Years of education: Not on file    Highest education level: Not on file   Occupational History    Not on file   Tobacco Use    Smoking status: Never Smoker    Smokeless tobacco: Never Used   Vaping Use    Vaping Use: Never used   Substance and Sexual Activity    Alcohol use: Not Currently    Drug use: Never    Sexual activity: Not Currently     Partners: Male   Other Topics Concern     Service Not Asked    Blood Transfusions Not Asked    Caffeine Concern Not Asked    Occupational Exposure Not Asked    Hobby Hazards Not Asked    Sleep Concern Not Asked    Stress Concern Not Asked    Weight Concern Not Asked    Special Diet Not Asked    Back Care Not Asked    Exercise Not Asked    Bike Helmet Not Asked   [address] Not Asked    Self-Exams Not Asked   Social History Narrative    Lives at home who has dementia and she is the caregiver. Social Determinants of Health     Financial Resource Strain:     Difficulty of Paying Living Expenses:    Food Insecurity:     Worried About Running Out of Food in the Last Year:     [address] in the Last Year:    Transportation Needs:     Lack of Transportation (Medical):  Lack of Transportation (Non-Medical):    Physical Activity:     Days of Exercise per Week:     Minutes of Exercise per Session:    Stress:     Feeling of Stress :    Social Connections:     Frequency of Communication with Friends and Family:     Frequency of Social Gatherings with Friends and Family:     Attends Yarsani Services:     Active Member of Clubs or Organizations:     Attends Club or Organization Meetings:     Marital Status:    Intimate Partner Violence:     Fear of Current or Ex-Partner:     Emotionally Abused:     Physically Abused:     Sexually Abused: ALLERGIES: Iodinated contrast media    Review of Systems   Cardiovascular: Positive for leg swelling. Neurological: Positive for weakness. All other systems reviewed and are negative.       Vitals:    06/04/21 2358 06/05/21 0005 06/05/21 0303 06/05/21 0732   BP: (!) 171/74  133/61 (!) 175/85   Pulse: 89  77 83   Resp: 18   18 Temp: 97.7 °F (36.5 °C)   98.1 °F (36.7 °C)   SpO2: 100% 97% 98% 98%   Weight: 54.4 kg (120 lb)      Height: 5' 1\" (1.549 m)               Physical Exam   Patient Vitals for the past 8 hrs:   Temp Pulse Resp BP SpO2   06/05/21 0732 98.1 °F (36.7 °C) 83 18 (!) 175/85 98 %   06/05/21 0303  77  133/61 98 %   06/05/21 0005     97 %   06/04/21 2358 97.7 °F (36.5 °C) 89 18 (!) 171/74 100 %        Nursing note and vitals reviewed. Constitutional: NAD. Head: Normocephalic and atraumatic. Mouth/Throat: Airway patent. Moist mucous membranes. Eyes: EOMI. No scleral icterus. Neck: Neck supple. Cardiovascular: Normal rate, regular rhythm. Systolic murmur. Good pulses throughout. Pulmonary/Chest: No respiratory distress. Clear to auscultation bilaterally. No wheezes, rales, or rhonchi. Abdominal/GI: BS normal, Soft, non-tender, non-distended. No rebound or guarding. Musculoskeletal: No gross injuries or deformities. BLE 3+ pitting edema up to the knees. B/l pedal pulse 2+. Neurological: Alert and oriented to person, place, and time. Cranial Nerves 2-12 intact. BUE 4.5/5 strength. BLE 4/5 strength. No pronator drift. Psych: Pleasant, cooperative. Skin: Skin is warm and dry. No rash noted. MDM   Ddx = heart failure, ACS, hypoalbuminemia, renal dysfunction, acute anemia, electrolyte derangement, urinary tract infection, pneumonia, severe hypothyroidism with pretibial edema. Gradual onset weakness and no focal deficit so I don't think this is a stroke that can benefit from TPA or thrombectomy. Work-up reveals very mild hypokalemia, this is repleted. No signs of urinary tract infection, thyroid level abnormality, heart failure, ACS, renal dysfunction, acute anemia. I am concerned by patient's inability to ambulate. I have asked Dr. Casarez [facility] hospitalist to evaluate the patient. He saw the patient but says there are not diagnoses to warrant admission or observation. Patient says her nieces live nearby.  I have asked her to be monitored closely by her nieces, return to ED for any worsening or concerning new symptoms. Discharged in stable condition. EKG preliminary interpretation by me 0:42 showing normal sinus rhythm, rate 86, normal EKG, no STEMI. Labs Reviewed   CBC WITH AUTOMATED DIFF - Abnormal; Notable for the following components:       Result Value    HGB 10.6 (*)     HCT 34.1 (*)     MONOCYTES 18 (*)     ABS. LYMPHOCYTES 0.6 (*)     All other components within normal limits   METABOLIC PANEL, COMPREHENSIVE - Abnormal; Notable for the following components:    Potassium 3.3 (*)     BUN 31 (*)     BUN/Creatinine ratio 32 (*)     GFR est non-AA 55 (*)     Calcium 10.2 (*)     A-G Ratio 1.0 (*)     All other components within normal limits   MAGNESIUM   TROPONIN I   BNP   URINALYSIS W/ REFLEX CULTURE   TSH 3RD GENERATION     XR CHEST SNGL V   Final Result      No airspace disease in the lungs. Follow-up as clinically indicated. Medications   potassium chloride SR (KLOR-CON 10) tablet 30 mEq (30 mEq Oral Given 6/5/21 0225)     ISneha MD, am  the first and primary ED provider for this patient.           Procedures

## 2021-06-17 ENCOUNTER — TRANSCRIBE ORDER (OUTPATIENT)
Dept: SCHEDULING | Age: 86
End: 2021-06-17

## 2021-06-17 DIAGNOSIS — R20.0 NUMBNESS OF RIGHT HAND: Primary | ICD-10-CM

## 2021-08-03 PROBLEM — N17.9 ACUTE KIDNEY INJURY (HCC): Status: RESOLVED | Noted: 2021-02-20 | Resolved: 2021-08-03

## 2021-10-27 ENCOUNTER — TRANSCRIBE ORDER (OUTPATIENT)
Dept: SCHEDULING | Age: 86
End: 2021-10-27

## 2021-10-27 ENCOUNTER — HOSPITAL ENCOUNTER (OUTPATIENT)
Dept: NON INVASIVE DIAGNOSTICS | Age: 86
Discharge: HOME | End: 2021-10-27
Attending: PHYSICIAN ASSISTANT
Payer: MEDICARE

## 2021-10-27 DIAGNOSIS — R60.0 EDEMA OF LOWER EXTREMITY: ICD-10-CM

## 2021-10-27 DIAGNOSIS — R60.0 EDEMA OF LOWER EXTREMITY: Primary | ICD-10-CM

## 2021-10-27 PROCEDURE — 93971 EXTREMITY STUDY: CPT

## 2021-11-05 ENCOUNTER — OFFICE VISIT (OUTPATIENT)
Dept: UROLOGY | Age: 86
End: 2021-11-05
Payer: MEDICARE

## 2021-11-05 VITALS
BODY MASS INDEX: 22.84 KG/M2 | DIASTOLIC BLOOD PRESSURE: 67 MMHG | TEMPERATURE: 97.1 F | HEART RATE: 57 BPM | SYSTOLIC BLOOD PRESSURE: 118 MMHG | OXYGEN SATURATION: 100 % | WEIGHT: 121 LBS | HEIGHT: 61 IN

## 2021-11-05 DIAGNOSIS — N39.41 URGE INCONTINENCE: ICD-10-CM

## 2021-11-05 DIAGNOSIS — N81.10 VAGINAL PROLAPSE: Primary | ICD-10-CM

## 2021-11-05 DIAGNOSIS — N39.3 STRESS INCONTINENCE: ICD-10-CM

## 2021-11-05 DIAGNOSIS — N36.42 INTRINSIC SPHINCTER DEFICIENCY (ISD): ICD-10-CM

## 2021-11-05 DIAGNOSIS — N36.8 URETHRAL PROLAPSE: ICD-10-CM

## 2021-11-05 PROCEDURE — G8536 NO DOC ELDER MAL SCRN: HCPCS | Performed by: UROLOGY

## 2021-11-05 PROCEDURE — 99214 OFFICE O/P EST MOD 30 MIN: CPT | Performed by: UROLOGY

## 2021-11-05 PROCEDURE — G9717 DOC PT DX DEP/BP F/U NT REQ: HCPCS | Performed by: UROLOGY

## 2021-11-05 PROCEDURE — G8400 PT W/DXA NO RESULTS DOC: HCPCS | Performed by: UROLOGY

## 2021-11-05 PROCEDURE — G8754 DIAS BP LESS 90: HCPCS | Performed by: UROLOGY

## 2021-11-05 PROCEDURE — G8752 SYS BP LESS 140: HCPCS | Performed by: UROLOGY

## 2021-11-05 PROCEDURE — G8420 CALC BMI NORM PARAMETERS: HCPCS | Performed by: UROLOGY

## 2021-11-05 PROCEDURE — G8427 DOCREV CUR MEDS BY ELIG CLIN: HCPCS | Performed by: UROLOGY

## 2021-11-05 RX ORDER — LETROZOLE 2.5 MG/1
2.5 TABLET, FILM COATED ORAL DAILY
COMMUNITY

## 2021-11-05 NOTE — LETTER
11/5/2021 Patient: Db Major YOB: 1935 Date of Visit: 11/5/2021 Anusha Frazier MD 
[address] [place] [id number] [address] Fax: [phone] Dear Anusha Frazier MD, Thank you for referring Ms. Fernando Berg to [place] [address] for evaluation. My notes for this consultation are attached. If you have questions, please do not hesitate to call me. I look forward to following your patient along with you.  
 
 
Sincerely, 
 
Shaun Morales MD

## 2021-11-05 NOTE — PROGRESS NOTES
Chief Complaint   Patient presents with    Follow-up    Urinary Incontinence    Stress Incontinence     1. Have you been to the ER, urgent care clinic since your last visit? Hospitalized since your last visit? No    2. Have you seen or consulted any other health care providers outside of the [address] [place] [address] since your last visit? Include any pap smears or colon screening.  No  Visit Vitals  /67 (BP 1 Location: Right upper arm, BP Patient Position: Sitting, BP Cuff Size: Adult)   Pulse (!) 57   Temp 97.1 °F (36.2 °C) (Temporal)   Ht 5' 1\" (1.549 m)   Wt 121 lb (54.9 kg)   SpO2 100%   BMI 22.86 kg/m²

## 2021-11-05 NOTE — PATIENT INSTRUCTIONS
Kegel Exercise For Women   What are kegel exercises? Kegel exercises help to stregthen the pelvic muscles. Kegel exercise may help to bring back or improve bladder control in people with urinary incontence. These exercises are done by elana (tightening) and relaxing the pelvic muscels. Kegel exercises are also called pelvic floor muscle training or pelvic floor exercise. They must be done correctly and regularly in order to help streghten the pelvic muscles. What are pelvic muscles? Pelvic muscles are attatched to the area between your pelvic (hip) bones. These muscles act like a strong floor that helps to hold your pelvic organs in place. Examples of pelvic organs are the bladder (holds urine) vagina, uterus (womb), rectum (holds bowel movements). Certain conditions may cause the pelvic muscles to weaken. Some of these conditions include being overweight, aging or pregnancy and childbirth. When your pelvic muscles become weak, you may have urinary incontinence or other problems. Which are the correct muscles to use for kegel exercises? Some people use the wrong muscles when doing kegel exercises. Instead of using the pelvic muscles, they use their back, abdominal or upper leg muscles. If you use the wrong muscles, the kegel exercises will not help you. To make sure you are using the right muscles, try the following:    Sit on the toilet. When passing urine, tighten your muscles to stop the flow of urine. Do this serveral times until you know what it feels like to tighten the correct muscles. Once you have found the right muscles to use, only do kegel exercises when you are not urinating.  Lie down and put one finger in your vagina. Tighten your vaginal muscles as if trying to stop urine and a bowel movement from coming out. You should feel the correct muscle tightening around your finger. How are kegel exercises done? Kegel exercises can be done anytime and anywhere.  You can do them in the morning, noon or night. The exercisises can be done when sitting, standing, lying on your back or taking a bath. Always urinate (empty the bladder) before starting. Do these exercises each day or as directed by your provider.  Slow Contractions:  Contract the muscles around your vagina (birth canal) and anus (rear end). This should feel like you are trying to hold back urine or gas    Hold these muscles for a count of 10    Slowly release these muscles and relax for a count of 10. Repeat the cycle again.  Do a set of 10 contractions at least three times everyday, or as often as your provider suggests    Quick Contractions: Do 5 to 10 quick, strong contractions after you are finished doing slow contractions. These exercisises may help you prevent accidents by quickly stopping urine leaks. Remember: Keep your abdominal (stomach), back and leg muscles relaxed during kegel excersises. You should feel only the muscles between your legs (pelvic muscles) elana. Try not to hold your breath while doing these exercisises. What can I do if my muscles are to weak to hold contractions? At the beginning, many people cannot contract their muscles for a count of 10. Start kegel exercisises by squeezing and relaxing pelvic muscles for 4 to 5 seconds each. You can increase your count as your muscle tone improves. How can I remember to do my kegel exercisises regularly? Do your exercisises at the same time evryday. For example, do kegel exercisises when you wakeup in the morning, right before lunch and bedtime. Keep a kegel exercise chart or diary. Write down or check off how many times each day you do kegel exercisises. Write down how many exercisises you do each time. What else should I know about kegel exercisises?  It may take 3 to 6 months after starting kegel exercisises to see a difference in bladder control. You may begin to notice improved bladder control after 6 to 8 weeks.    Do not stop doing kegel exercisises until you have talked to your provider. Kegel exercise may be useful for the rest of your life.     Tighten your pelvic muscles before sneezing, coughing or lifting to prevent urine leakage   

## 2021-11-05 NOTE — ASSESSMENT & PLAN NOTE
She has vaginal prolapse. She does not feel she can manage with a pessary. She is concerned about surgical management. She does take Eliquis and aspirin. She had a recent DVT. We previously discussed sacrocolpopexy. I think she is not a surgical candidate for 6 months. I recommend Reinaldo Petersen routinely. Information given. We can reevaluate her bladder and voiding pattern with cysto/ CMG in 6m.

## 2021-11-05 NOTE — PROGRESS NOTES
HISTORY OF PRESENT ILLNESS  Beth Hester is a 82 y.o. female. Chief Complaint   Patient presents with    Follow-up    Urinary Incontinence    Stress Incontinence     She has more urge versus stress incontinence. She also has a mild cystocele with prolapse out of the introitus at rest after standing. She has prolapsed vaginal walls which reduced well. She had been considering a sacrocolopopexy and is here today to discuss options. She had been unable to manage with a pessary previously, secondary to her arthritis. She had a DVT last week. She is on blood thinners. Chronic Conditions Addressed Today     1. Vaginal prolapse - Primary     Overview      She reports vaginal prolapse 14 years. She managed with a pessary for 9 years but could no longer manage with her arthritis. She feels a bulge and dryness. 10/30/2020: Urethra appears well supported. No obvious descent or prolapse in the lithotomy position. 12/10/20 exam:   Mild cystocele. Prolapse out the introitus at rest after standing. Vagina: Prolapsed vaginal walls (Out the introitus at rest with squamous changes. Reduces well) present. She will consider sacrocolpopexy. 4/9/21: Vaginal vault prolapse, unable to manage with a pessary at this point. She has been counseled on sacrocolpopexy and has been considering treatment. Current Assessment & Plan       She has vaginal prolapse. She does not feel she can manage with a pessary. She is concerned about surgical management. She does take Eliquis and aspirin. She had a recent DVT. We previously discussed sacrocolpopexy. I think she is not a surgical candidate for 6 months. I recommend Quilla [place] routinely. Information given. We can reevaluate her bladder and voiding pattern with cysto/ CMG in 6m. 2. Urge incontinence     Overview      She has incontinence, related to urge more than stress. She can have hesitancy and be unable to void.   I suspect this is mainly due to dehydration. It has been about 18 months. She wears a Depends in case she cannot get to the toilet in time. It can be every other week when she has an accident. 12/10/2020: Ditropan XL started. 4/9/21: She could not tolerate the Ditropan secondary to \"jitters. \"  She was managing without medication. 3. Stress incontinence     Overview      She has mild leak with strain without mobility. She was advised on kegels on 12/10/21.    4/9/21: More likely ISD than hypermobility. 4. Intrinsic sphincter deficiency (ISD)     Overview      12/10/2020: Mild YEIMI without hypermobility. She is continued to be encouraged on kegel exercises. 4/9/21: ISD and stress incontinence. Mild. She has continued to be advised on Kegel exercises. Incontinence may worsen with correcting vaginal prolapse. She may need urethral bulking if not improved. 5. Urethral prolapse     Overview      12/10/2020: Urethra: Prolapse (some prolapse and flat caruncle) present. No urethral pain, urethral swelling or urethral lesion. Patient denies the symptoms of COVID-19 per routine screening guidelines. ROS    Past Medical History:  PMHx (including negatives):  has a past medical history of Anxiety (6/16/2020), Arthritis, Breast cancer ([address] Breast cancer ([address] Depression (6/16/2020), Diverticulitis (6/16/2020), GERD (gastroesophageal reflux disease) (6/16/2020), High cholesterol (6/16/2020), dizziness (6/16/2020), Hypertension (6/16/2020), Primary cancer of lower-inner quadrant of left female breast ([address] (12/18/2018), and Thyroid disease (6/16/2020). PSurgHx:  has a past surgical history that includes hx hysterectomy (1976); hx breast lumpectomy (Left, 01/23/2019); hx orthopaedic (2014); hx orthopaedic (08/09/2017); hx orthopaedic (2013); hx heent (06/25/2018); and hx urological (12/10/2020). PSocHx:  reports that she has never smoked.  She has never used smokeless tobacco. She reports previous alcohol use. She reports that she does not use drugs. Physical Exam    ASSESSMENT and PLAN  Diagnoses and all orders for this visit:    1. Vaginal prolapse  Assessment & Plan:   She has vaginal prolapse. She does not feel she can manage with a pessary. She is concerned about surgical management. She does take Eliquis and aspirin. She had a recent DVT. We previously discussed sacrocolpopexy. I think she is not a surgical candidate for 6 months. I recommend Simone Corral routinely. Information given. We can reevaluate her bladder and voiding pattern with cysto/ CMG in 6m. 2. Urethral prolapse    3. Intrinsic sphincter deficiency (ISD)    4. Urge incontinence    5. Stress incontinence         Follow-up and Dispositions    · Return in about 6 months (around 5/5/2022) for cystoscopy/ CMG.          Marcela Gaytan MD

## 2021-12-09 ENCOUNTER — TRANSCRIBE ORDER (OUTPATIENT)
Dept: SCHEDULING | Age: 86
End: 2021-12-09

## 2021-12-09 DIAGNOSIS — M54.16 LUMBAR RADICULOPATHY: Primary | ICD-10-CM

## 2021-12-16 ENCOUNTER — TRANSCRIBE ORDER (OUTPATIENT)
Dept: SCHEDULING | Age: 86
End: 2021-12-16

## 2021-12-16 DIAGNOSIS — I82.402 DEEP VEIN THROMBOSIS (DVT) OF LEFT LOWER EXTREMITY, UNSPECIFIED CHRONICITY, UNSPECIFIED VEIN (HCC): Primary | ICD-10-CM

## 2021-12-23 ENCOUNTER — TRANSCRIBE ORDER (OUTPATIENT)
Dept: SCHEDULING | Age: 86
End: 2021-12-23

## 2021-12-23 ENCOUNTER — HOSPITAL ENCOUNTER (OUTPATIENT)
Dept: NON INVASIVE DIAGNOSTICS | Age: 86
Discharge: HOME | End: 2021-12-23
Attending: INTERNAL MEDICINE
Payer: MEDICARE

## 2021-12-23 DIAGNOSIS — C50.312 MALIGNANT NEOPLASM OF LOWER-INNER QUADRANT OF LEFT FEMALE BREAST (HCC): Primary | ICD-10-CM

## 2021-12-23 DIAGNOSIS — I82.402 DEEP VEIN THROMBOSIS (DVT) OF LEFT LOWER EXTREMITY, UNSPECIFIED CHRONICITY, UNSPECIFIED VEIN (HCC): ICD-10-CM

## 2021-12-23 PROCEDURE — 93971 EXTREMITY STUDY: CPT

## 2021-12-28 ENCOUNTER — HOSPITAL ENCOUNTER (OUTPATIENT)
Dept: MRI IMAGING | Age: 86
Discharge: HOME | End: 2021-12-28
Attending: STUDENT IN AN ORGANIZED HEALTH CARE EDUCATION/TRAINING PROGRAM
Payer: MEDICARE

## 2021-12-28 DIAGNOSIS — M54.16 LUMBAR RADICULOPATHY: ICD-10-CM

## 2021-12-28 PROCEDURE — 72148 MRI LUMBAR SPINE W/O DYE: CPT

## 2022-01-27 ENCOUNTER — HOSPITAL ENCOUNTER (EMERGENCY)
Age: 87
Discharge: HOME | End: 2022-01-27
Attending: EMERGENCY MEDICINE
Payer: MEDICARE

## 2022-01-27 ENCOUNTER — APPOINTMENT (OUTPATIENT)
Dept: CT IMAGING | Age: 87
End: 2022-01-27
Attending: EMERGENCY MEDICINE
Payer: MEDICARE

## 2022-01-27 ENCOUNTER — APPOINTMENT (OUTPATIENT)
Dept: GENERAL RADIOLOGY | Age: 87
End: 2022-01-27
Attending: EMERGENCY MEDICINE
Payer: MEDICARE

## 2022-01-27 VITALS
SYSTOLIC BLOOD PRESSURE: 184 MMHG | HEART RATE: 90 BPM | OXYGEN SATURATION: 100 % | WEIGHT: 121 LBS | BODY MASS INDEX: 22.84 KG/M2 | DIASTOLIC BLOOD PRESSURE: 95 MMHG | RESPIRATION RATE: 18 BRPM | HEIGHT: 61 IN
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
## 2022-01-27 DIAGNOSIS — S00.83XA TRAUMATIC HEMATOMA OF FOREHEAD, INITIAL ENCOUNTER: ICD-10-CM

## 2022-01-27 PROCEDURE — 72125 CT NECK SPINE W/O DYE: CPT

## 2022-01-27 PROCEDURE — 73060 X-RAY EXAM OF HUMERUS: CPT

## 2022-01-27 PROCEDURE — 73562 X-RAY EXAM OF KNEE 3: CPT

## 2022-01-27 PROCEDURE — 70450 CT HEAD/BRAIN W/O DYE: CPT

## 2022-01-27 PROCEDURE — 99283 EMERGENCY DEPT VISIT LOW MDM: CPT

## 2022-01-27 PROCEDURE — 71101 X-RAY EXAM UNILAT RIBS/CHEST: CPT

## 2022-01-27 RX ORDER — LIDOCAINE 4 G/100G
PATCH TOPICAL
Qty: 10 EACH | Refills: 0 | Status: SHIPPED | OUTPATIENT
Start: 2022-01-27 | End: 2022-07-11 | Stop reason: ALTCHOICE

## 2022-01-27 RX ORDER — CARBIDOPA AND LEVODOPA 25; 100 MG/1; MG/1
1 TABLET ORAL 3 TIMES DAILY
COMMUNITY
End: 2022-10-08

## 2022-01-27 NOTE — ED TRIAGE NOTES
Pt fell, c/o headache, R rib pain, L arm pain, R knee pain. On blood thinners. Hematoma to forehead. No LOC per patient.

## 2022-01-27 NOTE — DISCHARGE INSTRUCTIONS
Thank you! Thank you for allowing me to care for you in the emergency department. I sincerely hope that you are satisfied with your visit today. It is my goal to provide you with excellent care. Below you will find a list of your labs and imaging from your visit today. Should you have any questions regarding these results please do not hesitate to call the emergency department. Labs -   No results found for this or any previous visit (from the past 12 hour(s)). Radiologic Studies -   XR KNEE LT 3 V   Final Result   Except for likely trace suprapatellar joint effusion on the right,   no acute osseous abnormality in bilateral knees. XR KNEE RT 3 V   Final Result   Except for likely trace suprapatellar joint effusion on the right,   no acute osseous abnormality in bilateral knees. XR RIBS RT W PA CXR MIN 3 V   Final Result   Suspected subtle deformity in the lateral right sixth rib. No   displaced fracture. Correlate with physical exam.          XR HUMERUS LT   Final Result   No acute osseous abnormality identified. CT SPINE CERV WO CONT   Final Result   No fractures. No acute bony abnormality. Multilevel chronic degenerative disc   disease. CT HEAD WO CONT   Final Result   1. 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 No acute intracranial   abnormality. 2. Small left frontal scalp hematoma. No fractures. CT Results  (Last 48 hours)                 01/27/22 1308  CT SPINE CERV WO CONT Final result    Impression:  No fractures. No acute bony abnormality. Multilevel chronic degenerative disc   disease. Narrative:      Technique: 1.25 mm axial images of the entire cervical spine were obtained. Sagittal and coronal reformations performed. Comparisons: None. Findings:       Alignment of the cervical spine is normal. The craniocervical junction is   normal. Vertebral body height is well maintained.  The prevertebral soft tissues normal in depth and contour. There is no evidence for acute bony abnormality. No   fractures evident. There is severe erosion of the right lateral mass of C1. There is chronic degenerative joint disease at C3/C4, C4/C5, C5/C6 and C6/C7.           01/27/22 1305  CT HEAD WO CONT Final result    Impression:  1. 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 No acute intracranial   abnormality. 2. Small left frontal scalp hematoma. No fractures. Narrative:      Technique:  5 mm axial images were obtained through the entire calvarium. Sagittal and coronal reformations were performed. Comparisons: None. No acute intracranial abnormality is evident. There is no evidence for acute   hemorrhage, acute infarction, tumor, or mass-effect. There is low attenuation in   the deep white matter, consistent with mild ischemic microvascular disease. A small left frontal scalp hematoma is present. No fractures are present. The   paranasal sinuses and mastoid air cells are clear. CXR Results  (Last 48 hours)      None               If you feel that you have not received excellent quality care or timely care, please ask to speak to the nurse manager. Please choose us in the future for your continued health care needs. ------------------------------------------------------------------------------------------------------------  The exam and treatment you received in the Emergency Department were for an urgent problem and are not intended as complete care. It is important that you follow-up with a doctor, nurse practitioner, or physician assistant to:  (1) confirm your diagnosis,  (2) re-evaluation of changes in your illness and treatment, and  (3) for ongoing care. If your symptoms become worse or you do not improve as expected and you are unable to reach your usual health care provider, you should return to the Emergency Department. We are available 24 hours a day. Please take your discharge instructions with you when you go to your follow-up appointment. If you have any problem arranging a follow-up appointment, contact the Emergency Department immediately. If a prescription has been provided, please have it filled as soon as possible to prevent a delay in treatment. Read the entire medication instruction sheet provided to you by the pharmacy. If you have any questions or reservations about taking the medication due to side effects or interactions with other medications, please call your primary care physician or contact the ER to speak with the charge nurse. Make an appointment with your family doctor or the physician you were referred to for follow-up of this visit as instructed on your discharge paperwork, as this is a mandatory follow-up. Return to the ER if you are unable to be seen or if you are unable to be seen in a timely manner. If you have any problem arranging the follow-up visit, contact the Emergency Department immediately.

## 2022-01-27 NOTE — ED PROVIDER NOTES
EMERGENCY DEPARTMENT HISTORY AND PHYSICAL EXAM      Date: 1/27/2022  Patient Name: Sindy Beasley    History of Presenting Illness     Chief Complaint   Patient presents with   [facility] Fall    Arm Pain    Knee Pain       History Provided By: Patient and EMS    HPI: Sindy Beasley, 82 y.o. female with a past medical history significant hypertension, hyperlipidemia and deep vein thrombosis presents to the ED with cc of mechanical fall from standing with subsequent headache, left arm pain, bilateral knee pain, right chest wall pain. Patient is currently taking Eliquis for DVT. She reports no loss of consciousness, nausea, vomiting, vision changes, weakness, paresthesias. Patient states that she tripped over the leg of a chair subsequently causing her to fall. She did not take anything for pain prior to arrival to the ED. There are no other complaints, changes, or physical findings at this time. PCP: Tres Peck MD    No current facility-administered medications on file prior to encounter. Current Outpatient Medications on File Prior to Encounter   Medication Sig Dispense Refill    carbidopa-levodopa (SINEMET)  mg per tablet Take 1 Tablet by mouth three (3) times daily.  apixaban (ELIQUIS) 2.5 mg tablet Take 2.5 mg by mouth two (2) times a day.  letrozole (FEMARA) 2.5 mg tablet Take 2.5 mg by mouth daily.  multivit-min/iron/folic/lutein (CENTRUM SILVER WOMEN PO) Take  by mouth.  elderberry fruit (ELDERBERRY PO) Take  by mouth.  ZINC PICOLINATE PO Take  by mouth.  alendronate (FOSAMAX) 70 mg tablet Take 70 mg by mouth every seven (7) days.  clonazePAM (KlonoPIN) 0.5 mg tablet Take  by mouth two (2) times daily as needed for Anxiety.  losartan (COZAAR) 25 mg tablet Take  by mouth two (2) times a day.  metoprolol succinate (TOPROL-XL) 100 mg tablet Take  by mouth daily.  levothyroxine (synthroid) 50 mcg tablet Take  by mouth Daily (before breakfast).  ascorbic acid, vitamin C, (Vitamin C) 250 mg tablet Take  by mouth.  miconazole (Monistat 7) 2 % vaginal cream Insert 1 Applicator into vagina nightly. 45 g 0    aspirin delayed-release 81 mg tablet Take  by mouth daily. (Patient not taking: Reported on 1/27/2022)         Past History     Past Medical History:  Past Medical History:   Diagnosis Date    Anxiety 6/16/2020    Arthritis     Breast cancer ([address]     [facility]     Depression 6/16/2020    Diverticulitis 6/16/2020    GERD (gastroesophageal reflux disease) 6/16/2020    High cholesterol 6/16/2020    Hx of dizziness 6/16/2020    Hypertension 6/16/2020    Primary cancer of lower-inner quadrant of left female breast ([address] 12/18/2018    breast     Thyroid disease 6/16/2020       Past Surgical History:  Past Surgical History:   Procedure Laterality Date    HX BREAST LUMPECTOMY Left 01/23/2019    with SLNB    HX HEENT  06/25/2018    Cataract    HX HYSTERECTOMY  1976    HX ORTHOPAEDIC  2014    Left knee    HX ORTHOPAEDIC  08/09/2017    Right knee    HX ORTHOPAEDIC  2013    Right Shoulder    HX UROLOGICAL  12/10/2020    cystoscopy        Family History:  Family History   Problem Relation Age of Onset    Cancer Mother 80        Esophagas    Heart Disease Father     Prostate Cancer Brother     Breast Cancer Maternal Grandmother 71    Breast Cancer Maternal Cousin 58       Social History:  Social History     Tobacco Use    Smoking status: Never Smoker    Smokeless tobacco: Never Used   Vaping Use    Vaping Use: Never used   Substance Use Topics    Alcohol use: Not Currently    Drug use: Never       Allergies: Allergies   Allergen Reactions    Iodinated Contrast Media Shortness of Breath         Review of Systems     Review of Systems   Constitutional: Negative for chills and fever. HENT: Negative for congestion and sore throat. Eyes: Negative for pain and visual disturbance.    Respiratory: Negative for cough and shortness of breath. Cardiovascular: Negative for chest pain and palpitations. Gastrointestinal: Negative for constipation, diarrhea, nausea and vomiting. Genitourinary: Negative for dysuria and frequency. Musculoskeletal: Positive for arthralgias and myalgias. Skin: Positive for wound. Negative for color change and rash. Neurological: Positive for headaches. Negative for dizziness, weakness and light-headedness. Psychiatric/Behavioral: Negative for dysphoric mood and sleep disturbance. Physical Exam     Physical Exam  Constitutional:       Appearance: Normal appearance. HENT:      Head:        Comments: Left frontal soft tissue hematoma     Right Ear: External ear normal.      Left Ear: External ear normal.      Nose: Nose normal.      Mouth/Throat:      Mouth: Mucous membranes are moist.   Eyes:      Extraocular Movements: Extraocular movements intact. Conjunctiva/sclera: Conjunctivae normal.   Cardiovascular:      Rate and Rhythm: Normal rate and regular rhythm. Pulses: Normal pulses. Heart sounds: Normal heart sounds. Pulmonary:      Effort: Pulmonary effort is normal.      Breath sounds: Normal breath sounds. Abdominal:      General: Abdomen is flat. There is no distension. Palpations: Abdomen is soft. Tenderness: There is no abdominal tenderness. Musculoskeletal:         General: Normal range of motion. Left upper arm: Tenderness present. Cervical back: Normal range of motion. Right knee: Tenderness present. Left knee: Tenderness present. Comments: Tenderness to bilateral patella. No evidence of patella chaitanya or baja   Skin:     General: Skin is warm and dry. Capillary Refill: Capillary refill takes less than 2 seconds. Neurological:      General: No focal deficit present. Mental Status: She is alert and oriented to person, place, and time. Mental status is at baseline.    Psychiatric:         Mood and Affect: Mood normal.         Behavior: Behavior normal.         Lab and Diagnostic Study Results     Labs -   No results found for this or any previous visit (from the past 12 hour(s)). Radiologic Studies -   @lastxrresult@  CT Results  (Last 48 hours)               01/27/22 1308  CT SPINE CERV WO CONT Final result    Impression:  No fractures. No acute bony abnormality. Multilevel chronic degenerative disc   disease. Narrative:      Technique: 1.25 mm axial images of the entire cervical spine were obtained. Sagittal and coronal reformations performed. Comparisons: None. Findings:       Alignment of the cervical spine is normal. The craniocervical junction is   normal. Vertebral body height is well maintained. The prevertebral soft tissues   normal in depth and contour. There is no evidence for acute bony abnormality. No   fractures evident. There is severe erosion of the right lateral mass of C1. There is chronic degenerative joint disease at C3/C4, C4/C5, C5/C6 and C6/C7.           01/27/22 1305  CT HEAD WO CONT Final result    Impression:  1. 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 No acute intracranial   abnormality. 2. Small left frontal scalp hematoma. No fractures. Narrative:      Technique:  5 mm axial images were obtained through the entire calvarium. Sagittal and coronal reformations were performed. Comparisons: None. No acute intracranial abnormality is evident. There is no evidence for acute   hemorrhage, acute infarction, tumor, or mass-effect. There is low attenuation in   the deep white matter, consistent with mild ischemic microvascular disease. A small left frontal scalp hematoma is present. No fractures are present. The   paranasal sinuses and mastoid air cells are clear. CXR Results  (Last 48 hours)    None            Medical Decision Making   - I am the first provider for this patient.     - I reviewed the vital signs, available nursing notes, past medical history, past surgical history, family history and social history. - Initial assessment performed. The patients presenting problems have been discussed, and they are in agreement with the care plan formulated and outlined with them. I have encouraged them to ask questions as they arise throughout their visit. Vital Signs-Reviewed the patient's vital signs. Patient Vitals for the past 12 hrs:   Pulse Resp BP SpO2   01/27/22 1230 90 18 (!) 184/95 100 %       Records Reviewed: Nursing Notes    The patient presents with headache with a differential diagnosis of  cerebral hemorrhage, ICH, migraine and tension headache      ED Course:          Provider Notes (Medical Decision Making):   74-year-old female with past medical history significant for hypertension, hyperlipidemia, DVT currently on Eliquis presents to the ED for evaluation of headache, left arm pain, bilateral knee pain, right chest wall pain status post mechanical fall from standing. There is no loss of consciousness. On physical examination, patient has soft tissue hematoma noted to the left forehead. She has tenderness to the left upper arm without obvious deformity, ecchymosis or signs of external injury. Bilateral radial pulses full and equal.  Patient also complaining of right sided chest wall tenderness to palpation with no external sign of injury. Patient with bilateral patellar tenderness with full range of motion in flexion and extension of the knee joint. There is no joint line tenderness or laxity appreciated in any direction. CT head, CT C-spine, chest x-ray with right rib series, bilateral knee x-rays, left humerus x-ray demonstrating questionable right sixth rib fracture and possible right suprapatellar knee effusion. Patient to be discharged home with incentive spirometry in light of possible right rib fracture. She will be given topical analgesia for her symptoms.     Patient instructed to follow-up with their primary care physician and return to the ED if they develops any new or worsening symptoms. MDM       Procedures   Medical Decision Makingedical Decision Making  Performed by: Bianca Vazquez DO  PROCEDURES:  Procedures       Disposition   Disposition: Condition stable  DC- Adult Discharges: All of the diagnostic tests were reviewed and questions answered. Diagnosis, care plan and treatment options were discussed. The patient understands the instructions and will follow up as directed. The patients results have been reviewed with them. They have been counseled regarding their diagnosis. The patient verbally convey understanding and agreement of the signs, symptoms, diagnosis, treatment and prognosis and additionally agrees to follow up as recommended with their PCP in 24 - 48 hours. They also agree with the care-plan and convey that all of their questions have been answered. I have also put together some discharge instructions for them that include: 1) educational information regarding their diagnosis, 2) how to care for their diagnosis at home, as well a 3) list of reasons why they would want to return to the ED prior to their follow-up appointment, should their condition change. DC-The patient was given verbal head injury instructions    Discharged    DISCHARGE PLAN:  1. Current Discharge Medication List      START taking these medications    Details   lidocaine (Salonpas, lidocaine,) 4 % patch Apply to the affected area once daily as needed for pain. Dispense 10  Qty: 10 Each, Refills: 0         CONTINUE these medications which have NOT CHANGED    Details   carbidopa-levodopa (SINEMET)  mg per tablet Take 1 Tablet by mouth three (3) times daily. apixaban (ELIQUIS) 2.5 mg tablet Take 2.5 mg by mouth two (2) times a day. letrozole (FEMARA) 2.5 mg tablet Take 2.5 mg by mouth daily.       multivit-min/iron/folic/lutein (CENTRUM SILVER WOMEN PO) Take  by mouth.      elderberry fruit (ELDERBERRY PO) Take  by mouth. ZINC PICOLINATE PO Take  by mouth. alendronate (FOSAMAX) 70 mg tablet Take 70 mg by mouth every seven (7) days. clonazePAM (KlonoPIN) 0.5 mg tablet Take  by mouth two (2) times daily as needed for Anxiety. losartan (COZAAR) 25 mg tablet Take  by mouth two (2) times a day. metoprolol succinate (TOPROL-XL) 100 mg tablet Take  by mouth daily. levothyroxine (synthroid) 50 mcg tablet Take  by mouth Daily (before breakfast). ascorbic acid, vitamin C, (Vitamin C) 250 mg tablet Take  by mouth.      miconazole (Monistat 7) 2 % vaginal cream Insert 1 Applicator into vagina nightly. Qty: 45 g, Refills: 0      aspirin delayed-release 81 mg tablet Take  by mouth daily. 2.   Follow-up Information     Follow up With Specialties Details Why Contact Info    Nanci Zavala MD Internal Medicine Schedule an appointment as soon as possible for a visit   Asim Moreno 1996 [address] Emergency Medicine  As needed, If symptoms worsen [address] [facility]  [phone]        3. Return to ED if worse   4. Current Discharge Medication List      START taking these medications    Details   lidocaine (Salonpas, lidocaine,) 4 % patch Apply to the affected area once daily as needed for pain. Dispense 10  Qty: 10 Each, Refills: 0  Start date: 1/27/2022               Diagnosis     Clinical Impression:   1. Accident due to mechanical fall without injury, initial encounter    2. Traumatic hematoma of forehead, initial encounter        Attestations:    Mary Dangelo, DO    Please note that this dictation was completed with [employer] the [employer] voice recognition software. Quite often unanticipated grammatical, syntax, homophones, and other interpretive errors are inadvertently transcribed by the computer software. Please disregard these errors.   Please excuse any errors that have escaped final proofreading. Thank you.

## 2022-03-18 PROBLEM — N36.8 URETHRAL PROLAPSE: Status: ACTIVE | Noted: 2021-02-11

## 2022-03-18 PROBLEM — N17.9 AKI (ACUTE KIDNEY INJURY) (HCC): Status: ACTIVE | Noted: 2021-02-20

## 2022-03-18 PROBLEM — N39.3 STRESS INCONTINENCE: Status: ACTIVE | Noted: 2020-12-10

## 2022-03-18 PROBLEM — C50.312: Status: ACTIVE | Noted: 2018-12-18

## 2022-03-19 PROBLEM — N81.10 VAGINAL PROLAPSE: Status: ACTIVE | Noted: 2020-10-30

## 2022-03-19 PROBLEM — R39.11 HESITANCY OF MICTURITION: Status: ACTIVE | Noted: 2020-10-30

## 2022-03-19 PROBLEM — R42 DIZZINESS: Status: ACTIVE | Noted: 2020-10-05

## 2022-03-19 PROBLEM — Z87.898 HX OF DIZZINESS: Status: ACTIVE | Noted: 2020-06-16

## 2022-03-20 PROBLEM — N39.41 URGE INCONTINENCE: Status: ACTIVE | Noted: 2020-10-30

## 2022-03-20 PROBLEM — N36.42 INTRINSIC SPHINCTER DEFICIENCY (ISD): Status: ACTIVE | Noted: 2020-12-10

## 2022-05-02 ENCOUNTER — TELEPHONE (OUTPATIENT)
Dept: UROLOGY | Age: 87
End: 2022-05-02

## 2022-05-02 PROBLEM — Z79.01 ON ANTICOAGULANT THERAPY: Status: ACTIVE | Noted: 2022-05-02

## 2022-05-03 NOTE — TELEPHONE ENCOUNTER
Spoke with patient WICHO cat about info below she wanted to make sure her appt was made when she would be there with her

## 2022-06-08 NOTE — PROGRESS NOTES
HISTORY OF PRESENT ILLNESS  Anh Tracy is a 82 y.o. female. Chief Complaint   Patient presents with    Cystoscopy    Urinary Incontinence    Other     vaginal prolapse      Past Medical History:  PMHx (including negatives):  has a past medical history of Anxiety (6/16/2020), Arthritis, Breast cancer ([facility] Breast cancer ([facility] [address] [facility] Depression (6/16/2020), Diverticulitis (6/16/2020), GERD (gastroesophageal reflux disease) (6/16/2020), High cholesterol (6/16/2020), dizziness (6/16/2020), Hypertension (6/16/2020), Primary cancer of lower-inner quadrant of left female breast ([facility] [address] (12/18/2018), and Thyroid disease (6/16/2020). PSurgHx:  has a past surgical history that includes hx hysterectomy (1976); hx breast lumpectomy (Left, 01/23/2019); hx orthopaedic (2014); hx orthopaedic (08/09/2017); hx orthopaedic (2013); hx heent (06/25/2018); and hx urological (12/10/2020). PSocHx:  reports that she has never smoked. She has never used smokeless tobacco. She reports previous alcohol use. She reports that she does not use drugs. Last seen 11/5/21. She has problems with a mild cystocele, and prolapsed vaginal walls. Urethra had prolapse with a flat caruncle. She wished to consider sacrocolpopexy, but was diagnosed with a DVT in November 2021. She not a good surgical candidate secondary to this diagnosis. She was educated on Kegel exercises. We discussed reevaluation in 6 months. Here for 6 month re-evaluation of voiding pattern. Incontinence. Feelings of hesitancy and straining. Chronic Conditions Addressed Today     1. Vaginal prolapse     Overview      She reports vaginal prolapse 14 years. She managed with a pessary for 9 years but could no longer manage with her arthritis. She feels a bulge and dryness. 10/30/2020: Urethra appears well supported. No obvious descent or prolapse in the lithotomy position. 12/10/20 exam:   Mild cystocele.   Prolapse out the introitus at rest after standing. Vagina: Prolapsed vaginal walls (Out the introitus at rest with squamous changes. Reduces well) present. She will consider sacrocolpopexy. 4/9/21: Vaginal vault prolapse, unable to manage with a pessary at this point. She has been counseled on sacrocolpopexy and has been considering treatment. 11/5/21: she is concerned about surgical management secondary to Eliquis and ASA (recent DVT). She is not a good surgical candidate for at least 6 months. Perform Kegels. Re-evaluate bladder and voiding in 6 months. 2. Urge incontinence     Overview      She has incontinence, related to urge more than stress. She can have hesitancy and be unable to void. I suspect this is mainly due to dehydration. It has been about 18 months. She wears a Depends in case she cannot get to the toilet in time. It can be every other week when she has an accident. 12/10/2020: Ditropan XL started. 4/9/21: She could not tolerate the Ditropan secondary to \"jitters. \"  She was managing without medication. 3. Hesitancy of micturition     Overview      12/10/20: She has some feelings of hesitancy but can empty. 4. Stress incontinence     Overview      She has mild leak with strain without mobility. She was advised on kegels on 12/10/21.    4/9/21: More likely ISD than hypermobility. 5. Intrinsic sphincter deficiency (ISD)     Overview      12/10/2020: Mild YEIMI without hypermobility. She is continued to be encouraged on kegel exercises. 4/9/21: ISD and stress incontinence. Mild. She has continued to be advised on Kegel exercises. Incontinence may worsen with correcting vaginal prolapse. She may need urethral bulking if not improved. 6. Urethral prolapse - Primary     Overview      12/10/2020: Urethra: Prolapse (some prolapse and flat caruncle) present. No urethral pain, urethral swelling or urethral lesion.                          ROS  Patient denies the symptoms of COVID-19 per routine screening guidelines. Physical Exam  Vitals reviewed. Constitutional:       General: She is not in acute distress. Appearance: Normal appearance. She is obese. She is not ill-appearing, toxic-appearing or diaphoretic. HENT:      Head: Normocephalic and atraumatic. Nose: Nose normal.   Eyes:      Conjunctiva/sclera: Conjunctivae normal.      Pupils: Pupils are equal, round, and reactive to light. Pulmonary:      Effort: Pulmonary effort is normal. No respiratory distress. Breath sounds: Normal breath sounds. Abdominal:      General: Abdomen is flat. Bowel sounds are normal.      Palpations: Abdomen is soft. Genitourinary:     General: Normal vulva. Exam position: Lithotomy position. Pubic Area: No rash. Labia:         Right: No rash, tenderness, lesion or injury. Left: No rash, tenderness or lesion. Urethra: No prolapse, urethral pain, urethral swelling or urethral lesion. Vagina: No signs of injury and foreign body. Comments: Urethra was well supported and atrophic. No obvious cystocele, rectocele or enterocele. Vaginal introitus was mildly stenotic. Musculoskeletal:      Cervical back: Normal range of motion. Skin:     General: Skin is warm and dry. Neurological:      General: No focal deficit present. Mental Status: She is alert and oriented to person, place, and time. Psychiatric:         Mood and Affect: Mood normal.         ASSESSMENT and PLAN  Diagnoses and all orders for this visit:    1. Urethral prolapse  Assessment & Plan:   Urethra appears atrophic at this point. Likely contributes to ISD and incontinence. Continue Kegel exercises and pad usage      2. Vaginal prolapse  Assessment & Plan:   She no longer has a pessary. Her vaginal vault appears suspended and scarred in. There is no significant prolapse currently.       3. Urge incontinence  Assessment & Plan:   Urge incontinence and low normal bladder capacity. She did not tolerate Ditropan in the past.  He declines additional medication. On further discussion she does not urinate after drinking at a time and tends to swell and make more volume at night. She was wearing compression stockings and may be better off when she was on them. I recommend continuing these. She should also elevate her legs in the evening. Orders:  -     AMB POC PVR, ASHER,POST-VOID RES,US,NON-IMAGING  -     AMB POC UROFLOWMETRY  -     AMB POC URINALYSIS DIP STICK AUTO W/O MICRO    4. Hesitancy of micturition  -     AMB POC PVR, ASHER,POST-VOID RES,US,NON-IMAGING  -     AMB POC UROFLOWMETRY  -     AMB POC URINALYSIS DIP STICK AUTO W/O MICRO    5. Stress incontinence  Assessment & Plan:   Leakage appears more urge related than stress. No obvious stress incontinence on exam today. Orders:  -     AMB POC PVR, ASHER,POST-VOID RES,US,NON-IMAGING  -     AMB POC UROFLOWMETRY  -     AMB POC URINALYSIS DIP STICK AUTO W/O MICRO    6. Intrinsic sphincter deficiency (ISD)  Assessment & Plan:   Urethral atrophy and ISD. She will managed with Kegels and pads. Orders:  -     AMB POC PVR, ASHER,POST-VOID RES,US,NON-IMAGING  -     AMB POC UROFLOWMETRY  -     AMB POC URINALYSIS DIP STICK AUTO W/O MICRO             Peggy Hands may have a reminder for a \"due or due soon\" health maintenance. The patient has been encouraged to contact their primary care provider for follow-up on this health maintenance or other necessary and/or routine health screening.      Jero Giraldo MD

## 2022-06-09 ENCOUNTER — OFFICE VISIT (OUTPATIENT)
Dept: UROLOGY | Age: 87
End: 2022-06-09
Payer: MEDICARE

## 2022-06-09 VITALS
SYSTOLIC BLOOD PRESSURE: 171 MMHG | BODY MASS INDEX: 22.84 KG/M2 | HEART RATE: 70 BPM | OXYGEN SATURATION: 100 % | WEIGHT: 121 LBS | HEIGHT: 61 IN | TEMPERATURE: 97.8 F | DIASTOLIC BLOOD PRESSURE: 95 MMHG

## 2022-06-09 DIAGNOSIS — N81.10 VAGINAL PROLAPSE: ICD-10-CM

## 2022-06-09 DIAGNOSIS — N36.8 URETHRAL PROLAPSE: Primary | ICD-10-CM

## 2022-06-09 DIAGNOSIS — N39.3 STRESS INCONTINENCE: ICD-10-CM

## 2022-06-09 DIAGNOSIS — R39.11 HESITANCY OF MICTURITION: ICD-10-CM

## 2022-06-09 DIAGNOSIS — N36.42 INTRINSIC SPHINCTER DEFICIENCY (ISD): ICD-10-CM

## 2022-06-09 DIAGNOSIS — N39.41 URGE INCONTINENCE: ICD-10-CM

## 2022-06-09 LAB
AVG FLOW RATE POC: 12 ML/SECONDS
BILIRUB UR QL: NEGATIVE
FLOW TIME POC: 16 SECONDS
GLUCOSE UR-MCNC: NEGATIVE MG/DL
KETONES P FAST UR STRIP-MCNC: NEGATIVE MG/DL
PEAK FLOW: 18 ML/SECONDS
PH UR STRIP: 6 [PH] (ref 4.6–8)
PROT UR QL STRIP: NEGATIVE
PVR POC: 37 CC
SP GR UR STRIP: 1 (ref 1–1.03)
TIME TO PEAK: 14 SECONDS
TOTAL VOLUME POC: 189 ML
UA UROBILINOGEN AMB POC: NORMAL (ref 0.2–1)
URINALYSIS CLARITY POC: CLEAR
URINALYSIS COLOR POC: YELLOW
URINE BLOOD POC: NEGATIVE
URINE LEUKOCYTES POC: NEGATIVE
URINE NITRITES POC: NEGATIVE
VOIDING TIME POC: 24 SECONDS

## 2022-06-09 PROCEDURE — G8427 DOCREV CUR MEDS BY ELIG CLIN: HCPCS | Performed by: UROLOGY

## 2022-06-09 PROCEDURE — G8420 CALC BMI NORM PARAMETERS: HCPCS | Performed by: UROLOGY

## 2022-06-09 PROCEDURE — G8400 PT W/DXA NO RESULTS DOC: HCPCS | Performed by: UROLOGY

## 2022-06-09 PROCEDURE — 51798 US URINE CAPACITY MEASURE: CPT | Performed by: UROLOGY

## 2022-06-09 PROCEDURE — 52000 CYSTOURETHROSCOPY: CPT | Performed by: UROLOGY

## 2022-06-09 PROCEDURE — G9717 DOC PT DX DEP/BP F/U NT REQ: HCPCS | Performed by: UROLOGY

## 2022-06-09 PROCEDURE — G8753 SYS BP > OR = 140: HCPCS | Performed by: UROLOGY

## 2022-06-09 PROCEDURE — 81003 URINALYSIS AUTO W/O SCOPE: CPT | Performed by: UROLOGY

## 2022-06-09 PROCEDURE — G8755 DIAS BP > OR = 90: HCPCS | Performed by: UROLOGY

## 2022-06-09 PROCEDURE — 51725 SIMPLE CYSTOMETROGRAM: CPT | Performed by: UROLOGY

## 2022-06-09 PROCEDURE — G8536 NO DOC ELDER MAL SCRN: HCPCS | Performed by: UROLOGY

## 2022-06-09 PROCEDURE — 51741 ELECTRO-UROFLOWMETRY FIRST: CPT | Performed by: UROLOGY

## 2022-06-09 PROCEDURE — 99214 OFFICE O/P EST MOD 30 MIN: CPT | Performed by: UROLOGY

## 2022-06-09 NOTE — LETTER
6/9/2022    Patient: Anh Tracy   YOB: 1935   Date of Visit: 6/9/2022     Quinn Jensen MD  [place] [phone]  Via Fax: [phone]    Dear Quinn Jensen MD,      Thank you for referring Ms. Robyn Hines to [place] [id number] for evaluation. My notes for this consultation are attached. If you have questions, please do not hesitate to call me. I look forward to following your patient along with you.       Sincerely,    Ish Carpenter MD

## 2022-06-09 NOTE — ASSESSMENT & PLAN NOTE
Urge incontinence and low normal bladder capacity. She did not tolerate Ditropan in the past.  He declines additional medication. On further discussion she does not urinate after drinking at a time and tends to swell and make more volume at night. She was wearing compression stockings and may be better off when she was on them. I recommend continuing these. She should also elevate her legs in the evening.

## 2022-06-09 NOTE — ASSESSMENT & PLAN NOTE
Urethra appears atrophic at this point. Likely contributes to ISD and incontinence.   Continue Kegel exercises and pad usage

## 2022-06-09 NOTE — PROGRESS NOTES
Chief Complaint   Patient presents with    Cystoscopy    Urinary Incontinence    Other     vaginal prolapse        PHQ-9 score is    Negative    Vitals:    06/09/22 1500   BP: (!) 171/95   Pulse: 70   Temp: 97.8 °F (36.6 °C)   TempSrc: Temporal   SpO2: 100%   Weight: 121 lb (54.9 kg)   Height: 5' 1\" (1.549 m)   PainSc:   0 - No pain      1. \"Have you been to the ER, urgent care clinic since your last visit? Hospitalized since your last visit? \" No    2. \"Have you seen or consulted any other health care providers outside of the [address] [place] [address] since your last visit? \" No     3. For patients aged 39-70: Has the patient had a colonoscopy / FIT/ Cologuard? No      If the patient is female:    4. For patients aged 41-77: Has the patient had a mammogram within the past 2 years? No      5. For patients aged 21-65: Has the patient had a pap smear?  No

## 2022-06-09 NOTE — ASSESSMENT & PLAN NOTE
She no longer has a pessary. Her vaginal vault appears suspended and scarred in. There is no significant prolapse currently.

## 2022-06-09 NOTE — PROGRESS NOTES
Cystoscopy - Female    Findings:  Initial residual: mild  Anterior urethra: normal mucosa  Bladder neck: Normal appearing  Bladder mucosa: Intact, no bas fond deformity, did not descend with strain  Trabeculation: none  Diverticula: none  Ureteral orifices: normal, efflux clear urine    CMG    Initial urge at (cc): 150  Strong urge at (cc): 200  Findings: Leakage noted around the scope    Uroflow/ PVR    Max Flow: 18 ml/sec    Avg flow: 12 ml/sec    Voided Volume:  189ml    Residual Volume:37ml    Shape of the curve: Normal bell shaped curve    Impression: ISD and urge incontinence.

## 2022-07-08 ENCOUNTER — TELEPHONE (OUTPATIENT)
Dept: ENT CLINIC | Age: 87
End: 2022-07-08

## 2022-07-08 NOTE — TELEPHONE ENCOUNTER
Attempted to reach Pebbles Bailey to confirm next appointment and left a voicemail asking the patient to call back to confirm.

## 2022-07-11 ENCOUNTER — OFFICE VISIT (OUTPATIENT)
Dept: ENT CLINIC | Age: 87
End: 2022-07-11
Payer: MEDICARE

## 2022-07-11 VITALS
BODY MASS INDEX: 21.53 KG/M2 | HEART RATE: 70 BPM | HEIGHT: 62 IN | SYSTOLIC BLOOD PRESSURE: 142 MMHG | TEMPERATURE: 98.6 F | OXYGEN SATURATION: 100 % | WEIGHT: 117 LBS | DIASTOLIC BLOOD PRESSURE: 78 MMHG | RESPIRATION RATE: 16 BRPM

## 2022-07-11 DIAGNOSIS — R49.0 DYSPHONIA: ICD-10-CM

## 2022-07-11 DIAGNOSIS — R22.1 MASS OF LEFT SIDE OF NECK: Primary | ICD-10-CM

## 2022-07-11 PROCEDURE — G8754 DIAS BP LESS 90: HCPCS | Performed by: OTOLARYNGOLOGY

## 2022-07-11 PROCEDURE — 1101F PT FALLS ASSESS-DOCD LE1/YR: CPT | Performed by: OTOLARYNGOLOGY

## 2022-07-11 PROCEDURE — G8427 DOCREV CUR MEDS BY ELIG CLIN: HCPCS | Performed by: OTOLARYNGOLOGY

## 2022-07-11 PROCEDURE — 1090F PRES/ABSN URINE INCON ASSESS: CPT | Performed by: OTOLARYNGOLOGY

## 2022-07-11 PROCEDURE — G8536 NO DOC ELDER MAL SCRN: HCPCS | Performed by: OTOLARYNGOLOGY

## 2022-07-11 PROCEDURE — G8400 PT W/DXA NO RESULTS DOC: HCPCS | Performed by: OTOLARYNGOLOGY

## 2022-07-11 PROCEDURE — 31575 DIAGNOSTIC LARYNGOSCOPY: CPT | Performed by: OTOLARYNGOLOGY

## 2022-07-11 PROCEDURE — G8420 CALC BMI NORM PARAMETERS: HCPCS | Performed by: OTOLARYNGOLOGY

## 2022-07-11 PROCEDURE — G9717 DOC PT DX DEP/BP F/U NT REQ: HCPCS | Performed by: OTOLARYNGOLOGY

## 2022-07-11 PROCEDURE — G8753 SYS BP > OR = 140: HCPCS | Performed by: OTOLARYNGOLOGY

## 2022-07-11 PROCEDURE — 99204 OFFICE O/P NEW MOD 45 MIN: CPT | Performed by: OTOLARYNGOLOGY

## 2022-07-11 PROCEDURE — 1123F ACP DISCUSS/DSCN MKR DOCD: CPT | Performed by: OTOLARYNGOLOGY

## 2022-07-11 NOTE — LETTER
7/11/2022    Patient: Gypsy Baugh   YOB: 1935   Date of Visit: 7/11/2022     Fern Castro MD  [place] [phone]  Via Fax: [phone]    Dear Fern Castro MD,      Thank you for referring Ms. Ernst Scott to [facility] EAR NOSE AND THROAT [address], [facility] AND ALLERGY [facility] for evaluation. My notes for this consultation are attached. If you have questions, please do not hesitate to call me. I look forward to following your patient along with you.       Sincerely,    Jennifer Stack MD

## 2022-07-11 NOTE — PROGRESS NOTES
Otolaryngology-Head and Neck Surgery  New Patient Visit     Patient: Jones Galvez  YOB: 1935  MRN: [id number]  Date of Service: 7/11/2022    Chief Complaint: Neck node     History of Present Illness: Jones Galvez is a 82y.o. year old female who presents today for discussion of a neck node    Noticed a lump in neck about 1 year ago  Seems to be getting bigger  Seen another physician, ? ENT who suggested it may be a lipoma and reassurance was provided    Also describes raspiness over the last year     No dysphagia, odynophagia, hemoptysis, otalgia      Niece Francisco Javier Chun         Past Medical History:  Past Medical History:   Diagnosis Date    Anxiety 6/16/2020    Arthritis     Breast cancer ([address]     Breast cancer ([address]     Depression 6/16/2020    Diverticulitis 6/16/2020    GERD (gastroesophageal reflux disease) 6/16/2020    High cholesterol 6/16/2020    Hx of dizziness 6/16/2020    Hypertension 6/16/2020    Primary cancer of lower-inner quadrant of left female breast ([address] 12/18/2018    breast     Thyroid disease 6/16/2020     Dx with breast CA 2019 - treated with radiation     Past Surgical History:   Past Surgical History:   Procedure Laterality Date    HX BREAST LUMPECTOMY Left 01/23/2019    with SLNB    HX HEENT  06/25/2018    Cataract    HX HYSTERECTOMY  1976    HX ORTHOPAEDIC  2014    Left knee    HX ORTHOPAEDIC  08/09/2017    Right knee    HX ORTHOPAEDIC  2013    Right Shoulder    HX UROLOGICAL  12/10/2020    cystoscopy        Medications:   Current Outpatient Medications   Medication Instructions    alendronate (FOSAMAX) 70 mg, Oral, EVERY 7 DAYS    ascorbic acid, vitamin C, (Vitamin C) 250 mg tablet Oral    carbidopa-levodopa (SINEMET)  mg per tablet 1 Tablet, Oral, 3 TIMES DAILY    clonazePAM (KlonoPIN) 0.5 mg tablet Oral, 2 TIMES DAILY AS NEEDED    elderberry fruit (ELDERBERRY PO) Oral    letrozole (FEMARA) 2.5 mg, Oral, DAILY    levothyroxine (synthroid) 50 mcg tablet Oral, DAILY BEFORE BREAKFAST    losartan (COZAAR) 25 mg tablet Oral, 2 TIMES DAILY    metoprolol succinate (TOPROL-XL) 100 mg tablet Oral, DAILY    miconazole (Monistat 7) 2 % vaginal cream 1 Applicator, Vaginal, EVERY BEDTIME    multivit-min/iron/folic/lutein (CENTRUM SILVER WOMEN PO) Oral    ZINC PICOLINATE PO Oral       Allergies: Allergies   Allergen Reactions    Iodinated Contrast Media Shortness of Breath       Social History:   Social History     Tobacco Use    Smoking status: Never Smoker    Smokeless tobacco: Never Used   Vaping Use    Vaping Use: Never used   Substance Use Topics    Alcohol use: Not Currently    Drug use: Never        Family History:  Family History   Problem Relation Age of Onset    Cancer Mother 80        Esophagas    Heart Disease Father     Prostate Cancer Brother     Breast Cancer Maternal Grandmother 71    Breast Cancer Maternal Cousin 58       Review of Systems:    Consitutional: denies fever, excessive weight gain or loss. Eyes: denies diplopia, eye pain. Integumentary: denies new concerning skin lesions. Ears, Nose, Mouth, Throat: denies except as per HPI.   Endocrine: denies hot or cold intolerance, increased thirst.  Respiratory: denies cough, hemoptysis, wheezing  Gastrointestinal: denies trouble swallowing, nausea, emesis, regurgitation  Musculoskeletal: denies muscle weakness or wasting  Cardiovascular: denies chest pain, shortness of breath  Neurologic: denies seizures, numbness or tingling, syncope  Hematologic: denies easy bleeding or bruising    Physical Examination:   Vitals:    07/11/22 1456   BP: (!) 142/78   BP 1 Location: Left upper arm   BP Patient Position: Sitting   BP Cuff Size: Small adult   Pulse: 70   Temp: 98.6 °F (37 °C)   TempSrc: Temporal   Resp: 16   Height: 5' 1.5\" (1.562 m)   Weight: 117 lb (53.1 kg)   SpO2: 100%        General: Comfortable, pleasant, appears stated age  Voice: Strong, speaking in full sentences, no stridor    Face: No masses or lesions, facial strength symmetric   Ears: External ears unremarkable. Bilateral ear canal clear. Tympanic membrane clear and intact, with visible landmarks. Clear middle ear space  Nose: External nose unremarkable. Dorsum midline. Anterior rhinoscopy demonstrates no lesions. Septum midline. Turbinates without hypertrophy. Oral Cavity / Oropharynx: No trismus. Mucosa pink and moist. No lesions. Tongue is midline and mobile. Palate elevates symmetrically. Uvula midline. Tonsils unremarkable. Base of tongue soft. Floor of mouth soft. Neck: left neck level 2 adenopathy, asymmetry. Thyroid unremarkable. Palpable laryngeal landmarks. Full neck range of motion   Neurologic: CN II - XI intact. Walks with cane    PROCEDURE: FLEXIBLE FIBEROPTIC LARYNGOSCOPY    Preoperative Diagnosis: Dysphonia      Postoperative Diagnosis: Same     Procedure: Flexible Fiberoptic Laryngoscopy    Anesthesia: Topical 4% Lidocaine, Oxymetazoline    Description of Procedure: Verbal informed consent was obtained. After application of topical anesthetic and decongestant, the flexible fiberoptic endoscope was introduced to the patient's nare. It was passed through the nose and into the pharynx. The scope was then withdrawn and repeated on the opposing nare. The patient tolerated the procedure well. Findings: Normal nasal cavity, no polyposis or purulence. Middle meatus clear bilaterally. Nasopharynx without lesions. Base of tongue, vallecula & epiglottis unremarkable. Clear pyriform sinus. Vocal folds without lesions. Normal mobility. Visualized subglottis appears patent. Some vocal fold atrophy with incomplete glottic closure       Assessment and Plan:   1. Left neck mass  2.  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           The patient was instructed to return to clinic if no improvement or progression of symptoms. Signs to watch out for reviewed.       Maykel Shaffer MD   [place] [address] [facility]  [address] [place] [address]  Office Phone: [phone]

## 2022-07-11 NOTE — PROGRESS NOTES
Visit Vitals  BP (!) 142/78 (BP 1 Location: Left upper arm, BP Patient Position: Sitting, BP Cuff Size: Small adult)   Pulse 70   Temp 98.6 °F (37 °C) (Temporal)   Resp 16   Ht 5' 1.5\" (1.562 m)   Wt 117 lb (53.1 kg)   SpO2 100%   BMI 21.75 kg/m²     Chief Complaint   Patient presents with    New Patient     Nodule on left side of neck.

## 2022-08-01 ENCOUNTER — HOSPITAL ENCOUNTER (OUTPATIENT)
Dept: CT IMAGING | Age: 87
Discharge: HOME | End: 2022-08-01
Attending: OTOLARYNGOLOGY
Payer: MEDICARE

## 2022-08-01 DIAGNOSIS — R22.1 MASS OF LEFT SIDE OF NECK: ICD-10-CM

## 2022-08-01 PROCEDURE — 70490 CT SOFT TISSUE NECK W/O DYE: CPT

## 2022-08-02 ENCOUNTER — OFFICE VISIT (OUTPATIENT)
Dept: ENT CLINIC | Age: 87
End: 2022-08-02
Payer: MEDICARE

## 2022-08-02 VITALS
HEIGHT: 62 IN | OXYGEN SATURATION: 97 % | SYSTOLIC BLOOD PRESSURE: 130 MMHG | HEART RATE: 66 BPM | RESPIRATION RATE: 16 BRPM | DIASTOLIC BLOOD PRESSURE: 84 MMHG | WEIGHT: 117 LBS | BODY MASS INDEX: 21.53 KG/M2

## 2022-08-02 DIAGNOSIS — E04.1 THYROID NODULE: ICD-10-CM

## 2022-08-02 DIAGNOSIS — R49.0 DYSPHONIA: Primary | ICD-10-CM

## 2022-08-02 PROCEDURE — 1123F ACP DISCUSS/DSCN MKR DOCD: CPT | Performed by: OTOLARYNGOLOGY

## 2022-08-02 PROCEDURE — G8400 PT W/DXA NO RESULTS DOC: HCPCS | Performed by: OTOLARYNGOLOGY

## 2022-08-02 PROCEDURE — G8536 NO DOC ELDER MAL SCRN: HCPCS | Performed by: OTOLARYNGOLOGY

## 2022-08-02 PROCEDURE — G9717 DOC PT DX DEP/BP F/U NT REQ: HCPCS | Performed by: OTOLARYNGOLOGY

## 2022-08-02 PROCEDURE — G8754 DIAS BP LESS 90: HCPCS | Performed by: OTOLARYNGOLOGY

## 2022-08-02 PROCEDURE — 1090F PRES/ABSN URINE INCON ASSESS: CPT | Performed by: OTOLARYNGOLOGY

## 2022-08-02 PROCEDURE — 1101F PT FALLS ASSESS-DOCD LE1/YR: CPT | Performed by: OTOLARYNGOLOGY

## 2022-08-02 PROCEDURE — 99203 OFFICE O/P NEW LOW 30 MIN: CPT | Performed by: OTOLARYNGOLOGY

## 2022-08-02 PROCEDURE — G8420 CALC BMI NORM PARAMETERS: HCPCS | Performed by: OTOLARYNGOLOGY

## 2022-08-02 PROCEDURE — G8752 SYS BP LESS 140: HCPCS | Performed by: OTOLARYNGOLOGY

## 2022-08-02 PROCEDURE — G8427 DOCREV CUR MEDS BY ELIG CLIN: HCPCS | Performed by: OTOLARYNGOLOGY

## 2022-08-02 NOTE — PROGRESS NOTES
Otolaryngology-Head and Neck Surgery  Follow  Patient Visit     Patient: Romana Brownie  YOB: 1935  MRN: [id number]  Date of Service: 8/2/2022    Chief Complaint: Neck node     Interval hx  Here to discuss CT neck results     History of Present Illness: Romana Brownie is a 82y.o. year old female who presents today for discussion of a neck node    Noticed a lump in neck about 1 year ago  Seems to be getting bigger  Seen another physician, ? ENT who suggested it may be a lipoma and reassurance was provided    Also describes raspiness over the last year     No dysphagia, odynophagia, hemoptysis, otalgia      Niece Marleta Dates         Past Medical History:  Past Medical History:   Diagnosis Date    Anxiety 6/16/2020    Arthritis     Breast cancer ([address]     Breast cancer ([address]     Depression 6/16/2020    Diverticulitis 6/16/2020    GERD (gastroesophageal reflux disease) 6/16/2020    High cholesterol 6/16/2020    Hx of dizziness 6/16/2020    Hypertension 6/16/2020    Primary cancer of lower-inner quadrant of left female breast ([address] 12/18/2018    breast     Thyroid disease 6/16/2020     Dx with breast CA 2019 - treated with radiation     Past Surgical History:   Past Surgical History:   Procedure Laterality Date    HX BREAST LUMPECTOMY Left 01/23/2019    with SLNB    HX HEENT  06/25/2018    Cataract    HX HYSTERECTOMY  1976    HX ORTHOPAEDIC  2014    Left knee    HX ORTHOPAEDIC  08/09/2017    Right knee    HX ORTHOPAEDIC  2013    Right Shoulder    HX UROLOGICAL  12/10/2020    cystoscopy        Medications:   Current Outpatient Medications   Medication Instructions    alendronate (FOSAMAX) 70 mg, Oral, EVERY 7 DAYS    ascorbic acid, vitamin C, (VITAMIN C) 250 mg tablet Oral    carbidopa-levodopa (SINEMET)  mg per tablet 1 Tablet, 3 TIMES DAILY    clonazePAM (KlonoPIN) 0.5 mg tablet Oral, 2 TIMES DAILY AS NEEDED    elderberry fruit (ELDERBERRY PO) Oral    letrozole (FEMARA) 2.5 mg, Oral, DAILY levothyroxine (SYNTHROID) 50 mcg tablet Oral, DAILY BEFORE BREAKFAST    losartan (COZAAR) 25 mg tablet Oral, 2 TIMES DAILY    metoprolol succinate (TOPROL-XL) 100 mg tablet Oral, DAILY    miconazole (Monistat 7) 2 % vaginal cream 1 Applicator, Vaginal, EVERY BEDTIME    multivit-min/iron/folic/lutein (CENTRUM SILVER WOMEN PO) Oral    ZINC PICOLINATE PO Oral       Allergies: Allergies   Allergen Reactions    Iodinated Contrast Media Shortness of Breath       Social History:   Social History     Tobacco Use    Smoking status: Never    Smokeless tobacco: Never   Vaping Use    Vaping Use: Never used   Substance Use Topics    Alcohol use: Not Currently    Drug use: Never        Family History:  Family History   Problem Relation Age of Onset    Cancer Mother 80        Esophagas    Heart Disease Father     Prostate Cancer Brother     Breast Cancer Maternal Grandmother 71    Breast Cancer Maternal Cousin 58       Review of Systems:    Consitutional: denies fever, excessive weight gain or loss. Eyes: denies diplopia, eye pain. Integumentary: denies new concerning skin lesions. Ears, Nose, Mouth, Throat: denies except as per HPI.   Endocrine: denies hot or cold intolerance, increased thirst.  Respiratory: denies cough, hemoptysis, wheezing  Gastrointestinal: denies trouble swallowing, nausea, emesis, regurgitation  Musculoskeletal: denies muscle weakness or wasting  Cardiovascular: denies chest pain, shortness of breath  Neurologic: denies seizures, numbness or tingling, syncope  Hematologic: denies easy bleeding or bruising    Physical Examination:   Vitals:    08/02/22 1259   BP: 130/84   BP 1 Location: Left upper arm   BP Patient Position: Sitting   BP Cuff Size: Adult   Pulse: 66   Resp: 16   Height: 5' 1.5\" (1.562 m)   Weight: 117 lb (53.1 kg)   SpO2: 97%        General: Comfortable, pleasant, appears stated age  Voice: Strong, speaking in full sentences, no stridor    Face: No masses or lesions, facial strength symmetric   Ears: External ears unremarkable. Bilateral ear canal clear. Tympanic membrane clear and intact, with visible landmarks. Clear middle ear space  Nose: External nose unremarkable. Dorsum midline. Anterior rhinoscopy demonstrates no lesions. Septum midline. Turbinates without hypertrophy. Oral Cavity / Oropharynx: No trismus. Mucosa pink and moist. No lesions. Tongue is midline and mobile. Palate elevates symmetrically. Uvula midline. Tonsils unremarkable. Base of tongue soft. Floor of mouth soft. Neck: left neck level 2 adenopathy, asymmetry. Thyroid unremarkable. Palpable laryngeal landmarks. Full neck range of motion   Neurologic: CN II - XI intact. Walks with cane    Ct neck  IMPRESSION  Slight asymmetry of left sternocleidomastoid and underlying internal  jugular vein which could be responsible for clinical concern. No identified  left-sided mass or other acute findings. 2.0 x 2.1 cm dominant right thyroid  nodule for which thyroid ultrasound can be used for further evaluation. Assessment and Plan:   Left neck mass  Dysphonia  Thyroid nodule   - Reviewed CT results, overall benign   - She does have a thyroid nodule - history of thyroid lobectomy followed by completion (1970's, 1990's)   - Will check thyroid US   - I will call her with results  - Shes generally not bothered by her voice, if she changes her mind,  can arrange voice therapy       The patient was instructed to return to clinic if no improvement or progression of symptoms. Signs to watch out for reviewed.       MD [facility]  [address]  [facility]  Office Phone: [phone]

## 2022-08-02 NOTE — PROGRESS NOTES
Visit Vitals  /84 (BP 1 Location: Left upper arm, BP Patient Position: Sitting, BP Cuff Size: Adult)   Pulse 66   Resp 16   Ht 5' 1.5\" (1.562 m)   Wt 117 lb (53.1 kg)   SpO2 97%   BMI 21.75 kg/m²     Chief Complaint   Patient presents with    Follow-up     Talk on CT scan results

## 2022-08-02 NOTE — LETTER
8/2/2022    Patient: Braov Land   YOB: 1935   Date of Visit: 8/2/2022     Estefani Haynes MD  [place] [phone]  Via Fax: [phone]    Dear Estefani Haynes MD,      Thank you for referring Ms. Jodi Rosenbaum to [facility] EAR NOSE AND THROAT [address], [facility] AND ALLERGY CARE for evaluation. My notes for this consultation are attached. If you have questions, please do not hesitate to call me. I look forward to following your patient along with you.       Sincerely,    Susan Nuñez MD

## 2022-08-08 ENCOUNTER — HOSPITAL ENCOUNTER (OUTPATIENT)
Dept: ULTRASOUND IMAGING | Age: 87
Discharge: HOME | End: 2022-08-08
Attending: OTOLARYNGOLOGY
Payer: MEDICARE

## 2022-08-08 DIAGNOSIS — E04.1 THYROID NODULE: ICD-10-CM

## 2022-08-08 PROCEDURE — 76536 US EXAM OF HEAD AND NECK: CPT

## 2022-08-10 ENCOUNTER — TELEPHONE (OUTPATIENT)
Dept: ENT CLINIC | Age: 87
End: 2022-08-10

## 2022-08-10 NOTE — TELEPHONE ENCOUNTER
Called and spoke with pt re: thyroid US results    Will follow with [address] next year

## 2022-09-29 NOTE — PROGRESS NOTES
HISTORY OF PRESENT ILLNESS  Delicia Santiago is a 82 y.o. female. Chief Complaint   Patient presents with    Follow-up    Urinary Incontinence    Urinary Retention     Past Medical History:  PMHx (including negatives):  has a past medical history of Anxiety (6/16/2020), Arthritis, Breast cancer ([facility] [address] Breast cancer ([facility] [address] Depression (6/16/2020), Diverticulitis (6/16/2020), GERD (gastroesophageal reflux disease) (6/16/2020), High cholesterol (6/16/2020), dizziness (6/16/2020), Hypertension (6/16/2020), Primary cancer of lower-inner quadrant of left female breast ([facility] [address] (12/18/2018), and Thyroid disease (6/16/2020). PSurgHx:  has a past surgical history that includes hx hysterectomy (1976); hx breast lumpectomy (Left, 01/23/2019); hx orthopaedic (2014); hx orthopaedic (08/09/2017); hx orthopaedic (2013); hx heent (06/25/2018); and hx urological (12/10/2020). PSocHx:  reports that she has never smoked. She has never used smokeless tobacco. She reports that she does not currently use alcohol. She reports that she does not use drugs. She is here with her daughter. ISD and urge incontinence on cysto, voiding studies done in June. Lifting up her legs helps her go more. During the day she can void 3+ times. She stop fluids around 3:30PM except sips. She goes to sleep 9:30-11:00PM and wakes 7:00. She wakes less if she elevates her legs. She uses compression stockings occasionally. She wakes 2-7 times per night, usually 2-3x . She uses a bedside commode to manage mobility and urgency. She started spironolactone in the morning. She has problems with a mild cystocele, and prolapsed vaginal walls. Urethra had prolapse with a flat caruncle. She makes more urine at night, which is bothersome to her. She notes lower extremity swelling at night. She was educated on elevation of the lower extremities before bedtime and compression stockings at her June visit date. UA with leukocytes. Chronic Conditions Addressed Today       1. Urge incontinence - Primary     Overview      She has incontinence, related to urge more than stress. More functional than overactivity. She can have hesitancy and be unable to void. I suspect this is mainly due to dehydration. She wears a Depends in case she cannot get to the toilet in time. It can be every other week when she has an accident. 12/10/2020: Ditropan XL started. She could not tolerate the Ditropan secondary to \"jitters. \"       6/9/22: low normal capacity. Leakage managed by bedside commode and fluid management. Current Assessment & Plan      =          Relevant Orders     AMB POC URINALYSIS DIP STICK AUTO W/O MICRO (Completed)     CULTURE, URINE    2. Stress incontinence     Overview      She has mild leak with strain without mobility. She was advised on kegels on 12/10/21.    4/9/21: More likely ISD than hypermobility. 6/9/22: urge related more so than stress. No obvious leakage on stress on examination today. Current Assessment & Plan      Mild YEIMI, not a concern         3. Postmenopausal urethral atrophy     Overview      12/10/2020: Urethra: Prolapse (some prolapse and flat caruncle) present. No urethral pain, urethral swelling or urethral lesion. 6/9/22: urethra appears atrophic at this point. Likely contributes to ISD and incontinence. Continue with Kegel's and pad usage. Current Assessment & Plan      H/o of vaginal atrophy. Urge and functional leakage, managed with bedside commode. Relevant Medications     spironolactone (ALDACTONE) 25 mg tablet            ROS  Patient denies the symptoms of COVID-19 per routine screening guidelines. Physical Exam    ASSESSMENT and PLAN  Diagnoses and all orders for this visit:    1. Urge incontinence  Assessment & Plan:  =    Orders:  -     AMB POC URINALYSIS DIP STICK AUTO W/O MICRO  -     CULTURE, URINE    2.  Stress incontinence  Assessment & Plan:  Mild YEIMI, not a concern      3. Urethral prolapse  Assessment & Plan:  H/o of vaginal atrophy. Urge and functional leakage, managed with bedside commode. Orders:  -     AMB POC URINALYSIS DIP STICK AUTO W/O MICRO  -     CULTURE, URINE    4. Postmenopausal urethral atrophy  Assessment & Plan:  H/o of vaginal atrophy. Urge and functional leakage, managed with bedside commode. Follow-up and Dispositions    Return in about 6 months (around 4/7/2023). [facility] may have a reminder for a \"due or due soon\" health maintenance. The patient has been encouraged to contact their primary care provider for follow-up on this health maintenance or other necessary and/or routine health screening.      Sasha Bacon MD

## 2022-10-07 ENCOUNTER — OFFICE VISIT (OUTPATIENT)
Dept: UROLOGY | Age: 87
End: 2022-10-07
Payer: MEDICARE

## 2022-10-07 VITALS
WEIGHT: 117 LBS | SYSTOLIC BLOOD PRESSURE: 155 MMHG | OXYGEN SATURATION: 100 % | HEART RATE: 79 BPM | TEMPERATURE: 97.8 F | BODY MASS INDEX: 22.09 KG/M2 | HEIGHT: 61 IN | DIASTOLIC BLOOD PRESSURE: 77 MMHG

## 2022-10-07 DIAGNOSIS — N36.8 POSTMENOPAUSAL URETHRAL ATROPHY: ICD-10-CM

## 2022-10-07 DIAGNOSIS — N39.41 URGE INCONTINENCE: Primary | ICD-10-CM

## 2022-10-07 DIAGNOSIS — N39.3 STRESS INCONTINENCE: ICD-10-CM

## 2022-10-07 DIAGNOSIS — N36.8 URETHRAL PROLAPSE: ICD-10-CM

## 2022-10-07 DIAGNOSIS — N95.8 POSTMENOPAUSAL URETHRAL ATROPHY: ICD-10-CM

## 2022-10-07 LAB
BILIRUB UR QL: NEGATIVE
GLUCOSE UR-MCNC: NEGATIVE MG/DL
KETONES P FAST UR STRIP-MCNC: NEGATIVE MG/DL
PH UR STRIP: 7 [PH] (ref 4.6–8)
PROT UR QL STRIP: NEGATIVE
SP GR UR STRIP: 1.01 (ref 1–1.03)
UA UROBILINOGEN AMB POC: NORMAL (ref 0.2–1)
URINALYSIS CLARITY POC: CLEAR
URINALYSIS COLOR POC: YELLOW
URINE BLOOD POC: NEGATIVE
URINE LEUKOCYTES POC: NORMAL
URINE NITRITES POC: NEGATIVE

## 2022-10-07 PROCEDURE — G8753 SYS BP > OR = 140: HCPCS | Performed by: UROLOGY

## 2022-10-07 PROCEDURE — G8400 PT W/DXA NO RESULTS DOC: HCPCS | Performed by: UROLOGY

## 2022-10-07 PROCEDURE — G9717 DOC PT DX DEP/BP F/U NT REQ: HCPCS | Performed by: UROLOGY

## 2022-10-07 PROCEDURE — 1090F PRES/ABSN URINE INCON ASSESS: CPT | Performed by: UROLOGY

## 2022-10-07 PROCEDURE — 99214 OFFICE O/P EST MOD 30 MIN: CPT | Performed by: UROLOGY

## 2022-10-07 PROCEDURE — G8536 NO DOC ELDER MAL SCRN: HCPCS | Performed by: UROLOGY

## 2022-10-07 PROCEDURE — 1101F PT FALLS ASSESS-DOCD LE1/YR: CPT | Performed by: UROLOGY

## 2022-10-07 PROCEDURE — G8754 DIAS BP LESS 90: HCPCS | Performed by: UROLOGY

## 2022-10-07 PROCEDURE — G8427 DOCREV CUR MEDS BY ELIG CLIN: HCPCS | Performed by: UROLOGY

## 2022-10-07 PROCEDURE — 1123F ACP DISCUSS/DSCN MKR DOCD: CPT | Performed by: UROLOGY

## 2022-10-07 PROCEDURE — 81003 URINALYSIS AUTO W/O SCOPE: CPT | Performed by: UROLOGY

## 2022-10-07 PROCEDURE — G8420 CALC BMI NORM PARAMETERS: HCPCS | Performed by: UROLOGY

## 2022-10-07 RX ORDER — SPIRONOLACTONE 25 MG/1
TABLET ORAL DAILY
COMMUNITY

## 2022-10-07 NOTE — LETTER
10/8/2022    Patient: Sunshine Hummel   YOB: 1935   Date of Visit: 10/7/2022     MD Juve Clementsa [facility] [phone]  Via Fax: [phone]    Dear Trisha Shah MD,      Thank you for referring Ms. Justice Murray to [facility] for evaluation. My notes for this consultation are attached. If you have questions, please do not hesitate to call me. I look forward to following your patient along with you.       Sincerely,    Tana Perry MD

## 2022-10-07 NOTE — PROGRESS NOTES
Chief Complaint   Patient presents with    Follow-up    Urinary Incontinence    Urinary Retention       PHQ-9 score is    Negative    Vitals:    10/07/22 1155   BP: (!) 155/77   Pulse: 79   Temp: 97.8 °F (36.6 °C)   TempSrc: Temporal   SpO2: 100%   Weight: 117 lb (53.1 kg)   Height: 5' 1\" (1.549 m)   PainSc:   0 - No pain        1. \"Have you been to the ER, urgent care clinic since your last visit? Hospitalized since your last visit? \" No    2. \"Have you seen or consulted any other health care providers outside of the [address] [place] [address] since your last visit? \" No     3. For patients aged 39-70: Has the patient had a colonoscopy / FIT/ Cologuard? No      If the patient is female:    4. For patients aged 41-77: Has the patient had a mammogram within the past 2 years? No      5. For patients aged 21-65: Has the patient had a pap smear?  No

## 2022-10-08 PROBLEM — N36.8: Status: ACTIVE | Noted: 2021-02-11

## 2022-10-08 PROBLEM — N95.8: Status: ACTIVE | Noted: 2021-02-11

## 2022-10-10 LAB — BACTERIA UR CULT: NORMAL

## 2023-01-23 ENCOUNTER — HOSPITAL ENCOUNTER (EMERGENCY)
Age: 88
Discharge: HOME | End: 2023-01-23
Attending: EMERGENCY MEDICINE
Payer: MEDICARE

## 2023-01-23 ENCOUNTER — APPOINTMENT (OUTPATIENT)
Dept: GENERAL RADIOLOGY | Age: 88
End: 2023-01-23
Attending: EMERGENCY MEDICINE
Payer: MEDICARE

## 2023-01-23 VITALS
HEIGHT: 61 IN | WEIGHT: 115 LBS | HEART RATE: 85 BPM | BODY MASS INDEX: 21.71 KG/M2 | DIASTOLIC BLOOD PRESSURE: 70 MMHG | SYSTOLIC BLOOD PRESSURE: 123 MMHG | OXYGEN SATURATION: 100 % | RESPIRATION RATE: 18 BRPM | TEMPERATURE: 98 F

## 2023-01-23 DIAGNOSIS — R07.89 CHEST WALL PAIN: Primary | ICD-10-CM

## 2023-01-23 PROCEDURE — 99283 EMERGENCY DEPT VISIT LOW MDM: CPT

## 2023-01-23 PROCEDURE — 71101 X-RAY EXAM UNILAT RIBS/CHEST: CPT

## 2023-01-23 PROCEDURE — 74011250637 HC RX REV CODE- 250/637: Performed by: EMERGENCY MEDICINE

## 2023-01-23 RX ORDER — TRAMADOL HYDROCHLORIDE 50 MG/1
50 TABLET ORAL
Qty: 12 TABLET | Refills: 0 | Status: SHIPPED | OUTPATIENT
Start: 2023-01-23 | End: 2023-01-26

## 2023-01-23 RX ORDER — TRAMADOL HYDROCHLORIDE 50 MG/1
50 TABLET ORAL
Status: COMPLETED | OUTPATIENT
Start: 2023-01-23 | End: 2023-01-23

## 2023-01-23 RX ORDER — LIDOCAINE 50 MG/G
PATCH TOPICAL
Qty: 10 EACH | Refills: 0 | Status: SHIPPED | OUTPATIENT
Start: 2023-01-23

## 2023-01-23 RX ADMIN — TRAMADOL HYDROCHLORIDE 50 MG: 50 TABLET, COATED ORAL at 15:02

## 2023-01-23 NOTE — DISCHARGE INSTRUCTIONS
Thank you! Thank you for allowing me to care for you in the emergency department. It is my goal to provide you with excellent care. If you have not received excellent quality care, please ask to speak to the nurse manager. Please fill out the survey that will come to you by mail or email since we listen to your feedback! Below you will find a list of your tests from today's visit. Should you have any questions, please do not hesitate to call the emergency department. Labs  No results found for this or any previous visit (from the past 12 hour(s)). Radiologic Studies  XR RIBS LT W PA CXR MIN 3 V   Final Result   1. No acute abnormality           CT Results  (Last 48 hours)      None          CXR Results  (Last 48 hours)      None          ------------------------------------------------------------------------------------------------------------  The exam and treatment you received in the Emergency Department were for an urgent problem and are not intended as complete care. It is important that you follow-up with a doctor, nurse practitioner, or physician assistant to:  (1) confirm your diagnosis,  (2) re-evaluation of changes in your illness and treatment, and  (3) for ongoing care. Please take your discharge instructions with you when you go to your follow-up appointment. If you have any problem arranging a follow-up appointment, contact the Emergency Department. If your symptoms become worse or you do not improve as expected and you are unable to reach your health care provider, please return to the Emergency Department. We are available 24 hours a day. If a prescription has been provided, please have it filled as soon as possible to prevent a delay in treatment. If you have any questions or reservations about taking the medication due to side effects or interactions with other medications, please call your primary care provider or contact the ER.

## 2023-01-23 NOTE — ED PROVIDER NOTES
[facility] EMERGENCY DEPARTMENT  EMERGENCY DEPARTMENT HISTORY AND PHYSICAL EXAM      Date: 1/23/2023  Patient Name: Sarah Barboza  MRN: [id number]  [place]: 1935  Date of evaluation: 1/23/2023  Provider: Vianey Toth MD   Note Started: 2:33 PM 1/23/23    HISTORY OF PRESENT ILLNESS     Chief Complaint   Patient presents with    Fall    Back Pain       History Provided By: Patient and Patient's Daughter    HPI: Sarah Barboza, 82 y.o. female presents to the emergency department complaint of left lateral chest wall pain after mechanical ground-level fall. Patient denies head injury or LOC, denies any other pain complaint.     PAST MEDICAL HISTORY   Past Medical History:  Past Medical History:   Diagnosis Date    Anxiety 6/16/2020    Arthritis     Breast cancer ([address]     Breast cancer ([address]     Depression 6/16/2020    Diverticulitis 6/16/2020    GERD (gastroesophageal reflux disease) 6/16/2020    High cholesterol 6/16/2020    Hx of dizziness 6/16/2020    Hypertension 6/16/2020    Primary cancer of lower-inner quadrant of left female breast ([address] 12/18/2018    breast     Thyroid disease 6/16/2020       Past Surgical History:  Past Surgical History:   Procedure Laterality Date    HX BREAST LUMPECTOMY Left 01/23/2019    with SLNB    HX HEENT  06/25/2018    Cataract    HX HYSTERECTOMY  1976    HX ORTHOPAEDIC  2014    Left knee    HX ORTHOPAEDIC  08/09/2017    Right knee    HX ORTHOPAEDIC  2013    Right Shoulder    HX UROLOGICAL  12/10/2020    cystoscopy        Family History:  Family History   Problem Relation Age of Onset    Cancer Mother 80        Esophagas    Heart Disease Father     Prostate Cancer Brother     Breast Cancer Maternal Grandmother 71    Breast Cancer Maternal Cousin 58       Social History:  Social History     Tobacco Use    Smoking status: Never    Smokeless tobacco: Never   Vaping Use    Vaping Use: Never used   Substance Use Topics    Alcohol use: Not Currently    Drug use: Never Allergies: Allergies   Allergen Reactions    Iodinated Contrast Media Shortness of Breath       PCP: Jonathon Najera MD    Current Meds:   Previous Medications    ALENDRONATE (FOSAMAX) 70 MG TABLET    Take 70 mg by mouth every seven (7) days. ASCORBIC ACID, VITAMIN C, (VITAMIN C) 250 MG TABLET    Take  by mouth. CLONAZEPAM (KLONOPIN) 0.5 MG TABLET    Take  by mouth two (2) times daily as needed for Anxiety. ELDERBERRY FRUIT (ELDERBERRY PO)    Take  by mouth. LETROZOLE (FEMARA) 2.5 MG TABLET    Take 2.5 mg by mouth daily. LEVOTHYROXINE (SYNTHROID) 50 MCG TABLET    Take  by mouth Daily (before breakfast). LOSARTAN (COZAAR) 25 MG TABLET    Take  by mouth two (2) times a day. METOPROLOL SUCCINATE (TOPROL-XL) 100 MG TABLET    Take  by mouth daily. MICONAZOLE (MONISTAT 7) 2 % VAGINAL CREAM    Insert 1 Applicator into vagina nightly. MULTIVIT-MIN/IRON/FOLIC/LUTEIN (CENTRUM SILVER WOMEN PO)    Take  by mouth. SPIRONOLACTONE (ALDACTONE) 25 MG TABLET    Take  by mouth daily. ZINC PICOLINATE PO    Take  by mouth. REVIEW OF SYSTEMS   Review of Systems  Positives and Pertinent negatives as per HPI. PHYSICAL EXAM     ED Triage Vitals [01/23/23 1420]   ED Encounter Vitals Group      /70      Pulse (Heart Rate) 85      Resp Rate 18      Temp 98 °F (36.7 °C)      Temp src       O2 Sat (%) 100 %      Weight 115 lb      Height 5' 1\"      Physical Exam  Physical Exam  Constitutional:       General: No acute distress. Appearance: Normal appearance. Not toxic-appearing. HENT:      Head: Normocephalic and atraumatic. Nose: Nose normal.      Mouth/Throat:      Mouth: Mucous membranes are moist.   Eyes:      Extraocular Movements: Extraocular movements intact. Pupils: Pupils are equal, round, and reactive to light. Cardiovascular:      Rate and Rhythm: Normal rate. Pulses: Normal pulses. Pulmonary:      Effort: Pulmonary effort is normal.      Breath sounds:  No stridor. Abdominal:      General: Abdomen is flat. There is no distension. Musculoskeletal:         General: Normal range of motion. Cervical back: Normal range of motion and neck supple. Lumbar back: No midline tenderness step-off or deformity. Chest wall: Tenderness to palpation over the lateral inferior ribs. No crepitus felt. Skin:     General: Skin is warm and dry. Capillary Refill: Capillary refill takes less than 2 seconds. Neurological:      General: No focal deficit present. Mental Status: Aert and oriented to person, place, and time. Psychiatric:         Mood and Affect: Mood normal.         Behavior: Behavior normal.     SCREENINGS               No data recorded        LAB, EKG AND DIAGNOSTIC RESULTS   Labs:  No results found for this or any previous visit (from the past 12 hour(s)). EKG: Initial EKG interpreted by me. Shows     Radiologic Studies:  Non-plain film images such as CT, Ultrasound and MRI are read by the radiologist. Plain radiographic images are visualized and preliminarily interpreted by the ED Provider with the below findings:        Interpretation per the Radiologist below, if available at the time of this note:  XR RIBS LT W PA CXR MIN 3 V    Result Date: 1/23/2023  INDICATION:  GLF, L lateral rib pain EXAM: Examination: Frontal view of the chest and 2 views left ribs. Comparison 1/27/2022. FINDINGS: The cardiomediastinal silhouette is normal. The pulmonary vasculature is not engorged. There is no pneumothorax, focal parenchymal opacity or effusion. No displaced rib fractures.      1. No acute abnormality           ED COURSE and DIFFERENTIAL DIAGNOSIS/MDM   Vitals:    Vitals:    01/23/23 1420   BP: 123/70   Pulse: 85   Resp: 18   Temp: 98 °F (36.7 °C)   SpO2: 100%   Weight: 52.2 kg (115 lb)   Height: 5' 1\" (1.549 m)        Patient was given the following medications:  Medications   traMADoL (ULTRAM) tablet 50 mg (50 mg Oral Given 1/23/23 1502) CONSULTS: (Who and What was discussed)  None     Chronic Conditions: None  Social Determinants affecting Dx or Tx: None  Counseling:      Records Reviewed (source and summary of external notes): Nursing Notes    CC/HPI Summary, DDx, ED Course, and Reassessment: Patient with low mechanism fall and resultant pain on the left lateral ribs. We will get screening x-ray. Disposition Considerations (Tests not done, Shared Decision Making, Pt Expectation of Test or Treatment.):  Discussed with patient and family present the x-ray negative for x-rays, however discussed need for close follow-up as well as return precautions. FINAL IMPRESSION     1. Chest wall pain          DISPOSITION/PLAN   Discharged    Discharge Note: The patient is stable for discharge home. The signs, symptoms, diagnosis, and discharge instructions have been discussed, understanding conveyed, and agreed upon. The patient is to follow up as recommended or return to ER should their symptoms worsen. PATIENT REFERRED TO:  Follow-up Information       Follow up With Specialties Details Why Contact Info    Eddy Munoz MD Internal Medicine Physician  As needed [address] [place] [address]  [phone]                DISCHARGE MEDICATIONS:  Current Discharge Medication List        START taking these medications    Details   traMADoL (Ultram) 50 mg tablet Take 1 Tablet by mouth every six (6) hours as needed for Pain for up to 3 days. Max Daily Amount: 200 mg. Qty: 12 Tablet, Refills: 0  Start date: 1/23/2023, End date: 1/26/2023    Associated Diagnoses: Chest wall pain      lidocaine (Lidoderm) 5 % Apply patch to the affected area for 12 hours a day and remove for 12 hours a day.   Qty: 10 Each, Refills: 0  Start date: 1/23/2023               DISCONTINUED MEDICATIONS:  Current Discharge Medication List          I am the Primary Clinician of Record: Mary Colbert MD (electronically signed)    (Please note that parts of this dictation were completed with voice recognition software. Quite often unanticipated grammatical, syntax, homophones, and other interpretive errors are inadvertently transcribed by the computer software. Please disregards these errors.  Please excuse any errors that have escaped final proofreading.)

## 2023-02-16 ENCOUNTER — HOSPITAL ENCOUNTER (OUTPATIENT)
Age: 88
Setting detail: OBSERVATION
Discharge: NURSING, REHAB OR LONG-TERM CARE | End: 2023-02-28
Attending: STUDENT IN AN ORGANIZED HEALTH CARE EDUCATION/TRAINING PROGRAM | Admitting: INTERNAL MEDICINE
Payer: MEDICARE

## 2023-02-16 ENCOUNTER — APPOINTMENT (OUTPATIENT)
Dept: CT IMAGING | Age: 88
End: 2023-02-16
Attending: STUDENT IN AN ORGANIZED HEALTH CARE EDUCATION/TRAINING PROGRAM
Payer: MEDICARE

## 2023-02-16 DIAGNOSIS — Z74.09 IMPAIRED MOBILITY AND ADLS: ICD-10-CM

## 2023-02-16 DIAGNOSIS — R41.0 CONFUSION AND DISORIENTATION: ICD-10-CM

## 2023-02-16 DIAGNOSIS — Z78.9 IMPAIRED MOBILITY AND ADLS: ICD-10-CM

## 2023-02-16 DIAGNOSIS — N17.9 AKI (ACUTE KIDNEY INJURY) (HCC): Primary | ICD-10-CM

## 2023-02-16 DIAGNOSIS — G93.41 ACUTE METABOLIC ENCEPHALOPATHY: ICD-10-CM

## 2023-02-16 LAB
ALBUMIN SERPL-MCNC: 3.4 G/DL (ref 3.5–5)
ALBUMIN/GLOB SERPL: 1 (ref 1.1–2.2)
ALP SERPL-CCNC: 46 U/L (ref 45–117)
ALT SERPL-CCNC: 10 U/L (ref 12–78)
AMMONIA PLAS-SCNC: 16 UMOL/L
ANION GAP SERPL CALC-SCNC: 5 MMOL/L (ref 5–15)
APPEARANCE UR: CLEAR
AST SERPL-CCNC: 20 U/L (ref 15–37)
BACTERIA URNS QL MICRO: NEGATIVE /HPF
BASOPHILS # BLD: 0 K/UL (ref 0–0.1)
BASOPHILS NFR BLD: 1 % (ref 0–1)
BILIRUB SERPL-MCNC: 0.3 MG/DL (ref 0.2–1)
BILIRUB UR QL: NEGATIVE
BUN SERPL-MCNC: 50 MG/DL (ref 6–20)
BUN/CREAT SERPL: 34 (ref 12–20)
CALCIUM SERPL-MCNC: 9.1 MG/DL (ref 8.5–10.1)
CHLORIDE SERPL-SCNC: 114 MMOL/L (ref 97–108)
CO2 SERPL-SCNC: 22 MMOL/L (ref 21–32)
COLOR UR: ABNORMAL
COMMENT, HOLDF: NORMAL
CREAT SERPL-MCNC: 1.49 MG/DL (ref 0.55–1.02)
DIFFERENTIAL METHOD BLD: ABNORMAL
EOSINOPHIL # BLD: 0 K/UL (ref 0–0.4)
EOSINOPHIL NFR BLD: 0 % (ref 0–7)
EPITH CASTS URNS QL MICRO: ABNORMAL /LPF
ERYTHROCYTE [DISTWIDTH] IN BLOOD BY AUTOMATED COUNT: 15.3 % (ref 11.5–14.5)
GLOBULIN SER CALC-MCNC: 3.5 G/DL (ref 2–4)
GLUCOSE BLD STRIP.AUTO-MCNC: 103 MG/DL (ref 65–117)
GLUCOSE SERPL-MCNC: 100 MG/DL (ref 65–100)
GLUCOSE UR STRIP.AUTO-MCNC: NEGATIVE MG/DL
HCT VFR BLD AUTO: 33.6 % (ref 35–47)
HGB BLD-MCNC: 11 G/DL (ref 11.5–16)
HGB UR QL STRIP: NEGATIVE
HYALINE CASTS URNS QL MICRO: ABNORMAL /LPF (ref 0–2)
IMM GRANULOCYTES # BLD AUTO: 0 K/UL
IMM GRANULOCYTES NFR BLD AUTO: 0 %
INR PPP: 1.1 (ref 0.9–1.1)
KETONES UR QL STRIP.AUTO: ABNORMAL MG/DL
LEUKOCYTE ESTERASE UR QL STRIP.AUTO: ABNORMAL
LYMPHOCYTES # BLD: 0.6 K/UL (ref 0.8–3.5)
LYMPHOCYTES NFR BLD: 15 % (ref 12–49)
MAGNESIUM SERPL-MCNC: 2.2 MG/DL (ref 1.6–2.4)
MCH RBC QN AUTO: 30.4 PG (ref 26–34)
MCHC RBC AUTO-ENTMCNC: 32.7 G/DL (ref 30–36.5)
MCV RBC AUTO: 92.8 FL (ref 80–99)
MONOCYTES # BLD: 0.4 K/UL (ref 0–1)
MONOCYTES NFR BLD: 10 % (ref 5–13)
NEUTS SEG # BLD: 3.2 K/UL (ref 1.8–8)
NEUTS SEG NFR BLD: 74 % (ref 32–75)
NITRITE UR QL STRIP.AUTO: NEGATIVE
NRBC # BLD: 0 K/UL (ref 0–0.01)
NRBC BLD-RTO: 0 PER 100 WBC
PH UR STRIP: 5.5 (ref 5–8)
PLATELET # BLD AUTO: 321 K/UL (ref 150–400)
PLATELET COMMENTS,PCOM: ABNORMAL
PMV BLD AUTO: 9.3 FL (ref 8.9–12.9)
POTASSIUM SERPL-SCNC: 4 MMOL/L (ref 3.5–5.1)
PROT SERPL-MCNC: 6.9 G/DL (ref 6.4–8.2)
PROT UR STRIP-MCNC: ABNORMAL MG/DL
PROTHROMBIN TIME: 11.2 SEC (ref 9–11.1)
RBC # BLD AUTO: 3.62 M/UL (ref 3.8–5.2)
RBC #/AREA URNS HPF: ABNORMAL /HPF (ref 0–5)
RBC MORPH BLD: ABNORMAL
SAMPLES BEING HELD,HOLD: NORMAL
SERVICE CMNT-IMP: NORMAL
SODIUM SERPL-SCNC: 141 MMOL/L (ref 136–145)
SP GR UR REFRACTOMETRY: 1.02 (ref 1–1.03)
UR CULT HOLD, URHOLD: NORMAL
UROBILINOGEN UR QL STRIP.AUTO: 1 EU/DL (ref 0.2–1)
WBC # BLD AUTO: 4.2 K/UL (ref 3.6–11)
WBC URNS QL MICRO: ABNORMAL /HPF (ref 0–4)

## 2023-02-16 PROCEDURE — 80053 COMPREHEN METABOLIC PANEL: CPT

## 2023-02-16 PROCEDURE — 82140 ASSAY OF AMMONIA: CPT

## 2023-02-16 PROCEDURE — 85025 COMPLETE CBC W/AUTO DIFF WBC: CPT

## 2023-02-16 PROCEDURE — 36415 COLL VENOUS BLD VENIPUNCTURE: CPT

## 2023-02-16 PROCEDURE — 81001 URINALYSIS AUTO W/SCOPE: CPT

## 2023-02-16 PROCEDURE — 70450 CT HEAD/BRAIN W/O DYE: CPT

## 2023-02-16 PROCEDURE — 83735 ASSAY OF MAGNESIUM: CPT

## 2023-02-16 PROCEDURE — 74011250636 HC RX REV CODE- 250/636: Performed by: STUDENT IN AN ORGANIZED HEALTH CARE EDUCATION/TRAINING PROGRAM

## 2023-02-16 PROCEDURE — 85610 PROTHROMBIN TIME: CPT

## 2023-02-16 PROCEDURE — 99285 EMERGENCY DEPT VISIT HI MDM: CPT

## 2023-02-16 PROCEDURE — 82962 GLUCOSE BLOOD TEST: CPT

## 2023-02-16 RX ORDER — CARBIDOPA AND LEVODOPA 25; 250 MG/1; MG/1
1 TABLET ORAL 3 TIMES DAILY
COMMUNITY

## 2023-02-16 RX ORDER — ASPIRIN 81 MG/1
81 TABLET ORAL DAILY
COMMUNITY

## 2023-02-16 RX ORDER — METOPROLOL SUCCINATE 50 MG/1
50 TABLET, EXTENDED RELEASE ORAL EVERY EVENING
COMMUNITY
End: 2023-02-28

## 2023-02-16 RX ADMIN — SODIUM CHLORIDE 500 ML: 9 INJECTION, SOLUTION INTRAVENOUS at 15:44

## 2023-02-16 NOTE — ED PROVIDER NOTES
Patient is a 83 yo female presenting to the ED feeling poorly for the last few hours. Heart palpitations and confusion worsening since last night. Has had agitation. Walking got bad this morning. LKN more than 24 hours out. Went to Neurology yesterday. Hx of prolapsed bladder. No thinners. Has urinary frequency. Last fall a few months ago. The history is provided by the patient and a relative.       Past Medical History:   Diagnosis Date    Anxiety 6/16/2020    Arthritis     Breast cancer ([facility]     Breast cancer ([facility]     Depression 6/16/2020    Diverticulitis 6/16/2020    GERD (gastroesophageal reflux disease) 6/16/2020    High cholesterol 6/16/2020    Hx of dizziness 6/16/2020    Hypertension 6/16/2020    Primary cancer of lower-inner quadrant of left female breast ([facility] 12/18/2018    breast     Thyroid disease 6/16/2020       Past Surgical History:   Procedure Laterality Date    HX BREAST LUMPECTOMY Left 01/23/2019    with SLNB    HX HEENT  06/25/2018    Cataract    HX HYSTERECTOMY  1976    HX ORTHOPAEDIC  2014    Left knee    HX ORTHOPAEDIC  08/09/2017    Right knee    HX ORTHOPAEDIC  2013    Right Shoulder    HX UROLOGICAL  12/10/2020    cystoscopy          Family History:   Problem Relation Age of Onset    Cancer Mother 80        Esophagas    Heart Disease Father     Prostate Cancer Brother     Breast Cancer Maternal Grandmother 71    Breast Cancer Maternal Cousin 58       Social History     Socioeconomic History    Marital status:      Spouse name: Not on file    Number of children: Not on file    Years of education: Not on file    Highest education level: Not on file   Occupational History    Not on file   Tobacco Use    Smoking status: Never    Smokeless tobacco: Never   Vaping Use    Vaping Use: Never used   Substance and Sexual Activity    Alcohol use: Not Currently    Drug use: Never    Sexual activity: Not Currently     Partners: Male   Other Topics Concern     Service Not Asked Blood Transfusions Not Asked    Caffeine Concern Not Asked    Occupational Exposure Not Asked    Hobby Hazards Not Asked    Sleep Concern Not Asked    Stress Concern Not Asked    Weight Concern Not Asked    Special Diet Not Asked    Back Care Not Asked    Exercise Not Asked    Bike Helmet Not Asked    Seat Belt Not Asked    Self-Exams Not Asked   Social History Narrative    Lives at home who has dementia and she is the caregiver. Social Determinants of Health     Financial Resource Strain: Not on file   Food Insecurity: Not on file   Transportation Needs: Not on file   Physical Activity: Not on file   Stress: Not on file   Social Connections: Not on file   Intimate Partner Violence: Not on file   Housing Stability: Not on file         ALLERGIES: Iodinated contrast media    Review of Systems   Constitutional:  Positive for activity change, appetite change and fatigue. Respiratory:  Negative for shortness of breath. Cardiovascular:  Negative for chest pain. Genitourinary:  Positive for dysuria. Musculoskeletal:  Positive for gait problem. Psychiatric/Behavioral:  Positive for confusion. Vitals:    02/16/23 1537 02/16/23 1541   BP: 94/60 95/61   Pulse: 80    Resp: 16    Temp: 97.8 °F (36.6 °C)    SpO2: 99%    Weight: 50.8 kg (112 lb)    Height: 5' 1\" (1.549 m)             Physical Exam  Vitals and nursing note reviewed. Constitutional:       Appearance: Normal appearance. HENT:      Head: Normocephalic and atraumatic. Right Ear: External ear normal.      Left Ear: External ear normal.   Eyes:      Conjunctiva/sclera: Conjunctivae normal.   Cardiovascular:      Rate and Rhythm: Normal rate and regular rhythm. Pulses: Normal pulses. Heart sounds: Normal heart sounds. Pulmonary:      Effort: Pulmonary effort is normal.      Breath sounds: Normal breath sounds. Chest:      Chest wall: No deformity or tenderness. Abdominal:      Palpations: Abdomen is soft. Tenderness:  There is no abdominal tenderness. Musculoskeletal:         General: No deformity or signs of injury. Normal range of motion. Skin:     General: Skin is warm and dry. Coloration: Skin is not jaundiced. Neurological:      General: No focal deficit present. Mental Status: She is alert. GCS: GCS eye subscore is 4. GCS verbal subscore is 5. GCS motor subscore is 6. Comments: No focal neurologic deficits. Difficulty ambulating, intermittent confusion and delirium   Psychiatric:         Mood and Affect: Mood normal.         Behavior: Behavior normal.        Medical Decision Making  Amount and/or Complexity of Data Reviewed  Independent Historian: guardian  Labs: ordered. Decision-making details documented in ED Course. Radiology: ordered and independent interpretation performed. Decision-making details documented in ED Course. Risk  Decision regarding hospitalization. Diagnosis or treatment significantly limited by social determinants of health. ED Course as of 02/16/23 1809   u Feb 16, 2023   [address] [place] [address] Avenue: 103 [AL]   1637 Creatinine(!): 1.49  Consistent with LESLYE. [AL]   1637 eGFR(!): 34 [AL]   1637 HGB(!): 11.0 [AL]   1652 Ammonia, plasma: 16 [AL]   1652 Magnesium: 2.2 [AL]   2389 Bacteria: Negative [AL]   5763 Nitrites: Negative [AL]   1748 Leukocyte Esterase(!): TRACE [AL]   3843 Blood: Negative [AL]   1748 Bilirubin: Negative [AL]      ED Course User Index  [AL] Alysia Love MD     LABORATORY RESULTS:  Labs Reviewed   CBC WITH AUTOMATED DIFF - Abnormal; Notable for the following components:       Result Value    RBC 3.62 (*)     HGB 11.0 (*)     HCT 33.6 (*)     RDW 15.3 (*)     ABS.  LYMPHOCYTES 0.6 (*)     All other components within normal limits   PROTHROMBIN TIME + INR - Abnormal; Notable for the following components:    Prothrombin time 11.2 (*)     All other components within normal limits   METABOLIC PANEL, COMPREHENSIVE - Abnormal; Notable for the following components:    Chloride 114 (*)     BUN 50 (*)     Creatinine 1.49 (*)     BUN/Creatinine ratio 34 (*)     eGFR 34 (*)     ALT (SGPT) 10 (*)     Albumin 3.4 (*)     A-G Ratio 1.0 (*)     All other components within normal limits   URINALYSIS W/MICROSCOPIC - Abnormal; Notable for the following components:    Protein TRACE (*)     Ketone TRACE (*)     Leukocyte Esterase TRACE (*)     Epithelial cells MODERATE (*)     All other components within normal limits   URINE CULTURE HOLD SAMPLE   MAGNESIUM   AMMONIA   SAMPLES BEING HELD   GLUCOSE, POC       IMAGING RESULTS:  CT HEAD WO CONT   Final Result      No acute intracranial abnormality on this noncontrast head CT. No change. MEDICATIONS GIVEN:  Medications   sodium chloride 0.9 % bolus infusion 500 mL (0 mL IntraVENous IV Completed 2/16/23 6194)       Differential diagnosis: Remote stroke, dizziness, LESLYE, electrolyte abnormality, urinary tract infection, impaired mobility and ADLs, worsening chronic disease, dehydration    ED physician interpretation of imaging: CT head without acute bleeds  ED physician interpretation of laboratory results: Patient's serum creatinine up from baseline of 1 now 1.5. Patient has had decreased urine output. UA without signs of overt infection. Ammonia magnesium within normal limits. No leukocytosis. No significant anemia. MDM: Patient is a 51-year-old female presented to ED with her family for concern of significant worsening mobility, dizziness and dysuria. Patient's work-up shows mild LESLYE. No overt signs of infection. No focal neurologic deficits. However patient is unable to ambulate on her own. This is new over the last few days. Patient is also has intermittent confusion that is consistent with delirium. Based on patient's worsening symptoms, LESLYE and inability to ambulate or perform ADLs hospitalization is indicated for further treatment and evaluation.   Hospitalist was contacted and patient admitted. DISPOSITION: Admitted    Perfect Serve Consult for Admission  6:05 PM    ED Room Number: D29/D29  Patient Name and age:  Sim Situ 82 y.o.  female  Working Diagnosis:   1. LESLYE (acute kidney injury) ([address]    2. Confusion and disorientation    3. Impaired mobility and ADLs        COVID-19 Suspicion:  no  Sepsis present:  no  Reassessment needed: no  Code Status:  Full Code  Readmission: no  Isolation Requirements:  no  Recommended Level of Care:  med/surg  Department: [facility] - [phone]    Other: LESLYE. Delirium and confusion. Dysuria. Inability to ambulate or perform ADLs. May need further inpatient neurology work-up and evaluation. But no acute stroke findings on CT. Patient outside of window for code stroke. No focal neurologic deficits. Precious Ruth.  Tomas Miller MD      Procedures

## 2023-02-16 NOTE — ED NOTES
Patient to disaster area, introduce self as primary RN in this area. Patient accompanied by family member, urine specimen cup given to patient to provide sample as soon as she is able to.

## 2023-02-16 NOTE — ED TRIAGE NOTES
Pt presents with her daughter that states she is AMS sine last week but got worse last night. Feq urination with urgency. Prolapsed bladder.

## 2023-02-17 ENCOUNTER — HOSPITAL ENCOUNTER (OUTPATIENT)
Dept: MRI IMAGING | Age: 88
Setting detail: OBSERVATION
Discharge: HOME | End: 2023-02-17
Attending: PSYCHIATRY & NEUROLOGY
Payer: MEDICARE

## 2023-02-17 PROBLEM — G93.41 ACUTE METABOLIC ENCEPHALOPATHY: Status: ACTIVE | Noted: 2023-02-17

## 2023-02-17 LAB
ANION GAP SERPL CALC-SCNC: 4 MMOL/L (ref 5–15)
BUN SERPL-MCNC: 29 MG/DL (ref 6–20)
BUN/CREAT SERPL: 30 (ref 12–20)
CALCIUM SERPL-MCNC: 8.7 MG/DL (ref 8.5–10.1)
CHLORIDE SERPL-SCNC: 116 MMOL/L (ref 97–108)
CO2 SERPL-SCNC: 20 MMOL/L (ref 21–32)
CREAT SERPL-MCNC: 0.96 MG/DL (ref 0.55–1.02)
GLUCOSE SERPL-MCNC: 75 MG/DL (ref 65–100)
POTASSIUM SERPL-SCNC: 4.4 MMOL/L (ref 3.5–5.1)
SODIUM SERPL-SCNC: 140 MMOL/L (ref 136–145)

## 2023-02-17 PROCEDURE — 97535 SELF CARE MNGMENT TRAINING: CPT | Performed by: OCCUPATIONAL THERAPIST

## 2023-02-17 PROCEDURE — 74011250637 HC RX REV CODE- 250/637: Performed by: INTERNAL MEDICINE

## 2023-02-17 PROCEDURE — 97165 OT EVAL LOW COMPLEX 30 MIN: CPT | Performed by: OCCUPATIONAL THERAPIST

## 2023-02-17 PROCEDURE — G0378 HOSPITAL OBSERVATION PER HR: HCPCS

## 2023-02-17 PROCEDURE — 95816 EEG AWAKE AND DROWSY: CPT | Performed by: PSYCHIATRY & NEUROLOGY

## 2023-02-17 PROCEDURE — 97116 GAIT TRAINING THERAPY: CPT

## 2023-02-17 PROCEDURE — 96372 THER/PROPH/DIAG INJ SC/IM: CPT

## 2023-02-17 PROCEDURE — 97162 PT EVAL MOD COMPLEX 30 MIN: CPT

## 2023-02-17 PROCEDURE — 80048 BASIC METABOLIC PNL TOTAL CA: CPT

## 2023-02-17 PROCEDURE — 36415 COLL VENOUS BLD VENIPUNCTURE: CPT

## 2023-02-17 PROCEDURE — 70551 MRI BRAIN STEM W/O DYE: CPT

## 2023-02-17 PROCEDURE — 99223 1ST HOSP IP/OBS HIGH 75: CPT | Performed by: PSYCHIATRY & NEUROLOGY

## 2023-02-17 PROCEDURE — 74011250636 HC RX REV CODE- 250/636: Performed by: INTERNAL MEDICINE

## 2023-02-17 PROCEDURE — 97530 THERAPEUTIC ACTIVITIES: CPT

## 2023-02-17 PROCEDURE — 74011000250 HC RX REV CODE- 250: Performed by: INTERNAL MEDICINE

## 2023-02-17 RX ORDER — LOSARTAN POTASSIUM 25 MG/1
25 TABLET ORAL DAILY
Status: DISCONTINUED | OUTPATIENT
Start: 2023-02-17 | End: 2023-02-28 | Stop reason: HOSPADM

## 2023-02-17 RX ORDER — ONDANSETRON 4 MG/1
4 TABLET, ORALLY DISINTEGRATING ORAL
Status: DISCONTINUED | OUTPATIENT
Start: 2023-02-17 | End: 2023-02-28 | Stop reason: HOSPADM

## 2023-02-17 RX ORDER — CLONAZEPAM 0.5 MG/1
0.5 TABLET ORAL
Status: DISCONTINUED | OUTPATIENT
Start: 2023-02-17 | End: 2023-02-28 | Stop reason: HOSPADM

## 2023-02-17 RX ORDER — CARBIDOPA AND LEVODOPA 25; 250 MG/1; MG/1
1 TABLET ORAL 3 TIMES DAILY
Status: DISCONTINUED | OUTPATIENT
Start: 2023-02-17 | End: 2023-02-18

## 2023-02-17 RX ORDER — ACETAMINOPHEN 325 MG/1
650 TABLET ORAL
Status: DISCONTINUED | OUTPATIENT
Start: 2023-02-17 | End: 2023-02-28 | Stop reason: HOSPADM

## 2023-02-17 RX ORDER — CIPROFLOXACIN HYDROCHLORIDE 3.5 MG/ML
1 SOLUTION/ DROPS TOPICAL
Status: COMPLETED | OUTPATIENT
Start: 2023-02-19 | End: 2023-02-24

## 2023-02-17 RX ORDER — SODIUM CHLORIDE 0.9 % (FLUSH) 0.9 %
5-40 SYRINGE (ML) INJECTION AS NEEDED
Status: DISCONTINUED | OUTPATIENT
Start: 2023-02-17 | End: 2023-02-28 | Stop reason: HOSPADM

## 2023-02-17 RX ORDER — LETROZOLE 2.5 MG/1
2.5 TABLET, FILM COATED ORAL DAILY
Status: DISCONTINUED | OUTPATIENT
Start: 2023-02-17 | End: 2023-02-28 | Stop reason: HOSPADM

## 2023-02-17 RX ORDER — ACETAMINOPHEN 650 MG/1
650 SUPPOSITORY RECTAL
Status: DISCONTINUED | OUTPATIENT
Start: 2023-02-17 | End: 2023-02-28 | Stop reason: HOSPADM

## 2023-02-17 RX ORDER — LEVOTHYROXINE SODIUM 50 UG/1
50 TABLET ORAL
Status: DISCONTINUED | OUTPATIENT
Start: 2023-02-17 | End: 2023-02-28 | Stop reason: HOSPADM

## 2023-02-17 RX ORDER — ONDANSETRON 2 MG/ML
4 INJECTION INTRAMUSCULAR; INTRAVENOUS
Status: DISCONTINUED | OUTPATIENT
Start: 2023-02-17 | End: 2023-02-28 | Stop reason: HOSPADM

## 2023-02-17 RX ORDER — LIDOCAINE 4 G/100G
1 PATCH TOPICAL EVERY 24 HOURS
Status: DISCONTINUED | OUTPATIENT
Start: 2023-02-17 | End: 2023-02-28 | Stop reason: HOSPADM

## 2023-02-17 RX ORDER — HYDRALAZINE HYDROCHLORIDE 20 MG/ML
20 INJECTION INTRAMUSCULAR; INTRAVENOUS
Status: DISCONTINUED | OUTPATIENT
Start: 2023-02-17 | End: 2023-02-21

## 2023-02-17 RX ORDER — CIPROFLOXACIN HYDROCHLORIDE 3.5 MG/ML
1 SOLUTION/ DROPS TOPICAL
Status: DISCONTINUED | OUTPATIENT
Start: 2023-02-17 | End: 2023-02-17

## 2023-02-17 RX ORDER — ENOXAPARIN SODIUM 100 MG/ML
30 INJECTION SUBCUTANEOUS DAILY
Status: DISCONTINUED | OUTPATIENT
Start: 2023-02-17 | End: 2023-02-19

## 2023-02-17 RX ORDER — POLYETHYLENE GLYCOL 3350 17 G/17G
17 POWDER, FOR SOLUTION ORAL DAILY PRN
Status: DISCONTINUED | OUTPATIENT
Start: 2023-02-17 | End: 2023-02-28 | Stop reason: HOSPADM

## 2023-02-17 RX ORDER — ASPIRIN 81 MG/1
81 TABLET ORAL DAILY
Status: DISCONTINUED | OUTPATIENT
Start: 2023-02-17 | End: 2023-02-28 | Stop reason: HOSPADM

## 2023-02-17 RX ORDER — METOPROLOL SUCCINATE 50 MG/1
50 TABLET, EXTENDED RELEASE ORAL DAILY
Status: DISCONTINUED | OUTPATIENT
Start: 2023-02-17 | End: 2023-02-22

## 2023-02-17 RX ORDER — CARBIDOPA AND LEVODOPA 25; 100 MG/1; MG/1
1 TABLET ORAL 3 TIMES DAILY
Status: DISCONTINUED | OUTPATIENT
Start: 2023-02-17 | End: 2023-02-17

## 2023-02-17 RX ORDER — CIPROFLOXACIN HYDROCHLORIDE 3.5 MG/ML
1 SOLUTION/ DROPS TOPICAL
Status: DISPENSED | OUTPATIENT
Start: 2023-02-17 | End: 2023-02-19

## 2023-02-17 RX ORDER — NITROFURANTOIN (MACROCRYSTALS) 100 MG/1
100 CAPSULE ORAL 2 TIMES DAILY
COMMUNITY
Start: 2023-02-15 | End: 2023-02-28

## 2023-02-17 RX ORDER — SODIUM CHLORIDE, SODIUM LACTATE, POTASSIUM CHLORIDE, CALCIUM CHLORIDE 600; 310; 30; 20 MG/100ML; MG/100ML; MG/100ML; MG/100ML
50 INJECTION, SOLUTION INTRAVENOUS CONTINUOUS
Status: DISCONTINUED | OUTPATIENT
Start: 2023-02-17 | End: 2023-02-21

## 2023-02-17 RX ORDER — SODIUM CHLORIDE 0.9 % (FLUSH) 0.9 %
5-40 SYRINGE (ML) INJECTION EVERY 8 HOURS
Status: DISCONTINUED | OUTPATIENT
Start: 2023-02-17 | End: 2023-02-28 | Stop reason: HOSPADM

## 2023-02-17 RX ADMIN — CIPROFLOXACIN 1 DROP: 3 SOLUTION OPHTHALMIC at 14:59

## 2023-02-17 RX ADMIN — ASPIRIN 81 MG: 81 TABLET, COATED ORAL at 08:44

## 2023-02-17 RX ADMIN — SODIUM CHLORIDE, PRESERVATIVE FREE 10 ML: 5 INJECTION INTRAVENOUS at 16:17

## 2023-02-17 RX ADMIN — SODIUM CHLORIDE, POTASSIUM CHLORIDE, SODIUM LACTATE AND CALCIUM CHLORIDE 125 ML/HR: 600; 310; 30; 20 INJECTION, SOLUTION INTRAVENOUS at 12:40

## 2023-02-17 RX ADMIN — SODIUM CHLORIDE, POTASSIUM CHLORIDE, SODIUM LACTATE AND CALCIUM CHLORIDE 125 ML/HR: 600; 310; 30; 20 INJECTION, SOLUTION INTRAVENOUS at 03:14

## 2023-02-17 RX ADMIN — CARBIDOPA AND LEVODOPA 1 TABLET: 25; 250 TABLET ORAL at 16:20

## 2023-02-17 RX ADMIN — SODIUM CHLORIDE, POTASSIUM CHLORIDE, SODIUM LACTATE AND CALCIUM CHLORIDE 125 ML/HR: 600; 310; 30; 20 INJECTION, SOLUTION INTRAVENOUS at 23:04

## 2023-02-17 RX ADMIN — CLONAZEPAM 0.5 MG: 0.5 TABLET ORAL at 22:47

## 2023-02-17 RX ADMIN — CIPROFLOXACIN 1 DROP: 3 SOLUTION OPHTHALMIC at 16:20

## 2023-02-17 RX ADMIN — ENOXAPARIN SODIUM 30 MG: 100 INJECTION SUBCUTANEOUS at 08:43

## 2023-02-17 RX ADMIN — CARBIDOPA AND LEVODOPA 1 TABLET: 25; 250 TABLET ORAL at 23:12

## 2023-02-17 RX ADMIN — CIPROFLOXACIN 1 DROP: 3 SOLUTION OPHTHALMIC at 11:06

## 2023-02-17 RX ADMIN — LOSARTAN POTASSIUM 25 MG: 50 TABLET, FILM COATED ORAL at 08:44

## 2023-02-17 RX ADMIN — SODIUM CHLORIDE, PRESERVATIVE FREE 10 ML: 5 INJECTION INTRAVENOUS at 22:58

## 2023-02-17 RX ADMIN — SODIUM CHLORIDE, PRESERVATIVE FREE 10 ML: 5 INJECTION INTRAVENOUS at 08:45

## 2023-02-17 RX ADMIN — LEVOTHYROXINE SODIUM 50 MCG: 0.05 TABLET ORAL at 08:44

## 2023-02-17 RX ADMIN — CIPROFLOXACIN 1 DROP: 3 SOLUTION OPHTHALMIC at 22:49

## 2023-02-17 RX ADMIN — CARBIDOPA AND LEVODOPA 1 TABLET: 25; 100 TABLET ORAL at 09:52

## 2023-02-17 NOTE — H&P
Hospitalist Admission Note    NAME:  Karan North   :  1935   MRN:  [id number]     Date/Time:  2023 1:54 AM    Patient PCP: Crystal Ding MD  ________________________________________________________________________    Given the patient's current clinical presentation, I have a high level of concern for decompensation if discharged from the emergency department. Complex decision making was performed, which includes reviewing the patient's available past medical records, laboratory results, and x-ray films. My assessment of this patient's clinical condition and my plan of care is as follows. Assessment / Plan:    Acute Metabolic Encephalopathy:    The patient is brought in by her daughter due to concerns for worsening mental status    VSS  WBC normal, Hg 11  BUN 50, Cr 1.49 (21 Cr 0.97)  UA mod epithelial cells, trace LE  CT Head - There is movement artifact but she has generalized volume loss w/ enlarged ventricles and chronic microvascular ischemic disease. No acute infarct    Obs patient and cont to monitor. Suspect that her symptoms are most likely due to progressive cognitive decline w/ Parkinson's. She has established w/ Dr. Ernestina Maria and was evaluated yesterday. Low suspicion for UTI  Cont w/ Sinemet 25/100mg tid  Consult Neurology  PT for safety evaluation    2. LESLYE:    Cont w/ IVF and repeat BMP    3. HTN:    Cont w/ Toprol XL 50mg daily and Cozaar 25mg daily    4. Hx Breast CA:    Cont w/ Letrozole 2.5mg daily    5. Anxiety:    Cont w/ Clonazepam 0.5mg bid    6. Hypothyroid:    Cont w/ Synthroid 50mcg daily    Body mass index is 21.16 kg/m².:  18.5 - 24.9:  Normal weight    I have personally reviewed the radiographs, laboratory data in Epic and decisions and statements above are based partially on this personal interpretation.     Code Status: Full Code  Surrogate Decision Maker  Christ Fish (daughter)  [phone]    Prophylaxis:  Lovenox SQ     Subjective:   CHIEF COMPLAINT: I dont know    HISTORY OF PRESENT ILLNESS:       The patient is a 81 y/o AA F w/ PMH Parkinson's disease who is brought in by her daughter due to concerns of worsening altered mental status, confusion and not answering questions appropriately. This has become worse since this past Wednesday. At baseline she is able to ambulate w/ a cane and is conversational but can take time answering questions appropriately. The patient denies any fever, chills or night sweats. She has no chest pain, palpitations, coughing, wheezing or dyspnea. She has no abdominal pain, nausea or vomiting. The daughter notes concerns for palpitations, dyspnea on exertion, insomnia, increased anxiety and changes noted above. She has seen Dr. Pepito Chow of Neurology. On my evaluation she is awake, alert and oriented x3 but did accidentally notify her daughter as her mother but eventually corrected herself after redirection. The patient currently lives at home by herself but does have home health for assistance.     Past Medical History:   Diagnosis Date    Anxiety 6/16/2020    Arthritis     Breast cancer ([address]     Breast cancer ([address]     Depression 6/16/2020    Diverticulitis 6/16/2020    GERD (gastroesophageal reflux disease) 6/16/2020    High cholesterol 6/16/2020    Hx of dizziness 6/16/2020    Hypertension 6/16/2020    Primary cancer of lower-inner quadrant of left female breast ([address] 12/18/2018    breast     Thyroid disease 6/16/2020      Past Surgical History:   Procedure Laterality Date    HX BREAST LUMPECTOMY Left 01/23/2019    with SLNB    HX HEENT  06/25/2018    Cataract    HX HYSTERECTOMY  1976    HX ORTHOPAEDIC  2014    Left knee    HX ORTHOPAEDIC  08/09/2017    Right knee    HX ORTHOPAEDIC  2013    Right Shoulder    HX UROLOGICAL  12/10/2020    cystoscopy      Social History     Tobacco Use    Smoking status: Never    Smokeless tobacco: Never   Substance Use Topics    Alcohol use: Not Currently      Family History Problem Relation Age of Onset    Cancer Mother 80        Esophagas    Heart Disease Father     Prostate Cancer Brother     Breast Cancer Maternal Grandmother 71    Breast Cancer Maternal Cousin 58        Allergies   Allergen Reactions    Iodinated Contrast Media Shortness of Breath        Prior to Admission medications    Medication Sig Start Date End Date Taking? Authorizing Provider   carbidopa-levodopa (SINEMET)  mg per tablet Take 1 Tablet by mouth three (3) times daily. Yes Other, MD Umesh   aspirin delayed-release 81 mg tablet Take  by mouth daily. Yes Other, MD Umesh   metoprolol succinate (TOPROL-XL) 50 mg XL tablet Take 50 mg by mouth daily. Pt takes this in the  evening at supper time. Yes Other, MD Umesh   letrozole (FEMARA) 2.5 mg tablet Take 2.5 mg by mouth daily. Yes Provider, Historical   multivit-min/iron/folic/lutein (CENTRUM SILVER WOMEN PO) Take 2 Tablets by mouth daily. Yes Provider, Historical   elderberry fruit (ELDERBERRY PO) Take 1 Tablet by mouth two (2) times a day. Yes Provider, Historical   clonazePAM (KlonoPIN) 0.5 mg tablet Take  by mouth two (2) times daily as needed for Anxiety. Yes Provider, Historical   losartan (COZAAR) 25 mg tablet Take 25 mg by mouth daily. Yes Provider, Historical   levothyroxine (SYNTHROID) 50 mcg tablet Take 50 mcg by mouth Daily (before breakfast). Yes Provider, Historical   ascorbic acid, vitamin C, (VITAMIN C) 250 mg tablet Take  by mouth. Yes Provider, Historical   lidocaine (Lidoderm) 5 % Apply patch to the affected area for 12 hours a day and remove for 12 hours a day. Patient not taking: Reported on 2/16/2023 1/23/23   Rachelle Dutta MD   spironolactone (ALDACTONE) 25 mg tablet Take  by mouth daily. Patient not taking: Reported on 2/16/2023    Provider, Historical   miconazole (Monistat 7) 2 % vaginal cream Insert 1 Applicator into vagina nightly.   Patient not taking: Reported on 2/16/2023 4/9/21   Dulce Kaplan, MD   ZINC PICOLINATE PO Take  by mouth. Patient not taking: Reported on 2/16/2023    Provider, Historical   alendronate (FOSAMAX) 70 mg tablet Take 70 mg by mouth every seven (7) days. Patient not taking: Reported on 2/16/2023    Provider, Historical   metoprolol succinate (TOPROL-XL) 100 mg tablet Take  by mouth daily.   Patient not taking: Reported on 2/16/2023 2/16/23   Provider, Historical       REVIEW OF SYSTEMS:  See HPI for details  General: negative for fever, chills, sweats, weakness, weight loss  Eyes: negative for blurred vision, eye pain, loss of vision, diplopia  Ear Nose and Throat: negative for rhinorrhea, pharyngitis, otalgia, tinnitus, speech or swallowing difficulties  Respiratory:  negative for pleuritic pain, cough, sputum production, wheezing, SOB, JOSHUA  Cardiology:  negative for chest pain, palpitations, orthopnea, PND, edema, syncope   Gastrointestinal: negative for abdominal pain, N/V, dysphagia, change in bowel habits, bleeding  Genitourinary: negative for frequency, urgency, dysuria, hematuria, incontinence  Muskuloskeletal : negative for arthralgia, myalgia  Hematology: negative for easy bruising, bleeding, lymphadenopathy  Dermatological: negative for rash, ulceration, mole change, new lesion  Endocrine: negative for hot flashes or polydipsia  Neurological: negative for headache, dizziness, confusion, focal weakness, paresthesia, memory loss, gait disturbance  Psychological: negative for anxiety, depression, agitation    Objective:   VITALS:    Visit Vitals  BP (!) 152/79 (BP 1 Location: Left arm, BP Patient Position: At rest)   Pulse 81   Temp 98.6 °F (37 °C)   Resp 20   Ht 5' 1\" (1.549 m)   Wt 50.8 kg (112 lb)   SpO2 99%   BMI 21.16 kg/m²     PHYSICAL EXAM:    Physical Exam:    Gen: Well-developed, well-nourished, in no acute distress  HEENT:  Pink conjunctivae, PERRL, hearing intact to voice, moist mucous membranes  Neck: Supple, without masses, thyroid non-tender  Resp: No accessory muscle use, clear breath sounds without wheezes rales or rhonchi  Card: No murmurs, normal S1, S2 without thrills, bruits or peripheral edema  Abd:  Soft, non-tender, non-distended, normoactive bowel sounds are present, no palpable organomegaly and no detectable hernias  Lymph:  No cervical or inguinal adenopathy  Musc: No cyanosis or clubbing  Skin: No rashes or ulcers, skin turgor is good  Neuro:  Cranial nerves are grossly intact, no focal motor weakness, follows commands appropriately  Psych:  oriented to person, place and time, alert    RUE proximal 5/5  RUE distal 5/5  LUE proximal 5/5  LUE distal 5/5    RLE proximal 5/5  RLE distal 5/5  LLE proximal 5/5  LLE distal 5/5  _______________________________________________________________________  Care Plan discussed with:  Pt's condition, Imaging findings, Lab findings, Assessment, and Care Plan discussed with: Patient  _______________________________________________________________________    Probable disposition:  Home  ________________________________________________________________________    Comments   >50% of visit spent in counseling and coordination of care  Chart reviewed  Discussion with patient and/or family and questions answered     ________________________________________________________________________  Signed: Carson Clancy MD        Procedures: see electronic medical records for all procedures/Xrays and details which were not copied into this note but were reviewed prior to creation of Plan.     LAB DATA REVIEWED:    Recent Results (from the past 24 hour(s))   GLUCOSE, POC    Collection Time: 02/16/23  3:40 PM   Result Value Ref Range    Glucose (POC) 103 65 - 117 mg/dL    Performed by Anabell Hernandez    CBC WITH AUTOMATED DIFF    Collection Time: 02/16/23  3:43 PM   Result Value Ref Range    WBC 4.2 3.6 - 11.0 K/uL    RBC 3.62 (L) 3.80 - 5.20 M/uL    HGB 11.0 (L) 11.5 - 16.0 g/dL    HCT 33.6 (L) 35.0 - 47.0 %    MCV 92.8 80.0 - 99.0 FL    MCH 30.4 26.0 - 34.0 PG    MCHC 32.7 30.0 - 36.5 g/dL    RDW 15.3 (H) 11.5 - 14.5 %    PLATELET 578 921 - 162 K/uL    MPV 9.3 8.9 - 12.9 FL    NRBC 0.0 0  WBC    ABSOLUTE NRBC 0.00 0.00 - 0.01 K/uL    NEUTROPHILS 74 32 - 75 %    LYMPHOCYTES 15 12 - 49 %    MONOCYTES 10 5 - 13 %    EOSINOPHILS 0 0 - 7 %    BASOPHILS 1 0 - 1 %    IMMATURE GRANULOCYTES 0 %    ABS. NEUTROPHILS 3.2 1.8 - 8.0 K/UL    ABS. LYMPHOCYTES 0.6 (L) 0.8 - 3.5 K/UL    ABS. MONOCYTES 0.4 0.0 - 1.0 K/UL    ABS. EOSINOPHILS 0.0 0.0 - 0.4 K/UL    ABS. BASOPHILS 0.0 0.0 - 0.1 K/UL    ABS. IMM. GRANS. 0.0 K/UL    DF MANUAL      PLATELET COMMENTS Large Platelets      RBC COMMENTS ANISOCYTOSIS  1+       PROTHROMBIN TIME + INR    Collection Time: 02/16/23  3:43 PM   Result Value Ref Range    INR 1.1 0.9 - 1.1      Prothrombin time 11.2 (H) 9.0 - 25.5 sec   METABOLIC PANEL, COMPREHENSIVE    Collection Time: 02/16/23  3:43 PM   Result Value Ref Range    Sodium 141 136 - 145 mmol/L    Potassium 4.0 3.5 - 5.1 mmol/L    Chloride 114 (H) 97 - 108 mmol/L    CO2 22 21 - 32 mmol/L    Anion gap 5 5 - 15 mmol/L    Glucose 100 65 - 100 mg/dL    BUN 50 (H) 6 - 20 MG/DL    Creatinine 1.49 (H) 0.55 - 1.02 MG/DL    BUN/Creatinine ratio 34 (H) 12 - 20      eGFR 34 (L) >60 ml/min/1.73m2    Calcium 9.1 8.5 - 10.1 MG/DL    Bilirubin, total 0.3 0.2 - 1.0 MG/DL    ALT (SGPT) 10 (L) 12 - 78 U/L    AST (SGOT) 20 15 - 37 U/L    Alk. phosphatase 46 45 - 117 U/L    Protein, total 6.9 6.4 - 8.2 g/dL    Albumin 3.4 (L) 3.5 - 5.0 g/dL    Globulin 3.5 2.0 - 4.0 g/dL    A-G Ratio 1.0 (L) 1.1 - 2.2     MAGNESIUM    Collection Time: 02/16/23  3:43 PM   Result Value Ref Range    Magnesium 2.2 1.6 - 2.4 mg/dL   AMMONIA    Collection Time: 02/16/23  3:43 PM   Result Value Ref Range    Ammonia, plasma 16 <32 UMOL/L   SAMPLES BEING HELD    Collection Time: 02/16/23  3:43 PM   Result Value Ref Range    SAMPLES BEING HELD SST. RED     COMMENT        Add-on orders for these samples will be processed based on acceptable specimen integrity and analyte stability, which may vary by analyte. URINALYSIS W/MICROSCOPIC    Collection Time: 02/16/23  5:27 PM   Result Value Ref Range    Color YELLOW/STRAW      Appearance CLEAR CLEAR      Specific gravity 1.023 1.003 - 1.030      pH (UA) 5.5 5.0 - 8.0      Protein TRACE (A) NEG mg/dL    Glucose Negative NEG mg/dL    Ketone TRACE (A) NEG mg/dL    Bilirubin Negative NEG      Blood Negative NEG      Urobilinogen 1.0 0.2 - 1.0 EU/dL    Nitrites Negative NEG      Leukocyte Esterase TRACE (A) NEG      WBC 0-4 0 - 4 /hpf    RBC 0-5 0 - 5 /hpf    Epithelial cells MODERATE (A) FEW /lpf    Bacteria Negative NEG /hpf    Hyaline cast 0-2 0 - 2 /lpf   URINE CULTURE HOLD SAMPLE    Collection Time: 02/16/23  5:27 PM    Specimen: Urine   Result Value Ref Range    Urine culture hold        Urine on hold in Microbiology dept for 2 days. If unpreserved urine is submitted, it cannot be used for addtional testing after 24 hours, recollection will be required.

## 2023-02-17 NOTE — ED NOTES
0800: pt assisted to bedside commode with assistance of daughter. 0900: pt resting with eyes closed. MAR message sent to pharmacy regarding rescheduling pt's metoprolol and femara. 1000: pt resting with eyes closed. Daughter at bedside. 1110: pt reporting to daughter that she needs to urinate, pt up with assist to bedside commode. Pt not following commands of sitting on bedside commode but rather is almost marching in place.  Daughter states this has been happening recently where pt is having difficult following commands

## 2023-02-17 NOTE — PROGRESS NOTES
Problem: Mobility Impaired (Adult and Pediatric)  Goal: *Acute Goals and Plan of Care (Insert Text)  Description: FUNCTIONAL STATUS PRIOR TO ADMISSION: Prior to 1 week ago, pt was amb with and without a RW and performing ADLs with supervision. Pt has aides from 10a-6p daily per daughter. Pt's daughter is in town from [place] [id number] to assist pt for time being. Pt's  has advanced dementia and resides in a [facility]. HOME SUPPORT: The patient lived alone with aides to provide assistance    Physical Therapy Goals  Initiated 2/17/2023  1. Patient will move from supine to sit and sit to supine , scoot up and down, and roll side to side in bed with supervision/set-up within 7 day(s). 2.  Patient will transfer from bed to chair and chair to bed with supervision/set-up using the least restrictive device within 7 day(s). 3.  Patient will perform sit to stand with supervision/set-up within 7 day(s). 4.  Patient will ambulate with supervision/set-up for 50 feet with the least restrictive device within 7 day(s). 5.  Patient will ascend/descend 5 stairs with 1-2 handrail(s) with minimal assistance/contact guard assist within 7 day(s). Outcome: Not Met   PHYSICAL THERAPY EVALUATION  Patient: Jenny Nur (5 y.o. female)  Date: 2/17/2023  Primary Diagnosis: Acute metabolic encephalopathy [C32.62]       Precautions:   Fall, Skin      ASSESSMENT  Based on the objective data described below, the patient presents with bradykinesia, rigidity, delayed initiation, impaired balance, high risk of falls, shuffled gait with poor step clearance and overall significantly reduced functional mobility from her baseline in the setting of hospital admission for acute metabolic encephalopathy. MRI results pending at time of evaluation, CT head -. PMH notable for PD and breast CA. Patient today requires significantly extended time for functional mobility despite receiving her carbidopa/levodopa this morning.  She is due for her next does 1 hour following PT session. She requires significant assistance for bed mobility, modA and additional time as well as min to modA for sit <> stand transfers with RW. Patient takes approximately 5 minutes to walk 5ft to bedside commode with David and RW. She is unable to initiate donning/doffing her brief for toileting. At baseline patient is ambulatory without AD. Recommend IPR upon d/c. Current Level of Function Impacting Discharge (mobility/balance): David and significant additional time    Functional Outcome Measure: The patient scored Total Score: 5/28 on the Tinetti outcome measure which is indicative of high fall risk. Other factors to consider for discharge: patient's daughter is visiting from [place] [id number] to assist her mother     Patient will benefit from skilled therapy intervention to address the above noted impairments. PLAN :  Recommendations and Planned Interventions: bed mobility training, transfer training, gait training, therapeutic exercises, neuromuscular re-education, patient and family training/education, and therapeutic activities      Frequency/Duration: Patient will be followed by physical therapy:  5 times a week to address goals. Recommendation for discharge: (in order for the patient to meet his/her long term goals)  Therapy 3 hours per day 5-7 days per week    This discharge recommendation:  Has been made in collaboration with the attending provider and/or case management    IF patient discharges home will need the following DME: to be determined (TBD)         SUBJECTIVE:   Patient stated just tired.     OBJECTIVE DATA SUMMARY:   Patient received supine in bed and was agreeable to participate in PT session. Patient was cleared by nursing to participate in PT session.      Vitals:    02/17/23 0842 02/17/23 1459   BP: (!) 143/74 133/69   BP 1 Location: Left arm Left arm   BP Patient Position: Sitting Sitting   Pulse: 73 78   Temp: 98.8 °F (37.1 °C) 99 °F (37.2 °C)   Resp: 18 16 Height:     Weight:     SpO2: 100% 99%         HISTORY:    Past Medical History:   Diagnosis Date    Anxiety 6/16/2020    Arthritis     Bre[facility] ([facility] [address]     Breast cancer ([facility] [address]     Depression 6/16/2020    Diverticulitis 6/16/2020    GERD (gastroesophageal reflux disease) 6/16/2020    High cholesterol 6/16/2020    Hx of dizziness 6/16/2020    Hypertension 6/16/2020    Primary cancer of lower-inner quadrant of left female breast ([facility] [address] 12/18/2018    breast     Thyroid disease 6/16/2020     Past Surgical History:   Procedure Laterality Date    HX BREAST LUMPECTOMY Left 01/23/2019    with SLNB    HX HEENT  06/25/2018    Cataract    HX HYSTERECTOMY  1976    HX ORTHOPAEDIC  2014    Left knee    HX ORTHOPAEDIC  08/09/2017    Right knee    HX ORTHOPAEDIC  2013    Right Shoulder    HX UROLOGICAL  12/10/2020    cystoscopy        Personal factors and/or comorbidities impacting plan of care: none additional    Home Situation  Home Environment: Private residence  # Steps to Enter: 2  Rails to Enter: Yes  Hand Rails : Bilateral  One/Two Story Residence: One story  Living Alone: Yes  Support Systems: Child(baldev), Caregiver/Home Care Staff (pt had aides 10a-6p; daughter now here from [place] [phone] providing all care.   Pt's  with advanced dementia and resides in long-term at [facility])  Patient Expects to be Discharged to[de-identified] Rehab hospital/unit acute (if denied for IPR then [facility] with 24/7 assist.  Pt and daughter not interested in SNF.)  Current DME Used/Available at Home: Carmen Ishihara, straight, Walker, rolling, Wheelchair, Shower chair, Grab bars  Tub or Shower Type: Shower    EXAMINATION/PRESENTATION/DECISION MAKING:   Critical Behavior:  Neurologic State: Alert, Confused  Orientation Level: Oriented to person, Oriented to situation  Cognition: Decreased attention/concentration, Decreased command following, Poor safety awareness  Safety/Judgement: Decreased awareness of environment, Decreased awareness of need for assistance, Decreased awareness of need for safety, Decreased insight into deficits, Fall prevention  Hearing: Auditory  Auditory Impairment: None  Skin:  all observed intact   Edema: none apparent   Range Of Motion:  AROM: Generally decreased, functional           PROM: Generally decreased, functional           Strength:    Strength: Generally decreased, functional                    Tone & Sensation:   Tone: Abnormal                              Coordination:  Coordination: Grossly decreased, non-functional  Vision:   Acuity: Able to read employee name badge without difficulty  Functional Mobility:  Bed Mobility:     Supine to Sit: Moderate assistance  Sit to Supine: Moderate assistance  Scooting: Moderate assistance  Transfers:  Sit to Stand: Minimum assistance; Moderate assistance  Stand to Sit: Minimum assistance        Bed to Chair: Minimum assistance; Additional time              Balance:   Sitting: Intact  Standing: Impaired; With support  Standing - Static: Fair  Standing - Dynamic : Fair  Ambulation/Gait Training:  Distance (ft): 5 Feet (ft) (+5)  Assistive Device: Gait belt;Walker, rolling  Ambulation - Level of Assistance: Minimal assistance;Contact guard assistance        Gait Abnormalities: Decreased step clearance;Shuffling gait;Trunk sway increased        Base of Support: Narrowed     Speed/Aparna: Shuffled;Pace decreased (<100 feet/min); Delayed  Step Length: Right shortened;Left shortened                     Stairs:               Therapeutic Exercises:       Functional Measure:  Tinetti test:    Sitting Balance: 1  Arises: 0  Attempts to Rise: 0  Immediate Standing Balance: 0  Standing Balance: 0  Nudged: 0  Eyes Closed: 1  Turn 360 Degrees - Continuous/Discontinuous: 0  Turn 360 Degrees - Steady/Unsteady: 1  Sitting Down: 1  Balance Score: 4 Balance total score  Indication of Gait: 0  R Step Length/Height: 0  L Step Length/Height: 0  R Foot Clearance: 0  L Foot Clearance: 0  Step Symmetry: 1  Step Continuity: 0  Path: 0  Trunk: 0  Walking Time: 0  Gait Score: 1 Gait total score  Total Score: 5/28 Overall total score         Tinetti Tool Score Risk of Falls  <19 = High Fall Risk  19-24 = Moderate Fall Risk  25-28 = Low Fall Risk  Phyllis ME. Performance-Oriented Assessment of Mobility Problems in Elderly Patients. [facility] 66; [id number]. (Scoring Description: PT Bulletin Feb. 10, 1993)    Older adults: Mehreen Nevarez et al, 2009; n = 1000 [facility] elderly evaluated with ABC, OPAL, ADL, and IADL)  · Mean OPAL score for males aged 69-68 years = 26.21(3.40)  · Mean OPAL score for females age 69-68 years = 25.16(4.30)  · Mean OPAL score for males over 80 years = 23.29(6.02)  · Mean OPAL score for females over 80 years = 17.20(8.32)        Physical Therapy Evaluation Charge Determination   History Examination Presentation Decision-Making   MEDIUM  Complexity : 1-2 comorbidities / personal factors will impact the outcome/ POC  MEDIUM Complexity : 3 Standardized tests and measures addressing body structure, function, activity limitation and / or participation in recreation  MEDIUM Complexity : Evolving with changing characteristics  MEDIUM Complexity : FOTO score of 26-74      Based on the above components, the patient evaluation is determined to be of the following complexity level: MEDIUM    Pain Rating:  Patient without reports of pain during therapy      Activity Tolerance:   Fair and requires rest breaks      After treatment patient left in no apparent distress:   Supine in bed, Call bell within reach, Bed / chair alarm activated, and Side rails x 3    COMMUNICATION/EDUCATION:   The patients plan of care was discussed with: Occupational therapist and Registered nurse. Fall prevention education was provided and the patient/caregiver indicated understanding. and Patient/family have participated as able in goal setting and plan of care.     Thank you for this referral.  Mariah Ivy PT, DPT   Time Calculation: 32 mins

## 2023-02-17 NOTE — PROCEDURES
[facility]   Electroencephalogram  Report    Procedure ID: [id number] Procedure Date: 2/17/2023   Patient Name: Everton Chew YOB: 1935   Procedure Type: Routine Medical Record No: [id number]       An EEG is requested in this 82-year-old lady to evaluate for epileptiform abnormalities. Her medications are said to include Sinemet, Klonopin, Toprol, Zofran, hydralazine    This tracing is obtained while the patient is noted to be in the awake state. During wakefulness the background consists of diffuse mixed alpha and theta range frequencies. No definitive posterior alpha rhythm is seen. The tracing is reactive. Hyperventilation is not performed. Intermittent photic stimulation is not performed. Sleep architecture is not seen. Interpretation  This EEG recorded during the awake state is abnormal secondary to diffuse slowing and disorganization of the background rhythms indicative of a mild degree of bihemispheric dysfunction as is commonly seen with an encephalopathy nonspecific as to cause. This pattern may also be seen in chronic neurodegenerative disorder such as dementia and again clinical correlation is recommended.         Tejinder Fields MD

## 2023-02-17 NOTE — ED NOTES
Daughter at bedside and  she is taking care of her mother primarily. She is resting at this time. Waiting for admitting physician to see her now.

## 2023-02-17 NOTE — ED NOTES
Patient's daughter is going home for a bit, states she will be back soon and asks that we call her with any concerns or updates. Daughter is Marianna Temple [phone].

## 2023-02-17 NOTE — PROGRESS NOTES
Problem: Self Care Deficits Care Plan (Adult)  Goal: *Acute Goals and Plan of Care (Insert Text)  Description: FUNCTIONAL STATUS PRIOR TO ADMISSION: Prior to 1 week ago, pt was amb with and without a RW and performing ADLs with supervision. Pt has aides from 10a-6p daily per daughter. Pt's daughter is in town from [place] [id number] to assist pt for time being. Pt's  has advanced dementia and resides in a [facility]. HOME SUPPORT: The patient lived alone with aides to provide assistance. Occupational Therapy Goals  Initiated 2/17/2023  1. Patient will perform grooming standing at sink with minimal assistance/contact guard assist within 7 day(s). 2.  Patient will perform lower body dressing with minimal assistance/contact guard assist within 7 day(s). 3.  Patient will perform upper body dressing with minimal assistance/contact guard assist within 7 day(s). 4.  Patient will perform toilet transfers with contact guard assist within 7 day(s). 5.  Patient will perform all aspects of toileting with minimal assistance/contact guard assist within 7 day(s). 6.  Patient will participate in upper extremity therapeutic exercise/activities with supervision/set-up for 10 minutes within 7 day(s). 7.  Patient will utilize energy conservation techniques during functional activities with verbal and visual cues within 7 day(s). Outcome: Progressing Towards Goal     OCCUPATIONAL THERAPY EVALUATION  Patient: Everton Chew (26 y.o. female)  Date: 2/17/2023  Primary Diagnosis: Acute metabolic encephalopathy [F31.43]       Precautions: Fall, Skin    ASSESSMENT  Based on the objective data described below, the patient presents with impaired cognition, bradykinesia, impaired task initiation, decreased strength, endurance, mobility, coordination, balance and safety following admission with acute metabolic encephalopathy. Pt with baseline Parkinson's Disease.   Pt requires increased time and total A for ADLs and min A for functional mobility mainly due to poor initiation of tasks. Prior to her rapid decline in cognition over 1 week, she was amb with and without a RW and performing ADLs with supervision. Recommend IPR at discharge. If pt is denied, then will require [facility] therapy and 24/7 assist for safety. Pt and family are not interested in SNF. Current Level of Function Impacting Discharge (ADLs/self-care): increased time and total A for ADLs and min A for functional mobility mainly due to poor initiation of tasks    Functional Outcome Measure: The patient scored Total: 30/100 on the Barthel Index outcome measure. Other factors to consider for discharge: see above     Patient will benefit from skilled therapy intervention to address the above noted impairments. PLAN :  Recommendations and Planned Interventions: self care training, functional mobility training, therapeutic exercise, balance training, visual/perceptual training, therapeutic activities, cognitive retraining, endurance activities, neuromuscular re-education, patient education, home safety training, and family training/education    Frequency/Duration: Patient will be followed by occupational therapy 5 times a week to address goals. Recommendation for discharge: (in order for the patient to meet his/her long term goals)  Therapy 3 hours per day 5-7 days per week    This discharge recommendation:  Has been made in collaboration with the attending provider and/or case management    IF patient discharges home will need the following DME: TBD       SUBJECTIVE:   Patient with minimal verbalizations.     OBJECTIVE DATA SUMMARY:   HISTORY:   Past Medical History:   Diagnosis Date    Anxiety 6/16/2020    Arthritis     [facility] ([facility] [address]     Breast cancer ([facility] [address]     Depression 6/16/2020    Diverticulitis 6/16/2020    GERD (gastroesophageal reflux disease) 6/16/2020    High cholesterol 6/16/2020    Hx of dizziness 6/16/2020    Hypertension 6/16/2020    Primary cancer of lower-inner quadrant of left female breast ([facility] [address] 12/18/2018    breast     Thyroid disease 6/16/2020     Past Surgical History:   Procedure Laterality Date    HX BREAST LUMPECTOMY Left 01/23/2019    with SLNB    HX HEENT  06/25/2018    Cataract    HX HYSTERECTOMY  1976    HX ORTHOPAEDIC  2014    Left knee    HX ORTHOPAEDIC  08/09/2017    Right knee    HX ORTHOPAEDIC  2013    Right Shoulder    HX UROLOGICAL  12/10/2020    cystoscopy        Expanded or extensive additional review of patient history:     Home Situation  Home Environment: Private residence  # Steps to Enter: 2  Rails to Enter: Yes  Hand Rails : Bilateral  One/Two Story Residence: One story  Living Alone: Yes  Support Systems: Child(baldev), Caregiver/Home Care Staff (pt had aides 10a-6p; daughter now here from [place] [address] providing all care. Pt's  with advanced dementia and resides in detention at [facility])  Patient Expects to be Discharged to[de-identified] Rehab hospital/unit acute (if denied for IPR then [facility] with 24/7 assist.  Pt and daughter not interested in SNF.)  Current DME Used/Available at Home: [place] [employer], straight, Walker, rolling, Wheelchair, Shower chair, Grab bars  Tub or Shower Type: Shower    Hand dominance: Right    EXAMINATION OF PERFORMANCE DEFICITS:  Cognitive/Behavioral Status:  Neurologic State: Alert;Confused  Orientation Level: Oriented to person;Oriented to situation  Cognition: Decreased attention/concentration;Decreased command following;Poor safety awareness  Perception: Cues to maintain midline in standing; Tactile;Verbal;Visual  Perseveration: No perseveration noted  Safety/Judgement: Decreased awareness of environment;Decreased awareness of need for assistance;Decreased awareness of need for safety;Decreased insight into deficits; Fall prevention    Hearing:   Auditory  Auditory Impairment: None    Vision/Perceptual:    Acuity: Able to read employee name badge without difficulty         Range of Motion:  AROM: Generally decreased, functional  PROM: Generally decreased, functional                      Strength:  Strength: Generally decreased, functional                Coordination:  Coordination: Grossly decreased, non-functional  Fine Motor Skills-Upper: Left Impaired;Right Impaired    Gross Motor Skills-Upper: Left Intact; Right Intact    Tone & Sensation:  Tone: Abnormal                         Balance:  Sitting: Intact  Standing: Impaired; With support  Standing - Static: Fair;Constant support  Standing - Dynamic : Fair;Constant support    Functional Mobility and Transfers for ADLs:  Bed Mobility:  Sit to Supine: Minimum assistance; Additional time;Assist x1 (A for LEs)  Scooting: Minimum assistance; Additional time;Assist x1    Transfers:  Sit to Stand: Minimum assistance; Additional time;Assist x1  Stand to Sit: Minimum assistance; Additional time;Assist x1  Bed to Chair: Minimum assistance; Additional time;Assist x1  Toilet Transfer : Minimum assistance; Additional time  Assistive Device : Walker, rolling    ADL Assessment:  Feeding: Total assistance (no initiation noted to perform task)    Oral Facial Hygiene/Grooming: Total assistance (no initiation noted to perform task)    Bathing: Total assistance (no initiation noted to perform task)    Upper Body Dressing: Total assistance; Additional time (no initiation noted to perform task)    Lower Body Dressing: Total assistance; Additional time (no initiation noted to perform task)    Toileting: Total assistance; Additional time (no initiation noted to perform task)                ADL Intervention and task modifications:  Discussed home safety and fall prevention with pt and daughter. Daughter verbalized good understanding. Cognitive Retraining  Safety/Judgement: Decreased awareness of environment;Decreased awareness of need for assistance;Decreased awareness of need for safety;Decreased insight into deficits; Fall prevention      Functional Measure:    Barthel Index:  Bathin  Bladder: 10  Bowels: 10  Grooming: 0  Dressin  Feedin  Mobility: 0  Stairs: 0  Toilet Use: 0  Transfer (Bed to Chair and Back): 10  Total: 30/100      The Barthel ADL Index: Guidelines  1. The index should be used as a record of what a patient does, not as a record of what a patient could do. 2. The main aim is to establish degree of independence from any help, physical or verbal, however minor and for whatever reason. 3. The need for supervision renders the patient not independent. 4. A patient's performance should be established using the best available evidence. Asking the patient, friends/relatives and nurses are the usual sources, but direct observation and common sense are also important. However direct testing is not needed. 5. Usually the patient's performance over the preceding 24-48 hours is important, but occasionally longer periods will be relevant. 6. Middle categories imply that the patient supplies over 50 per cent of the effort. 7. Use of aids to be independent is allowed. Score Interpretation (from [address] [facility])    Independent   60-79 Minimally independent   40-59 Partially dependent   20-39 Very dependent   <20 Totally dependent     -Shahriar Ledezma., Barthel, D.W. (1965). Functional evaluation: the Barthel Index. [address] [facility] ([address] [facility] [address] (). The Barthel activities of daily living index: self-reporting versus actual performance in the old (> or = 75 years). Journal of [address] [place] [address] 45(7), [address] [facility], J.LAY, Evy Bentley., Darwin Hernandez. (1999). Measuring the change in disability after inpatient rehabilitation; comparison of the responsiveness of the Barthel Index and Functional [place] Measure. Journal of Neurology, Neurosurgery, and Psychiatry, 66(4), 855-300. Gail Cha, N.J.A, Sher Hood,  W.J.M, & Chris Pitts MZacharyA. (2004) Assessment of post-stroke quality of life in cost-effectiveness studies:  The usefulness of the Barthel Index and the EuroQoL-5D. Quality of Life Research, 15, 702-11     Occupational Therapy Evaluation Charge Determination   History Examination Decision-Making   LOW Complexity : Brief history review  HIGH Complexity : 5 or more performance deficits relating to physical, cognitive , or psychosocial skils that result in activity limitations and / or participation restrictions HIGH Complexity : Patient presents with comorbidities that affect occupational performance. Signifigant modification of tasks or assistance (eg, physical or verbal) with assessment (s) is necessary to enable patient to complete evaluation       Based on the above components, the patient evaluation is determined to be of the following complexity level: LOW   Pain Rating:  No signs of pain    Activity Tolerance:   Fair and requires frequent rest breaks    After treatment patient left in no apparent distress:    Supine in bed, Call bell within reach, and Caregiver / family present    COMMUNICATION/EDUCATION:   The patients plan of care was discussed with: Physical therapist, Registered nurse, and Case management. Home safety education was provided and the patient/caregiver indicated understanding., Patient/family have participated as able in goal setting and plan of care. , and Patient/family agree to work toward stated goals and plan of care. This patients plan of care is appropriate for delegation to [facility].     Thank you for this referral.  Clifford Najera, OT  Time Calculation: 23 mins

## 2023-02-17 NOTE — PROGRESS NOTES
Lefty [address]an [place] [address]  [address] [facility], [place], [address] [place] [address]  [phone]      Hospitalist Progress Note      NAME: Elio Ha   :  1935  MRM:  [id number]    Date of service: 2023  7:28 AM       Assessment and Plan:   Acute Metabolic Encephalopathy: CT Head - There is movement artifact but she has generalized volume loss w/ enlarged ventricles and chronic microvascular ischemic disease. No acute infarct. Suspect that her symptoms are most likely due to progressive cognitive decline w/ Parkinson's. Had recent visit with her neurologist Dr. Solange Barrow. Doubt infection as a cause. Consult neurology      2. Parkinson's disease: Cont w/ Sinemet 25/100mg tid     3. LESLYE: cont IVF. Monitor renal function      4. HTN: Cont w/ Toprol XL 50mg daily and Cozaar 25mg daily     4. Hx Breast CA: Cont w/ Letrozole 2.5mg daily     5. Anxiety: Cont w/ Clonazepam 0.5mg bid     6. Hypothyroid: Cont w/ Synthroid 50mcg daily. Check TSH    7. Conjunctivitis. Start ABx eye drop          Subjective:     Chief Complaint[de-identified] Patient was seen and examined as a follow up for AMS. Chart was reviewed. more awake and answering to questions approprietly     ROS:  (bold if positive, if negative)    Tolerating PT  Tolerating Diet        Objective:     Last 24hrs VS reviewed since prior progress note.  Most recent are:    Visit Vitals  BP (!) 176/89 (BP 1 Location: Left upper arm, BP Patient Position: At rest)   Pulse 80   Temp 98.2 °F (36.8 °C)   Resp 20   Ht 5' 1\" (1.549 m)   Wt 53 kg (116 lb 14.4 oz)   SpO2 100%   BMI 22.09 kg/m²     SpO2 Readings from Last 6 Encounters:   23 100%   23 100%   10/07/22 100%   22 97%   22 100%   22 100%          Intake/Output Summary (Last 24 hours) at 2023 3038  Last data filed at 2023 1735  Gross per 24 hour   Intake 1000 ml   Output --   Net 1000 ml        Physical Exam:    Gen:  Well-developed, well-nourished, in no acute distress  HEENT:  Pink conjunctivae, PERRL, hearing intact to voice, moist mucous membranes  Neck:  Supple, without masses, thyroid non-tender  Resp:  No accessory muscle use, clear breath sounds without wheezes rales or rhonchi  Card:  No murmurs, normal S1, S2 without thrills, bruits or peripheral edema  Abd:  Soft, non-tender, non-distended, normoactive bowel sounds are present, no palpable organomegaly and no detectable hernias  Lymph:  No cervical or inguinal adenopathy  Musc:  No cyanosis or clubbing  Skin:  No rashes or ulcers, skin turgor is good  Neuro:  Cranial nerves are grossly intact, no focal motor weakness, follows commands appropriately  Psych:  Good insight, oriented to person, place and time, alert  __________________________________________________________________  Medications Reviewed: (see below)  Medications:     Current Facility-Administered Medications   Medication Dose Route Frequency    aspirin delayed-release tablet 81 mg  81 mg Oral DAILY    carbidopa-levodopa (SINEMET)  mg per tablet 1 Tablet  1 Tablet Oral TID    clonazePAM (KlonoPIN) tablet 0.5 mg  0.5 mg Oral BID PRN    letrozole (FEMARA) tablet 2.5 mg  2.5 mg Oral DAILY    levothyroxine (SYNTHROID) tablet 50 mcg  50 mcg Oral ACB    lidocaine 4 % patch 1 Patch  1 Patch TransDERmal Q24H    losartan (COZAAR) tablet 25 mg  25 mg Oral DAILY    metoprolol succinate (TOPROL-XL) XL tablet 50 mg  50 mg Oral DAILY    sodium chloride (NS) flush 5-40 mL  5-40 mL IntraVENous Q8H    sodium chloride (NS) flush 5-40 mL  5-40 mL IntraVENous PRN    acetaminophen (TYLENOL) tablet 650 mg  650 mg Oral Q6H PRN    Or    acetaminophen (TYLENOL) suppository 650 mg  650 mg Rectal Q6H PRN    polyethylene glycol (MIRALAX) packet 17 g  17 g Oral DAILY PRN    ondansetron (ZOFRAN ODT) tablet 4 mg  4 mg Oral Q8H PRN    Or    ondansetron (ZOFRAN) injection 4 mg  4 mg IntraVENous Q6H PRN    enoxaparin (LOVENOX) injection 30 mg  30 mg SubCUTAneous DAILY lactated Ringers infusion  125 mL/hr IntraVENous CONTINUOUS    hydrALAZINE (APRESOLINE) 20 mg/mL injection 20 mg  20 mg IntraVENous Q4H PRN     Current Outpatient Medications   Medication Sig    carbidopa-levodopa (SINEMET)  mg per tablet Take 1 Tablet by mouth three (3) times daily. aspirin delayed-release 81 mg tablet Take  by mouth daily. metoprolol succinate (TOPROL-XL) 50 mg XL tablet Take 50 mg by mouth daily. Pt takes this in the  evening at supper time. letrozole (FEMARA) 2.5 mg tablet Take 2.5 mg by mouth daily. multivit-min/iron/folic/lutein (CENTRUM SILVER WOMEN PO) Take 2 Tablets by mouth daily. elderberry fruit (ELDERBERRY PO) Take 1 Tablet by mouth two (2) times a day. clonazePAM (KlonoPIN) 0.5 mg tablet Take  by mouth two (2) times daily as needed for Anxiety. losartan (COZAAR) 25 mg tablet Take 25 mg by mouth daily. levothyroxine (SYNTHROID) 50 mcg tablet Take 50 mcg by mouth Daily (before breakfast). ascorbic acid, vitamin C, (VITAMIN C) 250 mg tablet Take  by mouth.    lidocaine (Lidoderm) 5 % Apply patch to the affected area for 12 hours a day and remove for 12 hours a day. (Patient not taking: Reported on 2/16/2023)    spironolactone (ALDACTONE) 25 mg tablet Take  by mouth daily. (Patient not taking: Reported on 2/16/2023)    miconazole (Monistat 7) 2 % vaginal cream Insert 1 Applicator into vagina nightly. (Patient not taking: Reported on 2/16/2023)    ZINC PICOLINATE PO Take  by mouth. (Patient not taking: Reported on 2/16/2023)    alendronate (FOSAMAX) 70 mg tablet Take 70 mg by mouth every seven (7) days. (Patient not taking: Reported on 2/16/2023)    metoprolol succinate (TOPROL-XL) 100 mg tablet Take  by mouth daily.  (Patient not taking: Reported on 2/16/2023)        Lab Data Reviewed: (see below)  Lab Review:     Recent Labs     02/16/23  1543   WBC 4.2   HGB 11.0*   HCT 33.6*        Recent Labs     02/16/23  1543      K 4.0   *   CO2 22    BUN 50*   CREA 1.49*   CA 9.1   MG 2.2   ALB 3.4*   TBILI 0.3   ALT 10*   INR 1.1     Lab Results   Component Value Date/Time    Glucose (POC) 103 02/16/2023 03:40 PM     No results for input(s): PH, PCO2, PO2, HCO3, FIO2 in the last 72 hours. Recent Labs     02/16/23  1543   INR 1.1     All Micro Results       Procedure Component Value Units Date/Time    URINE CULTURE HOLD SAMPLE [[id number]] Collected: 02/16/23 1727    Order Status: Completed Specimen: Urine Updated: 02/16/23 1741     Urine culture hold       Urine on hold in Microbiology dept for 2 days. If unpreserved urine is submitted, it cannot be used for addtional testing after 24 hours, recollection will be required. I have reviewed notes of prior 24hr. Other pertinent lab: Total time spent with patient: 35 minutes I personally reviewed chart, notes, data and current medications in the medical record. I have personally examined and treated the patient at bedside during this period.                  Care Plan discussed with: Patient, Family, Nursing Staff, and >50% of time spent in counseling and coordination of care    Discussed:  Care Plan    Prophylaxis:  Lovenox    Disposition:  Home w/Family           ___________________________________________________    Attending Physician: Siri Godoy MD

## 2023-02-17 NOTE — PROGRESS NOTES
Admission Medication Reconciliation:     Information obtained from:    Debra Scotto with patient daughter in ER 15  RxQuery data available¹:  YES    Comments/Recommendations:   Patient able to confirm name, , allergies, and preferred pharmacy  Updated PTA medication list  The patient's carbidopa/levodopa was increased from  three times daily to carbidopa/levodopa  three times daily about three months ago for a shuffling gait. It is reported the increased dose helped the patient's movement. Aspirin 81 mg daily was started 1.5 months ago by the PCP for slurred speech. ¹RxQuery pharmacy benefit data reflects medications filled and processed through the patient's insurance, however   this data does NOT capture whether the medication was picked up or is currently being taken by the patient. Prior to Admission Medications   Prescriptions Last Dose Informant Taking? Alexsander Soriano 665-31-21 mg-mg-million tab 2023 Child Yes   Sig: Take 1 Tablet by mouth daily. ZINC PICOLINATE PO 2023 Child Yes   Sig: Take 1 Dose by mouth daily. alendronate (FOSAMAX) 70 mg tablet 2023 Child Yes   Sig: Take 70 mg by mouth every Monday. ascorbic acid (VITAMIN C) 250 mg chewable tablet 2023 at 0800 Child Yes   Sig: Take 250 mg by mouth daily. aspirin delayed-release 81 mg tablet 2023 at 0800 Child Yes   Sig: Take 81 mg by mouth daily. carbidopa-levodopa (SINEMET)  mg per tablet 2023 Child Yes   Sig: Take 1 Tablet by mouth three (3) times daily. clonazePAM (KlonoPIN) 0.5 mg tablet 2/15/2023 at 2000 Child Yes   Sig: Take  by mouth two (2) times daily as needed for Anxiety. elderberry fruit (ELDERBERRY PO) 2023 at 0800 Child Yes   Sig: Take 1 Tablet by mouth two (2) times a day. letrozole [facility]) 2.5 mg tablet 2/15/2023 at 1900 Child Yes   Sig: Take 2.5 mg by mouth every evening.    levothyroxine (SYNTHROID) 50 mcg tablet 2023 at 0800 Child Yes   Sig: Take 50 mcg by mouth Daily (before breakfast). losartan (COZAAR) 25 mg tablet 2/16/2023 at 0800 Child Yes   Sig: Take 25 mg by mouth daily. metoprolol succinate (TOPROL-XL) 50 mg XL tablet 2/16/2023 Child Yes   Sig: Take 50 mg by mouth every evening. Pt takes this in the  evening at supper time. multivit-min/iron/folic/lutein (CENTRUM SILVER WOMEN PO) 2/16/2023 at 0800 Child Yes   Sig: Take 1 Dose by mouth daily. The patient takes the gummies  One dose = 2   nitrofurantoin (MACRODANTIN) 100 mg capsule 2/16/2023 Child Yes   Sig: Take 100 mg by mouth two (2) times a day. Facility-Administered Medications: None         Please contact the main inpatient pharmacy with any questions or concerns at [phone] and we will direct you to the clinical pharmacist covering this patient's care while in-house.    Evita Gillis, NikhilD, BCPS

## 2023-02-17 NOTE — CONSULTS
Neurology Consult - Inpatient      Name:   Trista Long  MRN:    [id number]    Date of Admission:  2023    Date of Consultation:  23       HISTORY OF PRESENT ILLNESS:     This is a 82 y.o. female with  has a past medical history of Anxiety (2020), Arthritis, Breast cancer ([facility] [address] Breast cancer ([facility] [address] Depression (2020), Diverticulitis (2020), GERD (gastroesophageal reflux disease) (2020), High cholesterol (2020), dizziness (2020), Hypertension (2020), Primary cancer of lower-inner quadrant of left female breast ([facility] [address] (2018), and Thyroid disease (2020). .    Neurology is asked to see the patient for: AMS/ confusion in setting of Parkinson's    Daughter is at bedside and provides Hx. She reports that patient's cognitive baseline is awake, alert, conversant with mild occasional memory difficulty that she/ family have felt is age-appropriate. Reports that since 2023, pt has been acting confused, talking about  relatives like they were still around. Denies pt having any actual hallucinations. Saw her Neurologist/ Dr Swati Canela 2 days ago, Dtr relayed cognitive changes. Says Neurologist ordered some labs and Brain MRI and had their office had called Dtr yesterday saying labs showed dehydration. CT head w/o contrast (23): The ventricles and sulci are age-appropriate without hydrocephalus. There is no mass effect or midline shift. There is no intracranial hemorrhage or extra-axial fluid collection. Chronic microvascular ischemic disease is mild and unchanged. No CT evidence of acute infarct. The calvarium is intact. The visualized paranasal sinuses and mastoid air cells are clear. IMPRESSION  No acute intracranial abnormality on this noncontrast head CT. No change.     Reviewed labs  23:   CBC with normal WBCs, Hgb / Hct mild low 11/ 33.6, normal platelet count  INR 1.1  CMP with reduced Cr 1.49, reduced GFR 34, minimally low ALT 10, AST 20  Ammonia 16  UA trace LE, normal WBCs      Complete Review of Systems: UTO    ====================================     Allergies   Allergen Reactions    Iodinated Contrast Media Shortness of Breath       Current Facility-Administered Medications   Medication Dose Route Frequency Provider Last Rate Last Admin    aspirin delayed-release tablet 81 mg  81 mg Oral DAILY Lilo Hart MD   81 mg at 02/17/23 0844    carbidopa-levodopa (SINEMET)  mg per tablet 1 Tablet  1 Tablet Oral TID Lilo Hart MD   1 Tablet at 02/17/23 9232    clonazePAM (KlonoPIN) tablet 0.5 mg  0.5 mg Oral BID PRN Lilo Hart MD        [facility] tablet 2.5 mg  2.5 mg Oral DAILY Lilo Hart MD        levothyroxine (SYNTHROID) tablet 50 mcg  50 mcg Oral ACB Lilo Hart MD   50 mcg at 02/17/23 0844    lidocaine 4 % patch 1 Patch  1 Patch TransDERmal Q24H Lilo Hart MD   1 Patch at 02/17/23 0315    losartan (COZAAR) tablet 25 mg  25 mg Oral DAILY Lilo Hart MD   25 mg at 02/17/23 0844    metoprolol succinate (TOPROL-XL) XL tablet 50 mg  50 mg Oral DAILY Lilo Hart MD        sodium chloride (NS) flush 5-40 mL  5-40 mL IntraVENous Q8H Lilo Hart MD   10 mL at 02/17/23 0845    sodium chloride (NS) flush 5-40 mL  5-40 mL IntraVENous PRN Lilo Hart MD        acetaminophen (TYLENOL) tablet 650 mg  650 mg Oral Q6H PRN Lilo Hart MD        Or    acetaminophen (TYLENOL) suppository 650 mg  650 mg Rectal Q6H PRN Lilo Hart MD        polyethylene glycol (MIRALAX) packet 17 g  17 g Oral DAILY PRN Lilo Hart MD        ondansetron (ZOFRAN ODT) tablet 4 mg  4 mg Oral Q8H PRN Lilo Hart MD        Or    ondansetron [facility]) injection 4 mg  4 mg IntraVENous Q6H PRN Lilo Hart MD        enoxaparin (LOVENOX) injection 30 mg  30 mg SubCUTAneous DAILY Lilo Hart MD   30 mg at 02/17/23 7581    lactated Ringers infusion  125 mL/hr IntraVENous CONTINUOUS Lilo Hart  mL/hr at 02/17/23 0315 125 mL/hr at 02/17/23 9106 hydrALAZINE (APRESOLINE) 20 mg/mL injection 20 mg  20 mg IntraVENous Q4H PRN Mason Horner MD        ciprofloxacin HCl (CILOXAN) 0.3 % ophthalmic solution 1 Drop  1 Drop Both Eyes Q2HWA Jaiden Lozada MD   1 Drop at 02/17/23 1106    And    [START ON 2/19/2023] ciprofloxacin HCl (CILOXAN) 0.3 % ophthalmic solution 1 Drop  1 Drop Both Eyes Q4HWA Paco Shelton MD         Current Outpatient Medications   Medication Sig Dispense Refill    carbidopa-levodopa (SINEMET)  mg per tablet Take 1 Tablet by mouth three (3) times daily. aspirin delayed-release 81 mg tablet Take  by mouth daily. metoprolol succinate (TOPROL-XL) 50 mg XL tablet Take 50 mg by mouth daily. Pt takes this in the  evening at supper time. letrozole (FEMARA) 2.5 mg tablet Take 2.5 mg by mouth daily. multivit-min/iron/folic/lutein (CENTRUM SILVER WOMEN PO) Take 2 Tablets by mouth daily. elderberry fruit (ELDERBERRY PO) Take 1 Tablet by mouth two (2) times a day. clonazePAM (KlonoPIN) 0.5 mg tablet Take  by mouth two (2) times daily as needed for Anxiety. losartan (COZAAR) 25 mg tablet Take 25 mg by mouth daily. levothyroxine (SYNTHROID) 50 mcg tablet Take 50 mcg by mouth Daily (before breakfast). ascorbic acid, vitamin C, (VITAMIN C) 250 mg tablet Take  by mouth.      lidocaine (Lidoderm) 5 % Apply patch to the affected area for 12 hours a day and remove for 12 hours a day. (Patient not taking: Reported on 2/16/2023) 10 Each 0    spironolactone (ALDACTONE) 25 mg tablet Take  by mouth daily. (Patient not taking: Reported on 2/16/2023)      miconazole (Monistat 7) 2 % vaginal cream Insert 1 Applicator into vagina nightly. (Patient not taking: Reported on 2/16/2023) 45 g 0    ZINC PICOLINATE PO Take  by mouth. (Patient not taking: Reported on 2/16/2023)      alendronate (FOSAMAX) 70 mg tablet Take 70 mg by mouth every seven (7) days.  (Patient not taking: Reported on 2/16/2023)      metoprolol succinate (TOPROL-XL) 100 mg tablet Take  by mouth daily. (Patient not taking: Reported on 2/16/2023)         PSHx  has a past surgical history that includes hx hysterectomy (1976); hx breast lumpectomy (Left, 01/23/2019); hx orthopaedic (2014); hx orthopaedic (08/09/2017); hx orthopaedic (2013); hx heent (06/25/2018); and hx urological (12/10/2020). SocHx  reports that she has never smoked. She has never used smokeless tobacco. She reports that she does not currently use alcohol. She reports that she does not use drugs. FHx family history includes Breast Cancer (age of onset: 58) in her maternal cousin; Breast Cancer (age of onset: 71) in her maternal grandmother; Cancer (age of onset: 80) in her mother; Heart Disease in her father; Prostate Cancer in her brother. PHYSICAL EXAM    Patient Vitals for the past 4 hrs:   Temp Pulse Resp BP SpO2   02/17/23 0842 98.8 °F (37.1 °C) 73 18 (!) 143/74 100 %         General: Head: atraumatic. Eyes: conjunctiva clear. Neck: supple. Lungs: not examined. CV: not examined. Extremities: no edema. Skin: No rashes    Neurologic Exam:  Speech/ Language: no aphasia, no dysarthria, . Alertness: oriented to self, Daughter (identifies correctly), location (hospital, but not which specific one). Not oriented to month, year, or season. CNs: Smell: not tested. Visual Fields (II): full to confrontation. Pupils (II): not examined. Funduscopic: not examined. Extraocular movements (III, IV, VI): conjugate in all directions, no ALE. Ptosis (III, VII): none. Facial Sensation (V): intact LT bilateral.  Facial Movements (VII): symmetric at rest and on activation. Hearing (VIII): normal.  Soft palate elevation (IX): not examined. Shoulder shrug (XI): symmetric, strong. Tongue protrusion (XII): midline    Motor:  5/5 in all exts. Sensory: intact LT in all exts. Cerebellar: no resting, postural, or intention tremors . Deep Tendon Reflexes: trace throughout.   Plantar response: not tested. Gait: not tested. Romberg: not tested      Labs/ Radiology     See Hpi    Assessment/ Plan       ICD-10-CM ICD-9-CM    1. LESLYE (acute kidney injury) ([facility] [address]  N17.9 584.9       2. Confusion and disorientation  R41.0 780.97       3. Impaired mobility and ADLs  Z74.09 V49.89     Z78.9        4. Acute metabolic encephalopathy  A09.06 348.31 EEG      EEG          Hx of Parkinson's (gait difficulty, no resting tremors)  9 days of confusion; alert/ oriented at baseline  No evidence of UTI  Recent labs shows reduced kidney function suggestive of dehydration    DDx: metabolic encephalopathy due to LESLYE v other etiology ( stroke vs seizure vs new-onset parkinson's related dementia)    Check Brain MRI w/o contrast  Check EEG    Dr Jovani Bush takes over Neurology service today          Thank you for asking the Neurology Service to evaluate Sae Kapadia.       Signed By: Mary Chaney MD     February 17, 2023

## 2023-02-17 NOTE — PROGRESS NOTES
Medicare Outpatient Observation Notice (MOON)/ [place] Outpatient Observation Notice ([place]) provided to patient/representative with verbal explanation of the notice. Time allotted for questions regarding the notice. Patient /representative provided a completed copy of the MOON/VOON notice. Copy placed on bedside chart.   Katty Keys CMS

## 2023-02-17 NOTE — ED NOTES
Got patient up to the  beside to  void and  it took two of us to do that. She is concerned that he  daughter is now back yet. The daughter  went home to get her some personal items I think for her admission. She is suppose to be coming back. Unsure when. She is the  primary  caregiver.

## 2023-02-18 LAB
ANION GAP SERPL CALC-SCNC: 6 MMOL/L (ref 5–15)
BUN SERPL-MCNC: 25 MG/DL (ref 6–20)
BUN/CREAT SERPL: 26 (ref 12–20)
CALCIUM SERPL-MCNC: 8.7 MG/DL (ref 8.5–10.1)
CHLORIDE SERPL-SCNC: 114 MMOL/L (ref 97–108)
CO2 SERPL-SCNC: 21 MMOL/L (ref 21–32)
CREAT SERPL-MCNC: 0.96 MG/DL (ref 0.55–1.02)
ERYTHROCYTE [DISTWIDTH] IN BLOOD BY AUTOMATED COUNT: 15 % (ref 11.5–14.5)
GLUCOSE SERPL-MCNC: 92 MG/DL (ref 65–100)
HCT VFR BLD AUTO: 31.2 % (ref 35–47)
HGB BLD-MCNC: 10.1 G/DL (ref 11.5–16)
MCH RBC QN AUTO: 29.4 PG (ref 26–34)
MCHC RBC AUTO-ENTMCNC: 32.4 G/DL (ref 30–36.5)
MCV RBC AUTO: 90.7 FL (ref 80–99)
NRBC # BLD: 0 K/UL (ref 0–0.01)
NRBC BLD-RTO: 0 PER 100 WBC
PLATELET # BLD AUTO: 295 K/UL (ref 150–400)
PMV BLD AUTO: 9.5 FL (ref 8.9–12.9)
POTASSIUM SERPL-SCNC: 4.3 MMOL/L (ref 3.5–5.1)
RBC # BLD AUTO: 3.44 M/UL (ref 3.8–5.2)
SODIUM SERPL-SCNC: 141 MMOL/L (ref 136–145)
TSH SERPL DL<=0.05 MIU/L-ACNC: 0.49 UIU/ML (ref 0.36–3.74)
VIT B12 SERPL-MCNC: 860 PG/ML (ref 193–986)
WBC # BLD AUTO: 4.2 K/UL (ref 3.6–11)

## 2023-02-18 PROCEDURE — 96374 THER/PROPH/DIAG INJ IV PUSH: CPT

## 2023-02-18 PROCEDURE — G0378 HOSPITAL OBSERVATION PER HR: HCPCS

## 2023-02-18 PROCEDURE — 97535 SELF CARE MNGMENT TRAINING: CPT

## 2023-02-18 PROCEDURE — 82607 VITAMIN B-12: CPT

## 2023-02-18 PROCEDURE — 74011250637 HC RX REV CODE- 250/637: Performed by: PSYCHIATRY & NEUROLOGY

## 2023-02-18 PROCEDURE — 74011000250 HC RX REV CODE- 250: Performed by: INTERNAL MEDICINE

## 2023-02-18 PROCEDURE — 84443 ASSAY THYROID STIM HORMONE: CPT

## 2023-02-18 PROCEDURE — 36415 COLL VENOUS BLD VENIPUNCTURE: CPT

## 2023-02-18 PROCEDURE — 80048 BASIC METABOLIC PNL TOTAL CA: CPT

## 2023-02-18 PROCEDURE — 74011250637 HC RX REV CODE- 250/637: Performed by: INTERNAL MEDICINE

## 2023-02-18 PROCEDURE — 85027 COMPLETE CBC AUTOMATED: CPT

## 2023-02-18 PROCEDURE — 97530 THERAPEUTIC ACTIVITIES: CPT

## 2023-02-18 PROCEDURE — 74011250636 HC RX REV CODE- 250/636: Performed by: INTERNAL MEDICINE

## 2023-02-18 PROCEDURE — 97116 GAIT TRAINING THERAPY: CPT

## 2023-02-18 PROCEDURE — 99233 SBSQ HOSP IP/OBS HIGH 50: CPT | Performed by: PSYCHIATRY & NEUROLOGY

## 2023-02-18 RX ORDER — CARBIDOPA AND LEVODOPA 25; 250 MG/1; MG/1
1 TABLET ORAL 4 TIMES DAILY
Status: DISCONTINUED | OUTPATIENT
Start: 2023-02-18 | End: 2023-02-28 | Stop reason: HOSPADM

## 2023-02-18 RX ADMIN — SODIUM CHLORIDE, PRESERVATIVE FREE 10 ML: 5 INJECTION INTRAVENOUS at 22:10

## 2023-02-18 RX ADMIN — CIPROFLOXACIN 1 DROP: 3 SOLUTION OPHTHALMIC at 06:00

## 2023-02-18 RX ADMIN — CIPROFLOXACIN 1 DROP: 3 SOLUTION OPHTHALMIC at 08:27

## 2023-02-18 RX ADMIN — METOPROLOL SUCCINATE 50 MG: 50 TABLET, EXTENDED RELEASE ORAL at 17:43

## 2023-02-18 RX ADMIN — CIPROFLOXACIN 1 DROP: 3 SOLUTION OPHTHALMIC at 17:50

## 2023-02-18 RX ADMIN — LETROZOLE 2.5 MG: 2.5 TABLET ORAL at 17:42

## 2023-02-18 RX ADMIN — SODIUM CHLORIDE, PRESERVATIVE FREE 10 ML: 5 INJECTION INTRAVENOUS at 17:51

## 2023-02-18 RX ADMIN — CARBIDOPA AND LEVODOPA 1 TABLET: 25; 250 TABLET ORAL at 13:27

## 2023-02-18 RX ADMIN — SODIUM CHLORIDE, POTASSIUM CHLORIDE, SODIUM LACTATE AND CALCIUM CHLORIDE 125 ML/HR: 600; 310; 30; 20 INJECTION, SOLUTION INTRAVENOUS at 05:22

## 2023-02-18 RX ADMIN — LOSARTAN POTASSIUM 25 MG: 50 TABLET, FILM COATED ORAL at 08:28

## 2023-02-18 RX ADMIN — CARBIDOPA AND LEVODOPA 1 TABLET: 25; 250 TABLET ORAL at 22:06

## 2023-02-18 RX ADMIN — LEVOTHYROXINE SODIUM 50 MCG: 0.05 TABLET ORAL at 06:58

## 2023-02-18 RX ADMIN — HYDRALAZINE HYDROCHLORIDE 20 MG: 20 INJECTION INTRAMUSCULAR; INTRAVENOUS at 06:00

## 2023-02-18 RX ADMIN — CIPROFLOXACIN 1 DROP: 3 SOLUTION OPHTHALMIC at 13:27

## 2023-02-18 RX ADMIN — CARBIDOPA AND LEVODOPA 1 TABLET: 25; 250 TABLET ORAL at 09:49

## 2023-02-18 RX ADMIN — CARBIDOPA AND LEVODOPA 1 TABLET: 25; 250 TABLET ORAL at 17:43

## 2023-02-18 RX ADMIN — CLONAZEPAM 0.5 MG: 0.5 TABLET ORAL at 20:42

## 2023-02-18 RX ADMIN — SODIUM CHLORIDE, PRESERVATIVE FREE 10 ML: 5 INJECTION INTRAVENOUS at 05:29

## 2023-02-18 RX ADMIN — CIPROFLOXACIN 1 DROP: 3 SOLUTION OPHTHALMIC at 09:50

## 2023-02-18 RX ADMIN — CIPROFLOXACIN 1 DROP: 3 SOLUTION OPHTHALMIC at 22:07

## 2023-02-18 RX ADMIN — CIPROFLOXACIN 1 DROP: 3 SOLUTION OPHTHALMIC at 18:53

## 2023-02-18 NOTE — PROGRESS NOTES
Neurology Hospital Progress Note    Patient ID:  Jorje Keys  [id number]  75 y.o.  1935      Subjective:   History:  Jorje Keys is a 82 y.o. female who  has a past medical history of Anxiety (2020), Arthritis, Breast cancer ([facility] [address] Breast cancer ([facility] [address] Depression (2020), Diverticulitis (2020), GERD (gastroesophageal reflux disease) (2020), High cholesterol (2020), dizziness (2020), Hypertension (2020), Primary cancer of lower-inner quadrant of left female breast ([facility] [address] (2018), and Thyroid disease (2020). who is admitted on 23 for AMS. Patient was seen neurologist Dr Ward Chatterjee    As per his note: \"Daughter is at bedside and provides Hx. She reports that patient's cognitive baseline is awake, alert, conversant with mild occasional memory difficulty that she/ family have felt is age-appropriate. Reports that since 2023, pt has been acting confused, talking about  relatives like they were still around. Denies pt having any actual hallucinations. Saw her Neurologist/ Dr Johnny Prieto 2 days ago, Dtr relayed cognitive changes. Says Neurologist ordered some labs and Brain MRI and had their office had called Dtr yesterday saying labs showed dehydration. \"    (+) history of Parkinson's disease for 1 yr - baseline able to ambulate with cane and conversational - seeing neurologist Dr Johnny Prieto   On Sinemet 25/100 TID    ROS:  Per HPI-  Otherwise the remainder of the review of system was negative      Social Hx:  Social History     Socioeconomic History    Marital status:    Tobacco Use    Smoking status: Never    Smokeless tobacco: Never   Vaping Use    Vaping Use: Never used   Substance and Sexual Activity    Alcohol use: Not Currently    Drug use: Never    Sexual activity: Not Currently     Partners: Male   Social History Narrative    Lives at home who has dementia and she is the caregiver.        Meds:  No current facility-administered medications on file prior to encounter. Current Outpatient Medications on File Prior to Encounter   Medication Sig Dispense Refill    nitrofurantoin (MACRODANTIN) 100 mg capsule Take 100 mg by mouth two (2) times a day. AZO CRANBERRY 701-64-88 mg-mg-million tab Take 1 Tablet by mouth daily. carbidopa-levodopa (SINEMET)  mg per tablet Take 1 Tablet by mouth three (3) times daily. aspirin delayed-release 81 mg tablet Take 81 mg by mouth daily. metoprolol succinate (TOPROL-XL) 50 mg XL tablet Take 50 mg by mouth every evening. Pt takes this in the  evening at supper time. letrozole (FEMARA) 2.5 mg tablet Take 2.5 mg by mouth every evening.      multivit-min/iron/folic/lutein (CENTRUM SILVER WOMEN PO) Take 1 Dose by mouth daily. The patient takes the gummies  One dose = 2      elderberry fruit (ELDERBERRY PO) Take 1 Tablet by mouth two (2) times a day. ZINC PICOLINATE PO Take 1 Dose by mouth daily. alendronate (FOSAMAX) 70 mg tablet Take 70 mg by mouth every Monday. clonazePAM (KlonoPIN) 0.5 mg tablet Take  by mouth two (2) times daily as needed for Anxiety. losartan (COZAAR) 25 mg tablet Take 25 mg by mouth daily. levothyroxine (SYNTHROID) 50 mcg tablet Take 50 mcg by mouth Daily (before breakfast). ascorbic acid (VITAMIN C) 250 mg chewable tablet Take 250 mg by mouth daily. Imaging:    CT Results (recent):  Results from Hospital Encounter encounter on 02/16/23    CT HEAD WO CONT    Narrative  EXAM: CT HEAD WO CONT    INDICATION: Confusion. COMPARISON: None    TECHNIQUE: Noncontrast head CT. Coronal and sagittal reformats. CT dose  reduction was achieved through the use of a standardized protocol tailored for  this examination and automatic exposure control for dose modulation. FINDINGS: The ventricles and sulci are age-appropriate without hydrocephalus. There is no mass effect or midline shift.  There is no intracranial hemorrhage or  extra-axial fluid collection. Chronic microvascular ischemic disease is mild and  unchanged. No CT evidence of acute infarct. The calvarium is intact. The visualized paranasal sinuses and mastoid air cells  are clear. Impression  No acute intracranial abnormality on this noncontrast head CT. No change. MRI Results (recent):  Results from [place] encounter on 02/16/23    MRI BRAIN WO CONT    Narrative  CLINICAL HISTORY: Parkinson. new onset confusion (since 2-8-2023). INDICATION: Parkinson. new onset confusion (since 2-8-2023). COMPARISON: 2/16/2023    TECHNIQUE: MR examination of the brain includes axial and sagittal T1, coronal  T2, axial T2, axial FLAIR, axial gradient echo, axial DWI. CONTRAST: None    FINDINGS:  There is no intracranial mass, hemorrhage or evidence of acute infarction. Pontomesencephalic ratio is within normal limits. Parieto-occipital sulcus is  mildly prominent. Confluent periventricular and scattered foci of increased T2  signal intensity in the cerebral white matter. There is sulcal and ventricular  prominence which is temporal predominant. There is minimal left and right  maxillary sinus disease. The brain architecture is normal. There is no evidence of midline shift or  mass-effect. There are no extra-axial fluid collections. There is no Chiari or  syrinx. The pituitary and infundibulum are grossly unremarkable. There is no  skull base mass. Cerebellopontine angles are grossly unremarkable. The major  intracranial vascular flow-voids are unremarkable. The cavernous sinuses are  symmetric. Optic chiasm and infundibulum grossly unremarkable. Orbits are  grossly symmetric. Dural venous sinuses are grossly patent. The mastoid air cells are well pneumatized and clear. Impression  Mild/moderate chronic microvascular ischemic change and moderate temporal  predominant cerebral atrophy.       There is no intracranial mass, hemorrhage or evidence of acute infarction. No acute intracranial process is demonstrated. IR Results (recent):  No results found for this or any previous visit. VAS/US Results (recent):  No results found for this or any previous visit. Reviewed records in [facility] tab today    Lab Review     Admission on 02/16/2023   Component Date Value Ref Range Status    WBC 02/16/2023 4.2  3.6 - 11.0 K/uL Final    RBC 02/16/2023 3.62 (A)  3.80 - 5.20 M/uL Final    HGB 02/16/2023 11.0 (A)  11.5 - 16.0 g/dL Final    HCT 02/16/2023 33.6 (A)  35.0 - 47.0 % Final    MCV 02/16/2023 92.8  80.0 - 99.0 FL Final    MCH 02/16/2023 30.4  26.0 - 34.0 PG Final    MCHC 02/16/2023 32.7  30.0 - 36.5 g/dL Final    RDW 02/16/2023 15.3 (A)  11.5 - 14.5 % Final    PLATELET 39/11/0440 030  150 - 400 K/uL Final    MPV 02/16/2023 9.3  8.9 - 12.9 FL Final    NRBC 02/16/2023 0.0  0  WBC Final    ABSOLUTE NRBC 02/16/2023 0.00  0.00 - 0.01 K/uL Final    NEUTROPHILS 02/16/2023 74  32 - 75 % Final    LYMPHOCYTES 02/16/2023 15  12 - 49 % Final    MONOCYTES 02/16/2023 10  5 - 13 % Final    EOSINOPHILS 02/16/2023 0  0 - 7 % Final    BASOPHILS 02/16/2023 1  0 - 1 % Final    IMMATURE GRANULOCYTES 02/16/2023 0  % Final    ABS. NEUTROPHILS 02/16/2023 3.2  1.8 - 8.0 K/UL Final    ABS. LYMPHOCYTES 02/16/2023 0.6 (A)  0.8 - 3.5 K/UL Final    ABS. MONOCYTES 02/16/2023 0.4  0.0 - 1.0 K/UL Final    ABS. EOSINOPHILS 02/16/2023 0.0  0.0 - 0.4 K/UL Final    ABS. BASOPHILS 02/16/2023 0.0  0.0 - 0.1 K/UL Final    ABS. IMM.  GRANS. 02/16/2023 0.0  K/UL Final    DF 02/16/2023 MANUAL    Final    PLATELET COMMENTS 72/72/6638 Large Platelets    Final    PRESENT    RBC COMMENTS 02/16/2023     Final                    Value:ANISOCYTOSIS  1+      INR 02/16/2023 1.1  0.9 - 1.1   Final    A single therapeutic range for Vit K antagonists may not be optimal for all indications - see June, 2008 issue of Chest, American College of Chest Physicians Evidence-Based Clinical Practice Guidelines, 8th Edition. Prothrombin time 02/16/2023 11.2 (A)  9.0 - 11.1 sec Final    Sodium 02/16/2023 141  136 - 145 mmol/L Final    Potassium 02/16/2023 4.0  3.5 - 5.1 mmol/L Final    Chloride 02/16/2023 114 (A)  97 - 108 mmol/L Final    CO2 02/16/2023 22  21 - 32 mmol/L Final    Anion gap 02/16/2023 5  5 - 15 mmol/L Final    Glucose 02/16/2023 100  65 - 100 mg/dL Final    BUN 02/16/2023 50 (A)  6 - 20 MG/DL Final    Creatinine 02/16/2023 1.49 (A)  0.55 - 1.02 MG/DL Final    BUN/Creatinine ratio 02/16/2023 34 (A)  12 - 20   Final    eGFR 02/16/2023 34 (A)  >60 ml/min/1.73m2 Final    Comment:      Pediatric calculator link: Prosper.[email or web address] org/professionals/kdoqi/gfr_calculatorped       These results are not intended for use in patients <25years of age. eGFR results are calculated without a race factor using  the 2021 CKD-EPI equation. Careful clinical correlation is recommended, particularly when comparing to results calculated using previous equations. The CKD-EPI equation is less accurate in patients with extremes of muscle mass, extra-renal metabolism of creatinine, excessive creatine ingestion, or following therapy that affects renal tubular secretion. Calcium 02/16/2023 9.1  8.5 - 10.1 MG/DL Final    Bilirubin, total 02/16/2023 0.3  0.2 - 1.0 MG/DL Final    ALT (SGPT) 02/16/2023 10 (A)  12 - 78 U/L Final    AST (SGOT) 02/16/2023 20  15 - 37 U/L Final    Alk.  phosphatase 02/16/2023 46  45 - 117 U/L Final    Protein, total 02/16/2023 6.9  6.4 - 8.2 g/dL Final    Albumin 02/16/2023 3.4 (A)  3.5 - 5.0 g/dL Final    Globulin 02/16/2023 3.5  2.0 - 4.0 g/dL Final    A-G Ratio 02/16/2023 1.0 (A)  1.1 - 2.2   Final    Magnesium 02/16/2023 2.2  1.6 - 2.4 mg/dL Final    Color 02/16/2023 YELLOW/STRAW    Final    Color Reference Range: Straw, Yellow or Dark Yellow    Appearance 02/16/2023 CLEAR  CLEAR   Final    Specific gravity 02/16/2023 1.023  1.003 - 1.030   Final    pH (UA) 02/16/2023 5.5  5.0 - 8.0   Final    Protein 02/16/2023 TRACE (A)  NEG mg/dL Final    Glucose 02/16/2023 Negative  NEG mg/dL Final    Ketone 02/16/2023 TRACE (A)  NEG mg/dL Final    Bilirubin 02/16/2023 Negative  NEG   Final    Blood 02/16/2023 Negative  NEG   Final    Urobilinogen 02/16/2023 1.0  0.2 - 1.0 EU/dL Final    Nitrites 02/16/2023 Negative  NEG   Final    Leukocyte Esterase 02/16/2023 TRACE (A)  NEG   Final    WBC 02/16/2023 0-4  0 - 4 /hpf Final    RBC 02/16/2023 0-5  0 - 5 /hpf Final    Epithelial cells 02/16/2023 MODERATE (A)  FEW /lpf Final    Epithelial cell category consists of squamous cells and /or transitional urothelial cells. Renal tubular cells, if present, are separately identified as such. Bacteria 02/16/2023 Negative  NEG /hpf Final    Hyaline cast 02/16/2023 0-2  0 - 2 /lpf Final    Urine culture hold 02/16/2023 Urine on hold in Microbiology dept for 2 days. If unpreserved urine is submitted, it cannot be used for addtional testing after 24 hours, recollection will be required. Final    Ammonia, plasma 02/16/2023 16  <32 UMOL/L Final    Ammonia determinations are subject to marked lability. Upon standing, ammonia levels increase rapidly due to red cell metabolism. Concentrations may more than double in plasma if sample is stored at room temperature for 6 hours. Glucose (POC) 02/16/2023 103  65 - 117 mg/dL Final    Comment: (NOTE)  The FDA has indicated that no capillary point of care blood glucose  monitoring systems are approved for use in \"critically ill\" patients,  however they have not defined this population. The College of  American Pathologists has recommended that these devices should not  be used in cases such as severe hypotension, dehydration, shock, and  hyperglycemic-hyperosmolar state, amongst others. Venous or arterial  collection is the recommended specimen for testing these patients. Performed by 02/16/2023 Avelino De La Rosa   Final    SAMPLES BEING HELD 02/16/2023 SST. RED   Final COMMENT 02/16/2023 Add-on orders for these samples will be processed based on acceptable specimen integrity and analyte stability, which may vary by analyte. Final    Sodium 02/17/2023 140  136 - 145 mmol/L Final    Potassium 02/17/2023 4.4  3.5 - 5.1 mmol/L Final    Chloride 02/17/2023 116 (A)  97 - 108 mmol/L Final    CO2 02/17/2023 20 (A)  21 - 32 mmol/L Final    Anion gap 02/17/2023 4 (A)  5 - 15 mmol/L Final    Glucose 02/17/2023 75  65 - 100 mg/dL Final    BUN 02/17/2023 29 (A)  6 - 20 MG/DL Final    INVESTIGATED PER DELTA CHECK PROTOCOL    Creatinine 02/17/2023 0.96  0.55 - 1.02 MG/DL Final    BUN/Creatinine ratio 02/17/2023 30 (A)  12 - 20   Final    eGFR 02/17/2023 58 (A)  >60 ml/min/1.73m2 Final    Comment:      Pediatric calculator link: [facility].[email or web address] org/professionals/kdoqi/gfr_calculatorped       These results are not intended for use in patients <25years of age. eGFR results are calculated without a race factor using  the 2021 CKD-EPI equation. Careful clinical correlation is recommended, particularly when comparing to results calculated using previous equations. The CKD-EPI equation is less accurate in patients with extremes of muscle mass, extra-renal metabolism of creatinine, excessive creatine ingestion, or following therapy that affects renal tubular secretion.       Calcium 02/17/2023 8.7  8.5 - 10.1 MG/DL Final    WBC 02/18/2023 4.2  3.6 - 11.0 K/uL Final    RBC 02/18/2023 3.44 (A)  3.80 - 5.20 M/uL Final    HGB 02/18/2023 10.1 (A)  11.5 - 16.0 g/dL Final    HCT 02/18/2023 31.2 (A)  35.0 - 47.0 % Final    MCV 02/18/2023 90.7  80.0 - 99.0 FL Final    MCH 02/18/2023 29.4  26.0 - 34.0 PG Final    MCHC 02/18/2023 32.4  30.0 - 36.5 g/dL Final    RDW 02/18/2023 15.0 (A)  11.5 - 14.5 % Final    PLATELET 95/69/5219 246  150 - 400 K/uL Final    MPV 02/18/2023 9.5  8.9 - 12.9 FL Final    NRBC 02/18/2023 0.0  0  WBC Final    ABSOLUTE NRBC 02/18/2023 0.00  0.00 - 0.01 K/uL Final Sodium 02/18/2023 141  136 - 145 mmol/L Final    Potassium 02/18/2023 4.3  3.5 - 5.1 mmol/L Final    Chloride 02/18/2023 114 (A)  97 - 108 mmol/L Final    CO2 02/18/2023 21  21 - 32 mmol/L Final    Anion gap 02/18/2023 6  5 - 15 mmol/L Final    Glucose 02/18/2023 92  65 - 100 mg/dL Final    BUN 02/18/2023 25 (A)  6 - 20 MG/DL Final    Creatinine 02/18/2023 0.96  0.55 - 1.02 MG/DL Final    BUN/Creatinine ratio 02/18/2023 26 (A)  12 - 20   Final    eGFR 02/18/2023 58 (A)  >60 ml/min/1.73m2 Final    Comment:      Pediatric calculator link: [employer]Up.[email or web address] org/professionals/kdoqi/gfr_calculatorped       These results are not intended for use in patients <25years of age. eGFR results are calculated without a race factor using  the 2021 CKD-EPI equation. Careful clinical correlation is recommended, particularly when comparing to results calculated using previous equations. The CKD-EPI equation is less accurate in patients with extremes of muscle mass, extra-renal metabolism of creatinine, excessive creatine ingestion, or following therapy that affects renal tubular secretion. Calcium 02/18/2023 8.7  8.5 - 10.1 MG/DL Final    TSH 02/18/2023 0.49  0.36 - 3.74 uIU/mL Final    Comment:      Due to TSH heterogeneity, both structurally and degree of glycosylation, monoclonal antibodies used in the TSH assay may not accurately quantitate TSH. Therefore, this result should be correlated with clinical findings as well as with other assessments of thyroid function, e.g., free T4, free T3. Objective:       Exam:  Visit Vitals  BP (!) 144/68 (BP 1 Location: Right arm, BP Patient Position: At rest)   Pulse 95   Temp 97.5 °F (36.4 °C)   Resp 18   Ht 5' 1\" (1.549 m)   Wt 53 kg (116 lb 14.4 oz)   SpO2 96%   BMI 22.09 kg/m²     Gen: Awake, alert, follows commands  Appropriate appearance, normal speech.   Answers questions  Feels better  No visual field defect on confrontation exam.  Full eyes movement, with no nystagmus, no diplopia, no ptosis. Normal gag and swallow. All remaining cranial nerves were normal  Motor function: 5/5 in all extremities  (+) masked facies  (+) bilateral rigidity  (+) bradykineisa  Sensory: intact to LT, PP and JPS  DTRs + in all extremities, (-) Babinski  Good Slow FTN and HTS   Gait: Deferred    Assessment:     1. LESLYE (acute kidney injury) ([facility] [address]    2. Confusion and disorientation    3. Impaired mobility and ADLs    4. Acute metabolic encephalopathy      Worsening of Parkinson's disease    Possible associated dementia    MRI brain: Mild/moderate chronic microvascular ischemic change and moderate temporal predominant cerebral atrophy. No acute stroke. EEG:  diffuse slowing and disorganization of the background rhythms indicative of a mild degree of bihemispheric dysfunction as is commonly seen with an encephalopathy nonspecific as to cause. This pattern may also be seen in chronic neurodegenerative disorder such as dementia and again clinical correlation is recommended. Plan:   1. I had a long discussion with patient. Discussed diagnosis, prognosis, pathophysiology and available treatment. Reviewed test results. All questions were answered. 2. Reviewed medical records in [facility]  3. Discussed MRI brain showed no stroke and EEG showed no seizures  4. Check Vit B12  5. Increase Sinemet 25/100 1 tab QID  6. PT/OT  7. Can follow up with her neurologist Dr Herbie Hurley for evaluation for dementia      Please call for questions    50 mins of time spent, 50% of which was spent on counseling and coordination of care.       Alexander Green MD  Diplomate, American Board of Psychiatry and Neurology  Diplomate, Neuromuscular Medicine  Diplomate, American Board of Electrodiagnostic Medicine

## 2023-02-18 NOTE — PROGRESS NOTES
Patient has a nosebleed episode x1 from the left nostril,  blood noted to sheet and gown, pressure applied for 5 minutes, then clean with saline. Pt tolerated well, bleeding stopped.

## 2023-02-18 NOTE — PROGRESS NOTES
2/18/2023  11:32 AM  Case management note    UPDATE:    Spoke with daughter about SNF preference. She chose [facility], [address], [place], [facility] and [facility]. I did sent referral to [facility] for [facility], however today patient walked in room with daughter. She may be interested in moving patient and spouse to a place they can both stay for duration. They have long term care insurance as well. Slava Dears daughter       Reason for Admission:  acute metabolic encephalopathy    Patient came to hospital for increased weakness and confusion. Patient is , but  lives in memory unit at [facility]. She has walker, cane and wheelchair if needed. Patient did not drive. Patient has history of breast CA, depression, diverticulitis, GERD, HLD, HTN, thyroid and Parkinson. [facility]    Orders placed for [facility], daughter is aware that her insurance most likely wont allow her to go to [facility]. Will send to [facility]. She took the SNF list and will look to see if she is willing to send any referrals to them. If not patient will go home with [facility] and some aids for care, or she will look for private come to care for both her parents. HUMBERTO done with daughter in person.    Will send referral to [facility] per daughter                   RUR Score:          moderate           Plan for utilizing home health:      PT/OT to eval    PCP: First and Last name:  Savage Lei MD     Name of Practice:    Are you a current patient: Yes/No: yes   Approximate date of last visit: 1 month   Can you participate in a virtual visit with your PCP:                     Current Advanced Directive/Advance Care Plan: Full Code NO AD      Healthcare Decision Maker:   Slava Miranda daughter                              Transition of Care Plan:           Unable to determine at this time  PCP follow up  Long term planning  AD planning  CM to follow for discharge needs    Care Management Interventions  PCP Verified by CM:  Yes (Dr. Todd Schulte)  Support Systems: Spouse/Significant Other, Child(baldev)  The Plan for Transition of Care is Related to the Following Treatment Goals : acute metabolic encpehopathy  Discharge Location  Patient Expects to be Discharged to[de-identified] Unable to determine at this time   Kay

## 2023-02-18 NOTE — ED NOTES
TRANSFER - OUT REPORT:    Verbal report given to Breanne DELGADO on [facility]  being transferred to [facility]    Report consisted of patients Situation, Background, Assessment and   Recommendations(SBAR). Information from the following report(s) SBAR was reviewed with the receiving nurse. Lines:   Peripheral IV 02/16/23 Right Antecubital (Active)        Opportunity for questions and clarification was provided.

## 2023-02-18 NOTE — PROGRESS NOTES
Problem: Self Care Deficits Care Plan (Adult)  Goal: *Acute Goals and Plan of Care (Insert Text)  Description: FUNCTIONAL STATUS PRIOR TO ADMISSION: Prior to 1 week ago, pt was amb with and without a RW and performing ADLs with supervision. Pt has aides from 10a-6p daily per daughter. Pt's daughter is in town from [place] [id number] to assist pt for time being. Pt's  has advanced dementia and resides in a retirement. HOME SUPPORT: The patient lived alone with aides to provide assistance. Occupational Therapy Goals  Initiated 2/17/2023  1. Patient will perform grooming standing at sink with minimal assistance/contact guard assist within 7 day(s). 2.  Patient will perform lower body dressing with minimal assistance/contact guard assist within 7 day(s). 3.  Patient will perform upper body dressing with minimal assistance/contact guard assist within 7 day(s). 4.  Patient will perform toilet transfers with contact guard assist within 7 day(s). 5.  Patient will perform all aspects of toileting with minimal assistance/contact guard assist within 7 day(s). 6.  Patient will participate in upper extremity therapeutic exercise/activities with supervision/set-up for 10 minutes within 7 day(s). 7.  Patient will utilize energy conservation techniques during functional activities with verbal and visual cues within 7 day(s). Outcome: Progressing Towards Goal   OCCUPATIONAL THERAPY TREATMENT  Patient: Yoko Fonseca (82 y.o. female)  Date: 2/18/2023  Diagnosis: Acute metabolic encephalopathy [U93.77] <principal problem not specified>      Precautions: Fall, Skin  Chart, occupational therapy assessment, plan of care, and goals were reviewed. ASSESSMENT  Patient continues with skilled OT services and is progressing towards goals. P seen for ADL re-training. She transfers to [facility] min assist, assist for hygiene. Pt sat in chair for bathing task, stand by assist UB care.  She stands with min assist to brush her teeth at bedside table. Pts daughter present for tx and assisted her to place toothpaste on brush. BP following tx 129/58. Current Level of Function Impacting Discharge (ADLs): stand by assist UB bathe/dress, BSC transfers min assist, oral hygiene stand by assist    Other factors to consider for discharge:          PLAN :  Patient continues to benefit from skilled intervention to address the above impairments. Continue treatment per established plan of care to address goals. Recommend with staff: ADL's, there ex, there act    Recommend next OT session: cont towards goals    Recommendation for discharge: (in order for the patient to meet his/her long term goals)  Therapy 3 hours per day 5-7 days per week    This discharge recommendation:  Has not yet been discussed the attending provider and/or case management    IF patient discharges home will need the following DME:        SUBJECTIVE:   Patient stated I need help.  regarding hygiene    OBJECTIVE DATA SUMMARY:   Cognitive/Behavioral Status:  Neurologic State: Alert     Cognition: Impaired decision making             Functional Mobility and Transfers for ADLs:  Bed Mobility:  Supine to Sit: Moderate assistance  Scooting:  Moderate assistance;Maximum assistance    Transfers:  Sit to Stand: Contact guard assistance;Minimum assistance  Functional Transfers  Toilet Transfer : Minimum assistance  Adaptive Equipment: Bedside commode;Walker (comment)       Balance:  Sitting: High guard  Standing: With support  Standing - Static: Fair    ADL Intervention:       Grooming  Grooming Assistance: Stand-by assistance  Brushing Teeth: Stand-by assistance (daughter helped her place toothpaste on brush)    Upper Body Bathing  Bathing Assistance: Stand-by assistance  Position Performed: Seated in chair              [address]: Stand-by assistance         Therapeutic Exercises:   Encouraged pt to engage with UE exercises      Activity Tolerance:   Fair    After treatment patient left in no apparent distress:   Sitting in chair and Call bell within reach    COMMUNICATION/COLLABORATION:   The patients plan of care was discussed with: Physical therapy assistant, Occupational therapist, and Registered nurse.      VALERY Chatman/L  Time Calculation: 38 mins

## 2023-02-18 NOTE — PROGRESS NOTES
Problem: Pressure Injury - Risk of  Goal: *Prevention of pressure injury  Description: Document Jamel Scale and appropriate interventions in the flowsheet.   Outcome: Progressing Towards Goal  Note: Pressure Injury Interventions:       Moisture Interventions: Absorbent underpads    Activity Interventions: PT/OT evaluation    Mobility Interventions: HOB 30 degrees or less    Nutrition Interventions: Document food/fluid/supplement intake    Patient was up to chair today and walked in room a few times with walker and 1 assist

## 2023-02-18 NOTE — PROGRESS NOTES
[facility] [address]  [address] [facility], [address] [place] [address]  [phone]      Hospitalist Progress Note      NAME: Sae Kapadia   :  1935  MRM:  [id number]    Date of service: 2023  7:28 AM       Assessment and Plan:   Acute Metabolic Encephalopathy: CT Head - There is movement artifact but she has generalized volume loss w/ enlarged ventricles and chronic microvascular ischemic disease. No acute infarct. Suspect that her symptoms are most likely due to progressive cognitive decline w/ Parkinson's. Had recent visit with her neurologist Dr. Shaka Morales. Doubt infection as a cause. Evaluated by neurology and had MRI of the brain: Mild/moderate chronic microvascular ischemic change and moderate temporal. predominant cerebral atrophy. No acute finding. EEG is without seizure. Evaluated by PT/OT and pt needs IPR     2. Parkinson's disease: Cont w/ Sinemet 25/250 mg tid     3. LESLYE: resolved with IVF      4. HTN: Cont w/ Toprol XL 50mg daily and Cozaar 25mg daily     4. Hx Breast CA: Cont w/ Letrozole 2.5mg daily     5. Anxiety: Cont w/ Clonazepam 0.5mg bid     6. Hypothyroid: Cont w/ Synthroid 50mcg daily. Check TSH    7. Conjunctivitis. Cont cipro eye drop          Subjective:     Chief Complaint[de-identified] Patient was seen and examined as a follow up for AMS. Chart was reviewed. no events     ROS:  (bold if positive, if negative)    Tolerating PT  Tolerating Diet        Objective:     Last 24hrs VS reviewed since prior progress note.  Most recent are:    Visit Vitals  BP (!) 144/68 (BP 1 Location: Right arm, BP Patient Position: At rest)   Pulse 95   Temp 97.5 °F (36.4 °C)   Resp 18   Ht 5' 1\" (1.549 m)   Wt 53 kg (116 lb 14.4 oz)   SpO2 96%   BMI 22.09 kg/m²     SpO2 Readings from Last 6 Encounters:   23 96%   23 100%   10/07/22 100%   22 97%   22 100%   22 100%          Intake/Output Summary (Last 24 hours) at 2023 1016  Last data filed at 2/17/2023 1651  Gross per 24 hour   Intake 240 ml   Output --   Net 240 ml          Physical Exam:    Gen:  Well-developed, well-nourished, in no acute distress  HEENT:  Pink conjunctivae, PERRL, hearing intact to voice, moist mucous membranes  Neck:  Supple, without masses, thyroid non-tender  Resp:  No accessory muscle use, clear breath sounds without wheezes rales or rhonchi  Card:  No murmurs, normal S1, S2 without thrills, bruits or peripheral edema  Abd:  Soft, non-tender, non-distended, normoactive bowel sounds are present, no palpable organomegaly and no detectable hernias  Lymph:  No cervical or inguinal adenopathy  Musc:  No cyanosis or clubbing  Skin:  No rashes or ulcers, skin turgor is good  Neuro:  Cranial nerves are grossly intact, no focal motor weakness, follows commands appropriately  Psych:  Good insight, oriented to person, place and time, alert  __________________________________________________________________  Medications Reviewed: (see below)  Medications:     Current Facility-Administered Medications   Medication Dose Route Frequency    aspirin delayed-release tablet 81 mg  81 mg Oral DAILY    clonazePAM (KlonoPIN) tablet 0.5 mg  0.5 mg Oral BID PRN    letrozole (FEMARA) tablet 2.5 mg  2.5 mg Oral DAILY    levothyroxine (SYNTHROID) tablet 50 mcg  50 mcg Oral ACB    lidocaine 4 % patch 1 Patch  1 Patch TransDERmal Q24H    losartan (COZAAR) tablet 25 mg  25 mg Oral DAILY    metoprolol succinate (TOPROL-XL) XL tablet 50 mg  50 mg Oral DAILY    sodium chloride (NS) flush 5-40 mL  5-40 mL IntraVENous Q8H    sodium chloride (NS) flush 5-40 mL  5-40 mL IntraVENous PRN    acetaminophen (TYLENOL) tablet 650 mg  650 mg Oral Q6H PRN    Or    acetaminophen (TYLENOL) suppository 650 mg  650 mg Rectal Q6H PRN    polyethylene glycol (MIRALAX) packet 17 g  17 g Oral DAILY PRN    ondansetron (ZOFRAN ODT) tablet 4 mg  4 mg Oral Q8H PRN    Or    ondansetron (ZOFRAN) injection 4 mg  4 mg IntraVENous Q6H PRN enoxaparin (LOVENOX) injection 30 mg  30 mg SubCUTAneous DAILY    lactated Ringers infusion  125 mL/hr IntraVENous CONTINUOUS    hydrALAZINE (APRESOLINE) 20 mg/mL injection 20 mg  20 mg IntraVENous Q4H PRN    ciprofloxacin HCl (CILOXAN) 0.3 % ophthalmic solution 1 Drop  1 Drop Both Eyes Q2HWA    And    [START ON 2/19/2023] ciprofloxacin HCl (CILOXAN) 0.3 % ophthalmic solution 1 Drop  1 Drop Both Eyes Q4HWA    carbidopa-levodopa (SINEMET)  mg per tablet 1 Tablet  1 Tablet Oral TID        Lab Data Reviewed: (see below)  Lab Review:     Recent Labs     02/18/23  0320 02/16/23  1543   WBC 4.2 4.2   HGB 10.1* 11.0*   HCT 31.2* 33.6*    321       Recent Labs     02/18/23  0320 02/17/23  1115 02/16/23  1543    140 141   K 4.3 4.4 4.0   * 116* 114*   CO2 21 20* 22   GLU 92 75 100   BUN 25* 29* 50*   CREA 0.96 0.96 1.49*   CA 8.7 8.7 9.1   MG  --   --  2.2   ALB  --   --  3.4*   TBILI  --   --  0.3   ALT  --   --  10*   INR  --   --  1.1       Lab Results   Component Value Date/Time    Glucose (POC) 103 02/16/2023 03:40 PM     No results for input(s): PH, PCO2, PO2, HCO3, FIO2 in the last 72 hours. Recent Labs     02/16/23  1543   INR 1.1       All Micro Results       Procedure Component Value Units Date/Time    URINE CULTURE HOLD SAMPLE [[id number]] Collected: 02/16/23 1727    Order Status: Completed Specimen: Urine Updated: 02/16/23 1741     Urine culture hold       Urine on hold in Microbiology dept for 2 days. If unpreserved urine is submitted, it cannot be used for addtional testing after 24 hours, recollection will be required. I have reviewed notes of prior 24hr. Other pertinent lab: Total time spent with patient: 35 minutes I personally reviewed chart, notes, data and current medications in the medical record. I have personally examined and treated the patient at bedside during this period.                  Care Plan discussed with: Patient, Family, Nursing Staff, and >50% of time spent in counseling and coordination of care    Discussed:  Care Plan    Prophylaxis:  Lovenox    Disposition:  Home w/Family           ___________________________________________________    Attending Physician: Jennifer Sethi MD

## 2023-02-18 NOTE — PROGRESS NOTES
Problem: Patient Education: Go to Patient Education Activity  Goal: Patient/Family Education  Outcome: Progressing Towards Goal     Problem: Patient Education: Go to Patient Education Activity  Goal: Patient/Family Education  Outcome: Progressing Towards Goal     Problem: Pressure Injury - Risk of  Goal: *Prevention of pressure injury  Description: Document Jamel Scale and appropriate interventions in the flowsheet. Outcome: Progressing Towards Goal  Note: Pressure Injury Interventions:       Moisture Interventions: Absorbent underpads    Activity Interventions: PT/OT evaluation    Mobility Interventions: HOB 30 degrees or less, PT/OT evaluation    Nutrition Interventions: Document food/fluid/supplement intake                     Problem: Patient Education: Go to Patient Education Activity  Goal: Patient/Family Education  Outcome: Progressing Towards Goal     Problem: Falls - Risk of  Goal: *Absence of Falls  Description: Document Thien Fall Risk and appropriate interventions in the flowsheet.   Outcome: Progressing Towards Goal  Note: Fall Risk Interventions:                                Problem: Patient Education: Go to Patient Education Activity  Goal: Patient/Family Education  Outcome: Progressing Towards Goal

## 2023-02-18 NOTE — PROGRESS NOTES
Problem: Mobility Impaired (Adult and Pediatric)  Goal: *Acute Goals and Plan of Care (Insert Text)  Description: FUNCTIONAL STATUS PRIOR TO ADMISSION: Prior to 1 week ago, pt was amb with and without a RW and performing ADLs with supervision. Pt has aides from 10a-6p daily per daughter. Pt's daughter is in town from [place] [id number] to assist pt for time being. Pt's  has advanced dementia and resides in a [facility]. HOME SUPPORT: The patient lived alone with aides to provide assistance    Physical Therapy Goals  Initiated 2/17/2023  1. Patient will move from supine to sit and sit to supine , scoot up and down, and roll side to side in bed with supervision/set-up within 7 day(s). 2.  Patient will transfer from bed to chair and chair to bed with supervision/set-up using the least restrictive device within 7 day(s). 3.  Patient will perform sit to stand with supervision/set-up within 7 day(s). 4.  Patient will ambulate with supervision/set-up for 50 feet with the least restrictive device within 7 day(s). 5.  Patient will ascend/descend 5 stairs with 1-2 handrail(s) with minimal assistance/contact guard assist within 7 day(s). Note:   PHYSICAL THERAPY TREATMENT  Patient: Kevin Monzon (6 y.o. female)  Date: 2/18/2023  Diagnosis: Acute metabolic encephalopathy [L13.51] <principal problem not specified>      Precautions: Fall, Skin  Chart, physical therapy assessment, plan of care and goals were reviewed. ASSESSMENT  Patient continues with skilled PT services. Pt seen this AM.Pt supine to sit with mod assist.Pt sit to stand with CGA to min assist.Pt ambulated 10ft with RW CGA to min assist.Pt was assisted to bedside commode before sitting up in chair. Pt progressing with mobility. Continue goals. PLAN :  Patient continues to benefit from skilled intervention to address the above impairments. Continue treatment per established plan of care. to address goals.     Recommendation for discharge: (in order for the patient to meet his/her long term goals)  Therapy 3 hours per day 5-7 days per week    This discharge recommendation:  Has been made in collaboration with the attending provider and/or case management    IF patient discharges home will need the following DME: rolling walker       SUBJECTIVE:       OBJECTIVE DATA SUMMARY:   Critical Behavior:  Neurologic State: Alert  Orientation Level: Oriented to person, Oriented to place  Cognition: Impaired decision making  Safety/Judgement: Decreased awareness of environment, Decreased awareness of need for assistance, Decreased awareness of need for safety, Decreased insight into deficits, Fall prevention  Functional Mobility Training:  Bed Mobility:     Supine to Sit: Moderate assistance     Scooting:  Moderate assistance;Maximum assistance        Transfers:  Sit to Stand: Contact guard assistance;Minimum assistance  Stand to Sit: Contact guard assistance;Minimum assistance                             Balance:  Sitting: High guard  Standing: With support  Standing - Static: Fair  Ambulation/Gait Training:  Distance (ft): 10 Feet (ft)  Assistive Device: Gait belt;Walker, rolling  Ambulation - Level of Assistance: Contact guard assistance;Minimal assistance        Gait Abnormalities: Decreased step clearance        Base of Support: Narrowed     Speed/Aparna: Pace decreased (<100 feet/min)  Step Length: Right shortened;Left shortened                Activity Tolerance:   Fair to good    After treatment patient left in no apparent distress:   Sitting in chair    COMMUNICATION/COLLABORATION:   The patients plan of care was discussed with: Physical therapist.     Sagar Damon PTA   Time Calculation: 38 mins

## 2023-02-19 PROCEDURE — 96372 THER/PROPH/DIAG INJ SC/IM: CPT

## 2023-02-19 PROCEDURE — 74011000250 HC RX REV CODE- 250: Performed by: INTERNAL MEDICINE

## 2023-02-19 PROCEDURE — 74011250636 HC RX REV CODE- 250/636: Performed by: INTERNAL MEDICINE

## 2023-02-19 PROCEDURE — G0378 HOSPITAL OBSERVATION PER HR: HCPCS

## 2023-02-19 PROCEDURE — 74011250637 HC RX REV CODE- 250/637: Performed by: INTERNAL MEDICINE

## 2023-02-19 PROCEDURE — 74011250637 HC RX REV CODE- 250/637: Performed by: PSYCHIATRY & NEUROLOGY

## 2023-02-19 RX ORDER — ENOXAPARIN SODIUM 100 MG/ML
40 INJECTION SUBCUTANEOUS DAILY
Status: DISCONTINUED | OUTPATIENT
Start: 2023-02-20 | End: 2023-02-28 | Stop reason: HOSPADM

## 2023-02-19 RX ADMIN — LETROZOLE 2.5 MG: 2.5 TABLET ORAL at 18:03

## 2023-02-19 RX ADMIN — SODIUM CHLORIDE, PRESERVATIVE FREE 10 ML: 5 INJECTION INTRAVENOUS at 22:31

## 2023-02-19 RX ADMIN — CARBIDOPA AND LEVODOPA 1 TABLET: 25; 250 TABLET ORAL at 13:45

## 2023-02-19 RX ADMIN — LEVOTHYROXINE SODIUM 50 MCG: 0.05 TABLET ORAL at 06:52

## 2023-02-19 RX ADMIN — CARBIDOPA AND LEVODOPA 1 TABLET: 25; 250 TABLET ORAL at 08:37

## 2023-02-19 RX ADMIN — CARBIDOPA AND LEVODOPA 1 TABLET: 25; 250 TABLET ORAL at 22:30

## 2023-02-19 RX ADMIN — CLONAZEPAM 0.5 MG: 0.5 TABLET ORAL at 18:52

## 2023-02-19 RX ADMIN — ENOXAPARIN SODIUM 30 MG: 100 INJECTION SUBCUTANEOUS at 08:39

## 2023-02-19 RX ADMIN — CIPROFLOXACIN 1 DROP: 3 SOLUTION OPHTHALMIC at 13:45

## 2023-02-19 RX ADMIN — CIPROFLOXACIN 1 DROP: 3 SOLUTION OPHTHALMIC at 18:05

## 2023-02-19 RX ADMIN — SODIUM CHLORIDE, PRESERVATIVE FREE 10 ML: 5 INJECTION INTRAVENOUS at 05:13

## 2023-02-19 RX ADMIN — METOPROLOL SUCCINATE 50 MG: 50 TABLET, EXTENDED RELEASE ORAL at 18:03

## 2023-02-19 RX ADMIN — CLONAZEPAM 0.5 MG: 0.5 TABLET ORAL at 08:37

## 2023-02-19 RX ADMIN — CARBIDOPA AND LEVODOPA 1 TABLET: 25; 250 TABLET ORAL at 18:04

## 2023-02-19 RX ADMIN — LOSARTAN POTASSIUM 25 MG: 50 TABLET, FILM COATED ORAL at 08:37

## 2023-02-19 RX ADMIN — CIPROFLOXACIN 1 DROP: 3 SOLUTION OPHTHALMIC at 10:29

## 2023-02-19 RX ADMIN — CIPROFLOXACIN 1 DROP: 3 SOLUTION OPHTHALMIC at 22:31

## 2023-02-19 RX ADMIN — ASPIRIN 81 MG: 81 TABLET, COATED ORAL at 08:37

## 2023-02-19 RX ADMIN — CIPROFLOXACIN 1 DROP: 3 SOLUTION OPHTHALMIC at 05:13

## 2023-02-19 NOTE — PROGRESS NOTES
Problem: Patient Education: Go to Patient Education Activity  Goal: Patient/Family Education  2/19/2023 0057 by Laura Rao RN  Outcome: Progressing Towards Goal  2/19/2023 0040 by Laura Rao RN  Outcome: Progressing Towards Goal     Problem: Pressure Injury - Risk of  Goal: *Prevention of pressure injury  Description: Document Jamel Scale and appropriate interventions in the flowsheet. 2/19/2023 0057 by Laura Rao RN  Outcome: Progressing Towards Goal  Note: Pressure Injury Interventions:       Moisture Interventions: Absorbent underpads    Activity Interventions: PT/OT evaluation, Pressure redistribution bed/mattress(bed type)    Mobility Interventions: HOB 30 degrees or less    Nutrition Interventions: Document food/fluid/supplement intake    Friction and Shear Interventions: Apply protective barrier, creams and emollients, HOB 30 degrees or less, Minimize layers             2/19/2023 0040 by Laura Rao RN  Outcome: Progressing Towards Goal  Note: Pressure Injury Interventions:       Moisture Interventions: Absorbent underpads    Activity Interventions: PT/OT evaluation, Pressure redistribution bed/mattress(bed type)    Mobility Interventions: HOB 30 degrees or less    Nutrition Interventions: Document food/fluid/supplement intake    Friction and Shear Interventions: Apply protective barrier, creams and emollients, HOB 30 degrees or less, Minimize layers                Problem: Falls - Risk of  Goal: *Absence of Falls  Description: Document Thien Fall Risk and appropriate interventions in the flowsheet.   2/19/2023 0057 by Laura Rao RN  Outcome: Progressing Towards Goal  Note: Fall Risk Interventions:                             2/19/2023 0040 by Laura Rao RN  Outcome: Progressing Towards Goal  Note: Fall Risk Interventions:

## 2023-02-19 NOTE — PROGRESS NOTES
[facility] [address]  [address] [facility], [address] [place] [address]  [phone]      Hospitalist Progress Note      NAME: Rosa Alvarado   :  1935  MRM:  [id number]    Date of service: 2023  7:28 AM       Assessment and Plan:   Acute Metabolic Encephalopathy: CT Head - There is movement artifact but she has generalized volume loss w/ enlarged ventricles and chronic microvascular ischemic disease. No acute infarct. Suspect that her symptoms are most likely due to progressive cognitive decline w/ Parkinson's. Had recent visit with her neurologist Dr. Yasmin Montano. Doubt infection as a cause. Evaluated by neurology and had MRI of the brain: Mild/moderate chronic microvascular ischemic change and moderate temporal. predominant cerebral atrophy. No acute finding. EEG is without seizure. Evaluated by PT/OT and pt needs IPR     2. Parkinson's disease possible associated dementia: Cont w/ Sinemet 25/250 mg QID( increased dose by neurology). Daughter stated that she has some hallucinations and confusion. 3.  LESLYE: resolved with IVF      4. HTN: Cont w/ Toprol XL 50mg daily and Cozaar 25mg daily     4. Hx Breast CA: Cont w/ Letrozole 2.5mg daily     5. Anxiety: Cont w/ Clonazepam 0.5mg bid     6. Hypothyroid: Cont w/ Synthroid 50mcg daily. Normal TSH    7. Conjunctivitis. Cont cipro eye drop          Subjective:     Chief Complaint[de-identified] Patient was seen and examined as a follow up for AMS. Chart was reviewed. had hallucinations. ROS:  (bold if positive, if negative)    Tolerating PT  Tolerating Diet        Objective:     Last 24hrs VS reviewed since prior progress note.  Most recent are:    Visit Vitals  BP (!) 176/87 (BP 1 Location: Right arm, BP Patient Position: At rest)   Pulse 95   Temp 97.7 °F (36.5 °C)   Resp 18   Ht 5' 1\" (1.549 m)   Wt 53 kg (116 lb 14.4 oz)   SpO2 100%   BMI 22.09 kg/m²     SpO2 Readings from Last 6 Encounters:   23 100%   23 100%   10/07/22 100%   08/02/22 97%   07/11/22 100%   06/09/22 100%          Intake/Output Summary (Last 24 hours) at 2/19/2023 0959  Last data filed at 2/18/2023 1800  Gross per 24 hour   Intake 700 ml   Output --   Net 700 ml          Physical Exam:    Gen:  Well-developed, well-nourished, in no acute distress  HEENT:  Pink conjunctivae, PERRL, hearing intact to voice, moist mucous membranes  Neck:  Supple, without masses, thyroid non-tender  Resp:  No accessory muscle use, clear breath sounds without wheezes rales or rhonchi  Card:  No murmurs, normal S1, S2 without thrills, bruits or peripheral edema  Abd:  Soft, non-tender, non-distended, normoactive bowel sounds are present, no palpable organomegaly and no detectable hernias  Lymph:  No cervical or inguinal adenopathy  Musc:  No cyanosis or clubbing  Skin:  No rashes or ulcers, skin turgor is good  Neuro:  Cranial nerves are grossly intact, no focal motor weakness, follows commands appropriately  Psych:  poor insight,    __________________________________________________________________  Medications Reviewed: (see below)  Medications:     Current Facility-Administered Medications   Medication Dose Route Frequency    carbidopa-levodopa (SINEMET)  mg per tablet 1 Tablet  1 Tablet Oral QID    aspirin delayed-release tablet 81 mg  81 mg Oral DAILY    clonazePAM (KlonoPIN) tablet 0.5 mg  0.5 mg Oral BID PRN    letrozole (FEMARA) tablet 2.5 mg  2.5 mg Oral DAILY    levothyroxine (SYNTHROID) tablet 50 mcg  50 mcg Oral ACB    lidocaine 4 % patch 1 Patch  1 Patch TransDERmal Q24H    losartan (COZAAR) tablet 25 mg  25 mg Oral DAILY    metoprolol succinate (TOPROL-XL) XL tablet 50 mg  50 mg Oral DAILY    sodium chloride (NS) flush 5-40 mL  5-40 mL IntraVENous Q8H    sodium chloride (NS) flush 5-40 mL  5-40 mL IntraVENous PRN    acetaminophen (TYLENOL) tablet 650 mg  650 mg Oral Q6H PRN    Or    acetaminophen (TYLENOL) suppository 650 mg  650 mg Rectal Q6H PRN    polyethylene glycol (MIRALAX) packet 17 g  17 g Oral DAILY PRN    ondansetron (ZOFRAN ODT) tablet 4 mg  4 mg Oral Q8H PRN    Or    ondansetron (ZOFRAN) injection 4 mg  4 mg IntraVENous Q6H PRN    enoxaparin (LOVENOX) injection 30 mg  30 mg SubCUTAneous DAILY    lactated Ringers infusion  125 mL/hr IntraVENous CONTINUOUS    hydrALAZINE (APRESOLINE) 20 mg/mL injection 20 mg  20 mg IntraVENous Q4H PRN    ciprofloxacin HCl (CILOXAN) 0.3 % ophthalmic solution 1 Drop  1 Drop Both Eyes Q4HWA        Lab Data Reviewed: (see below)  Lab Review:     Recent Labs     02/18/23  0320 02/16/23  1543   WBC 4.2 4.2   HGB 10.1* 11.0*   HCT 31.2* 33.6*    321       Recent Labs     02/18/23  0320 02/17/23  1115 02/16/23  1543    140 141   K 4.3 4.4 4.0   * 116* 114*   CO2 21 20* 22   GLU 92 75 100   BUN 25* 29* 50*   CREA 0.96 0.96 1.49*   CA 8.7 8.7 9.1   MG  --   --  2.2   ALB  --   --  3.4*   TBILI  --   --  0.3   ALT  --   --  10*   INR  --   --  1.1       Lab Results   Component Value Date/Time    Glucose (POC) 103 02/16/2023 03:40 PM     No results for input(s): PH, PCO2, PO2, HCO3, FIO2 in the last 72 hours. Recent Labs     02/16/23  1543   INR 1.1       All Micro Results       Procedure Component Value Units Date/Time    URINE CULTURE HOLD SAMPLE [[id number]] Collected: 02/16/23 1727    Order Status: Completed Specimen: Urine Updated: 02/16/23 1741     Urine culture hold       Urine on hold in Microbiology dept for 2 days. If unpreserved urine is submitted, it cannot be used for addtional testing after 24 hours, recollection will be required. I have reviewed notes of prior 24hr. Other pertinent lab: Total time spent with patient: 25 minutes I personally reviewed chart, notes, data and current medications in the medical record. I have personally examined and treated the patient at bedside during this period.                  Care Plan discussed with: Patient, Family, Nursing Staff, and >50% of time spent in counseling and coordination of care    Discussed:  Care Plan    Prophylaxis:  Lovenox    Disposition:  Home w/Family           ___________________________________________________    Attending Physician: Yasmine Hahn MD

## 2023-02-19 NOTE — PROGRESS NOTES
[facility] Lovenox Dosing 02/19/23  Lovenox dose change per protocol  Physician: Dr Michell Mera    [facility] Protocol  Enoxaparin prophylaxis dosing (medically ill, surgical patients)   Patient Weight (kg)     50 and below 51 - 100 101 - 150 151 - 174 175 or greater         Estimated CrCl  (ml/min) 30 or greater   30 mg SUBQ daily   40 mg SUBQ daily (or 30 mg BID for ortho) 30 mg SUBQ BID  40 mg SUBQ BID 60mg SUBQ BID      15-29 UFH 5000 units SUBQ BID   30 mg SUBQ daily 30 mg SUBQ daily 40 mg SUBQ daily   60 mg SUBQ daily      Less than 15 or Dialysis UFH 5000 units SUBQ BID   UFH 5000 units SUBQ TID UFH 7500 units SUBQ TID     Estimated Creatinine Clearance: 32.3 mL/min (based on SCr of 0.96 mg/dL).   Current dose: Lovenox 30 mg SC daily  Recommendation: Lovenox 40 mg SC daily    Thank you  Cristy Emanuel, PharmD  [phone]

## 2023-02-20 LAB
ANION GAP SERPL CALC-SCNC: 5 MMOL/L (ref 5–15)
BASOPHILS # BLD: 0 K/UL (ref 0–0.1)
BASOPHILS NFR BLD: 1 % (ref 0–1)
BUN SERPL-MCNC: 39 MG/DL (ref 6–20)
BUN/CREAT SERPL: 38 (ref 12–20)
CALCIUM SERPL-MCNC: 9.1 MG/DL (ref 8.5–10.1)
CHLORIDE SERPL-SCNC: 112 MMOL/L (ref 97–108)
CO2 SERPL-SCNC: 23 MMOL/L (ref 21–32)
CREAT SERPL-MCNC: 1.04 MG/DL (ref 0.55–1.02)
DIFFERENTIAL METHOD BLD: ABNORMAL
EOSINOPHIL # BLD: 0.1 K/UL (ref 0–0.4)
EOSINOPHIL NFR BLD: 3 % (ref 0–7)
ERYTHROCYTE [DISTWIDTH] IN BLOOD BY AUTOMATED COUNT: 15.2 % (ref 11.5–14.5)
GLUCOSE SERPL-MCNC: 108 MG/DL (ref 65–100)
HCT VFR BLD AUTO: 32.3 % (ref 35–47)
HGB BLD-MCNC: 10.4 G/DL (ref 11.5–16)
IMM GRANULOCYTES # BLD AUTO: 0.1 K/UL (ref 0–0.04)
IMM GRANULOCYTES NFR BLD AUTO: 1 % (ref 0–0.5)
LYMPHOCYTES # BLD: 0.7 K/UL (ref 0.8–3.5)
LYMPHOCYTES NFR BLD: 14 % (ref 12–49)
MAGNESIUM SERPL-MCNC: 2.1 MG/DL (ref 1.6–2.4)
MCH RBC QN AUTO: 29.7 PG (ref 26–34)
MCHC RBC AUTO-ENTMCNC: 32.2 G/DL (ref 30–36.5)
MCV RBC AUTO: 92.3 FL (ref 80–99)
MONOCYTES # BLD: 0.5 K/UL (ref 0–1)
MONOCYTES NFR BLD: 10 % (ref 5–13)
NEUTS SEG # BLD: 3.8 K/UL (ref 1.8–8)
NEUTS SEG NFR BLD: 72 % (ref 32–75)
NRBC # BLD: 0 K/UL (ref 0–0.01)
NRBC BLD-RTO: 0 PER 100 WBC
PHOSPHATE SERPL-MCNC: 2.8 MG/DL (ref 2.6–4.7)
PLATELET # BLD AUTO: 298 K/UL (ref 150–400)
PMV BLD AUTO: 9.1 FL (ref 8.9–12.9)
POTASSIUM SERPL-SCNC: 4.4 MMOL/L (ref 3.5–5.1)
RBC # BLD AUTO: 3.5 M/UL (ref 3.8–5.2)
SODIUM SERPL-SCNC: 140 MMOL/L (ref 136–145)
WBC # BLD AUTO: 5.3 K/UL (ref 3.6–11)

## 2023-02-20 PROCEDURE — 36415 COLL VENOUS BLD VENIPUNCTURE: CPT

## 2023-02-20 PROCEDURE — 83735 ASSAY OF MAGNESIUM: CPT

## 2023-02-20 PROCEDURE — 74011250637 HC RX REV CODE- 250/637: Performed by: PSYCHIATRY & NEUROLOGY

## 2023-02-20 PROCEDURE — 85025 COMPLETE CBC W/AUTO DIFF WBC: CPT

## 2023-02-20 PROCEDURE — 97535 SELF CARE MNGMENT TRAINING: CPT

## 2023-02-20 PROCEDURE — 97110 THERAPEUTIC EXERCISES: CPT

## 2023-02-20 PROCEDURE — 97116 GAIT TRAINING THERAPY: CPT

## 2023-02-20 PROCEDURE — G0378 HOSPITAL OBSERVATION PER HR: HCPCS

## 2023-02-20 PROCEDURE — 74011250636 HC RX REV CODE- 250/636: Performed by: INTERNAL MEDICINE

## 2023-02-20 PROCEDURE — 96372 THER/PROPH/DIAG INJ SC/IM: CPT

## 2023-02-20 PROCEDURE — 74011250637 HC RX REV CODE- 250/637: Performed by: INTERNAL MEDICINE

## 2023-02-20 PROCEDURE — 84100 ASSAY OF PHOSPHORUS: CPT

## 2023-02-20 PROCEDURE — 74011000250 HC RX REV CODE- 250: Performed by: INTERNAL MEDICINE

## 2023-02-20 PROCEDURE — 80048 BASIC METABOLIC PNL TOTAL CA: CPT

## 2023-02-20 RX ADMIN — CIPROFLOXACIN 1 DROP: 3 SOLUTION OPHTHALMIC at 23:29

## 2023-02-20 RX ADMIN — CIPROFLOXACIN 1 DROP: 3 SOLUTION OPHTHALMIC at 17:06

## 2023-02-20 RX ADMIN — CARBIDOPA AND LEVODOPA 1 TABLET: 25; 250 TABLET ORAL at 08:11

## 2023-02-20 RX ADMIN — LETROZOLE 2.5 MG: 2.5 TABLET ORAL at 17:01

## 2023-02-20 RX ADMIN — SODIUM CHLORIDE, PRESERVATIVE FREE 10 ML: 5 INJECTION INTRAVENOUS at 07:01

## 2023-02-20 RX ADMIN — ENOXAPARIN SODIUM 40 MG: 100 INJECTION SUBCUTANEOUS at 08:11

## 2023-02-20 RX ADMIN — CARBIDOPA AND LEVODOPA 1 TABLET: 25; 250 TABLET ORAL at 13:02

## 2023-02-20 RX ADMIN — ACETAMINOPHEN 650 MG: 325 TABLET ORAL at 08:11

## 2023-02-20 RX ADMIN — CARBIDOPA AND LEVODOPA 1 TABLET: 25; 250 TABLET ORAL at 17:01

## 2023-02-20 RX ADMIN — CARBIDOPA AND LEVODOPA 1 TABLET: 25; 250 TABLET ORAL at 23:29

## 2023-02-20 RX ADMIN — CIPROFLOXACIN 1 DROP: 3 SOLUTION OPHTHALMIC at 13:02

## 2023-02-20 RX ADMIN — LEVOTHYROXINE SODIUM 50 MCG: 0.05 TABLET ORAL at 07:02

## 2023-02-20 RX ADMIN — SODIUM CHLORIDE, PRESERVATIVE FREE 10 ML: 5 INJECTION INTRAVENOUS at 13:03

## 2023-02-20 RX ADMIN — CIPROFLOXACIN 1 DROP: 3 SOLUTION OPHTHALMIC at 09:31

## 2023-02-20 RX ADMIN — ASPIRIN 81 MG: 81 TABLET, COATED ORAL at 08:11

## 2023-02-20 RX ADMIN — CIPROFLOXACIN 1 DROP: 3 SOLUTION OPHTHALMIC at 07:07

## 2023-02-20 RX ADMIN — METOPROLOL SUCCINATE 50 MG: 50 TABLET, EXTENDED RELEASE ORAL at 17:01

## 2023-02-20 RX ADMIN — SODIUM CHLORIDE, PRESERVATIVE FREE 10 ML: 5 INJECTION INTRAVENOUS at 23:29

## 2023-02-20 RX ADMIN — LOSARTAN POTASSIUM 25 MG: 50 TABLET, FILM COATED ORAL at 08:14

## 2023-02-20 NOTE — PROGRESS NOTES
Problem: Patient Education: Go to Patient Education Activity  Goal: Patient/Family Education  Outcome: Progressing Towards Goal     Problem: Patient Education: Go to Patient Education Activity  Goal: Patient/Family Education  Outcome: Progressing Towards Goal     Problem: Pressure Injury - Risk of  Goal: *Prevention of pressure injury  Description: Document Jamel Scale and appropriate interventions in the flowsheet. Outcome: Progressing Towards Goal  Note: Pressure Injury Interventions:       Moisture Interventions: Moisture barrier    Activity Interventions: Assess need for specialty bed    Mobility Interventions: PT/OT evaluation    Nutrition Interventions: Document food/fluid/supplement intake    Friction and Shear Interventions: HOB 30 degrees or less                Problem: Patient Education: Go to Patient Education Activity  Goal: Patient/Family Education  Outcome: Progressing Towards Goal     Problem: Falls - Risk of  Goal: *Absence of Falls  Description: Document Thien Fall Risk and appropriate interventions in the flowsheet.   Outcome: Progressing Towards Goal  Note: Fall Risk Interventions:                                Problem: Patient Education: Go to Patient Education Activity  Goal: Patient/Family Education  Outcome: Progressing Towards Goal

## 2023-02-20 NOTE — PROGRESS NOTES
NUTRITION     Nutrition screening referral was triggered based on results obtained during nursing admission assessment for Unintentional wt loss: 2-13#    Wt Readings from Last 30 Encounters:   02/17/23 53 kg (116 lb 14.4 oz)   01/23/23 52.2 kg (115 lb)   10/07/22 53.1 kg (117 lb)   08/02/22 53.1 kg (117 lb)   07/11/22 53.1 kg (117 lb)   06/09/22 54.9 kg (121 lb)   01/27/22 54.9 kg (121 lb)   11/05/21 54.9 kg (121 lb)   06/04/21 54.4 kg (120 lb)   04/09/21 52.2 kg (115 lb)   02/20/21 53.2 kg (117 lb 4.6 oz)   10/30/20 54.4 kg (120 lb)   10/06/20 52.6 kg (116 lb)   08/21/20 51 kg (112 lb 6.4 oz)   02/04/20 56.9 kg (125 lb 6.4 oz)          Patient Vitals for the past 168 hrs:   % Diet Eaten   02/19/23 1300 51 - 75%   02/19/23 0800 51 - 75%   02/18/23 1800 51 - 75%   02/18/23 1300 51 - 75%   02/18/23 0900 51 - 75%   02/17/23 1651 76 - 100%       The patient's chart was reviewed and nutrition assessment is not indicated at this time. Plan to see patient for rescreen as indicated. Thank you.      West Flynn RD

## 2023-02-20 NOTE — PROGRESS NOTES
Problem: Patient Education: Go to Patient Education Activity  Goal: Patient/Family Education  Outcome: Progressing Towards Goal     Problem: Pressure Injury - Risk of  Goal: *Prevention of pressure injury  Description: Document Jamel Scale and appropriate interventions in the flowsheet. Outcome: Progressing Towards Goal  Note: Pressure Injury Interventions:       Moisture Interventions: Moisture barrier    Activity Interventions: Assess need for specialty bed    Mobility Interventions: PT/OT evaluation    Nutrition Interventions: Document food/fluid/supplement intake    Friction and Shear Interventions: HOB 30 degrees or less                Problem: Falls - Risk of  Goal: *Absence of Falls  Description: Document Thien Fall Risk and appropriate interventions in the flowsheet.   Outcome: Progressing Towards Goal  Note: Fall Risk Interventions:

## 2023-02-20 NOTE — PROGRESS NOTES
Problem: Self Care Deficits Care Plan (Adult)  Goal: *Acute Goals and Plan of Care (Insert Text)  Description: FUNCTIONAL STATUS PRIOR TO ADMISSION: Prior to 1 week ago, pt was amb with and without a RW and performing ADLs with supervision. Pt has aides from 10a-6p daily per daughter. Pt's daughter is in town from [place] [id number] to assist pt for time being. Pt's  has advanced dementia and resides in a [facility]. HOME SUPPORT: The patient lived alone with aides to provide assistance. Occupational Therapy Goals  Initiated 2/17/2023  1. Patient will perform grooming standing at sink with minimal assistance/contact guard assist within 7 day(s). 2.  Patient will perform lower body dressing with minimal assistance/contact guard assist within 7 day(s). 3.  Patient will perform upper body dressing with minimal assistance/contact guard assist within 7 day(s). 4.  Patient will perform toilet transfers with contact guard assist within 7 day(s). 5.  Patient will perform all aspects of toileting with minimal assistance/contact guard assist within 7 day(s). 6.  Patient will participate in upper extremity therapeutic exercise/activities with supervision/set-up for 10 minutes within 7 day(s). 7.  Patient will utilize energy conservation techniques during functional activities with verbal and visual cues within 7 day(s). Outcome: Progressing Towards Goal   OCCUPATIONAL THERAPY TREATMENT  Patient: Rosa Alvarado (34 y.o. female)  Date: 2/20/2023  Diagnosis: Acute metabolic encephalopathy [R52.52] <principal problem not specified>      Precautions: Fall, Skin  Chart, occupational therapy assessment, plan of care, and goals were reviewed. ASSESSMENT  Patient continues with skilled OT services and is progressing towards goals.   Pt sits edge of bed to doff gown with verbal cueing to reach towards shoulders and min assist, don pullover shirt mod assist, dons gown min assist.  Pts daughter present for tx and educated her as to having pt perform large movements with arms/LE's and pt engaged with UE's overhead and than bringing UEs anterior as well as knee flex/ext seated edge of bed. Pt brushed teeth with contact guard seated in chair. Current Level of Function Impacting Discharge (ADLs): contact guard grooming, min/mod assist UB dress    Other factors to consider for discharge:          PLAN :  Patient continues to benefit from skilled intervention to address the above impairments. Continue treatment per established plan of care to address goals. Recommend with staff: ADL's, there ex, there act    Recommend next OT session: cont towards goals    Recommendation for discharge: (in order for the patient to meet his/her long term goals)  Therapy 3 hours per day 5-7 days per week    This discharge recommendation:  Has not yet been discussed the attending provider and/or case management    IF patient discharges home will need the following DME:        SUBJECTIVE:   Patient stated Thank you.     OBJECTIVE DATA SUMMARY:   Cognitive/Behavioral Status:  Neurologic State: Alert  Orientation Level: Oriented to place  Cognition: Follows commands             Functional Mobility and Transfers for ADLs:  Bed Mobility:  Supine to Sit: Minimum assistance    Transfers:  Sit to Stand: Minimum assistance          Balance:  Standing: Impaired    ADL Intervention:       Grooming  Grooming Assistance: Contact guard assistance  Position Performed: Seated in chair  Brushing Teeth: Contact guard assistance                   Upper Body Dressing Assistance  Dressing Assistance:  Moderate assistance  Pullover Shirt: Moderate assistance    Therapeutic Exercises:   Shoulder flex/ext, anterior reach 5 x    Activity Tolerance:   Fair    After treatment patient left in no apparent distress:   Sitting in chair and Call bell within reach    COMMUNICATION/COLLABORATION:   The patients plan of care was discussed with: Physical therapy assistant, Occupational therapist, and Registered nurse.      VALERY Chatman/L  Time Calculation: 37 mins

## 2023-02-20 NOTE — PROGRESS NOTES
Problem: Mobility Impaired (Adult and Pediatric)  Goal: *Acute Goals and Plan of Care (Insert Text)  Description: FUNCTIONAL STATUS PRIOR TO ADMISSION: Prior to 1 week ago, pt was amb with and without a RW and performing ADLs with supervision. Pt has aides from 10a-6p daily per daughter. Pt's daughter is in town from [place] [phone] to assist pt for time being. Pt's  has advanced dementia and resides in a [facility]. HOME SUPPORT: The patient lived alone with aides to provide assistance    Physical Therapy Goals  Initiated 2/17/2023  1. Patient will move from supine to sit and sit to supine , scoot up and down, and roll side to side in bed with supervision/set-up within 7 day(s). 2.  Patient will transfer from bed to chair and chair to bed with supervision/set-up using the least restrictive device within 7 day(s). 3.  Patient will perform sit to stand with supervision/set-up within 7 day(s). 4.  Patient will ambulate with supervision/set-up for 50 feet with the least restrictive device within 7 day(s). 5.  Patient will ascend/descend 5 stairs with 1-2 handrail(s) with minimal assistance/contact guard assist within 7 day(s). Outcome: Progressing Towards Goal  Note:   PHYSICAL THERAPY TREATMENT  Patient: Jenny Nur (27 y.o. female)  Date: 2/20/2023  Diagnosis: Acute metabolic encephalopathy [C33.37] <principal problem not specified>      Precautions: Fall, Skin  Chart, physical therapy assessment, plan of care and goals were reviewed. ASSESSMENT  Patient continues with skilled PT services and progressing towards goals. Daughter at bedside throughout session. Pt requires increased time and David x 1 for bed mobility. Performed thera ex for BLE/UE with large amplitude prior to mobility. Initially demonstrates posterior lean upon standing with RW with Min/mod A x1. Improved gait with, metronome for torres, and CGA/MIN A x 1.  Pt remained in chair at end of session  to work with OT    Current Level of Function Impacting Discharge (mobility/balance): Min A     Other factors to consider for discharge:          PLAN :  Patient continues to benefit from skilled intervention to address the above impairments. Continue treatment per established plan of care. to address goals. Recommendation for discharge: (in order for the patient to meet his/her long term goals)  Therapy up to 5 days/week in rehab setting    This discharge recommendation:  Has been made in collaboration with the attending provider and/or case management    IF patient discharges home will need the following DME: to be determined (TBD)       SUBJECTIVE:   Patient stated doing ok.     OBJECTIVE DATA SUMMARY:   Critical Behavior:  Neurologic State: Alert  Orientation Level: Oriented to place  Cognition: Follows commands  Safety/Judgement: Decreased awareness of environment, Decreased awareness of need for assistance, Decreased awareness of need for safety, Decreased insight into deficits, Fall prevention  Functional Mobility Training:  Bed Mobility:     Supine to Sit: Minimum assistance              Transfers:  Sit to Stand: Minimum assistance  Stand to Sit: Minimum assistance                             Balance:  Standing: Impaired  Ambulation/Gait Training:  Distance (ft): 15 Feet (ft)  Assistive Device: Walker, rolling;Gait belt  Ambulation - Level of Assistance: Minimal assistance        Gait Abnormalities: Decreased step clearance        Base of Support: Narrowed     Speed/Aparna: Pace decreased (<100 feet/min)                       Stairs: Therapeutic Exercises:     Pain Rating:      Activity Tolerance:   Good    After treatment patient left in no apparent distress:   Sitting in chair, Call bell within reach, Bed / chair alarm activated, and Caregiver / family present    COMMUNICATION/COLLABORATION:   The patients plan of care was discussed with: Registered nurseZachary Hughes   Time Calculation: 25 mins

## 2023-02-20 NOTE — PROGRESS NOTES
[facility]  [address] [facility], [address] [place] [address]  [phone]      Hospitalist Progress Note      NAME: Stephani Tang   :  1935  MRM:  [id number]    Date/Time of service: 2023  2:18 PM       Subjective:     Chief Complaint:  Patient was personally seen and examined by me during this time period. Chart reviewed. Confused but pleasant. Spoke to daughter at bedside        Objective:       Vitals:       Last 24hrs VS reviewed since prior progress note.  Most recent are:    Visit Vitals  /63 (BP 1 Location: Right upper arm, BP Patient Position: Sitting)   Pulse 96   Temp 97.7 °F (36.5 °C)   Resp 16   Ht 5' 1\" (1.549 m)   Wt 53 kg (116 lb 14.4 oz)   SpO2 98%   BMI 22.09 kg/m²     SpO2 Readings from Last 6 Encounters:   23 98%   23 100%   10/07/22 100%   22 97%   22 100%   22 100%        No intake or output data in the 24 hours ending 23 1418     Exam:     Physical Exam:    Gen:  elderly, frail, NAD  HEENT:  Pink conjunctivae, PERRL, hearing intact to voice, moist mucous membranes  Neck:  Supple, without masses, thyroid non-tender  Resp:  No accessory muscle use, clear breath sounds without wheezes rales or rhonchi  Card:  No murmurs, normal S1, S2 without thrills, bruits or peripheral edema  Abd:  Soft, non-tender, non-distended, normoactive bowel sounds are present  Musc:  No cyanosis or clubbing  Skin:  No rashes   Neuro:  Cranial nerves 3-12 are grossly intact, generalized weakness, follows commands appropriately  Psych:  poor insight, oriented to person    Medications Reviewed: (see below)    Lab Data Reviewed: (see below)    ______________________________________________________________________    Medications:     Current Facility-Administered Medications   Medication Dose Route Frequency    enoxaparin (LOVENOX) injection 40 mg  40 mg SubCUTAneous DAILY    carbidopa-levodopa (SINEMET)  mg per tablet 1 Tablet  1 Tablet Oral QID    aspirin delayed-release tablet 81 mg  81 mg Oral DAILY    clonazePAM (KlonoPIN) tablet 0.5 mg  0.5 mg Oral BID PRN    letrozole [facility]) tablet 2.5 mg  2.5 mg Oral DAILY    levothyroxine (SYNTHROID) tablet 50 mcg  50 mcg Oral ACB    lidocaine 4 % patch 1 Patch  1 Patch TransDERmal Q24H    losartan (COZAAR) tablet 25 mg  25 mg Oral DAILY    metoprolol succinate (TOPROL-XL) XL tablet 50 mg  50 mg Oral DAILY    sodium chloride (NS) flush 5-40 mL  5-40 mL IntraVENous Q8H    sodium chloride (NS) flush 5-40 mL  5-40 mL IntraVENous PRN    acetaminophen (TYLENOL) tablet 650 mg  650 mg Oral Q6H PRN    Or    acetaminophen (TYLENOL) suppository 650 mg  650 mg Rectal Q6H PRN    polyethylene glycol (MIRALAX) packet 17 g  17 g Oral DAILY PRN    ondansetron (ZOFRAN ODT) tablet 4 mg  4 mg Oral Q8H PRN    Or    ondansetron (ZOFRAN) injection 4 mg  4 mg IntraVENous Q6H PRN    lactated Ringers infusion  50 mL/hr IntraVENous CONTINUOUS    hydrALAZINE (APRESOLINE) 20 mg/mL injection 20 mg  20 mg IntraVENous Q4H PRN    ciprofloxacin HCl (CILOXAN) 0.3 % ophthalmic solution 1 Drop  1 Drop Both Eyes Q4HWA          Lab Review:     Recent Labs     02/20/23  0438 02/18/23  0320   WBC 5.3 4.2   HGB 10.4* 10.1*   HCT 32.3* 31.2*    295     Recent Labs     02/20/23  0438 02/18/23  0320    141   K 4.4 4.3   * 114*   CO2 23 21   * 92   BUN 39* 25*   CREA 1.04* 0.96   CA 9.1 8.7   MG 2.1  --    PHOS 2.8  --      Lab Results   Component Value Date/Time    Glucose (POC) 103 02/16/2023 03:40 PM          Assessment / Plan:     83 yo hx of HTN, Parkinson's, breast CA, hypothyroid, presented w/ AMS, LESLYE    1) Acute met encephalopathy: now stable, but has worsening cognitive decline from baseline. Likely due to progressive dementia from Parkinson's. Head MRI neg for CVA but showed cerebral atrophy. EEG neg for seizures. Neuro did not recommend further testing or therapy.   Patient will need to follow up with her neurologist, Dr. Bryan Slater    2) Parkinson's w/ dementia: cont Sinemet QID (dose was increased by neurology)    3) LESLYE: resolved with IVF    4) HTN: cont toprol, losartan     5) Breast cancer: cont Letrozole    6) Hypothyroid: cont synthroid    7) Weakness: cont PT/OT.   Awaiting Rehab vs SNF    **Prior records, notes, labs, radiology, and medications reviewed in [address] [facility]**    Total time spent with patient care: 35 min  **I personally saw and examined the patient during this time period**                 Care Plan discussed with: Patient, nursing, daughter     Discussed:  Care Plan    Prophylaxis:  Lovenox    Disposition:  SNF/LTC           ___________________________________________________    Attending Physician: Alex Brizuela MD

## 2023-02-20 NOTE — PROGRESS NOTES
2/20/2023  4:14 PM  CM spoke w/ [facility] liaison,pt approved for IPR   insurance pre-auth submitted today.   TORY Carcamo       12:54 PM  Pt emergently admitted 2/16//23 for Acute Metabolic Encephalopathy, discussed in IDR is continuing to require medical management for Acute Metabolic Encephalopathy, Parkinson's disease possible associated dementia, LESLYE, HTN, Hx Breast CA, Anxiety  Transitions of Care Plan:  RUR N/A pt is OBS Low Risk of Readmission/Green  LOS 4 Days   Medical management continues  PT, OT following , the recommendation is  for IPR a referral is pending w/ [facility], spoke / liaison their MD is reviewing   Neurology, medications adjustment, plan for outpatient follow up   CM to follow through for treatment/response  DC when stable to IPR   Outpatient follow up at DC from IPR, PCP, neurology  Transport Family vs [facility]  CM will continue to follow and assist w/ DC needs   TORY Carcamo

## 2023-02-21 PROCEDURE — 74011250636 HC RX REV CODE- 250/636: Performed by: INTERNAL MEDICINE

## 2023-02-21 PROCEDURE — 97535 SELF CARE MNGMENT TRAINING: CPT

## 2023-02-21 PROCEDURE — 97530 THERAPEUTIC ACTIVITIES: CPT

## 2023-02-21 PROCEDURE — 74011250637 HC RX REV CODE- 250/637: Performed by: PSYCHIATRY & NEUROLOGY

## 2023-02-21 PROCEDURE — 97116 GAIT TRAINING THERAPY: CPT

## 2023-02-21 PROCEDURE — 96372 THER/PROPH/DIAG INJ SC/IM: CPT

## 2023-02-21 PROCEDURE — 74011000250 HC RX REV CODE- 250: Performed by: INTERNAL MEDICINE

## 2023-02-21 PROCEDURE — 74011250637 HC RX REV CODE- 250/637: Performed by: INTERNAL MEDICINE

## 2023-02-21 PROCEDURE — 96376 TX/PRO/DX INJ SAME DRUG ADON: CPT

## 2023-02-21 PROCEDURE — G0378 HOSPITAL OBSERVATION PER HR: HCPCS

## 2023-02-21 RX ORDER — DOCUSATE SODIUM 100 MG/1
100 CAPSULE, LIQUID FILLED ORAL ONCE
Status: COMPLETED | OUTPATIENT
Start: 2023-02-21 | End: 2023-02-22

## 2023-02-21 RX ORDER — HYDRALAZINE HYDROCHLORIDE 20 MG/ML
20 INJECTION INTRAMUSCULAR; INTRAVENOUS
Status: DISCONTINUED | OUTPATIENT
Start: 2023-02-21 | End: 2023-02-28 | Stop reason: HOSPADM

## 2023-02-21 RX ADMIN — CIPROFLOXACIN 1 DROP: 3 SOLUTION OPHTHALMIC at 05:51

## 2023-02-21 RX ADMIN — LEVOTHYROXINE SODIUM 50 MCG: 0.05 TABLET ORAL at 06:46

## 2023-02-21 RX ADMIN — SODIUM CHLORIDE, PRESERVATIVE FREE 10 ML: 5 INJECTION INTRAVENOUS at 18:10

## 2023-02-21 RX ADMIN — CARBIDOPA AND LEVODOPA 1 TABLET: 25; 250 TABLET ORAL at 17:15

## 2023-02-21 RX ADMIN — SODIUM CHLORIDE, PRESERVATIVE FREE 10 ML: 5 INJECTION INTRAVENOUS at 05:51

## 2023-02-21 RX ADMIN — CARBIDOPA AND LEVODOPA 1 TABLET: 25; 250 TABLET ORAL at 12:14

## 2023-02-21 RX ADMIN — ACETAMINOPHEN 650 MG: 325 TABLET ORAL at 03:51

## 2023-02-21 RX ADMIN — LETROZOLE 2.5 MG: 2.5 TABLET ORAL at 17:18

## 2023-02-21 RX ADMIN — CIPROFLOXACIN 1 DROP: 3 SOLUTION OPHTHALMIC at 10:05

## 2023-02-21 RX ADMIN — ENOXAPARIN SODIUM 40 MG: 100 INJECTION SUBCUTANEOUS at 08:15

## 2023-02-21 RX ADMIN — CIPROFLOXACIN 1 DROP: 3 SOLUTION OPHTHALMIC at 21:25

## 2023-02-21 RX ADMIN — LOSARTAN POTASSIUM 25 MG: 50 TABLET, FILM COATED ORAL at 08:15

## 2023-02-21 RX ADMIN — CARBIDOPA AND LEVODOPA 1 TABLET: 25; 250 TABLET ORAL at 21:25

## 2023-02-21 RX ADMIN — METOPROLOL SUCCINATE 50 MG: 50 TABLET, EXTENDED RELEASE ORAL at 17:15

## 2023-02-21 RX ADMIN — HYDRALAZINE HYDROCHLORIDE 20 MG: 20 INJECTION INTRAMUSCULAR; INTRAVENOUS at 05:35

## 2023-02-21 RX ADMIN — CLONAZEPAM 0.5 MG: 0.5 TABLET ORAL at 21:25

## 2023-02-21 RX ADMIN — ASPIRIN 81 MG: 81 TABLET, COATED ORAL at 08:14

## 2023-02-21 RX ADMIN — CIPROFLOXACIN 1 DROP: 3 SOLUTION OPHTHALMIC at 15:24

## 2023-02-21 RX ADMIN — CIPROFLOXACIN 1 DROP: 3 SOLUTION OPHTHALMIC at 17:14

## 2023-02-21 RX ADMIN — SODIUM CHLORIDE, PRESERVATIVE FREE 10 ML: 5 INJECTION INTRAVENOUS at 21:27

## 2023-02-21 RX ADMIN — CLONAZEPAM 0.5 MG: 0.5 TABLET ORAL at 08:14

## 2023-02-21 RX ADMIN — CARBIDOPA AND LEVODOPA 1 TABLET: 25; 250 TABLET ORAL at 08:15

## 2023-02-21 NOTE — PROGRESS NOTES
Bedside and Verbal shift change report given to Tyree Brand RN (oncoming nurse) by Karson Lux RN (offgoing nurse). Report included the following information SBAR, Kardex, ED Summary, Intake/Output, MAR, and Recent Results.

## 2023-02-21 NOTE — PROGRESS NOTES
Problem: Self Care Deficits Care Plan (Adult)  Goal: *Acute Goals and Plan of Care (Insert Text)  Description: FUNCTIONAL STATUS PRIOR TO ADMISSION: Prior to 1 week ago, pt was amb with and without a RW and performing ADLs with supervision. Pt has aides from 10a-6p daily per daughter. Pt's daughter is in town from [place] [phone] to assist pt for time being. Pt's  has advanced dementia and resides in a assisted. HOME SUPPORT: The patient lived alone with aides to provide assistance. Occupational Therapy Goals  Initiated 2/17/2023  1. Patient will perform grooming standing at sink with minimal assistance/contact guard assist within 7 day(s). 2.  Patient will perform lower body dressing with minimal assistance/contact guard assist within 7 day(s). 3.  Patient will perform upper body dressing with minimal assistance/contact guard assist within 7 day(s). 4.  Patient will perform toilet transfers with contact guard assist within 7 day(s). 5.  Patient will perform all aspects of toileting with minimal assistance/contact guard assist within 7 day(s). 6.  Patient will participate in upper extremity therapeutic exercise/activities with supervision/set-up for 10 minutes within 7 day(s). 7.  Patient will utilize energy conservation techniques during functional activities with verbal and visual cues within 7 day(s). Outcome: Progressing Towards Goal   OCCUPATIONAL THERAPY TREATMENT  Patient: Yaya Iglesias (65 y.o. female)  Date: 2/21/2023  Diagnosis: Acute metabolic encephalopathy [K48.26] <principal problem not specified>      Precautions: Fall, Skin  Chart, occupational therapy assessment, plan of care, and goals were reviewed. ASSESSMENT  Patient continues with skilled OT services and is progressing slowly towards goals. Ms. Jatinder Esparza was received up agreeable to activity. Activity today focused on ADL retraining and ADL transfers.   Patient performed short distance ambulation to the sink for standing grooming tasks.  She performed x2 grooming tasks in standing and completed additional grooming tasks once returned to sitting. Patient requires increased time for initiation and cues for sequencing during functional tasks; Increased cuing needed with more complex, dynamic tasks. She is tolerating increased activity overall but continues to benefit from skilled OT interventions. Current Level of Function Impacting Discharge (ADLs): Functional mobility, min A overall; Grooming, setup- CGA         PLAN :  Patient continues to benefit from skilled intervention to address the above impairments. Continue treatment per established plan of care to address goals. Recommend with staff:   Recommend patient be OOB to chair as frequently as tolerated; Goal of 3x/day for all meals for 60 minutes at a time. For toileting needs, recommend transfers to/from [facility] or bathroom with staff assist.    Encourage patient involvement in personal care as able. Encourage exercises frequently throughout the day. Recommend next OT session: Continue towards set OT goals. Recommendation for discharge: (in order for the patient to meet his/her long term goals)  Therapy up to 5 days/week in rehab setting    This discharge recommendation:  Has been made in collaboration with the attending provider and/or case management    IF patient discharges home will need the following DME: none       SUBJECTIVE:   Patient agreeable to OT tx.     OBJECTIVE DATA SUMMARY:   Cognitive/Behavioral Status:  Neurologic State: Alert  Orientation Level: Oriented to person  Cognition: Follows commands (intermittent delayed command following + initiation)  Perception: Appears intact  Perseveration: No perseveration noted  Safety/Judgement: Decreased awareness of environment    Functional Mobility and Transfers for ADLs:  Bed Mobility:  Supine to Sit:  (pt recieved up and remained up)  Scooting: Contact guard assistance    Transfers:  Sit to Stand: Minimum assistance  Bed to Chair: Minimum assistance    Balance:  Sitting: Intact  Standing: Impaired  Standing - Static: Fair  Standing - Dynamic : Fair    ADL Intervention:  Grooming  Grooming Assistance: Contact guard assistance  Position Performed: Standing;Seated in chair  Washing Face: Contact guard assistance  Washing Hands: Contact guard assistance  Brushing Teeth: Set-up (+ increased time and verbal cuing)  Cues: Verbal cues provided    Cognitive Retraining  Safety/Judgement: Decreased awareness of environment      Activity Tolerance:   Good    After treatment patient left in no apparent distress:   Sitting in chair, Call bell within reach, and Bed / chair alarm activated    COMMUNICATION/COLLABORATION:   The patients plan of care was discussed with: Registered nurse and patient .      Helder Salinas OTR/L  Time Calculation: 24 mins

## 2023-02-21 NOTE — PROGRESS NOTES
Problem: Patient Education: Go to Patient Education Activity  Goal: Patient/Family Education  Outcome: Progressing Towards Goal     Problem: Patient Education: Go to Patient Education Activity  Goal: Patient/Family Education  Outcome: Progressing Towards Goal     Problem: Pressure Injury - Risk of  Goal: *Prevention of pressure injury  Description: Document Jamel Scale and appropriate interventions in the flowsheet. Outcome: Progressing Towards Goal  Note: Pressure Injury Interventions:  Sensory Interventions: Assess changes in LOC    Moisture Interventions: Absorbent underpads    Activity Interventions: PT/OT evaluation, Increase time out of bed    Mobility Interventions: HOB 30 degrees or less    Nutrition Interventions: Document food/fluid/supplement intake    Friction and Shear Interventions: HOB 30 degrees or less                Problem: Falls - Risk of  Goal: *Absence of Falls  Description: Document Thien Fall Risk and appropriate interventions in the flowsheet.   Outcome: Progressing Towards Goal  Note: Fall Risk Interventions:

## 2023-02-21 NOTE — PROGRESS NOTES
Problem: Mobility Impaired (Adult and Pediatric)  Goal: *Acute Goals and Plan of Care (Insert Text)  Description: FUNCTIONAL STATUS PRIOR TO ADMISSION: Prior to 1 week ago, pt was amb with and without a RW and performing ADLs with supervision. Pt has aides from 10a-6p daily per daughter. Pt's daughter is in town from [place] [id number] to assist pt for time being. Pt's  has advanced dementia and resides in a [facility]. HOME SUPPORT: The patient lived alone with aides to provide assistance    Physical Therapy Goals  Initiated 2/17/2023  1. Patient will move from supine to sit and sit to supine , scoot up and down, and roll side to side in bed with supervision/set-up within 7 day(s). 2.  Patient will transfer from bed to chair and chair to bed with supervision/set-up using the least restrictive device within 7 day(s). 3.  Patient will perform sit to stand with supervision/set-up within 7 day(s). 4.  Patient will ambulate with supervision/set-up for 50 feet with the least restrictive device within 7 day(s). 5.  Patient will ascend/descend 5 stairs with 1-2 handrail(s) with minimal assistance/contact guard assist within 7 day(s). Outcome: Progressing Towards Goal   PHYSICAL THERAPY TREATMENT  Patient: Stacy Cooper (55 y.o. female)  Date: 2/21/2023  Diagnosis: Acute metabolic encephalopathy [O80.34] <principal problem not specified>      Precautions: Fall, Skin  Chart, physical therapy assessment, plan of care and goals were reviewed. ASSESSMENT  Patient continues with skilled PT services and is progressing towards goals. Patient this afternoon with good tolerance and participation in physical therapy session emphasizing therapeutic activity at bedside commode and gait training. Patient tolerates short distance ambulation to bedside commode with David and RW. Increased freezing present today compared to initial evaluation, but patient receptive to gentle weight shifts from PT to initiate mobility.  She denies dyspnea on exertion, lightheadedness/dizziness throughout. Continue to recommend rehab upon d/c. Current Level of Function Impacting Discharge (mobility/balance): David    Other factors to consider for discharge: patient unable to initiate self-care tasks such as perianal hygiene, unable to complete sit <> Stand transfer without assistance         PLAN :  Patient continues to benefit from skilled intervention to address the above impairments. Continue treatment per established plan of care. to address goals. Recommendation for discharge: (in order for the patient to meet his/her long term goals)  Therapy 3 hours per day 5-7 days per week    This discharge recommendation:  Has been made in collaboration with the attending provider and/or case management    IF patient discharges home will need the following DME: to be determined (TBD)       SUBJECTIVE:   Patient stated I'm ready.     OBJECTIVE DATA SUMMARY:   Patient received supine in bed and was agreeable to participate in PT session. Patient was cleared by nursing to participate in PT session. Patient's daughter present for conclusion of session    Critical Behavior:  Neurologic State: Alert  Orientation Level: Oriented to person, Oriented to place  Cognition: Follows commands  Safety/Judgement: Decreased awareness of environment, Decreased awareness of need for assistance, Decreased awareness of need for safety, Decreased insight into deficits, Fall prevention  Functional Mobility Training:  Bed Mobility:     Supine to Sit: Minimum assistance; Additional time     Scooting: Contact guard assistance        Transfers:  Sit to Stand: Minimum assistance  Stand to Sit: Minimum assistance        Bed to Chair: Minimum assistance                    Balance:  Sitting: Intact; With support  Standing: Impaired; With support  Standing - Static: Fair;Constant support  Standing - Dynamic : Fair;Constant support  Ambulation/Gait Training:  Distance (ft): 5 Feet (ft)  Assistive Device: Gait belt;Walker, rolling  Ambulation - Level of Assistance: Minimal assistance        Gait Abnormalities: Festinating gait; Shuffling gait (freezing)              Speed/Aparna: Delayed; Shuffled  Step Length: Right shortened;Left shortened                    Stairs: Therapeutic Exercises:     Pain Rating:  Patient without reports of pain during therapy      Activity Tolerance:   Fair and requires rest breaks    After treatment patient left in no apparent distress:   Sitting in chair, Call bell within reach, Bed / chair alarm activated, and Caregiver / family present    COMMUNICATION/COLLABORATION:   The patients plan of care was discussed with: Registered nurse.      Haja Anderson PT, DPT   Time Calculation: 26 mins

## 2023-02-21 NOTE — PROGRESS NOTES
Lefty Robin [facility]  [address] [facility], [address] [place] [address]  [phone]      Hospitalist Progress Note      NAME: Madhav Darnell   :  1935  MRM:  [id number]    Date/Time of service: 2023  11:10 AM       Subjective:     Chief Complaint:  Patient was personally seen and examined by me during this time period. Chart reviewed. No issues overnight. BP high this AM       Objective:       Vitals:       Last 24hrs VS reviewed since prior progress note.  Most recent are:    Visit Vitals  BP (!) 151/69 (BP 1 Location: Right upper arm, BP Patient Position: Semi fowlers)   Pulse 95   Temp 98.4 °F (36.9 °C)   Resp 16   Ht 5' 1\" (1.549 m)   Wt 53 kg (116 lb 14.4 oz)   SpO2 95%   BMI 22.09 kg/m²     SpO2 Readings from Last 6 Encounters:   23 95%   23 100%   10/07/22 100%   22 97%   22 100%   22 100%        No intake or output data in the 24 hours ending 23 1110     Exam:     Physical Exam:    Gen:  elderly, frail, NAD  HEENT:  Pink conjunctivae, PERRL, hearing intact to voice, moist mucous membranes  Neck:  Supple, without masses, thyroid non-tender  Resp:  No accessory muscle use, clear breath sounds without wheezes rales or rhonchi  Card:  No murmurs, normal S1, S2 without thrills, bruits or peripheral edema  Abd:  Soft, non-tender, non-distended, normoactive bowel sounds are present  Musc:  No cyanosis or clubbing  Skin:  No rashes   Neuro:  Cranial nerves 3-12 are grossly intact, generalized weakness, follows commands appropriately  Psych:  poor insight, oriented to person    Medications Reviewed: (see below)    Lab Data Reviewed: (see below)    ______________________________________________________________________    Medications:     Current Facility-Administered Medications   Medication Dose Route Frequency    hydrALAZINE (APRESOLINE) 20 mg/mL injection 20 mg  20 mg IntraVENous Q4H PRN    enoxaparin (LOVENOX) injection 40 mg  40 mg SubCUTAneous DAILY    carbidopa-levodopa (SINEMET)  mg per tablet 1 Tablet  1 Tablet Oral QID    aspirin delayed-release tablet 81 mg  81 mg Oral DAILY    clonazePAM (KlonoPIN) tablet 0.5 mg  0.5 mg Oral BID PRN    letrozole [facility]) tablet 2.5 mg  2.5 mg Oral DAILY    levothyroxine (SYNTHROID) tablet 50 mcg  50 mcg Oral ACB    lidocaine 4 % patch 1 Patch  1 Patch TransDERmal Q24H    losartan (COZAAR) tablet 25 mg  25 mg Oral DAILY    metoprolol succinate (TOPROL-XL) XL tablet 50 mg  50 mg Oral DAILY    sodium chloride (NS) flush 5-40 mL  5-40 mL IntraVENous Q8H    sodium chloride (NS) flush 5-40 mL  5-40 mL IntraVENous PRN    acetaminophen (TYLENOL) tablet 650 mg  650 mg Oral Q6H PRN    Or    acetaminophen (TYLENOL) suppository 650 mg  650 mg Rectal Q6H PRN    polyethylene glycol (MIRALAX) packet 17 g  17 g Oral DAILY PRN    ondansetron (ZOFRAN ODT) tablet 4 mg  4 mg Oral Q8H PRN    Or    ondansetron (ZOFRAN) injection 4 mg  4 mg IntraVENous Q6H PRN    ciprofloxacin HCl (CILOXAN) 0.3 % ophthalmic solution 1 Drop  1 Drop Both Eyes Q4HWA          Lab Review:     Recent Labs     02/20/23  0438   WBC 5.3   HGB 10.4*   HCT 32.3*          Recent Labs     02/20/23  0438      K 4.4   *   CO2 23   *   BUN 39*   CREA 1.04*   CA 9.1   MG 2.1   PHOS 2.8       Lab Results   Component Value Date/Time    Glucose (POC) 103 02/16/2023 03:40 PM          Assessment / Plan:     83 yo hx of HTN, Parkinson's, breast CA, hypothyroid, presented w/ AMS, LESLYE    1) Acute met encephalopathy: now stable, but has worsening cognitive decline from baseline. Likely due to progressive dementia from Parkinson's. Head MRI neg for CVA but showed cerebral atrophy. EEG neg for seizures. Neuro did not recommend further testing or therapy. Patient will need to follow up with her neurologist, Dr. Manolo Bobo    2) Parkinson's w/ dementia: cont Sinemet QID (dose was increased by neurology).   Watch for agitation    3) LESLYE: resolved with IVF    4) HTN: BP still high. Stop IVF. Cont toprol, losartan. Use IV hydralazine prn    5) Breast cancer: cont Letrozole    6) Hypothyroid: cont synthroid    7) Weakness: cont PT/OT.   Awaiting Rehab vs SNF    **Prior records, notes, labs, radiology, and medications reviewed in [facility]**    Total time spent with patient care: 35 min  **I personally saw and examined the patient during this time period**                 Care Plan discussed with: Patient, nursing    Discussed:  Care Plan    Prophylaxis:  Lovenox    Disposition:  SNF/LTC           ___________________________________________________    Attending Physician: Troy Miguel MD

## 2023-02-21 NOTE — PROGRESS NOTES
2/21/2023  10:58 AM  Pt emergently admitted 2/16//23 for Acute Metabolic Encephalopathy, discussed in IDR is continuing to require medical management for Acute Metabolic Encephalopathy, Parkinson's disease possible associated dementia, LESLYE, HTN, Hx Breast CA, Anxiety  Transitions of Care Plan:  RUR N/A pt is OBS Low Risk of Readmission/Green  LOS 5 Days   Medical management continues  PT, OT following , the recommendation is  for IPR, pt accepted to [facility] and Breana Reveles is pending and was submitted 2/20   Neurology, medications adjustment, plan for outpatient follow up   CM to follow through for treatment/response  DC when stable to IPR   Outpatient follow up at DC from IPR, PCP, neurology  Transport Family vs WC Ruth Mccollum  CM will continue to follow and assist w/ DC needs   TORY Bowden

## 2023-02-21 NOTE — PROGRESS NOTES
Problem: Patient Education: Go to Patient Education Activity  Goal: Patient/Family Education  Outcome: Progressing Towards Goal     Problem: Patient Education: Go to Patient Education Activity  Goal: Patient/Family Education  Outcome: Progressing Towards Goal     Problem: Pressure Injury - Risk of  Goal: *Prevention of pressure injury  Description: Document Jamel Scale and appropriate interventions in the flowsheet. Outcome: Progressing Towards Goal  Note: Pressure Injury Interventions:  Sensory Interventions: Assess changes in LOC    Moisture Interventions: Absorbent underpads    Activity Interventions: Increase time out of bed, PT/OT evaluation    Mobility Interventions: HOB 30 degrees or less    Nutrition Interventions: Document food/fluid/supplement intake, Offer support with meals,snacks and hydration    Friction and Shear Interventions: HOB 30 degrees or less                Problem: Patient Education: Go to Patient Education Activity  Goal: Patient/Family Education  Outcome: Progressing Towards Goal     Problem: Falls - Risk of  Goal: *Absence of Falls  Description: Document Thien Fall Risk and appropriate interventions in the flowsheet.   Outcome: Progressing Towards Goal  Note: Fall Risk Interventions:                                Problem: Patient Education: Go to Patient Education Activity  Goal: Patient/Family Education  Outcome: Progressing Towards Goal

## 2023-02-22 LAB
ALBUMIN SERPL-MCNC: 2.6 G/DL (ref 3.5–5)
ALBUMIN/GLOB SERPL: 0.8 (ref 1.1–2.2)
ALP SERPL-CCNC: 36 U/L (ref 45–117)
ALT SERPL-CCNC: 9 U/L (ref 12–78)
ANION GAP SERPL CALC-SCNC: 4 MMOL/L (ref 5–15)
AST SERPL-CCNC: 16 U/L (ref 15–37)
BILIRUB SERPL-MCNC: 0.4 MG/DL (ref 0.2–1)
BUN SERPL-MCNC: 33 MG/DL (ref 6–20)
BUN/CREAT SERPL: 30 (ref 12–20)
CALCIUM SERPL-MCNC: 8.8 MG/DL (ref 8.5–10.1)
CHLORIDE SERPL-SCNC: 112 MMOL/L (ref 97–108)
CO2 SERPL-SCNC: 24 MMOL/L (ref 21–32)
CREAT SERPL-MCNC: 1.09 MG/DL (ref 0.55–1.02)
ERYTHROCYTE [DISTWIDTH] IN BLOOD BY AUTOMATED COUNT: 15.5 % (ref 11.5–14.5)
GLOBULIN SER CALC-MCNC: 3.1 G/DL (ref 2–4)
GLUCOSE SERPL-MCNC: 99 MG/DL (ref 65–100)
HCT VFR BLD AUTO: 29.5 % (ref 35–47)
HGB BLD-MCNC: 9.4 G/DL (ref 11.5–16)
MAGNESIUM SERPL-MCNC: 2.2 MG/DL (ref 1.6–2.4)
MCH RBC QN AUTO: 29.7 PG (ref 26–34)
MCHC RBC AUTO-ENTMCNC: 31.9 G/DL (ref 30–36.5)
MCV RBC AUTO: 93.4 FL (ref 80–99)
NRBC # BLD: 0 K/UL (ref 0–0.01)
NRBC BLD-RTO: 0 PER 100 WBC
PHOSPHATE SERPL-MCNC: 3.2 MG/DL (ref 2.6–4.7)
PLATELET # BLD AUTO: 261 K/UL (ref 150–400)
PMV BLD AUTO: 9.3 FL (ref 8.9–12.9)
POTASSIUM SERPL-SCNC: 4.2 MMOL/L (ref 3.5–5.1)
PROT SERPL-MCNC: 5.7 G/DL (ref 6.4–8.2)
RBC # BLD AUTO: 3.16 M/UL (ref 3.8–5.2)
SODIUM SERPL-SCNC: 140 MMOL/L (ref 136–145)
WBC # BLD AUTO: 4.4 K/UL (ref 3.6–11)

## 2023-02-22 PROCEDURE — 74011250636 HC RX REV CODE- 250/636: Performed by: INTERNAL MEDICINE

## 2023-02-22 PROCEDURE — 36415 COLL VENOUS BLD VENIPUNCTURE: CPT

## 2023-02-22 PROCEDURE — 96372 THER/PROPH/DIAG INJ SC/IM: CPT

## 2023-02-22 PROCEDURE — 74011250637 HC RX REV CODE- 250/637

## 2023-02-22 PROCEDURE — 97530 THERAPEUTIC ACTIVITIES: CPT

## 2023-02-22 PROCEDURE — 83735 ASSAY OF MAGNESIUM: CPT

## 2023-02-22 PROCEDURE — 80053 COMPREHEN METABOLIC PANEL: CPT

## 2023-02-22 PROCEDURE — 85027 COMPLETE CBC AUTOMATED: CPT

## 2023-02-22 PROCEDURE — 74011000250 HC RX REV CODE- 250: Performed by: INTERNAL MEDICINE

## 2023-02-22 PROCEDURE — 74011250637 HC RX REV CODE- 250/637: Performed by: INTERNAL MEDICINE

## 2023-02-22 PROCEDURE — G0378 HOSPITAL OBSERVATION PER HR: HCPCS

## 2023-02-22 PROCEDURE — 97535 SELF CARE MNGMENT TRAINING: CPT

## 2023-02-22 PROCEDURE — 74011250637 HC RX REV CODE- 250/637: Performed by: PSYCHIATRY & NEUROLOGY

## 2023-02-22 PROCEDURE — 84100 ASSAY OF PHOSPHORUS: CPT

## 2023-02-22 PROCEDURE — 97116 GAIT TRAINING THERAPY: CPT

## 2023-02-22 RX ORDER — LABETALOL 100 MG/1
200 TABLET, FILM COATED ORAL EVERY 12 HOURS
Status: DISCONTINUED | OUTPATIENT
Start: 2023-02-22 | End: 2023-02-28 | Stop reason: HOSPADM

## 2023-02-22 RX ADMIN — ASPIRIN 81 MG: 81 TABLET, COATED ORAL at 10:07

## 2023-02-22 RX ADMIN — SODIUM CHLORIDE, PRESERVATIVE FREE 10 ML: 5 INJECTION INTRAVENOUS at 21:11

## 2023-02-22 RX ADMIN — LABETALOL HYDROCHLORIDE 200 MG: 100 TABLET, FILM COATED ORAL at 12:47

## 2023-02-22 RX ADMIN — CIPROFLOXACIN 1 DROP: 3 SOLUTION OPHTHALMIC at 13:01

## 2023-02-22 RX ADMIN — ENOXAPARIN SODIUM 40 MG: 100 INJECTION SUBCUTANEOUS at 10:07

## 2023-02-22 RX ADMIN — CARBIDOPA AND LEVODOPA 1 TABLET: 25; 250 TABLET ORAL at 12:47

## 2023-02-22 RX ADMIN — DOCUSATE SODIUM 100 MG: 100 CAPSULE, LIQUID FILLED ORAL at 01:54

## 2023-02-22 RX ADMIN — CARBIDOPA AND LEVODOPA 1 TABLET: 25; 250 TABLET ORAL at 10:07

## 2023-02-22 RX ADMIN — SODIUM CHLORIDE, PRESERVATIVE FREE 10 ML: 5 INJECTION INTRAVENOUS at 06:36

## 2023-02-22 RX ADMIN — CIPROFLOXACIN 1 DROP: 3 SOLUTION OPHTHALMIC at 18:12

## 2023-02-22 RX ADMIN — CIPROFLOXACIN 1 DROP: 3 SOLUTION OPHTHALMIC at 21:11

## 2023-02-22 RX ADMIN — CARBIDOPA AND LEVODOPA 1 TABLET: 25; 250 TABLET ORAL at 21:10

## 2023-02-22 RX ADMIN — CIPROFLOXACIN 1 DROP: 3 SOLUTION OPHTHALMIC at 06:36

## 2023-02-22 RX ADMIN — CARBIDOPA AND LEVODOPA 1 TABLET: 25; 250 TABLET ORAL at 18:11

## 2023-02-22 RX ADMIN — LEVOTHYROXINE SODIUM 50 MCG: 0.05 TABLET ORAL at 06:36

## 2023-02-22 RX ADMIN — ACETAMINOPHEN 650 MG: 325 TABLET ORAL at 01:54

## 2023-02-22 RX ADMIN — LOSARTAN POTASSIUM 25 MG: 50 TABLET, FILM COATED ORAL at 10:07

## 2023-02-22 RX ADMIN — LETROZOLE 2.5 MG: 2.5 TABLET ORAL at 18:11

## 2023-02-22 RX ADMIN — CLONAZEPAM 0.5 MG: 0.5 TABLET ORAL at 21:10

## 2023-02-22 RX ADMIN — CIPROFLOXACIN 1 DROP: 3 SOLUTION OPHTHALMIC at 10:07

## 2023-02-22 NOTE — PROGRESS NOTES
Problem: Pressure Injury - Risk of  Goal: *Prevention of pressure injury  Description: Document Jamel Scale and appropriate interventions in the flowsheet. Outcome: Progressing Towards Goal  Note: Pressure Injury Interventions:  Sensory Interventions: Assess changes in LOC, Keep linens dry and wrinkle-free    Moisture Interventions: Absorbent underpads, Minimize layers    Activity Interventions: Increase time out of bed    Mobility Interventions: HOB 30 degrees or less, Float heels    Nutrition Interventions: Document food/fluid/supplement intake    Friction and Shear Interventions: Minimize layers                Problem: Patient Education: Go to Patient Education Activity  Goal: Patient/Family Education  Outcome: Progressing Towards Goal     Problem: Falls - Risk of  Goal: *Absence of Falls  Description: Document Thien Fall Risk and appropriate interventions in the flowsheet.   Outcome: Progressing Towards Goal  Note: Fall Risk Interventions:                                Problem: Patient Education: Go to Patient Education Activity  Goal: Patient/Family Education  Outcome: Progressing Towards Goal

## 2023-02-22 NOTE — PROGRESS NOTES
Lefty Robin [facility] [address]  [address] [facility], [address] [place] [address]  [phone]      Hospitalist Progress Note      NAME: Elio Ha   :  1935  MRM:  [id number]    Date/Time of service: 2023  10:53 AM       Subjective:     Chief Complaint:  Patient was personally seen and examined by me during this time period. Chart reviewed. BP still high. No chest pain. Spoke to daughter at bedside       Objective:       Vitals:       Last 24hrs VS reviewed since prior progress note.  Most recent are:    Visit Vitals  BP (!) 165/83   Pulse 88   Temp 98.2 °F (36.8 °C)   Resp 16   Ht 5' 1\" (1.549 m)   Wt 53 kg (116 lb 14.4 oz)   SpO2 96%   BMI 22.09 kg/m²     SpO2 Readings from Last 6 Encounters:   23 96%   23 100%   10/07/22 100%   22 97%   22 100%   22 100%          Intake/Output Summary (Last 24 hours) at 2023 1053  Last data filed at 2023 1314  Gross per 24 hour   Intake 80 ml   Output --   Net 80 ml          Exam:     Physical Exam:    Gen:  elderly, frail, NAD  HEENT:  Pink conjunctivae, PERRL, hearing intact to voice, moist mucous membranes  Neck:  Supple, without masses, thyroid non-tender  Resp:  No accessory muscle use, clear breath sounds without wheezes rales or rhonchi  Card:  No murmurs, normal S1, S2 without thrills, bruits or peripheral edema  Abd:  Soft, non-tender, non-distended, normoactive bowel sounds are present  Musc:  No cyanosis or clubbing  Skin:  No rashes   Neuro:  Cranial nerves 3-12 are grossly intact, generalized weakness, follows commands appropriately  Psych:  poor insight, oriented to person    Medications Reviewed: (see below)    Lab Data Reviewed: (see below)    ______________________________________________________________________    Medications:     Current Facility-Administered Medications   Medication Dose Route Frequency    labetaloL (NORMODYNE) tablet 200 mg  200 mg Oral Q12H    hydrALAZINE (APRESOLINE) 20 mg/mL injection 20 mg  20 mg IntraVENous Q4H PRN    enoxaparin (LOVENOX) injection 40 mg  40 mg SubCUTAneous DAILY    carbidopa-levodopa (SINEMET)  mg per tablet 1 Tablet  1 Tablet Oral QID    aspirin delayed-release tablet 81 mg  81 mg Oral DAILY    clonazePAM (KlonoPIN) tablet 0.5 mg  0.5 mg Oral BID PRN    letrozole [facility]) tablet 2.5 mg  2.5 mg Oral DAILY    levothyroxine (SYNTHROID) tablet 50 mcg  50 mcg Oral ACB    lidocaine 4 % patch 1 Patch  1 Patch TransDERmal Q24H    losartan (COZAAR) tablet 25 mg  25 mg Oral DAILY    sodium chloride (NS) flush 5-40 mL  5-40 mL IntraVENous Q8H    sodium chloride (NS) flush 5-40 mL  5-40 mL IntraVENous PRN    acetaminophen (TYLENOL) tablet 650 mg  650 mg Oral Q6H PRN    Or    acetaminophen (TYLENOL) suppository 650 mg  650 mg Rectal Q6H PRN    polyethylene glycol (MIRALAX) packet 17 g  17 g Oral DAILY PRN    ondansetron (ZOFRAN ODT) tablet 4 mg  4 mg Oral Q8H PRN    Or    ondansetron (ZOFRAN) injection 4 mg  4 mg IntraVENous Q6H PRN    ciprofloxacin HCl (CILOXAN) 0.3 % ophthalmic solution 1 Drop  1 Drop Both Eyes Q4HWA          Lab Review:     Recent Labs     02/22/23 0317 02/20/23  0438   WBC 4.4 5.3   HGB 9.4* 10.4*   HCT 29.5* 32.3*    298       Recent Labs     02/22/23 0317 02/20/23  0438    140   K 4.2 4.4   * 112*   CO2 24 23   GLU 99 108*   BUN 33* 39*   CREA 1.09* 1.04*   CA 8.8 9.1   MG 2.2 2.1   PHOS 3.2 2.8   ALB 2.6*  --    TBILI 0.4  --    ALT 9*  --        Lab Results   Component Value Date/Time    Glucose (POC) 103 02/16/2023 03:40 PM          Assessment / Plan:     83 yo hx of HTN, Parkinson's, breast CA, hypothyroid, presented w/ AMS, LESLYE    1) Acute met encephalopathy: now stable, but has worsening cognitive decline from baseline. Likely due to progressive dementia from Parkinson's. Head MRI neg for CVA but showed cerebral atrophy. EEG neg for seizures. Neuro did not recommend further testing or therapy.   Patient will need to follow up with her neurologist, Dr. Charito Hanson    2) Parkinson's w/ dementia: cont Sinemet QID (dose was increased by neurology). Watch for agitation    3) LESLYE: resolved with IVF    4) HTN: BP still high. Will cont losartan. Will change metoprolol to labetalol for more alpha blockade. Use IV hydralazine prn. Monitor closely     5) Breast cancer: cont Letrozole    6) Hypothyroid: cont synthroid    7) Weakness: cont PT/OT.   Awaiting Rehab vs SNF    **Prior records, notes, labs, radiology, and medications reviewed in [address] [facility]**    Total time spent with patient care: 35 min  **I personally saw and examined the patient during this time period**                 Care Plan discussed with: Patient, nursing, daughter     Discussed:  Care Plan    Prophylaxis:  Lovenox    Disposition:  SNF/LTC           ___________________________________________________    Attending Physician: Arya Forrest MD

## 2023-02-22 NOTE — PROGRESS NOTES
Problem: Mobility Impaired (Adult and Pediatric)  Goal: *Acute Goals and Plan of Care (Insert Text)  Description: FUNCTIONAL STATUS PRIOR TO ADMISSION: Prior to 1 week ago, pt was amb with and without a RW and performing ADLs with supervision. Pt has aides from 10a-6p daily per daughter. Pt's daughter is in town from [place] [phone] to assist pt for time being. Pt's  has advanced dementia and resides in a [facility]. HOME SUPPORT: The patient lived alone with aides to provide assistance    Physical Therapy Goals  Initiated 2/17/2023  1. Patient will move from supine to sit and sit to supine , scoot up and down, and roll side to side in bed with supervision/set-up within 7 day(s). 2.  Patient will transfer from bed to chair and chair to bed with supervision/set-up using the least restrictive device within 7 day(s). 3.  Patient will perform sit to stand with supervision/set-up within 7 day(s). 4.  Patient will ambulate with supervision/set-up for 50 feet with the least restrictive device within 7 day(s). 5.  Patient will ascend/descend 5 stairs with 1-2 handrail(s) with minimal assistance/contact guard assist within 7 day(s). Outcome: Progressing Towards Goal   PHYSICAL THERAPY TREATMENT  Patient: Jenny Welch (19 y.o. female)  Date: 2/22/2023  Diagnosis: Acute metabolic encephalopathy [R45.35] <principal problem not specified>      Precautions: Fall, Skin  Chart, physical therapy assessment, plan of care and goals were reviewed. ASSESSMENT  Patient continues with skilled PT services and is progressing towards goals. Communicated with nurse cleared for therapy. Patient supine on bed when received daughter agreed with all goals set for the patient. Rolled on the edge of bed min assist, supine to sit min assist, sit to stand min assist, ambulate with rolling walker min assist in the room and towards the bathroom. Noted shuffling gait with ambulation so slow due to history of parkinson's.  Need verbal and tactile cues to advance rolling walker took a long time to ambulate to and from the bathroom. Offered to be OOB to chair as tolerated. Performed some active range of motion exercise on both LE all planes. Initiated family training to maintain range of motion on both LE by encouraging patient to exercise when awake. Assisted supine on bed and activated bed alarm notified nurse who agreed to monitor patiner       Current Level of Function Impacting Discharge (mobility/balance): min assist with transfers and ambulation using a rolling walker. Other factors to consider for discharge: fall         PLAN :  Patient continues to benefit from skilled intervention to address the above impairments. Continue treatment per established plan of care. to address goals. Recommendation for discharge: (in order for the patient to meet his/her long term goals)  Therapy 3 hours per day 5-7 days per week    This discharge recommendation:  Has been made in collaboration with the attending provider and/or case management    IF patient discharges home will need the following DME: patient owns DME required for discharge       SUBJECTIVE:   Patient stated Yojana Madison.     OBJECTIVE DATA SUMMARY:   Critical Behavior:  Neurologic State: Alert  Orientation Level: Oriented to person  Cognition: Follows commands  Safety/Judgement: Decreased awareness of environment  Functional Mobility Training:  Bed Mobility:  Rolling: Minimum assistance  Supine to Sit: Minimum assistance  Sit to Supine: Minimum assistance  Scooting: Minimum assistance        Transfers:  Sit to Stand: Minimum assistance  Stand to Sit: Minimum assistance  Stand Pivot Transfers: Minimum assistance     Bed to Chair: Minimum assistance                    Balance:  Sitting: Intact; High guard  Standing: Impaired; With support  Standing - Static: Fair  Standing - Dynamic : Fair  Ambulation/Gait Training:  Distance (ft): 30 Feet (ft)  Assistive Device: Walker, rolling;Gait belt  Ambulation - Level of Assistance: Minimal assistance     Gait Description (WDL): Exceptions to WDL  Gait Abnormalities: Path deviations; Shuffling gait; Step to gait        Base of Support: Narrowed     Speed/Aparna: Fluctuations; Shuffled  Step Length: Right shortened;Left shortened                      Therapeutic Exercises:   Educate and instructed patient to continue active range of motion exercise on both legs while up on chair or on bed multiple times. Recommend patient to be up on the chair at least 3 times a day every meal times as tolerated. Pain Ratin/10    Activity Tolerance:   Good    After treatment patient left in no apparent distress:   Supine in bed, Heels elevated for pressure relief, Call bell within reach, Bed / chair alarm activated, and Side rails x 3    COMMUNICATION/COLLABORATION:   The patients plan of care was discussed with: Occupational therapist.     Lelia Villegas PT.[facility].    Time Calculation: 41 mins

## 2023-02-22 NOTE — PROGRESS NOTES
Bedside and Verbal shift change report given to Lisa Bahena RN (oncoming nurse) by Neva Kelley RN (offgoing nurse). Report included the following information SBAR, Kardex, ED Summary, Intake/Output, MAR, and Recent Results.

## 2023-02-22 NOTE — PROGRESS NOTES
Problem: Self Care Deficits Care Plan (Adult)  Goal: *Acute Goals and Plan of Care (Insert Text)  Description: FUNCTIONAL STATUS PRIOR TO ADMISSION: Prior to 1 week ago, pt was amb with and without a RW and performing ADLs with supervision. Pt has aides from 10a-6p daily per daughter. Pt's daughter is in town from [place] [id number] to assist pt for time being. Pt's  has advanced dementia and resides in a custodial. HOME SUPPORT: The patient lived alone with aides to provide assistance. Occupational Therapy Goals  Initiated 2/17/2023  1. Patient will perform grooming standing at sink with minimal assistance/contact guard assist within 7 day(s). 2.  Patient will perform lower body dressing with minimal assistance/contact guard assist within 7 day(s). 3.  Patient will perform upper body dressing with minimal assistance/contact guard assist within 7 day(s). 4.  Patient will perform toilet transfers with contact guard assist within 7 day(s). 5.  Patient will perform all aspects of toileting with minimal assistance/contact guard assist within 7 day(s). 6.  Patient will participate in upper extremity therapeutic exercise/activities with supervision/set-up for 10 minutes within 7 day(s). 7.  Patient will utilize energy conservation techniques during functional activities with verbal and visual cues within 7 day(s). Outcome: Progressing Towards Goal   OCCUPATIONAL THERAPY TREATMENT  Patient: Laith Castro (11 y.o. female)  Date: 2/22/2023  Diagnosis: Acute metabolic encephalopathy [Q57.80] <principal problem not specified>      Precautions: Fall, Skin  Chart, occupational therapy assessment, plan of care, and goals were reviewed. ASSESSMENT  Patient continues with skilled OT services and is progressing towards goals. Pt daughter present and stated her mom had \"a rough night\" with BP fluctuating.  She did ambulate to restroom to wash her hands with increased time for supine to sit, sit to stand and movement patterns. BP during tx 142/65. Pt returned to bed following tx. Daughter with concerns about her mom not being her usual joyful self and concerned re depression. Encouraged opening blinds and having pt sit up in chair later today. Current Level of Function Impacting Discharge (ADLs): min assist hand washing, min assist ADL related transfers    Other factors to consider for discharge:          PLAN :  Patient continues to benefit from skilled intervention to address the above impairments. Continue treatment per established plan of care to address goals. Recommend with staff: ADL's, there ex, there act    Recommend next OT session: cont towards goals    Recommendation for discharge: (in order for the patient to meet his/her long term goals)  Therapy 3 hours per day 5-7 days per week    This discharge recommendation:  Has not yet been discussed the attending provider and/or case management    IF patient discharges home will need the following DME:        SUBJECTIVE:   Patient stated I am tired.     OBJECTIVE DATA SUMMARY:   Cognitive/Behavioral Status:  Neurologic State: Alert  Orientation Level: Oriented to person  Cognition: Follows commands             Functional Mobility and Transfers for ADLs:  Bed Mobility:  Sit to Supine: Minimum assistance    Transfers:  Sit to Stand: Minimum assistance          Balance:  Standing: Impaired    ADL Intervention:       Grooming  Grooming Assistance: Minimum assistance  Position Performed: Standing  Washing Hands: Minimum assistance         Type of Bath: Chlorhexidine (CHG); Basin/Soap/Water         Therapeutic Exercises:   Encouraged pt to engage with UE exercises later in the day        Activity Tolerance:   Fair    After treatment patient left in no apparent distress:   Supine in bed and Call bell within reach    COMMUNICATION/COLLABORATION:   The patients plan of care was discussed with: Physical therapist, Occupational therapist, and Registered nurse.      Jacoby Osorio VALERY Dickson/L  Time Calculation: 32 mins

## 2023-02-22 NOTE — PROGRESS NOTES
Problem: Patient Education: Go to Patient Education Activity  Goal: Patient/Family Education  Outcome: Progressing Towards Goal     Problem: Patient Education: Go to Patient Education Activity  Goal: Patient/Family Education  Outcome: Progressing Towards Goal     Problem: Pressure Injury - Risk of  Goal: *Prevention of pressure injury  Description: Document Jamel Scale and appropriate interventions in the flowsheet.   Outcome: Progressing Towards Goal  Note: Pressure Injury Interventions:  Sensory Interventions: Assess changes in LOC, Keep linens dry and wrinkle-free    Moisture Interventions: Absorbent underpads, Minimize layers    Activity Interventions: Increase time out of bed    Mobility Interventions: HOB 30 degrees or less, Float heels    Nutrition Interventions: Document food/fluid/supplement intake    Friction and Shear Interventions: Minimize layers

## 2023-02-22 NOTE — PROGRESS NOTES
2/22/2023 4:04 PM Auth remains pending for [facility]. CM met with pt's daughter at bedside relayed auth remains pending. CM discussed alternative placement if I[facility] is declined. Pt's daughter selected [facility], [address], [address], and [address] as preferences for SNF. Referrals sent to [facility] and [facility] via [employer]. Referrals sent to [facility] and [facility] via [employer]. CM did also fax referral to [facility] at [phone], received fax confirmation. 2/22/2023 10:19 AM CM confirmed with Crystal in admissions at [facility] remains pending. CM will follow.  OLIVE Brower     Transitions of Care Plan:  RUR N/A pt is OBS Low Risk of Readmission/Green  LOS 6 Days   Medical management continues  PT, OT following , the recommendation is  for IPR, pt accepted to [facility] and [facility] is pending and was submitted 2/20   Neurology, medications adjustment, plan for outpatient follow up   CM to follow through for treatment/response  DC when stable to IPR   Outpatient follow up at DC from IPR, PCP, neurology  Transport Family vs [facility] will continue to follow and assist w/ DC needs

## 2023-02-23 PROCEDURE — G0378 HOSPITAL OBSERVATION PER HR: HCPCS

## 2023-02-23 PROCEDURE — 74011250637 HC RX REV CODE- 250/637: Performed by: PSYCHIATRY & NEUROLOGY

## 2023-02-23 PROCEDURE — 97535 SELF CARE MNGMENT TRAINING: CPT

## 2023-02-23 PROCEDURE — 97116 GAIT TRAINING THERAPY: CPT

## 2023-02-23 PROCEDURE — 74011250637 HC RX REV CODE- 250/637: Performed by: INTERNAL MEDICINE

## 2023-02-23 PROCEDURE — 74011000250 HC RX REV CODE- 250: Performed by: INTERNAL MEDICINE

## 2023-02-23 PROCEDURE — 97530 THERAPEUTIC ACTIVITIES: CPT

## 2023-02-23 PROCEDURE — 96372 THER/PROPH/DIAG INJ SC/IM: CPT

## 2023-02-23 PROCEDURE — 74011250636 HC RX REV CODE- 250/636: Performed by: INTERNAL MEDICINE

## 2023-02-23 RX ADMIN — CLONAZEPAM 0.5 MG: 0.5 TABLET ORAL at 22:15

## 2023-02-23 RX ADMIN — CIPROFLOXACIN 1 DROP: 3 SOLUTION OPHTHALMIC at 14:48

## 2023-02-23 RX ADMIN — CARBIDOPA AND LEVODOPA 1 TABLET: 25; 250 TABLET ORAL at 12:46

## 2023-02-23 RX ADMIN — CIPROFLOXACIN 1 DROP: 3 SOLUTION OPHTHALMIC at 09:27

## 2023-02-23 RX ADMIN — LOSARTAN POTASSIUM 25 MG: 50 TABLET, FILM COATED ORAL at 09:27

## 2023-02-23 RX ADMIN — LETROZOLE 2.5 MG: 2.5 TABLET ORAL at 18:28

## 2023-02-23 RX ADMIN — LEVOTHYROXINE SODIUM 50 MCG: 0.05 TABLET ORAL at 06:40

## 2023-02-23 RX ADMIN — CLONAZEPAM 0.5 MG: 0.5 TABLET ORAL at 14:48

## 2023-02-23 RX ADMIN — LABETALOL HYDROCHLORIDE 200 MG: 100 TABLET, FILM COATED ORAL at 22:15

## 2023-02-23 RX ADMIN — ASPIRIN 81 MG: 81 TABLET, COATED ORAL at 09:27

## 2023-02-23 RX ADMIN — ENOXAPARIN SODIUM 40 MG: 100 INJECTION SUBCUTANEOUS at 09:27

## 2023-02-23 RX ADMIN — CIPROFLOXACIN 1 DROP: 3 SOLUTION OPHTHALMIC at 06:40

## 2023-02-23 RX ADMIN — SODIUM CHLORIDE, PRESERVATIVE FREE 10 ML: 5 INJECTION INTRAVENOUS at 06:40

## 2023-02-23 RX ADMIN — CIPROFLOXACIN 1 DROP: 3 SOLUTION OPHTHALMIC at 22:16

## 2023-02-23 RX ADMIN — CIPROFLOXACIN 1 DROP: 3 SOLUTION OPHTHALMIC at 18:29

## 2023-02-23 RX ADMIN — CARBIDOPA AND LEVODOPA 1 TABLET: 25; 250 TABLET ORAL at 09:27

## 2023-02-23 RX ADMIN — CARBIDOPA AND LEVODOPA 1 TABLET: 25; 250 TABLET ORAL at 18:28

## 2023-02-23 RX ADMIN — SODIUM CHLORIDE, PRESERVATIVE FREE 10 ML: 5 INJECTION INTRAVENOUS at 22:15

## 2023-02-23 RX ADMIN — CARBIDOPA AND LEVODOPA 1 TABLET: 25; 250 TABLET ORAL at 22:15

## 2023-02-23 NOTE — PROGRESS NOTES
Problem: Self Care Deficits Care Plan (Adult)  Goal: *Acute Goals and Plan of Care (Insert Text)  Description: FUNCTIONAL STATUS PRIOR TO ADMISSION: Prior to 1 week ago, pt was amb with and without a RW and performing ADLs with supervision. Pt has aides from 10a-6p daily per daughter. Pt's daughter is in town from [place] [id number] to assist pt for time being. Pt's  has advanced dementia and resides in a intermediate. HOME SUPPORT: The patient lived alone with aides to provide assistance. Occupational Therapy Goals  Initiated 2/17/2023  1. Patient will perform grooming standing at sink with minimal assistance/contact guard assist within 7 day(s). 2.  Patient will perform lower body dressing with minimal assistance/contact guard assist within 7 day(s). 3.  Patient will perform upper body dressing with minimal assistance/contact guard assist within 7 day(s). 4.  Patient will perform toilet transfers with contact guard assist within 7 day(s). 5.  Patient will perform all aspects of toileting with minimal assistance/contact guard assist within 7 day(s). 6.  Patient will participate in upper extremity therapeutic exercise/activities with supervision/set-up for 10 minutes within 7 day(s). 7.  Patient will utilize energy conservation techniques during functional activities with verbal and visual cues within 7 day(s). Outcome: Progressing Towards Goal   OCCUPATIONAL THERAPY TREATMENT  Patient: Tessie Soriano (15 y.o. female)  Date: 2/23/2023  Diagnosis: Acute metabolic encephalopathy [B79.03] <principal problem not specified>      Precautions: Fall, Skin  Chart, occupational therapy assessment, plan of care, and goals were reviewed. ASSESSMENT  Patient continues with skilled OT services and is progressing towards goals. After ambulation around the bed pt stands in front of chair in prep to brush her teeth. Min assist for stand to sit as pt tends to move walker forward vs back towards chair.  She stands with min assist to brush her teeth at bedside table. Pts daughter assists her with placing toothpaste on brush and  with managing cups of water to rinse her mouth. /52 seated. Encouraged her to engage with UE exercises after rest break in chair. Current Level of Function Impacting Discharge (ADLs): Min assist oral hygiene in standing, daughter assists as needed with task    Other factors to consider for discharge:          PLAN :  Patient continues to benefit from skilled intervention to address the above impairments. Continue treatment per established plan of care to address goals. Recommend with staff: ADL's, there ex, there act    Recommend next OT session:  cont towards goals    Recommendation for discharge: (in order for the patient to meet his/her long term goals)  Therapy 3 hours per day 5-7 days per week    This discharge recommendation:  Has not yet been discussed the attending provider and/or case management    IF patient discharges home will need the following DME:        SUBJECTIVE:   Patient stated I do not want to use mouthwash.     OBJECTIVE DATA SUMMARY:   Cognitive/Behavioral Status:  Neurologic State: Alert  Orientation Level: Oriented to person;Disoriented to time;Disoriented to situation;Disoriented to place  Cognition: Decreased command following; Appropriate decision making             Functional Mobility and Transfers for ADLs:  Bed Mobility:       Transfers:  Sit to Stand: Minimum assistance          Balance:  Sitting: Intact  Standing: Impaired; With support    ADL Intervention:       Grooming  Grooming Assistance: Minimum assistance  Position Performed: Standing  Brushing Teeth: Minimum assistance  Cues: Verbal cues provided;Physical assistance         Therapeutic Exercises:   Encouraged her to engage with UE exercises after rest break in chair.       Activity Tolerance:   Fair    After treatment patient left in no apparent distress:   Sitting in chair and Call bell within reach    COMMUNICATION/COLLABORATION:   The patients plan of care was discussed with: Physical therapist and Occupational therapist.     MIKE Bueno  Time Calculation: 23 mins

## 2023-02-23 NOTE — PROGRESS NOTES
Problem: Pressure Injury - Risk of  Goal: *Prevention of pressure injury  Description: Document Jamel Scale and appropriate interventions in the flowsheet. Outcome: Progressing Towards Goal  Note: Pressure Injury Interventions:  Sensory Interventions: Assess changes in LOC, Keep linens dry and wrinkle-free    Moisture Interventions: Absorbent underpads, Minimize layers    Activity Interventions: Increase time out of bed    Mobility Interventions: HOB 30 degrees or less, Float heels    Nutrition Interventions: Document food/fluid/supplement intake    Friction and Shear Interventions: Minimize layers                Problem: Falls - Risk of  Goal: *Absence of Falls  Description: Document Thien Fall Risk and appropriate interventions in the flowsheet.   Outcome: Progressing Towards Goal  Note: Fall Risk Interventions:

## 2023-02-23 NOTE — PROGRESS NOTES
2/23/2023     4:36 PM CM called back to [facility] peer to peer [facility]([phone]), spoke with [facility] and scheduled pt's peer to peer at 11:30AM on 2/24. CM provided pt's Attending on 2/24, Dr. Cristopher Olivas phone number. MD that will be calling for peer to peer is Dr. Reita Dakins. 3:54 PM CM received call from [phone] with [facility], [employer] [employer] is requiring a peer to peer. Peer to peer to be done by 12PM tomorrow. CM called to [facility], Opt 3). CM had to [facility] but provided Dr. Malik Salazar contact information and he will be available until 5PM today. CM met with pt's daughter and relayed need for peer to peer. CM also relayed if [facility] is denied, [facility], [facility], and [facility] have accepted. CM will follow. 1:02 PM CM spoke with Rafaela with [facility], [facility] remains pending. 10:45 AM Confirmed with Rafaela with [facility] remains pending. SNF back ups: [facility]. [facility]- pending, sent message to [place] in admission to check on referral status  [facility]- pending, called and lvm with admissions  [facility]- pending, called and left message for admissions   CM will follow.  OLIVE Ch       8:27 AM   Transitions of Care Plan:  RUR N/A pt is OBS Low Risk of Readmission/Green  LOS 7 Days   Medical management continues  PT, OT following , the recommendation is for IPR, pt accepted to [facility] [place] and [facility] is pending and was submitted 2/20   Neurology, medications adjustment, plan for outpatient follow up   CM to follow through for treatment/response  If IPR is not approved, SNF referrals are pending:  [facility]- accepted   [facility]- accepted  [facility]- accepted  Outpatient follow up at [place] from rehab, PCP, neurology  Transport Family vs  [facility] will continue to follow and assist w/ DC needs

## 2023-02-23 NOTE — PROGRESS NOTES
Problem: Mobility Impaired (Adult and Pediatric)  Goal: *Acute Goals and Plan of Care (Insert Text)  Description: FUNCTIONAL STATUS PRIOR TO ADMISSION: Prior to 1 week ago, pt was amb with and without a RW and performing ADLs with supervision. Pt has aides from 10a-6p daily per daughter. Pt's daughter is in town from [place] [phone] to assist pt for time being. Pt's  has advanced dementia and resides in a [facility]. HOME SUPPORT: The patient lived alone with aides to provide assistance    Physical Therapy Goals  Initiated 2/17/2023  1. Patient will move from supine to sit and sit to supine , scoot up and down, and roll side to side in bed with supervision/set-up within 7 day(s). 2.  Patient will transfer from bed to chair and chair to bed with supervision/set-up using the least restrictive device within 7 day(s). 3.  Patient will perform sit to stand with supervision/set-up within 7 day(s). 4.  Patient will ambulate with supervision/set-up for 50 feet with the least restrictive device within 7 day(s). 5.  Patient will ascend/descend 5 stairs with 1-2 handrail(s) with minimal assistance/contact guard assist within 7 day(s). Outcome: Progressing Towards Goal   PHYSICAL THERAPY TREATMENT  Patient: Chito Farley (30 y.o. female)  Date: 2/23/2023  Diagnosis: Acute metabolic encephalopathy [O58.56] <principal problem not specified>      Precautions: Fall, Skin  Chart, physical therapy assessment, plan of care and goals were reviewed. ASSESSMENT  Patient continues with skilled PT services and is progressing towards goals. She progresses gait distance, performs multiple transfers, and engages in wheelchair mobility with daughter's assistance. Pt performing multiple commode transfers this session due to urinary frequency and urgency. RN notified. Pt offers excellent participation and appears very well supported by daughter at bedside.      Current Level of Function Impacting Discharge (mobility/balance): mod A some transfers; one step cues for sequencing    Other factors to consider for discharge: none additional         PLAN :  Patient continues to benefit from skilled intervention to address the above impairments. Continue treatment per established plan of care. to address goals. Recommendation for discharge: (in order for the patient to meet his/her long term goals)  Therapy 3 hours per day 5-7 days per week    This discharge recommendation:  Has been made in collaboration with the attending provider and/or case management    IF patient discharges home will need the following DME: to be determined (TBD)       SUBJECTIVE:   Patient stated [facility] are you doing?  to passers by in hallway. After mobility out of room pt with increased social and verbal interactions. Pt received seated, agreeable to PT and cleared by RN. Daughter requesting that PT facilitate pt positioning in wheelchair in order for her to assist pt to patio. Pt cleared for this by RN. OBJECTIVE DATA SUMMARY:   Critical Behavior:  Neurologic State: Alert  Orientation Level: Oriented to person, Disoriented to time, Disoriented to situation, Disoriented to place  Cognition: Decreased command following, Appropriate decision making  Safety/Judgement: Decreased awareness of environment  Functional Mobility Training:  Bed Mobility:           Scooting: Minimum assistance (in chair)        Transfers:  Sit to Stand: Minimum assistance; Moderate assistance (performed x 6; performed at chair, wheelchair, and commode surfaces; one step cues with improving sequencing over repeated trials)  Stand to Sit: Additional time;Minimum assistance (festination on chair approach final 2 trials)                             Balance:  Sitting: Without support  Sitting - Static: Good (unsupported)  Sitting - Dynamic: Good (unsupported)  Standing: With support  Standing - Static: Fair  Standing - Dynamic : Fair  Ambulation/Gait Training:  Distance (ft):  (12' + 54' + 5')  Assistive Device: Walker, rolling;Gait belt  Ambulation - Level of Assistance: Minimal assistance        Gait Abnormalities: Decreased step clearance        Base of Support: Narrowed     Speed/Aparna: Pace decreased (<100 feet/min)  Step Length: Right shortened;Left shortened                           Pain Rating:  No pain reported    Activity Tolerance:   requires rest breaks    After treatment patient left in no apparent distress:   Sitting in chair, Call bell within reach, and Caregiver / family present    COMMUNICATION/COLLABORATION:   The patients plan of care was discussed with: Registered nurse.      Lana Emanuel, PT, DPT   Time Calculation: 56 mins

## 2023-02-23 NOTE — PROGRESS NOTES
Problem: Patient Education: Go to Patient Education Activity  Goal: Patient/Family Education  Outcome: Progressing Towards Goal     Problem: Patient Education: Go to Patient Education Activity  Goal: Patient/Family Education  Outcome: Progressing Towards Goal     Problem: Pressure Injury - Risk of  Goal: *Prevention of pressure injury  Description: Document Jamel Scale and appropriate interventions in the flowsheet. Outcome: Progressing Towards Goal  Note: Pressure Injury Interventions:  Sensory Interventions: Assess changes in LOC, Monitor skin under medical devices, Minimize linen layers, Maintain/enhance activity level, Keep linens dry and wrinkle-free    Moisture Interventions: Absorbent underpads, Check for incontinence Q2 hours and as needed, Limit adult briefs, Maintain skin hydration (lotion/cream)    Activity Interventions: Increase time out of bed    Mobility Interventions: HOB 30 degrees or less    Nutrition Interventions: Document food/fluid/supplement intake    Friction and Shear Interventions: HOB 30 degrees or less                Problem: Patient Education: Go to Patient Education Activity  Goal: Patient/Family Education  Outcome: Progressing Towards Goal     Problem: Falls - Risk of  Goal: *Absence of Falls  Description: Document Thien Fall Risk and appropriate interventions in the flowsheet.   Outcome: Progressing Towards Goal  Note: Fall Risk Interventions:

## 2023-02-23 NOTE — PROGRESS NOTES
Lefty Robin [facility]  [address] [facility], [address] [place] [address]  [phone]      Hospitalist Progress Note      NAME: Stacy Cooper   :  1935  MRM:  [id number]    Date/Time of service: 2023  11:55 AM       Subjective:     Chief Complaint:  Patient was personally seen and examined by me during this time period. Chart reviewed. BP much improved with labetalol. I answered daughter's questions at bedside        Objective:       Vitals:       Last 24hrs VS reviewed since prior progress note.  Most recent are:    Visit Vitals  BP (!) 155/78 (BP 1 Location: Right upper arm, BP Patient Position: Sitting)   Pulse 87   Temp 97.6 °F (36.4 °C)   Resp 18   Ht 5' 1\" (1.549 m)   Wt 53 kg (116 lb 14.4 oz)   SpO2 99%   BMI 22.09 kg/m²     SpO2 Readings from Last 6 Encounters:   23 99%   23 100%   10/07/22 100%   22 97%   22 100%   22 100%          Intake/Output Summary (Last 24 hours) at 2023 1155  Last data filed at 2023 2111  Gross per 24 hour   Intake 240 ml   Output --   Net 240 ml          Exam:     Physical Exam:    Gen:  elderly, frail, NAD  HEENT:  Pink conjunctivae, PERRL, hearing intact to voice, moist mucous membranes  Neck:  Supple, without masses, thyroid non-tender  Resp:  No accessory muscle use, clear breath sounds without wheezes rales or rhonchi  Card:  No murmurs, normal S1, S2 without thrills, bruits or peripheral edema  Abd:  Soft, non-tender, non-distended, normoactive bowel sounds are present  Musc:  No cyanosis or clubbing  Skin:  No rashes   Neuro:  Cranial nerves 3-12 are grossly intact, generalized weakness, follows commands appropriately  Psych:  poor insight, oriented to person    Medications Reviewed: (see below)    Lab Data Reviewed: (see below)    ______________________________________________________________________    Medications:     Current Facility-Administered Medications   Medication Dose Route Frequency labetaloL (NORMODYNE) tablet 200 mg  200 mg Oral Q12H    hydrALAZINE (APRESOLINE) 20 mg/mL injection 20 mg  20 mg IntraVENous Q4H PRN    enoxaparin (LOVENOX) injection 40 mg  40 mg SubCUTAneous DAILY    carbidopa-levodopa (SINEMET)  mg per tablet 1 Tablet  1 Tablet Oral QID    aspirin delayed-release tablet 81 mg  81 mg Oral DAILY    clonazePAM (KlonoPIN) tablet 0.5 mg  0.5 mg Oral BID PRN    letrozole (FEMARA) tablet 2.5 mg  2.5 mg Oral DAILY    levothyroxine (SYNTHROID) tablet 50 mcg  50 mcg Oral ACB    lidocaine 4 % patch 1 Patch  1 Patch TransDERmal Q24H    losartan (COZAAR) tablet 25 mg  25 mg Oral DAILY    sodium chloride (NS) flush 5-40 mL  5-40 mL IntraVENous Q8H    sodium chloride (NS) flush 5-40 mL  5-40 mL IntraVENous PRN    acetaminophen (TYLENOL) tablet 650 mg  650 mg Oral Q6H PRN    Or    acetaminophen (TYLENOL) suppository 650 mg  650 mg Rectal Q6H PRN    polyethylene glycol (MIRALAX) packet 17 g  17 g Oral DAILY PRN    ondansetron (ZOFRAN ODT) tablet 4 mg  4 mg Oral Q8H PRN    Or    ondansetron (ZOFRAN) injection 4 mg  4 mg IntraVENous Q6H PRN    ciprofloxacin HCl (CILOXAN) 0.3 % ophthalmic solution 1 Drop  1 Drop Both Eyes Q4HWA          Lab Review:     Recent Labs     02/22/23  0317   WBC 4.4   HGB 9.4*   HCT 29.5*          Recent Labs     02/22/23  0317      K 4.2   *   CO2 24   GLU 99   BUN 33*   CREA 1.09*   CA 8.8   MG 2.2   PHOS 3.2   ALB 2.6*   TBILI 0.4   ALT 9*       Lab Results   Component Value Date/Time    Glucose (POC) 103 02/16/2023 03:40 PM          Assessment / Plan:     81 yo hx of HTN, Parkinson's, breast CA, hypothyroid, presented w/ AMS, LESLYE    1) Acute met encephalopathy: now stable, but has worsening cognitive decline from baseline. Likely due to progressive dementia from Parkinson's. Head MRI neg for CVA but showed cerebral atrophy. EEG neg for seizures. Neuro did not recommend further testing or therapy.   Patient will need to follow up with her neurologist, Dr. Aliya Recio. Monitor for agitation     2) Parkinson's w/ dementia: cont Sinemet QID (dose was increased by neurology). Watch for agitation    3) LESLYE: resolved with IVF    4) HTN: BP improved with labetalol. Will cont losartan. Stopped metoprolol. Use IV hydralazine prn. Monitor closely     5) Breast cancer: cont Letrozole    6) Hypothyroid: cont synthroid    7) Weakness: cont PT/OT.   Awaiting Rehab vs SNF    **Prior records, notes, labs, radiology, and medications reviewed in [facility]**    Total time spent with patient care: 35 min  **I personally saw and examined the patient during this time period**                 Care Plan discussed with: Patient, nursing, daughter     Discussed:  Care Plan    Prophylaxis:  Lovenox    Disposition:  SNF/LTC           ___________________________________________________    Attending Physician: Ebony Burnham MD

## 2023-02-23 NOTE — PROGRESS NOTES
Comprehensive Nutrition Assessment    Type and Reason for Visit: Initial    Nutrition Recommendations/Plan:   Continue Regular diet  Modify ONS - Decrease magic cup x 1 per day and Ensure Enlive BID. Monitor PO intake, weight, placement. Malnutrition Assessment:  Malnutrition Status: At risk for malnutrition (specify) (02/23/23 1501)    Context:  Chronic illness     Findings of the 6 clinical characteristics of malnutrition:   Energy Intake:  No significant decrease in energy intake  Weight Loss:  No significant weight loss     Body Fat Loss:  Mild body fat loss,     Muscle Mass Loss:  Mild muscle mass loss,    Fluid Accumulation:  No significant fluid accumulation,     Strength:  Not performed     Nutrition Assessment:         Pt admitted with Acute metabolic encephalopathy [R16.83]    Past Medical History:   Diagnosis Date    Anxiety 6/16/2020    Arthritis     Breast cancer ([facility]     Breast cancer ([facility]     Depression 6/16/2020    Diverticulitis 6/16/2020    GERD (gastroesophageal reflux disease) 6/16/2020    High cholesterol 6/16/2020    Hx of dizziness 6/16/2020    Hypertension 6/16/2020    Primary cancer of lower-inner quadrant of left female breast ([facility] 12/18/2018    breast     Thyroid disease 6/16/2020       Pt screened for LOS. Pt eating well with help of daughter. Intake >75% on average. Pt with parkinson's and dementia. Pt wt very stable over last 2 years with minor wt shifts. Pt with regular diet, decreased ONS - Ensure enlive BID and magic cup down to 1 per day. No known food allergies. No reported chew/swallow difficulties.  No c/d, no n/v.     Last BM: 02/22/23, Formed    PO intake: Patient Vitals for the past 168 hrs:   % Diet Eaten   02/23/23 1459 76 - 100%   02/23/23 0800 1 - 25%   02/21/23 1314 51 - 75%   02/19/23 1300 51 - 75%   02/19/23 0800 51 - 75%   02/18/23 1800 51 - 75%   02/18/23 1300 51 - 75%   02/18/23 0900 51 - 75%   02/17/23 1651 76 - 100%       Wt Readings from Last 30 Encounters:   02/17/23 53 kg (116 lb 14.4 oz)   01/23/23 52.2 kg (115 lb)   10/07/22 53.1 kg (117 lb)   08/02/22 53.1 kg (117 lb)   07/11/22 53.1 kg (117 lb)   06/09/22 54.9 kg (121 lb)   01/27/22 54.9 kg (121 lb)   11/05/21 54.9 kg (121 lb)   06/04/21 54.4 kg (120 lb)   04/09/21 52.2 kg (115 lb)   02/20/21 53.2 kg (117 lb 4.6 oz)   10/30/20 54.4 kg (120 lb)   10/06/20 52.6 kg (116 lb)   08/21/20 51 kg (112 lb 6.4 oz)   02/04/20 56.9 kg (125 lb 6.4 oz)           Nutrition Related Findings:      Wound Type: None    Edema: No data recorded      Current Nutrition Intake & Therapies:  Average Meal Intake: 51-75%  Average Supplement Intake: None ordered  ADULT DIET Regular  ADULT ORAL NUTRITION SUPPLEMENT Lunch; Frozen Supplement  ADULT ORAL NUTRITION SUPPLEMENT Breakfast, Dinner; Standard High Calorie/High Protein    Anthropometric Measures:  Height: 5' 0.98\" (154.9 cm)  Ideal Body Weight (IBW): 105 lbs (48 kg)     Current Body Wt:  53 kg (116 lb 13.5 oz), 111.3 % IBW. Bed scale  Current BMI (kg/m2): 22.1        Weight Adjustment: No adjustment                 BMI Category: Normal weight (BMI 22.0-24.9) age over 72    Estimated Daily Nutrient Needs:  Energy Requirements Based On: Kcal/kg  Weight Used for Energy Requirements: Current  Energy (kcal/day): 3728-9749 (25-30 kcal/kg)  Weight Used for Protein Requirements: Current  Protein (g/day): 53-63  Method Used for Fluid Requirements: 1 ml/kcal  Fluid (ml/day): 4710-1352    Nutrition Diagnosis:    In context of chronic illness related to cognitive or neurological impairment as evidenced by mild muscle loss    Nutrition Interventions:   Food and/or Nutrient Delivery: Continue current diet  Nutrition Education/Counseling: Education not indicated  Coordination of Nutrition Care: Continue to monitor while inpatient, Interdisciplinary rounds       Goals:     Goals: PO intake 50% or greater, by next RD assessment       Nutrition Monitoring and Evaluation: Behavioral-Environmental Outcomes: None identified  Food/Nutrient Intake Outcomes: Food and nutrient intake  Physical Signs/Symptoms Outcomes: Biochemical data, Weight    Discharge Planning:    Continue current diet    Barry Mora, [address]  Contact: [phone]

## 2023-02-24 LAB
ANION GAP SERPL CALC-SCNC: 4 MMOL/L (ref 5–15)
BUN SERPL-MCNC: 31 MG/DL (ref 6–20)
BUN/CREAT SERPL: 34 (ref 12–20)
CALCIUM SERPL-MCNC: 9.1 MG/DL (ref 8.5–10.1)
CHLORIDE SERPL-SCNC: 115 MMOL/L (ref 97–108)
CO2 SERPL-SCNC: 23 MMOL/L (ref 21–32)
CREAT SERPL-MCNC: 0.9 MG/DL (ref 0.55–1.02)
ERYTHROCYTE [DISTWIDTH] IN BLOOD BY AUTOMATED COUNT: 15.6 % (ref 11.5–14.5)
GLUCOSE SERPL-MCNC: 105 MG/DL (ref 65–100)
HCT VFR BLD AUTO: 32 % (ref 35–47)
HGB BLD-MCNC: 10 G/DL (ref 11.5–16)
MAGNESIUM SERPL-MCNC: 2.5 MG/DL (ref 1.6–2.4)
MCH RBC QN AUTO: 29.2 PG (ref 26–34)
MCHC RBC AUTO-ENTMCNC: 31.3 G/DL (ref 30–36.5)
MCV RBC AUTO: 93.6 FL (ref 80–99)
NRBC # BLD: 0 K/UL (ref 0–0.01)
NRBC BLD-RTO: 0 PER 100 WBC
PHOSPHATE SERPL-MCNC: 2.8 MG/DL (ref 2.6–4.7)
PLATELET # BLD AUTO: 291 K/UL (ref 150–400)
PMV BLD AUTO: 9.5 FL (ref 8.9–12.9)
POTASSIUM SERPL-SCNC: 4.3 MMOL/L (ref 3.5–5.1)
RBC # BLD AUTO: 3.42 M/UL (ref 3.8–5.2)
SODIUM SERPL-SCNC: 142 MMOL/L (ref 136–145)
WBC # BLD AUTO: 5.1 K/UL (ref 3.6–11)

## 2023-02-24 PROCEDURE — 74011250637 HC RX REV CODE- 250/637: Performed by: INTERNAL MEDICINE

## 2023-02-24 PROCEDURE — 83735 ASSAY OF MAGNESIUM: CPT

## 2023-02-24 PROCEDURE — 74011000250 HC RX REV CODE- 250: Performed by: INTERNAL MEDICINE

## 2023-02-24 PROCEDURE — 97116 GAIT TRAINING THERAPY: CPT

## 2023-02-24 PROCEDURE — 74011250636 HC RX REV CODE- 250/636: Performed by: INTERNAL MEDICINE

## 2023-02-24 PROCEDURE — 74011250637 HC RX REV CODE- 250/637: Performed by: PSYCHIATRY & NEUROLOGY

## 2023-02-24 PROCEDURE — 97110 THERAPEUTIC EXERCISES: CPT

## 2023-02-24 PROCEDURE — 96372 THER/PROPH/DIAG INJ SC/IM: CPT

## 2023-02-24 PROCEDURE — 80048 BASIC METABOLIC PNL TOTAL CA: CPT

## 2023-02-24 PROCEDURE — 36415 COLL VENOUS BLD VENIPUNCTURE: CPT

## 2023-02-24 PROCEDURE — 84100 ASSAY OF PHOSPHORUS: CPT

## 2023-02-24 PROCEDURE — G0378 HOSPITAL OBSERVATION PER HR: HCPCS

## 2023-02-24 PROCEDURE — 85027 COMPLETE CBC AUTOMATED: CPT

## 2023-02-24 RX ADMIN — CLONAZEPAM 0.5 MG: 0.5 TABLET ORAL at 23:12

## 2023-02-24 RX ADMIN — CARBIDOPA AND LEVODOPA 1 TABLET: 25; 250 TABLET ORAL at 12:49

## 2023-02-24 RX ADMIN — LEVOTHYROXINE SODIUM 50 MCG: 0.05 TABLET ORAL at 06:25

## 2023-02-24 RX ADMIN — SODIUM CHLORIDE, PRESERVATIVE FREE 10 ML: 5 INJECTION INTRAVENOUS at 23:13

## 2023-02-24 RX ADMIN — SODIUM CHLORIDE, PRESERVATIVE FREE 10 ML: 5 INJECTION INTRAVENOUS at 05:40

## 2023-02-24 RX ADMIN — SODIUM CHLORIDE, PRESERVATIVE FREE 10 ML: 5 INJECTION INTRAVENOUS at 06:25

## 2023-02-24 RX ADMIN — ENOXAPARIN SODIUM 40 MG: 100 INJECTION SUBCUTANEOUS at 08:25

## 2023-02-24 RX ADMIN — LOSARTAN POTASSIUM 25 MG: 50 TABLET, FILM COATED ORAL at 08:14

## 2023-02-24 RX ADMIN — CARBIDOPA AND LEVODOPA 1 TABLET: 25; 250 TABLET ORAL at 18:09

## 2023-02-24 RX ADMIN — ASPIRIN 81 MG: 81 TABLET, COATED ORAL at 08:13

## 2023-02-24 RX ADMIN — CIPROFLOXACIN 1 DROP: 3 SOLUTION OPHTHALMIC at 05:39

## 2023-02-24 RX ADMIN — ACETAMINOPHEN 650 MG: 325 TABLET ORAL at 18:09

## 2023-02-24 RX ADMIN — LABETALOL HYDROCHLORIDE 200 MG: 100 TABLET, FILM COATED ORAL at 23:12

## 2023-02-24 RX ADMIN — CARBIDOPA AND LEVODOPA 1 TABLET: 25; 250 TABLET ORAL at 23:12

## 2023-02-24 RX ADMIN — CARBIDOPA AND LEVODOPA 1 TABLET: 25; 250 TABLET ORAL at 08:16

## 2023-02-24 RX ADMIN — LABETALOL HYDROCHLORIDE 200 MG: 100 TABLET, FILM COATED ORAL at 08:17

## 2023-02-24 RX ADMIN — LETROZOLE 2.5 MG: 2.5 TABLET ORAL at 18:09

## 2023-02-24 NOTE — PROGRESS NOTES
Problem: Patient Education: Go to Patient Education Activity  Goal: Patient/Family Education  Outcome: Progressing Towards Goal     Problem: Patient Education: Go to Patient Education Activity  Goal: Patient/Family Education  Outcome: Progressing Towards Goal     Problem: Pressure Injury - Risk of  Goal: *Prevention of pressure injury  Description: Document Jamel Scale and appropriate interventions in the flowsheet. Outcome: Progressing Towards Goal  Note: Pressure Injury Interventions:  Sensory Interventions: Assess changes in LOC, Monitor skin under medical devices, Minimize linen layers, Maintain/enhance activity level, Keep linens dry and wrinkle-free    Moisture Interventions: Absorbent underpads, Check for incontinence Q2 hours and as needed, Limit adult briefs, Maintain skin hydration (lotion/cream)    Activity Interventions: Increase time out of bed    Mobility Interventions: HOB 30 degrees or less    Nutrition Interventions: Document food/fluid/supplement intake    Friction and Shear Interventions: HOB 30 degrees or less                Problem: Patient Education: Go to Patient Education Activity  Goal: Patient/Family Education  Outcome: Progressing Towards Goal     Problem: Falls - Risk of  Goal: *Absence of Falls  Description: Document Thien Fall Risk and appropriate interventions in the flowsheet.   Outcome: Progressing Towards Goal  Note: Fall Risk Interventions:                                Problem: Patient Education: Go to Patient Education Activity  Goal: Patient/Family Education  Outcome: Progressing Towards Goal

## 2023-02-24 NOTE — PROGRESS NOTES
Physical/Occupational Therapy Note:  Pt received supine in bed. Extremely fatigued due to agitation and inability to sleep prior night. Dtr from out of town staying with pt and expressing concern. Stating hope for discharge to rehab today feeling LOS is hindering pt progress. Dtr is very supportive. Requesting deferral of PT/OT tx at this time per above.   Jeremy Carrera, PT

## 2023-02-24 NOTE — PROGRESS NOTES
Lefty Robin [facility]  [address] [facility], [place], [address] [place] [address]  [phone]      Medical Progress Note      NAME: Pollo Segura   :  1935  MRM:  [id number]    Date/Time: 2023  1:22 PM           Assessment / Plan:     83 yo hx of HTN, Parkinson's, breast CA, hypothyroid, presented w/ AMS, LESLYE     1) Acute met encephalopathy: Resolving, but has worsening cognitive decline from baseline. Likely due to progressive dementia from Parkinson's. Head MRI neg for CVA but showed cerebral atrophy. EEG neg for seizures. Neuro did not recommend further testing or therapy. Patient will need to follow up with her neurologist, Dr. Azra Watkins. 2) Parkinson's w/ dementia: Cont Sinemet QID (dose was increased by neurology). Watch for agitation. 3) LESLYE: resolved with IVF. 4) HTN: Continue labetalol and valsartan. 5) Breast cancer: cont Letrozole     6) Hypothyroid: cont synthroid     7) Weakness: Cont PT/OT. Awaiting SNF. I had a peer to peer discussion today with insurance company. Unfortunately they declined acute rehab and approved skilled nursing facility instead. Care Plan discussed with: Patient, Family, and Care Manager    Prophylaxis:  Lovenox    Disposition:  SNF/LTC           ___________________________________________________    Attending Physician: Marylu Duane, MD        Subjective:     Chief Complaint: Daughter at bedside, reported significant functional decline. Patient herself was awake and alert as well as engaging in some minimal discussions. ROS:  Rest of review of systems negative otherwise. Objective:       Vitals:     Last 24hrs VS reviewed since prior progress note.  Most recent are:    Visit Vitals  BP (!) 144/68 (BP 1 Location: Right upper arm, BP Patient Position: Semi fowlers)   Pulse 91   Temp 97.8 °F (36.6 °C)   Resp 18   Ht 5' 0.98\" (1.549 m)   Wt 53 kg (116 lb 14.4 oz)   SpO2 98%   BMI 22.10 kg/m²     SpO2 Readings from Last 6 Encounters:   02/24/23 98%   01/23/23 100%   10/07/22 100%   08/02/22 97%   07/11/22 100%   06/09/22 100%    O2 Flow Rate (L/min): 1 l/min     Intake/Output Summary (Last 24 hours) at 2/24/2023 1322  Last data filed at 2/24/2023 0554  Gross per 24 hour   Intake 220 ml   Output 0 ml   Net 220 ml          Exam:     Physical Exam:    Gen:  No acute distress but elderly and frail. HEENT:  NC/AT. Neck:  Supple. Resp:  Unlabored breathing. Clear breath sounds with good air entry. No wheezes rales or rhonchi. Card:  Regular rate and rhythm. Normal S1 and S2. Abd:  Soft, non-tender, non-distended, normoactive bowel sounds. Ext: No clubbing, cyanosis or edema. Skin:  No rashes or ulcers, skin turgor is good. Neuro: Awake and alert, appropriately follows instructions.     Medications Reviewed: (see below)    Lab Data Reviewed: (see below)  ______________________________________________________________________    Medications:     Current Facility-Administered Medications   Medication Dose Route Frequency    labetaloL (NORMODYNE) tablet 200 mg  200 mg Oral Q12H    hydrALAZINE (APRESOLINE) 20 mg/mL injection 20 mg  20 mg IntraVENous Q4H PRN    enoxaparin (LOVENOX) injection 40 mg  40 mg SubCUTAneous DAILY    carbidopa-levodopa (SINEMET)  mg per tablet 1 Tablet  1 Tablet Oral QID    aspirin delayed-release tablet 81 mg  81 mg Oral DAILY    clonazePAM (KlonoPIN) tablet 0.5 mg  0.5 mg Oral BID PRN    letrozole (FEMARA) tablet 2.5 mg  2.5 mg Oral DAILY    levothyroxine (SYNTHROID) tablet 50 mcg  50 mcg Oral ACB    lidocaine 4 % patch 1 Patch  1 Patch TransDERmal Q24H    losartan (COZAAR) tablet 25 mg  25 mg Oral DAILY    sodium chloride (NS) flush 5-40 mL  5-40 mL IntraVENous Q8H    sodium chloride (NS) flush 5-40 mL  5-40 mL IntraVENous PRN    acetaminophen (TYLENOL) tablet 650 mg  650 mg Oral Q6H PRN    Or    acetaminophen (TYLENOL) suppository 650 mg  650 mg Rectal Q6H PRN    polyethylene glycol (MIRALAX) packet 17 g  17 g Oral DAILY PRN    ondansetron (ZOFRAN ODT) tablet 4 mg  4 mg Oral Q8H PRN    Or    ondansetron (ZOFRAN) injection 4 mg  4 mg IntraVENous Q6H PRN        Lab Review:     Recent Labs     02/24/23  0258 02/22/23 0317   WBC 5.1 4.4   HGB 10.0* 9.4*   HCT 32.0* 29.5*    261     Recent Labs     02/24/23 0258 02/22/23 0317    140   K 4.3 4.2   * 112*   CO2 23 24   * 99   BUN 31* 33*   CREA 0.90 1.09*   CA 9.1 8.8   MG 2.5* 2.2   PHOS 2.8 3.2   ALB  --  2.6*   ALT  --  9*     No components found for: [place]

## 2023-02-24 NOTE — PROGRESS NOTES
2/24/2023 12:30 PM Per MD, peer to peer for IPR has been denied, recommending SNF placement. CM met with pt's daughter, relayed denial for IPR. Pt's daughter agreeable to SNF placement at [address] [place] [address] and Rehab. CM called to Debbi Nur at [facility] who confirmed they can accept pt and start auth today. Debbi Nur confirmed if Stacy Balding is received today they can accept pt today but likely for auth to not be approved until Monday. CM faxed pt's updates to Debbi Nur at [phone], fax confirmation received.     2/24/2023  10:52 AM CM spoke with pt's Attending earlier this AM, relayed plan for peer to peer between 11-11:30AM will be done for IPR. CM will follow up.  OLIVE Johns     Transitions of Care Plan:  RUR N/A pt is OBS Low Risk of Readmission/Green  LOS 7 Days   Medical management continues  PT, OT following, IPR denied   Plan for SNF:  [facility]   [facility]- accepted  [facility]- accepted and started auth   Outpatient follow up at DC from rehab, PCP, neurology  [facility] vs [facility]  CM will continue to follow and assist w/ DC needs

## 2023-02-24 NOTE — PROGRESS NOTES
Problem: Mobility Impaired (Adult and Pediatric)  Goal: *Acute Goals and Plan of Care (Insert Text)  Description: Physical Therapy Goals  Revised 2/24/2023  1. Patient will move from supine to sit and sit to supine  in bed with minimal assistance/contact guard assist within 7 day(s). 2.  Patient will transfer from bed to chair and chair to bed with minimal assistance/contact guard assist using the least restrictive device within 7 day(s). 3.  Patient will perform sit to stand with minimal assistance/contact guard assist within 7 day(s). 4.  Patient will ambulate with minimal assistance/contact guard assist for 150 feet with the least restrictive device within 7 day(s). FUNCTIONAL STATUS PRIOR TO ADMISSION: Prior to 1 week ago, pt was amb with and without a RW and performing ADLs with supervision. Pt has aides from 10a-6p daily per daughter. Pt's daughter is in town from [place] [address] to assist pt for time being. Pt's  has advanced dementia and resides in a [facility]. HOME SUPPORT: The patient lived alone with aides to provide assistance    Physical Therapy Goals  Initiated 2/17/2023  1. Patient will move from supine to sit and sit to supine , scoot up and down, and roll side to side in bed with supervision/set-up within 7 day(s). 2.  Patient will transfer from bed to chair and chair to bed with supervision/set-up using the least restrictive device within 7 day(s). 3.  Patient will perform sit to stand with supervision/set-up within 7 day(s). 4.  Patient will ambulate with supervision/set-up for 50 feet with the least restrictive device within 7 day(s). 5.  Patient will ascend/descend 5 stairs with 1-2 handrail(s) with minimal assistance/contact guard assist within 7 day(s).     Outcome: Progressing Towards Goal   PHYSICAL THERAPY TREATMENT: WEEKLY REASSESSMENT  Patient: Yoko Fonseca (76 y.o. female)  Date: 2/24/2023  Primary Diagnosis: Acute metabolic encephalopathy [R18.18]       Precautions: Fall, Skin      ASSESSMENT  Patient continues with skilled PT services and is slowly progressing towards goals. Pt was Min A yesterday and now is Mod to Total A with mobility today. Per chart pt had agitated and sleepless night. She is received supine in bed demonstrating a very rigid Parkinsonian posture and tone. Her speech is slow, affect is flat and tone is rigid restricting movement. Performed AAROM/strengthening ex x10 each to UE/LE. Pt needing Max A with rolling to wendi brief. Supine to sit with Max A. High guard with sitting balance. Sit to stand with Mod A with RW.  Gait of 48' with Mod A with RW needing RW management assistance. Pt returned to bed and positioned for comfort. RN informed of pt restrictions with mobility and agreeable to assess Parkinsons meds with MD.       Patient's progression toward goals since last assessment: per above    Current Level of Function Impacting Discharge (mobility/balance): Mod to Total A/fair to poor    Functional Outcome Measure: The patient scored 30/100 on the barthel outcome measure   Other factors to consider for discharge: per above         PLAN :  Goals have been updated based on progression since last assessment. Patient continues to benefit from skilled intervention to address the above impairments. Recommendations and Planned Interventions: bed mobility training, transfer training, gait training, therapeutic exercises, neuromuscular re-education, edema management/control, patient and family training/education, and therapeutic activities      Frequency/Duration: Patient will be followed by physical therapy:  5 times a week to address goals.     Recommendation for discharge: (in order for the patient to meet his/her long term goals)  Therapy up to 5 days/week in rehab setting    This discharge recommendation:  Has been made in collaboration with the attending provider and/or case management    IF patient discharges home will need the following DME: to be determined (TBD)         SUBJECTIVE:   Patient stated I thank you for working with me.     OBJECTIVE DATA SUMMARY:   HISTORY:    Past Medical History:   Diagnosis Date    Anxiety 6/16/2020    Arthritis     Breast cancer ([facility] [address]     Breast cancer ([facility] [address]     Depression 6/16/2020    Diverticulitis 6/16/2020    GERD (gastroesophageal reflux disease) 6/16/2020    High cholesterol 6/16/2020    Hx of dizziness 6/16/2020    Hypertension 6/16/2020    Primary cancer of lower-inner quadrant of left female breast ([facility] [address] 12/18/2018    breast     Thyroid disease 6/16/2020     Past Surgical History:   Procedure Laterality Date    HX BREAST LUMPECTOMY Left 01/23/2019    with SLNB    HX HEENT  06/25/2018    Cataract    HX HYSTERECTOMY  1976    HX ORTHOPAEDIC  2014    Left knee    HX ORTHOPAEDIC  08/09/2017    Right knee    HX ORTHOPAEDIC  2013    Right Shoulder    HX UROLOGICAL  12/10/2020    cystoscopy        Personal factors and/or comorbidities impacting plan of care: per above and below    Home Situation  Home Environment: Private residence  # Steps to Enter: 2  Rails to Enter: Yes  Hand Rails : Bilateral  One/Two Story Residence: One story  Living Alone: Yes  Support Systems: Spouse/Significant Other, Child(baldev)  Patient Expects to be Discharged to[de-identified] Unable to determine at this time  Current DME Used/Available at Home: Cane, straight, Commode, bedside, Walker, Wheelchair  Tub or Shower Type: Shower    EXAMINATION/PRESENTATION/DECISION MAKING:   Critical Behavior:  Neurologic State: Agitated  Orientation Level: Oriented to person  Cognition: Memory loss, Decreased command following  Safety/Judgement: Decreased awareness of environment  Hearing:   Auditory  Auditory Impairment: None  Skin:  IV  Edema: WNL  Range Of Motion:  AROM: Generally decreased, functional                       Strength:    Strength: Generally decreased, functional                    Tone & Sensation:   Tone: Abnormal (rigid) Coordination:  Coordination: Generally decreased, functional  Vision:      Functional Mobility:  Bed Mobility:  Rolling: Moderate assistance;Maximum assistance  Supine to Sit: Moderate assistance;Maximum assistance  Sit to Supine: Total assistance  Scooting: Maximum assistance  Transfers:  Sit to Stand: Moderate assistance;Minimum assistance  Stand to Sit: Moderate assistance;Minimum assistance                       Balance:   Sitting: High guard; Impaired  Sitting - Static: Fair (occasional)  Sitting - Dynamic: Poor (constant support)  Standing: Impaired  Standing - Static: Fair;Constant support  Standing - Dynamic : Poor;Constant support  Ambulation/Gait Training:  Distance (ft): 50 Feet (ft)  Assistive Device: Walker, rolling;Gait belt  Ambulation - Level of Assistance: Moderate assistance        Gait Abnormalities: Decreased step clearance; Path deviations        Base of Support: Narrowed     Speed/Aparna: Pace decreased (<100 feet/min)  Step Length: Right shortened;Left shortened                        Therapeutic Exercises:   Per above    Functional Measure:  Barthel Index:    Bathin  Bladder: 10  Bowels: 10  Groomin  Dressin  Feedin  Mobility: 0  Stairs: 0  Toilet Use: 0  Transfer (Bed to Chair and Back): 5  Total: 30/100       The Barthel ADL Index: Guidelines  1. The index should be used as a record of what a patient does, not as a record of what a patient could do. 2. The main aim is to establish degree of independence from any help, physical or verbal, however minor and for whatever reason. 3. The need for supervision renders the patient not independent. 4. A patient's performance should be established using the best available evidence. Asking the patient, friends/relatives and nurses are the usual sources, but direct observation and common sense are also important. However direct testing is not needed.   5. Usually the patient's performance over the preceding 24-48 hours is important, but occasionally longer periods will be relevant. 6. Middle categories imply that the patient supplies over 50 per cent of the effort. 7. Use of aids to be independent is allowed. Score Interpretation (from [address])    Independent   60-79 Minimally independent   40-59 Partially dependent   20-39 Very dependent   <20 Totally dependent     -Shahriar Ledezma., Barthel, D.W. (1965). Functional evaluation: the Barthel Index. [address] ([address] (1997). The Barthel activities of daily living index: self-reporting versus actual performance in the old (> or = 75 years). Journal of [address] [place] [address] 45(7), [address] [facility], ISHA, Lena Gould., Agustin Cannon. (1999). Measuring the change in disability after inpatient rehabilitation; comparison of the responsiveness of the Barthel Index and Functional [place] Measure. Journal of Neurology, Neurosurgery, and Psychiatry, 66(4), 842-331. Sushila Thomson, N.J.A, MEGHAN Lobo, & Thai Dunbar MNAT. (2004) Assessment of post-stroke quality of life in cost-effectiveness studies: The usefulness of the Barthel Index and the EuroQoL-5D. Quality of Life Research, 13, 427-43          Pain Rating:  none    Activity Tolerance:   Fair    After treatment patient left in no apparent distress:   Supine in bed, Heels elevated for pressure relief, Call bell within reach, Bed / chair alarm activated, and Side rails x 3    COMMUNICATION/EDUCATION:   The patients plan of care was discussed with: Registered nurse and Case management. Fall prevention education was provided and the patient/caregiver indicated understanding., Patient/family have participated as able in goal setting and plan of care. , and Patient/family agree to work toward stated goals and plan of care.     Thank you for this referral.  Jackson Catherine, PT   Time Calculation: 25 mins

## 2023-02-24 NOTE — PROGRESS NOTES
Spiritual Care Assessment/Progress Note  [facility]      NAME: Jenny Poole      MRN: [id number]  AGE: 82 y.o.  SEX: female  Jew Affiliation: Evangelical   Language: English     2/24/2023     Total Time (in minutes): 40     Spiritual Assessment begun in [facility] 4[facility] POST SURG ORT 2 through conversation with:         [x]Patient        [x] Family    [] Friend(s)        Reason for Consult: Initial/Spiritual assessment, patient floor     Spiritual beliefs: (Please include comment if needed)     [x] Identifies with a luis tradition:   Jew      [] Supported by a luis community:            [] Claims no spiritual orientation:           [] Seeking spiritual identity:                [] Adheres to an individual form of spirituality:           [x] Not able to assess:                           Identified resources for coping:      [x] Prayer                               [] Music                  [] Guided Imagery     [x] Family/friends                 [] Pet visits     [] Devotional reading                         [] Unknown     [] Other:                                               Interventions offered during this visit: (See comments for more details)    Patient Interventions: Affirmation of emotions/emotional suffering, Catharsis/review of pertinent events in supportive environment     Family/Friend(s): Catharsis/review of pertinent events in supportive environment, Affirmation of emotions/emotional suffering, Normalization of emotional/spiritual concerns     Plan of Care:     [] Support spiritual and/or cultural needs    [] Support AMD and/or advance care planning process      [] Support grieving process   [] Coordinate Rites and/or Rituals    [] Coordination with community clergy   [] No spiritual needs identified at this time   [] Detailed Plan of Care below (See Comments)  [] Make referral to Music Therapy  [] Make referral to Pet Therapy     [] Make referral to Addiction services  [] Make referral to [facility]  [] Make referral to Spiritual Care Partner  [] No future visits requested        [x] Contact Spiritual Care for further referrals     Comments:  visit for the patient on Post Surg. Reviewed patient's chart and spoke with the . Introduced self and chaplaincy. Pt tearful as she shared recent events. Pt's daughter tearful as she shared recent events. Pt is to be discharged to a skilled nursing facility, pt is afraid of being left alone. Provided an empathetic presence. Listened attentively. Reflected value statements. Affirmed hope. Advised of  availability. Please contact Spiritual Care for further referrals.     [address], [address], [facility]  Staff   Paging service: [phone] (LAVERNE)

## 2023-02-24 NOTE — PROGRESS NOTES
Bedside shift change report given to vishal Tineo (oncoming nurse) by Faith Lara (offgoing nurse). Report included the following information SBAR, Kardex, ED Summary, Procedure Summary, Intake/Output, and MAR.

## 2023-02-25 PROCEDURE — 74011250637 HC RX REV CODE- 250/637: Performed by: INTERNAL MEDICINE

## 2023-02-25 PROCEDURE — 96372 THER/PROPH/DIAG INJ SC/IM: CPT

## 2023-02-25 PROCEDURE — G0378 HOSPITAL OBSERVATION PER HR: HCPCS

## 2023-02-25 PROCEDURE — 74011250636 HC RX REV CODE- 250/636: Performed by: INTERNAL MEDICINE

## 2023-02-25 PROCEDURE — 74011250637 HC RX REV CODE- 250/637: Performed by: PSYCHIATRY & NEUROLOGY

## 2023-02-25 PROCEDURE — 96376 TX/PRO/DX INJ SAME DRUG ADON: CPT

## 2023-02-25 PROCEDURE — 74011000250 HC RX REV CODE- 250: Performed by: INTERNAL MEDICINE

## 2023-02-25 RX ADMIN — ASPIRIN 81 MG: 81 TABLET, COATED ORAL at 08:05

## 2023-02-25 RX ADMIN — HYDRALAZINE HYDROCHLORIDE 20 MG: 20 INJECTION INTRAMUSCULAR; INTRAVENOUS at 06:18

## 2023-02-25 RX ADMIN — SODIUM CHLORIDE, PRESERVATIVE FREE 10 ML: 5 INJECTION INTRAVENOUS at 22:05

## 2023-02-25 RX ADMIN — ACETAMINOPHEN 650 MG: 325 TABLET ORAL at 08:05

## 2023-02-25 RX ADMIN — CARBIDOPA AND LEVODOPA 1 TABLET: 25; 250 TABLET ORAL at 17:28

## 2023-02-25 RX ADMIN — LETROZOLE 2.5 MG: 2.5 TABLET ORAL at 17:28

## 2023-02-25 RX ADMIN — CLONAZEPAM 0.5 MG: 0.5 TABLET ORAL at 22:04

## 2023-02-25 RX ADMIN — CARBIDOPA AND LEVODOPA 1 TABLET: 25; 250 TABLET ORAL at 14:59

## 2023-02-25 RX ADMIN — LABETALOL HYDROCHLORIDE 200 MG: 100 TABLET, FILM COATED ORAL at 08:04

## 2023-02-25 RX ADMIN — CARBIDOPA AND LEVODOPA 1 TABLET: 25; 250 TABLET ORAL at 22:04

## 2023-02-25 RX ADMIN — ENOXAPARIN SODIUM 40 MG: 100 INJECTION SUBCUTANEOUS at 08:04

## 2023-02-25 RX ADMIN — CARBIDOPA AND LEVODOPA 1 TABLET: 25; 250 TABLET ORAL at 08:05

## 2023-02-25 RX ADMIN — LOSARTAN POTASSIUM 25 MG: 50 TABLET, FILM COATED ORAL at 08:05

## 2023-02-25 RX ADMIN — LABETALOL HYDROCHLORIDE 200 MG: 100 TABLET, FILM COATED ORAL at 22:04

## 2023-02-25 RX ADMIN — SODIUM CHLORIDE, PRESERVATIVE FREE 10 ML: 5 INJECTION INTRAVENOUS at 06:18

## 2023-02-25 RX ADMIN — LEVOTHYROXINE SODIUM 50 MCG: 0.05 TABLET ORAL at 06:36

## 2023-02-25 RX ADMIN — SODIUM CHLORIDE, PRESERVATIVE FREE 10 ML: 5 INJECTION INTRAVENOUS at 14:59

## 2023-02-25 RX ADMIN — ACETAMINOPHEN 650 MG: 325 TABLET ORAL at 19:37

## 2023-02-25 NOTE — PROGRESS NOTES
[facility]efty [facility]  [address] [facility], [place], [address] [place] [address]  [phone]      Medical Progress Note      NAME: Veldon Favre   :  1935  MRM:  [id number]    Date/Time: 2023  1:22 PM           Assessment / Plan:     81 yo hx of HTN, Parkinson's, breast CA, hypothyroid, presented w/ AMS, LESLYE     1) Acute met encephalopathy: Resolved. Worsening cognitive decline -likely due to progressive dementia from Parkinson's. Head MRI neg for CVA but showed cerebral atrophy. EEG neg for seizures. Neuro did not recommend further testing or therapy. Patient will need to follow up with her neurologist, Dr. Arnell Schirmer. 2) Parkinson's w/ dementia: Cont Sinemet QID (dose was increased by neurology). Watch for agitation. 3) LESLYE: resolved with IVF. 4) HTN: Continue labetalol and valsartan. 5) Breast cancer: cont Letrozole     6) Hypothyroid: cont synthroid     7) Weakness: Cont PT/OT. Awaiting SNF. Care Plan discussed with: Patient and daughter at bedside. Prophylaxis:  Lovenox    Disposition:  SNF/LTC           ___________________________________________________    Attending Physician: Claudia Garcia MD        Subjective:     Chief Complaint: Patient awake and alert eating breakfast.  Daughter at bedside. Daughter reports patient slept better last night and feels better this morning. ROS:  Rest of review of systems negative otherwise. Objective:       Vitals:     Last 24hrs VS reviewed since prior progress note.  Most recent are:    Visit Vitals  /62 (BP 1 Location: Right upper arm, BP Patient Position: At rest)   Pulse 99   Temp 98.1 °F (36.7 °C)   Resp 18   Ht 5' 0.98\" (1.549 m)   Wt 53 kg (116 lb 14.4 oz)   SpO2 99%   BMI 22.10 kg/m²     SpO2 Readings from Last 6 Encounters:   23 99%   23 100%   10/07/22 100%   22 97%   22 100%   22 100%    O2 Flow Rate (L/min): 1 l/min     Intake/Output Summary (Last 24 hours) at 2/25/2023 1138  Last data filed at 2/24/2023 6460  Gross per 24 hour   Intake 60 ml   Output 200 ml   Net -140 ml            Exam:     Physical Exam:    Gen:  No acute distress but elderly and frail. HEENT:  NC/AT. Neck:  Supple. Resp:  Unlabored breathing. Clear breath sounds with good air entry. No wheezes rales or rhonchi. Card:  Regular rate and rhythm. Normal S1 and S2. Questionable systolic murmur. Abd:  Soft, non-tender, non-distended, normoactive bowel sounds. Ext: No clubbing, cyanosis or edema. Skin:  No rashes or ulcers, skin turgor is good. Neuro: Awake and alert, appropriately follows instructions.     Medications Reviewed: (see below)    Lab Data Reviewed: (see below)  ______________________________________________________________________    Medications:     Current Facility-Administered Medications   Medication Dose Route Frequency    labetaloL (NORMODYNE) tablet 200 mg  200 mg Oral Q12H    hydrALAZINE (APRESOLINE) 20 mg/mL injection 20 mg  20 mg IntraVENous Q4H PRN    enoxaparin (LOVENOX) injection 40 mg  40 mg SubCUTAneous DAILY    carbidopa-levodopa (SINEMET)  mg per tablet 1 Tablet  1 Tablet Oral QID    aspirin delayed-release tablet 81 mg  81 mg Oral DAILY    clonazePAM (KlonoPIN) tablet 0.5 mg  0.5 mg Oral BID PRN    letrozole (FEMARA) tablet 2.5 mg  2.5 mg Oral DAILY    levothyroxine (SYNTHROID) tablet 50 mcg  50 mcg Oral ACB    lidocaine 4 % patch 1 Patch  1 Patch TransDERmal Q24H    losartan (COZAAR) tablet 25 mg  25 mg Oral DAILY    sodium chloride (NS) flush 5-40 mL  5-40 mL IntraVENous Q8H    sodium chloride (NS) flush 5-40 mL  5-40 mL IntraVENous PRN    acetaminophen (TYLENOL) tablet 650 mg  650 mg Oral Q6H PRN    Or    acetaminophen (TYLENOL) suppository 650 mg  650 mg Rectal Q6H PRN    polyethylene glycol (MIRALAX) packet 17 g  17 g Oral DAILY PRN    ondansetron (ZOFRAN ODT) tablet 4 mg  4 mg Oral Q8H PRN    Or    ondansetron (ZOFRAN) injection 4 mg  4 mg IntraVENous Q6H PRN        Lab Review:     Recent Labs     02/24/23 0258   WBC 5.1   HGB 10.0*   HCT 32.0*          Recent Labs     02/24/23 0258      K 4.3   *   CO2 23   *   BUN 31*   CREA 0.90   CA 9.1   MG 2.5*   PHOS 2.8       No components found for: [place]

## 2023-02-25 NOTE — PROGRESS NOTES
Problem: Patient Education: Go to Patient Education Activity  Goal: Patient/Family Education  Outcome: Progressing Towards Goal     Problem: Pressure Injury - Risk of  Goal: *Prevention of pressure injury  Description: Document Jamel Scale and appropriate interventions in the flowsheet. Outcome: Progressing Towards Goal  Note: Pressure Injury Interventions:  Sensory Interventions: Keep linens dry and wrinkle-free, Assess changes in LOC, Check visual cues for pain (Patient and daughter refused repositioning to anything other than semi fowlers. Educated on risk of pressure injury. Patient will get up to chair later today but needs sleep now.)    Moisture Interventions: Absorbent underpads, Minimize layers    Activity Interventions: Increase time out of bed (Patient will get up to chair later today. Needs to sleep now.)    Mobility Interventions: Float heels    Nutrition Interventions: Offer support with meals,snacks and hydration    Friction and Shear Interventions: Feet elevated on foot rest                Problem: Patient Education: Go to Patient Education Activity  Goal: Patient/Family Education  Outcome: Progressing Towards Goal     Problem: Falls - Risk of  Goal: *Absence of Falls  Description: Document Thien Fall Risk and appropriate interventions in the flowsheet.   Outcome: Progressing Towards Goal  Note: Fall Risk Interventions:

## 2023-02-25 NOTE — PROGRESS NOTES
Problem: Patient Education: Go to Patient Education Activity  Goal: Patient/Family Education  Outcome: Progressing Towards Goal     Problem: Patient Education: Go to Patient Education Activity  Goal: Patient/Family Education  Outcome: Progressing Towards Goal     Problem: Pressure Injury - Risk of  Goal: *Prevention of pressure injury  Description: Document Jamel Scale and appropriate interventions in the flowsheet. Outcome: Progressing Towards Goal  Note: Pressure Injury Interventions:  Sensory Interventions: Check visual cues for pain, Assess changes in LOC    Moisture Interventions: Absorbent underpads    Activity Interventions: Increase time out of bed    Mobility Interventions: HOB 30 degrees or less    Nutrition Interventions: Document food/fluid/supplement intake    Friction and Shear Interventions: Feet elevated on foot rest                Problem: Falls - Risk of  Goal: *Absence of Falls  Description: Document Thien Fall Risk and appropriate interventions in the flowsheet.   Outcome: Progressing Towards Goal  Note: Fall Risk Interventions:

## 2023-02-26 PROCEDURE — 51798 US URINE CAPACITY MEASURE: CPT

## 2023-02-26 PROCEDURE — 74011250636 HC RX REV CODE- 250/636: Performed by: INTERNAL MEDICINE

## 2023-02-26 PROCEDURE — 74011250636 HC RX REV CODE- 250/636: Performed by: HOSPITALIST

## 2023-02-26 PROCEDURE — 74011250637 HC RX REV CODE- 250/637: Performed by: PSYCHIATRY & NEUROLOGY

## 2023-02-26 PROCEDURE — 96372 THER/PROPH/DIAG INJ SC/IM: CPT

## 2023-02-26 PROCEDURE — G0378 HOSPITAL OBSERVATION PER HR: HCPCS

## 2023-02-26 PROCEDURE — 74011250637 HC RX REV CODE- 250/637: Performed by: INTERNAL MEDICINE

## 2023-02-26 PROCEDURE — 74011000250 HC RX REV CODE- 250: Performed by: INTERNAL MEDICINE

## 2023-02-26 RX ORDER — SODIUM CHLORIDE 9 MG/ML
75 INJECTION, SOLUTION INTRAVENOUS CONTINUOUS
Status: DISCONTINUED | OUTPATIENT
Start: 2023-02-26 | End: 2023-02-27

## 2023-02-26 RX ADMIN — CARBIDOPA AND LEVODOPA 1 TABLET: 25; 250 TABLET ORAL at 08:47

## 2023-02-26 RX ADMIN — LABETALOL HYDROCHLORIDE 200 MG: 100 TABLET, FILM COATED ORAL at 08:47

## 2023-02-26 RX ADMIN — LETROZOLE 2.5 MG: 2.5 TABLET ORAL at 18:01

## 2023-02-26 RX ADMIN — CARBIDOPA AND LEVODOPA 1 TABLET: 25; 250 TABLET ORAL at 21:08

## 2023-02-26 RX ADMIN — ACETAMINOPHEN 650 MG: 325 TABLET ORAL at 21:08

## 2023-02-26 RX ADMIN — LOSARTAN POTASSIUM 25 MG: 50 TABLET, FILM COATED ORAL at 08:47

## 2023-02-26 RX ADMIN — CARBIDOPA AND LEVODOPA 1 TABLET: 25; 250 TABLET ORAL at 18:01

## 2023-02-26 RX ADMIN — ASPIRIN 81 MG: 81 TABLET, COATED ORAL at 08:47

## 2023-02-26 RX ADMIN — CARBIDOPA AND LEVODOPA 1 TABLET: 25; 250 TABLET ORAL at 14:03

## 2023-02-26 RX ADMIN — SODIUM CHLORIDE, PRESERVATIVE FREE 10 ML: 5 INJECTION INTRAVENOUS at 14:03

## 2023-02-26 RX ADMIN — SODIUM CHLORIDE, PRESERVATIVE FREE 10 ML: 5 INJECTION INTRAVENOUS at 07:37

## 2023-02-26 RX ADMIN — LEVOTHYROXINE SODIUM 50 MCG: 0.05 TABLET ORAL at 07:36

## 2023-02-26 RX ADMIN — CLONAZEPAM 0.5 MG: 0.5 TABLET ORAL at 21:08

## 2023-02-26 RX ADMIN — LABETALOL HYDROCHLORIDE 200 MG: 100 TABLET, FILM COATED ORAL at 21:08

## 2023-02-26 RX ADMIN — SODIUM CHLORIDE 75 ML/HR: 9 INJECTION, SOLUTION INTRAVENOUS at 18:38

## 2023-02-26 RX ADMIN — ENOXAPARIN SODIUM 40 MG: 100 INJECTION SUBCUTANEOUS at 08:47

## 2023-02-26 NOTE — PROGRESS NOTES
Lefty Robin [facility]  [address] [facility], [place], [address] [place] [address]  [phone]      Medical Progress Note      NAME: Chito Farley   :  1935  MRM:  [id number]    Date/Time: 2023  1:22 PM           Assessment / Plan:     81 yo hx of HTN, Parkinson's, breast CA, hypothyroid, presented w/ AMS, LESLYE     1) Acute met encephalopathy: Resolved. Worsening cognitive decline - likely due to progressive dementia from Parkinson's. Head MRI neg for CVA but showed cerebral atrophy. EEG neg for seizures. Neuro did not recommend further testing or therapy. Patient will need to follow up with her neurologist, Dr. Ailin Barraza. 2) Parkinson's w/ dementia: Cont Sinemet QID (dose was increased by neurology). Watch for agitation. Awaiting SNF discharge. 3) LESLYE: resolved with IVF. 4) HTN: Continue labetalol and valsartan. 5) Breast cancer: cont Letrozole     6) Hypothyroid: cont synthroid     7) Weakness: Cont PT/OT. Awaiting SNF. Care Plan discussed with: Patient and daughter at bedside. Prophylaxis:  Lovenox    Disposition:  SNF/LTC           ___________________________________________________    Attending Physician: Kayley Day MD        Subjective:     Chief Complaint: Patient was upset this morning that she thought people were out to get her. She had been resisting to take her levothyroxine pill despite her daughter being at bedside. I spent time talking and empathizing with patient and let her know that we are all here in her best interest.  I was able to convince her to take her medication; daughter was very pleased with this help. ROS:  Rest of review of systems negative otherwise. Objective:       Vitals:     Last 24hrs VS reviewed since prior progress note.  Most recent are:    Visit Vitals  /70 (BP 1 Location: Right upper arm, BP Patient Position: Semi fowlers)   Pulse 82   Temp 98.3 °F (36.8 °C)   Resp 17   Ht 5' 0.98\" (1.549 m)   Wt 53 kg (116 lb 14.4 oz)   SpO2 97%   BMI 22.10 kg/m²     SpO2 Readings from Last 6 Encounters:   02/26/23 97%   01/23/23 100%   10/07/22 100%   08/02/22 97%   07/11/22 100%   06/09/22 100%    O2 Flow Rate (L/min): 1 l/min     Intake/Output Summary (Last 24 hours) at 2/26/2023 1029  Last data filed at 2/26/2023 0014  Gross per 24 hour   Intake --   Output 400 ml   Net -400 ml            Exam:     Physical Exam:    Gen:  No acute distress but elderly and frail. HEENT:  NC/AT. Neck:  Supple. Resp:  Unlabored breathing. Clear breath sounds with good air entry. No wheezes rales or rhonchi. Card:  Regular rate and rhythm. Normal S1 and S2. Questionable systolic murmur. Abd:  Soft, non-tender, non-distended, normoactive bowel sounds. Ext: No clubbing, cyanosis or edema. Skin:  No rashes or ulcers, skin turgor is good. Neuro: Awake and alert, appropriately follows instructions.     Medications Reviewed: (see below)    Lab Data Reviewed: (see below)  ______________________________________________________________________    Medications:     Current Facility-Administered Medications   Medication Dose Route Frequency    labetaloL (NORMODYNE) tablet 200 mg  200 mg Oral Q12H    hydrALAZINE (APRESOLINE) 20 mg/mL injection 20 mg  20 mg IntraVENous Q4H PRN    enoxaparin (LOVENOX) injection 40 mg  40 mg SubCUTAneous DAILY    carbidopa-levodopa (SINEMET)  mg per tablet 1 Tablet  1 Tablet Oral QID    aspirin delayed-release tablet 81 mg  81 mg Oral DAILY    clonazePAM (KlonoPIN) tablet 0.5 mg  0.5 mg Oral BID PRN    letrozole [facility]) tablet 2.5 mg  2.5 mg Oral DAILY    levothyroxine (SYNTHROID) tablet 50 mcg  50 mcg Oral ACB    lidocaine 4 % patch 1 Patch  1 Patch TransDERmal Q24H    losartan (COZAAR) tablet 25 mg  25 mg Oral DAILY    sodium chloride (NS) flush 5-40 mL  5-40 mL IntraVENous Q8H    sodium chloride (NS) flush 5-40 mL  5-40 mL IntraVENous PRN    acetaminophen (TYLENOL) tablet 650 mg  650 mg Oral Q6H PRN Or    acetaminophen (TYLENOL) suppository 650 mg  650 mg Rectal Q6H PRN    polyethylene glycol (MIRALAX) packet 17 g  17 g Oral DAILY PRN    ondansetron (ZOFRAN ODT) tablet 4 mg  4 mg Oral Q8H PRN    Or    ondansetron (ZOFRAN) injection 4 mg  4 mg IntraVENous Q6H PRN        Lab Review:     Recent Labs     02/24/23  0258   WBC 5.1   HGB 10.0*   HCT 32.0*          Recent Labs     02/24/23  0258      K 4.3   *   CO2 23   *   BUN 31*   CREA 0.90   CA 9.1   MG 2.5*   PHOS 2.8       No components found for: [place]

## 2023-02-26 NOTE — PROGRESS NOTES
Problem: Patient Education: Go to Patient Education Activity  Goal: Patient/Family Education  Outcome: Progressing Towards Goal     Problem: Pressure Injury - Risk of  Goal: *Prevention of pressure injury  Description: Document Jamel Scale and appropriate interventions in the flowsheet. Outcome: Progressing Towards Goal  Note: Pressure Injury Interventions:  Sensory Interventions: Assess changes in LOC    Moisture Interventions: Absorbent underpads    Activity Interventions: Increase time out of bed, PT/OT evaluation    Mobility Interventions: HOB 30 degrees or less    Nutrition Interventions: Document food/fluid/supplement intake    Friction and Shear Interventions: Apply protective barrier, creams and emollients, HOB 30 degrees or less, Minimize layers                Problem: Falls - Risk of  Goal: *Absence of Falls  Description: Document Thien Fall Risk and appropriate interventions in the flowsheet.   Outcome: Progressing Towards Goal  Note: Fall Risk Interventions:

## 2023-02-26 NOTE — PROGRESS NOTES
Bedside shift change report given to Zhang Ruiz RN (oncoming nurse) by Elena Moore RN (offgoing nurse). Report included the following information SBAR, Kardex, Intake/Output, MAR, and Recent Results.

## 2023-02-26 NOTE — PROGRESS NOTES
Problem: Patient Education: Go to Patient Education Activity  Goal: Patient/Family Education  Outcome: Progressing Towards Goal     Problem: Patient Education: Go to Patient Education Activity  Goal: Patient/Family Education  Outcome: Progressing Towards Goal     Problem: Pressure Injury - Risk of  Goal: *Prevention of pressure injury  Description: Document Jamel Scale and appropriate interventions in the flowsheet. Outcome: Progressing Towards Goal  Note: Pressure Injury Interventions:  Sensory Interventions: Check visual cues for pain, Assess changes in LOC    Moisture Interventions: Absorbent underpads, Minimize layers    Activity Interventions: Increase time out of bed    Mobility Interventions: HOB 30 degrees or less    Nutrition Interventions: Document food/fluid/supplement intake    Friction and Shear Interventions: HOB 30 degrees or less                Problem: Falls - Risk of  Goal: *Absence of Falls  Description: Document Thien Fall Risk and appropriate interventions in the flowsheet.   Outcome: Progressing Towards Goal  Note: Fall Risk Interventions:

## 2023-02-27 PROCEDURE — 74011250637 HC RX REV CODE- 250/637: Performed by: INTERNAL MEDICINE

## 2023-02-27 PROCEDURE — 74011250637 HC RX REV CODE- 250/637: Performed by: NURSE PRACTITIONER

## 2023-02-27 PROCEDURE — 96372 THER/PROPH/DIAG INJ SC/IM: CPT

## 2023-02-27 PROCEDURE — 74011000250 HC RX REV CODE- 250: Performed by: INTERNAL MEDICINE

## 2023-02-27 PROCEDURE — G0378 HOSPITAL OBSERVATION PER HR: HCPCS

## 2023-02-27 PROCEDURE — 74011250637 HC RX REV CODE- 250/637: Performed by: PSYCHIATRY & NEUROLOGY

## 2023-02-27 PROCEDURE — 97116 GAIT TRAINING THERAPY: CPT

## 2023-02-27 PROCEDURE — 74011250636 HC RX REV CODE- 250/636: Performed by: INTERNAL MEDICINE

## 2023-02-27 PROCEDURE — 97530 THERAPEUTIC ACTIVITIES: CPT

## 2023-02-27 RX ORDER — DOCUSATE SODIUM 50 MG/5ML
100 LIQUID ORAL
Status: DISCONTINUED | OUTPATIENT
Start: 2023-02-27 | End: 2023-02-28 | Stop reason: HOSPADM

## 2023-02-27 RX ORDER — ALENDRONATE SODIUM 70 MG/1
70 TABLET ORAL
Status: DISCONTINUED | OUTPATIENT
Start: 2023-02-28 | End: 2023-02-28 | Stop reason: HOSPADM

## 2023-02-27 RX ORDER — DIPHENHYDRAMINE HCL 25 MG
25 CAPSULE ORAL
Status: CANCELLED | OUTPATIENT
Start: 2023-02-27

## 2023-02-27 RX ORDER — QUETIAPINE FUMARATE 25 MG/1
12.5 TABLET, FILM COATED ORAL
Status: DISCONTINUED | OUTPATIENT
Start: 2023-02-27 | End: 2023-02-28 | Stop reason: HOSPADM

## 2023-02-27 RX ORDER — ALENDRONATE SODIUM 70 MG/1
70 TABLET ORAL
Status: DISCONTINUED | OUTPATIENT
Start: 2023-02-27 | End: 2023-02-27

## 2023-02-27 RX ADMIN — LEVOTHYROXINE SODIUM 50 MCG: 0.05 TABLET ORAL at 07:16

## 2023-02-27 RX ADMIN — CARBIDOPA AND LEVODOPA 1 TABLET: 25; 250 TABLET ORAL at 17:15

## 2023-02-27 RX ADMIN — LOSARTAN POTASSIUM 25 MG: 50 TABLET, FILM COATED ORAL at 08:15

## 2023-02-27 RX ADMIN — DOCUSATE SODIUM 100 MG: 50 LIQUID ORAL at 07:34

## 2023-02-27 RX ADMIN — LETROZOLE 2.5 MG: 2.5 TABLET ORAL at 17:15

## 2023-02-27 RX ADMIN — LABETALOL HYDROCHLORIDE 200 MG: 100 TABLET, FILM COATED ORAL at 22:30

## 2023-02-27 RX ADMIN — SODIUM CHLORIDE, PRESERVATIVE FREE 10 ML: 5 INJECTION INTRAVENOUS at 22:31

## 2023-02-27 RX ADMIN — ASPIRIN 81 MG: 81 TABLET, COATED ORAL at 08:15

## 2023-02-27 RX ADMIN — SODIUM CHLORIDE, PRESERVATIVE FREE 10 ML: 5 INJECTION INTRAVENOUS at 01:12

## 2023-02-27 RX ADMIN — CARBIDOPA AND LEVODOPA 1 TABLET: 25; 250 TABLET ORAL at 22:30

## 2023-02-27 RX ADMIN — ENOXAPARIN SODIUM 40 MG: 100 INJECTION SUBCUTANEOUS at 08:15

## 2023-02-27 RX ADMIN — LABETALOL HYDROCHLORIDE 200 MG: 100 TABLET, FILM COATED ORAL at 08:15

## 2023-02-27 RX ADMIN — ACETAMINOPHEN 650 MG: 325 TABLET ORAL at 08:15

## 2023-02-27 RX ADMIN — SODIUM CHLORIDE, PRESERVATIVE FREE 10 ML: 5 INJECTION INTRAVENOUS at 13:22

## 2023-02-27 RX ADMIN — CARBIDOPA AND LEVODOPA 1 TABLET: 25; 250 TABLET ORAL at 08:15

## 2023-02-27 RX ADMIN — CARBIDOPA AND LEVODOPA 1 TABLET: 25; 250 TABLET ORAL at 13:22

## 2023-02-27 RX ADMIN — QUETIAPINE FUMARATE 12.5 MG: 25 TABLET ORAL at 22:29

## 2023-02-27 RX ADMIN — SODIUM CHLORIDE, PRESERVATIVE FREE 10 ML: 5 INJECTION INTRAVENOUS at 07:18

## 2023-02-27 NOTE — PROGRESS NOTES
[facility] [address]  [address] [facility], [place], [address] [place] [address]  [phone]      Medical Progress Note      NAME: Rosa Alvarado   :  1935  MRM:  [id number]    Date/Time: 2023  1:22 PM           Assessment / Plan:     81 yo hx of HTN, Parkinson's, breast CA, hypothyroid, presented w/ AMS, LESLYE     Acute met encephalopathy: Resolved. Worsening cognitive decline - likely due to progressive dementia from Parkinson's. Head MRI neg for CVA but showed cerebral atrophy. EEG neg for seizures. Neuro did not recommend further testing or therapy. Patient will need to follow up with her neurologist, Dr. Yvrose Castillo. Patient's daughter worried about worsening nighttime agitation and hallucinations  Start low dose seroquel for nightime Sx that have been ongoing since BEFORE sinemet was increased     Parkinson's w/ dementia:   Cont Sinemet QID (dose was increased by neurology). Watch for agitation. Awaiting SNF discharge. LESLYE: resolved with IVF. HTN: Continue labetalol and valsartan. Breast cancer: cont Letrozole     Hypothyroid: cont synthroid     Weakness: Cont PT/OT. Awaiting SNF. Care Plan discussed with: Patient and daughter at bedside. Prophylaxis:  Lovenox    Disposition:  SNF/LTC           ___________________________________________________    Attending Physician: Poncho Sanchez MD        Subjective:     Chief Complaint: Patient had a rough night. Hard to do day/night cycling as blind in room does not open. Discussed with charge. Nurse/ Messaged neurology about starting seroquel for nighttime agitation. ROS:  Rest of review of systems negative otherwise. Objective:       Vitals:     Last 24hrs VS reviewed since prior progress note.  Most recent are:    Visit Vitals  /62 (BP 1 Location: Right upper arm, BP Patient Position: At rest)   Pulse 89   Temp 97.9 °F (36.6 °C)   Resp 16   Ht 5' 0.98\" (1.549 m)   Wt 53 kg (116 lb 14.4 oz)   SpO2 99%   BMI 22.10 kg/m²     SpO2 Readings from Last 6 Encounters:   02/27/23 99%   01/23/23 100%   10/07/22 100%   08/02/22 97%   07/11/22 100%   06/09/22 100%    O2 Flow Rate (L/min): 1 l/min   No intake or output data in the 24 hours ending 02/27/23 1442         Exam:     Physical Exam:    Gen:  No acute distress but elderly and frail. HEENT:  NC/AT. Neck:  Supple. Resp:  Unlabored breathing. Clear breath sounds with good air entry. No wheezes rales or rhonchi. Card:  Regular rate and rhythm. Normal S1 and S2. Questionable systolic murmur. Abd:  Soft, non-tender, non-distended, normoactive bowel sounds. Ext: No clubbing, cyanosis or edema. Skin:  No rashes or ulcers, skin turgor is good. Neuro: Awake and alert, appropriately follows instructions.     Medications Reviewed: (see below)    Lab Data Reviewed: (see below)  ______________________________________________________________________    Medications:     Current Facility-Administered Medications   Medication Dose Route Frequency    docusate (COLACE) 50 mg/5 mL oral liquid 100 mg  100 mg Oral BID PRN    [START ON 2/28/2023] alendronate (FOSAMAX) tablet 70 mg  70 mg Oral Q7D    QUEtiapine (SEROquel) tablet 12.5 mg  12.5 mg Oral QHS    labetaloL (NORMODYNE) tablet 200 mg  200 mg Oral Q12H    hydrALAZINE (APRESOLINE) 20 mg/mL injection 20 mg  20 mg IntraVENous Q4H PRN    enoxaparin (LOVENOX) injection 40 mg  40 mg SubCUTAneous DAILY    carbidopa-levodopa (SINEMET)  mg per tablet 1 Tablet  1 Tablet Oral QID    aspirin delayed-release tablet 81 mg  81 mg Oral DAILY    clonazePAM (KlonoPIN) tablet 0.5 mg  0.5 mg Oral BID PRN    letrozole On license of [facility]) tablet 2.5 mg  2.5 mg Oral DAILY    levothyroxine (SYNTHROID) tablet 50 mcg  50 mcg Oral ACB    lidocaine 4 % patch 1 Patch  1 Patch TransDERmal Q24H    losartan (COZAAR) tablet 25 mg  25 mg Oral DAILY    sodium chloride (NS) flush 5-40 mL  5-40 mL IntraVENous Q8H    sodium chloride (NS) flush 5-40 mL 5-40 mL IntraVENous PRN    acetaminophen (TYLENOL) tablet 650 mg  650 mg Oral Q6H PRN    Or    acetaminophen (TYLENOL) suppository 650 mg  650 mg Rectal Q6H PRN    polyethylene glycol (MIRALAX) packet 17 g  17 g Oral DAILY PRN    ondansetron (ZOFRAN ODT) tablet 4 mg  4 mg Oral Q8H PRN    Or    ondansetron (ZOFRAN) injection 4 mg  4 mg IntraVENous Q6H PRN        Lab Review:     No results for input(s): WBC, HGB, HCT, PLT, HGBEXT, HCTEXT, PLTEXT, HGBEXT, HCTEXT, PLTEXT in the last 72 hours. No results for input(s): NA, K, CL, CO2, GLU, BUN, CREA, CA, MG, PHOS, ALB, TBIL, ALT, INR, INREXT, INREXT in the last 72 hours.     No lab exists for component: SGOT    No components found for: [place]

## 2023-02-27 NOTE — PROGRESS NOTES
2/27/2023 4:06 PM Updates have been faxed to [address] [place] [address] and Rehab. CM confirmed with Shimon Bashir at [address] remains pending. CM called and updated pt's daughter. CM called to [facility] to check on pt's auth status. Per [place] with [facility], they do not have record of pt's SNF auth. CM requested auth be started for [address] [place] [address] and Rehab. CM confirmed Yoselin NPI and MD's name. Auth number is [id number]. [place] provided fax number of [phone] for CM to send clinicals to. CM faxed pt's H&P, PT/OT evals, most recent progress MD note, updated PT/OT notes and Neurology notes to provided fax number. Fax confirmation received. CM updated pt's daughter. 2/27/2023 12:11 PM CM spoke with Rosina Contreras at [address] [place] [address] and Rehab earlier this AM regarding pt's auth status and admission. Rosina Contreras to contact CM back on update is available. CM called and updated pt's daughter. CM will follow up. OLIVE Rendon     Care Management Progress Note      ICD-10-CM ICD-9-CM    1. LESLYE (acute kidney injury) ([facility] [address]  N17.9 584.9       2. Confusion and disorientation  R41.0 780.97       3. Impaired mobility and ADLs  Z74.09 V49.89     Z78.9        4.  Acute metabolic encephalopathy  E29.70 348.31 EEG      EEG          RUR:  n/a   Risk Level: [x]Low []Moderate []High  Value-based purchasing: [] Yes [x] No  Bundle patient: [] Yes [x] No   Specify:     Transition of care plan:  Medical management continues  PT, OT following, IPR denied   Plan for SNF:  [facility]- accepted   [facility]- accepted  [facility]- accepted and auth pending   Outpatient follow up at DC from rehab, PCP, neurology  [facility] vs [facility]  CM will continue to follow and assist w/ DC needs

## 2023-02-27 NOTE — PROGRESS NOTES
Problem: Patient Education: Go to Patient Education Activity  Goal: Patient/Family Education  Outcome: Progressing Towards Goal     Problem: Patient Education: Go to Patient Education Activity  Goal: Patient/Family Education  Outcome: Progressing Towards Goal     Problem: Pressure Injury - Risk of  Goal: *Prevention of pressure injury  Description: Document Jamel Scale and appropriate interventions in the flowsheet.   Outcome: Progressing Towards Goal  Note: Pressure Injury Interventions:  Sensory Interventions: Assess changes in LOC, Assess need for specialty bed    Moisture Interventions: Absorbent underpads, Minimize layers    Activity Interventions: Increase time out of bed, Pressure redistribution bed/mattress(bed type), PT/OT evaluation    Mobility Interventions: HOB 30 degrees or less, PT/OT evaluation, Suspension boots    Nutrition Interventions: Document food/fluid/supplement intake, Discuss nutritional consult with provider, Offer support with meals,snacks and hydration    Friction and Shear Interventions: Foam dressings/transparent film/skin sealants, Lift sheet

## 2023-02-27 NOTE — PROGRESS NOTES
Problem: Mobility Impaired (Adult and Pediatric)  Goal: *Acute Goals and Plan of Care (Insert Text)  Description: Physical Therapy Goals  Revised 2/24/2023  1. Patient will move from supine to sit and sit to supine  in bed with minimal assistance/contact guard assist within 7 day(s). 2.  Patient will transfer from bed to chair and chair to bed with minimal assistance/contact guard assist using the least restrictive device within 7 day(s). 3.  Patient will perform sit to stand with minimal assistance/contact guard assist within 7 day(s). 4.  Patient will ambulate with minimal assistance/contact guard assist for 150 feet with the least restrictive device within 7 day(s). FUNCTIONAL STATUS PRIOR TO ADMISSION: Prior to 1 week ago, pt was amb with and without a RW and performing ADLs with supervision. Pt has aides from 10a-6p daily per daughter. Pt's daughter is in town from [place] [address] to assist pt for time being. Pt's  has advanced dementia and resides in a [facility]. HOME SUPPORT: The patient lived alone with aides to provide assistance    Physical Therapy Goals  Initiated 2/17/2023  1. Patient will move from supine to sit and sit to supine , scoot up and down, and roll side to side in bed with supervision/set-up within 7 day(s). 2.  Patient will transfer from bed to chair and chair to bed with supervision/set-up using the least restrictive device within 7 day(s). 3.  Patient will perform sit to stand with supervision/set-up within 7 day(s). 4.  Patient will ambulate with supervision/set-up for 50 feet with the least restrictive device within 7 day(s). 5.  Patient will ascend/descend 5 stairs with 1-2 handrail(s) with minimal assistance/contact guard assist within 7 day(s).     Outcome: Progressing Towards Goal   PHYSICAL THERAPY TREATMENT  Patient: Jorje Keys (76 y.o. female)  Date: 2/27/2023  Diagnosis: Acute metabolic encephalopathy [W23.97] <principal problem not specified> Precautions: Fall  Chart, physical therapy assessment, plan of care and goals were reviewed. ASSESSMENT  Patient continues with skilled PT services and is progressing towards goals. Pt received supine in bed. Affect improved with facial expressions today vs flat. Dtr present. Pt rolling with Min A and same for supine to sit with additional time. Difficulty with scooting to EOB. Attempted wt shifting, yet still needing Min A.  Good sitting balance. Sit to stand with Min A with RW. Additional time and manual A to lean forward and to find COG over feet. Gait of 80' is slow with path deviation to L. Posture likewise with leaning to L. Turning is compromised needing manual assist with RW to guide. Overall has improved from last tx, yet continues with compromised gait, balance and motor control due hx of Parkinson's. Support rehab upon discharge. PTA  pt was walking without a.d. 2wks ago per dtr. Current Level of Function Impacting Discharge (mobility/balance): Min A with additional time/fair    Other factors to consider for discharge: per above          PLAN :  Patient continues to benefit from skilled intervention to address the above impairments. Continue treatment per established plan of care. to address goals. Recommendation for discharge: (in order for the patient to meet his/her long term goals)  Therapy up to 5 days/week in SNF setting    This discharge recommendation:  Has been made in collaboration with the attending provider and/or case management    IF patient discharges home will need the following DME: to be determined (TBD)       SUBJECTIVE:   Patient stated I am doing better today.     OBJECTIVE DATA SUMMARY:   Critical Behavior:  Neurologic State: Alert  Orientation Level: Oriented to person, Oriented to place, Oriented to time  Cognition: Follows commands  Safety/Judgement: Decreased awareness of environment  Functional Mobility Training:  Bed Mobility:  Rolling: Minimum assistance; Additional time  Supine to Sit: Minimum assistance; Additional time  Sit to Supine: Additional time  Scooting: Moderate assistance;Minimum assistance        Transfers:  Sit to Stand: Minimum assistance;Contact guard assistance  Stand to Sit: Contact guard assistance                             Balance:  Sitting: High guard  Sitting - Static: Good (unsupported)  Sitting - Dynamic: Fair (occasional)  Standing: Impaired  Standing - Static: Fair  Standing - Dynamic : Fair  Ambulation/Gait Training:  Distance (ft): 90 Feet (ft)  Assistive Device: Walker, rolling;Gait belt  Ambulation - Level of Assistance: Minimal assistance        Gait Abnormalities: Decreased step clearance; Path deviations (listin to L)        Base of Support: Narrowed     Speed/Aparna: Pace decreased (<100 feet/min)  Step Length: Right shortened;Left shortened                        Therapeutic Exercises: Ankle pumps and heel slides AAROM supine  Pain Rating:  none    Activity Tolerance:   Good and Fair    After treatment patient left in no apparent distress:   Sitting in chair,  Call bell within reach, and Bed / chair alarm activated; caregiver present    COMMUNICATION/COLLABORATION:   The patients plan of care was discussed with: Occupational therapist, Registered nurse, and Case management.      Pati Barth, PT   Time Calculation: 19 mins

## 2023-02-27 NOTE — PROGRESS NOTES
Bedside shift change report given to Tomas Villeda RN (oncoming nurse) by Lety Lane RN (offgoing nurse). Report included the following information SBAR, Kardex, Intake/Output, MAR, and Recent Results.

## 2023-02-27 NOTE — PROGRESS NOTES
Problem: Self Care Deficits Care Plan (Adult)  Goal: *Acute Goals and Plan of Care (Insert Text)  Description: FUNCTIONAL STATUS PRIOR TO ADMISSION: Prior to 1 week ago, pt was amb with and without a RW and performing ADLs with supervision. Pt has aides from 10a-6p daily per daughter. Pt's daughter is in town from [place] [address] to assist pt for time being. Pt's  has advanced dementia and resides in a [facility]. HOME SUPPORT: The patient lived alone with aides to provide assistance. Occupational Therapy Goals  Initiated 2/17/2023 reviewed at weekly re-assessment 2/27/23 goals remain appropriate  1. Patient will perform grooming standing at sink with minimal assistance/contact guard assist within 7 day(s). 2.  Patient will perform lower body dressing with minimal assistance/contact guard assist within 7 day(s). 3.  Patient will perform upper body dressing with minimal assistance/contact guard assist within 7 day(s). 4.  Patient will perform toilet transfers with contact guard assist within 7 day(s). 5.  Patient will perform all aspects of toileting with minimal assistance/contact guard assist within 7 day(s). 6.  Patient will participate in upper extremity therapeutic exercise/activities with supervision/set-up for 10 minutes within 7 day(s). 7.  Patient will utilize energy conservation techniques during functional activities with verbal and visual cues within 7 day(s). Outcome: Progressing Towards Goal   OCCUPATIONAL THERAPY TREATMENT/WEEKLY RE-ASSESSMENT  Patient: Hina Kan (89 y.o. female)  Date: 2/27/2023  Diagnosis: Acute metabolic encephalopathy [E78.21] <principal problem not specified>      Precautions: Fall  Chart, occupational therapy assessment, plan of care, and goals were reviewed. ASSESSMENT  Patient continues with skilled OT services and is progressing towards goals. Patient received supine agreeable to  OT tx today with supportive caregiver at bedside.  Patient continues to be limited by decreased insight into deficits, general weakness, and decreased tolerance for activity. Participated in AROM HEP at beginning of session with Min VC for carryover. Patient with improved function compared to end of last week though still requiring increased VC/TC for RW management, and increased need for assist with FM tasks (I.e. opening containers/cutting food). Continue to recommend subacute rehab placement on DC to maximize functional independence and safety at DC. Current Level of Function Impacting Discharge (ADLs): Min A feeding, transfers, bed mobility    Other factors to consider for discharge: PLOF (Independent for ambulation), supervision for most ADLs         PLAN :  Goals have been updated based on progression since last assessment. Patient continues to benefit from skilled intervention to address the above impairments. Continue to follow patient 5 times a week to address goals. Recommend with staff: OOB to recliner as tolerated. OOB to BSC/bathroom with assist x1 and RW for safety     Recommend next OT session: Progress goals    Recommendation for discharge: (in order for the patient to meet his/her long term goals)  Therapy up to 5 days/week in SNF setting    This discharge recommendation:  Has been made in collaboration with the attending provider and/or case management    IF patient discharges home will need the following DME: TBD pending acute progress       SUBJECTIVE:   Patient stated I think today is a pretty good day for me.     OBJECTIVE DATA SUMMARY:   Cognitive/Behavioral Status:  Neurologic State: Alert  Orientation Level: Oriented to person;Oriented to place  Cognition: Follows commands  Perception: Appears intact  Perseveration: No perseveration noted  Safety/Judgement: Decreased awareness of environment;Decreased awareness of need for assistance;Decreased awareness of need for safety;Decreased insight into deficits    Functional Mobility and Transfers for ADLs:  Bed Mobility:  Rolling: Minimum assistance; Additional time  Supine to Sit: Minimum assistance; Additional time  Sit to Supine: Additional time  Scooting: Moderate assistance;Minimum assistance    Transfers:  Sit to Stand: Minimum assistance;Contact guard assistance          Balance:  Sitting: High guard  Sitting - Static: Good (unsupported)  Sitting - Dynamic: Fair (occasional)  Standing: Impaired  Standing - Static: Fair  Standing - Dynamic : Fair    ADL Intervention:  Feeding  Feeding Assistance: Set-up  Container Management: Minimum assistance; Compensatory technique training  Utensil Management: Set-up  Food to Mouth: Set-up  Drink to Mouth: Set-up    Cognitive Retraining  Safety/Judgement: Decreased awareness of environment;Decreased awareness of need for assistance;Decreased awareness of need for safety;Decreased insight into deficits      Pain:  No complaints    Activity Tolerance:   Fair, SpO2 stable on RA, requires rest breaks, and observed SOB with activity    After treatment patient left in no apparent distress:   Sitting in chair, Call bell within reach, Bed / chair alarm activated, and Caregiver / family present    COMMUNICATION/COLLABORATION:   The patients plan of care was discussed with: Physical therapist, Registered nurse, and Case management.      Eleanor Steiner OT  Time Calculation: 19 mins

## 2023-02-28 VITALS
SYSTOLIC BLOOD PRESSURE: 148 MMHG | OXYGEN SATURATION: 97 % | HEIGHT: 61 IN | TEMPERATURE: 98.6 F | HEART RATE: 92 BPM | RESPIRATION RATE: 17 BRPM | WEIGHT: 116.9 LBS | DIASTOLIC BLOOD PRESSURE: 75 MMHG | BODY MASS INDEX: 22.07 KG/M2

## 2023-02-28 LAB
SARS-COV-2 RDRP RESP QL NAA+PROBE: NOT DETECTED
SOURCE, COVRS: NORMAL

## 2023-02-28 PROCEDURE — 74011000250 HC RX REV CODE- 250: Performed by: INTERNAL MEDICINE

## 2023-02-28 PROCEDURE — 74011250636 HC RX REV CODE- 250/636: Performed by: INTERNAL MEDICINE

## 2023-02-28 PROCEDURE — 87635 SARS-COV-2 COVID-19 AMP PRB: CPT

## 2023-02-28 PROCEDURE — 96372 THER/PROPH/DIAG INJ SC/IM: CPT

## 2023-02-28 PROCEDURE — 74011250637 HC RX REV CODE- 250/637: Performed by: INTERNAL MEDICINE

## 2023-02-28 PROCEDURE — 74011250637 HC RX REV CODE- 250/637: Performed by: PSYCHIATRY & NEUROLOGY

## 2023-02-28 PROCEDURE — G0378 HOSPITAL OBSERVATION PER HR: HCPCS

## 2023-02-28 RX ORDER — LABETALOL 200 MG/1
200 TABLET, FILM COATED ORAL 2 TIMES DAILY
Qty: 60 TABLET | Refills: 0 | Status: SHIPPED
Start: 2023-02-28 | End: 2023-03-30

## 2023-02-28 RX ORDER — QUETIAPINE FUMARATE 25 MG/1
12.5 TABLET, FILM COATED ORAL
Qty: 15 TABLET | Refills: 0 | Status: SHIPPED
Start: 2023-02-28 | End: 2023-03-30

## 2023-02-28 RX ADMIN — ENOXAPARIN SODIUM 40 MG: 100 INJECTION SUBCUTANEOUS at 09:58

## 2023-02-28 RX ADMIN — LABETALOL HYDROCHLORIDE 200 MG: 100 TABLET, FILM COATED ORAL at 09:58

## 2023-02-28 RX ADMIN — LOSARTAN POTASSIUM 25 MG: 50 TABLET, FILM COATED ORAL at 09:58

## 2023-02-28 RX ADMIN — CLONAZEPAM 0.5 MG: 0.5 TABLET ORAL at 02:00

## 2023-02-28 RX ADMIN — LEVOTHYROXINE SODIUM 50 MCG: 0.05 TABLET ORAL at 07:22

## 2023-02-28 RX ADMIN — ALENDRONATE SODIUM 70 MG: 70 TABLET ORAL at 06:17

## 2023-02-28 RX ADMIN — ASPIRIN 81 MG: 81 TABLET, COATED ORAL at 09:58

## 2023-02-28 RX ADMIN — CARBIDOPA AND LEVODOPA 1 TABLET: 25; 250 TABLET ORAL at 09:58

## 2023-02-28 RX ADMIN — SODIUM CHLORIDE, PRESERVATIVE FREE 10 ML: 5 INJECTION INTRAVENOUS at 06:21

## 2023-02-28 NOTE — PROGRESS NOTES
2/28/2023 11:40 AM   Transition of Care Plan to SNF/Rehab    SNF/Rehab Transition:  Patient has been accepted to [address] [place] [address] and [facility] and meets criteria for admission. Patient will transported by her daughter and expected to leave at [facility] received call from [facility] with Tanesha Gray this AM approving pt's SNF stay for [place] with admission for today. Auth # [id number]  Start date of today through 3/2 with updates due on 3/3  Paid at subacute level 2  Rafaela Biswas is the  for [employer] number [phone]  Fax [phone]  CM provided this information to Peri Gail in admissions at [address] [place] [address] and [facility]. Communication to Patient/Family:  Met with patient and pt's daughter and they are agreeable to the transition plan. Communication to SNF/Rehab:  Bedside RN, Andee Phoenix, has been notified to update the transition plan to the facility and call report [phone]  Discharge information has been updated on the AVS.     Discharge instructions to be fax'd to facility at [phone]. Nursing Please include all hard scripts for controlled substances, med rec and dc summary, and AVS in packet.      Reviewed and confirmed with facility, [address] [place] [address] and [facility], can manage the patient care needs for the following:     Radha Tomlin with (X) only those applicable:    Medication:  [x]  Medications will be available at the facility  []  IV Antibiotics  []  Controlled Substance - hard copy to be sent with patient   []  Weekly Labs   Documents:  [] Hard RX  [x] MAR  [] Kardex  [x] AVS  []Transfer Summary  [x]Discharge   Equipment:  []  CPAP/BiPAP  []  Wound Vacuum  []  Holt or Urinary Device  []  PICC/Central Line  []  Nebulizer  []  Ventilator   Treatment:  []Isolation (for MRSA, VRE, etc.)  []Surgical Drain Management  []Tracheostomy Care  []Dressing Changes  []Dialysis with transportation and chair time  []PEG Care  []Oxygen  []Daily Weights for Heart Failure   Dietary:  []Any diet limitations  []Tube Feedings   []Total Parenteral Management (TPN)   Eligible for Medicaid Long Term Services and Supports  Yes:  [] Eligible for medical assistance or will become eligible within 180 days and UAI completed. [] Provider/Patient and/or support system has requested screening. [] UAI copy provided to patient or responsible party  [] UAI unavailable at discharge will send once processed to SNF provider. [] UAI unavailable at discharged mailed to patient  No:   [x] Private pay and is not financially eligible for Medicaid within the next 180 days. [] Reside out-of-state. [] A residents of a state owned/operated facility that is licensed  by [place] [address] [facility] or [facility]  [] Enrollment in [facility] hospice services  [] [address]  [] Patient /Family declines to have screening completed or provide financial information for screening     Financial Resources:  Medicaid    [] Initiated and application pending   [] Full coverage     Advanced Care Plan:  []Surrogate Decision Maker of Care  []POA  [x]Communicated Code Status Full   Other    Care Management Interventions  PCP Verified by CM:  Yes (Dr. Dex Yarbrough)  Support Systems: Spouse/Significant Other, Child(baldev)  The Plan for Transition of Care is Related to the Following Treatment Goals : acute metabolic encpehopathy  Discharge Location  Patient Expects to be Discharged to[de-identified] Skilled nursing facility

## 2023-02-28 NOTE — PROGRESS NOTES
Problem: Patient Education: Go to Patient Education Activity  Goal: Patient/Family Education  Outcome: Progressing Towards Goal     Problem: Pressure Injury - Risk of  Goal: *Prevention of pressure injury  Description: Document Jamel Scale and appropriate interventions in the flowsheet. Outcome: Progressing Towards Goal  Note: Pressure Injury Interventions:  Sensory Interventions: Assess changes in LOC, Assess need for specialty bed    Moisture Interventions: Absorbent underpads, Minimize layers    Activity Interventions: Increase time out of bed, Pressure redistribution bed/mattress(bed type), PT/OT evaluation    Mobility Interventions: HOB 30 degrees or less, PT/OT evaluation, Suspension boots    Nutrition Interventions: Document food/fluid/supplement intake, Discuss nutritional consult with provider, Offer support with meals,snacks and hydration    Friction and Shear Interventions: Foam dressings/transparent film/skin sealants, Lift sheet                Problem: Falls - Risk of  Goal: *Absence of Falls  Description: Document Thien Fall Risk and appropriate interventions in the flowsheet.   Note: Fall Risk Interventions:

## 2023-02-28 NOTE — PROGRESS NOTES
I have reviewed discharge instructions with the guardian. The guardian verbalized understanding. The opportunity for questions was presented.

## 2023-02-28 NOTE — PROGRESS NOTES
[address] [place] [address], gave report over the telephone to [facility]. The opportunity for questions was presented.

## 2023-02-28 NOTE — DISCHARGE SUMMARY
Discharge Summary       PATIENT ID: Everton Chew  MRN: [id number]   YOB: 1935    DATE OF ADMISSION: 2/16/2023  3:41 PM    DATE OF DISCHARGE: 2/28/2023 10:31 AM  PRIMARY CARE PROVIDER: Cristal Barakat MD     ATTENDING PHYSICIAN: Vero Wheeler MD  DISCHARGING PROVIDER: Vero Wheeler MD    To contact this individual call [phone] and ask the  to page. If unavailable ask to be transferred the Adult Hospitalist Department. CONSULTATIONS: IP CONSULT TO NEUROLOGY    PROCEDURES/SURGERIES: * No surgery found *    ADMITTING DIAGNOSES & HOSPITAL COURSE:   81 yo hx of HTN, Parkinson's, breast CA, hypothyroid, presented w/ AMS, LESLYE    Patient was evaluated by neurology and will be discharged to rehab on sinemt 3 times a day. She should follow up with Dr. Mariana Landaverde after DC. DISCHARGE DIAGNOSES / PLAN:          Acute met encephalopathy: Resolved. Worsening cognitive decline - likely due to progressive dementia from Parkinson's. Head MRI neg for CVA but showed cerebral atrophy. EEG neg for seizures. Neuro did not recommend further testing or therapy. Patient will need to follow up with her neurologist, Dr. Mariana Landaverde. Patient's daughter worried about worsening nighttime agitation and hallucinations  Started low dose seroquel for nightime Sx that have been ongoing since BEFORE      Parkinson's w/ dementia:   Cont Sinemet TID, decreased from QID due to agitation overnight  [facility]    LESLYE: resolved with IVF. HTN: Continue labetalol and valsartan. Breast cancer: cont Letrozole     Hypothyroid: cont synthroid     Weakness: Cont PT/OT. Awaiting SNF.                                                                               PENDING TEST RESULTS:   At the time of discharge the following test results are still pending: None    FOLLOW UP APPOINTMENTS:    Follow-up Information       Follow up With Specialties Details Why Contact Info    [facility] [address] [address], Dialysis, Inpatient Rehabilitation Facility Go to Inpatient Rehab [address]    Crystal Ding MD Internal Medicine Physician   Asim Moreno 1996 [address] [facility] Sherie Castleman., MD Neurology Follow up in 1 month(s)  [address] [facility] [phone]               ADDITIONAL CARE RECOMMENDATIONS: Follow up with Dr. Magdalena Johnston in neurology    DIET: Regular Diet ADULT DIET Regular  ADULT ORAL NUTRITION SUPPLEMENT Lunch; Frozen Supplement  ADULT ORAL NUTRITION SUPPLEMENT Breakfast, Dinner; Standard High Calorie/High Protein    ACTIVITY: Activity as tolerated    WOUND CARE: None      DISCHARGE MEDICATIONS:  Current Discharge Medication List        START taking these medications    Details   labetaloL (NORMODYNE) 200 mg tablet Take 1 Tablet by mouth two (2) times a day for 30 days. Qty: 60 Tablet, Refills: 0  Start date: 2/28/2023, End date: 3/30/2023      QUEtiapine (SEROquel) 25 mg tablet Take 0.5 Tablets by mouth nightly for 30 days. Qty: 15 Tablet, Refills: 0  Start date: 2/28/2023, End date: 3/30/2023           CONTINUE these medications which have NOT CHANGED    Details   AZO CRANBERRY 594-55-70 mg-mg-million tab Take 1 Tablet by mouth daily. carbidopa-levodopa (SINEMET)  mg per tablet Take 1 Tablet by mouth three (3) times daily. aspirin delayed-release 81 mg tablet Take 81 mg by mouth daily. letrozole (FEMARA) 2.5 mg tablet Take 2.5 mg by mouth every evening.      multivit-min/iron/folic/lutein (CENTRUM SILVER WOMEN PO) Take 1 Dose by mouth daily. The patient takes the gummies  One dose = 2      elderberry fruit (ELDERBERRY PO) Take 1 Tablet by mouth two (2) times a day. ZINC PICOLINATE PO Take 1 Dose by mouth daily. alendronate (FOSAMAX) 70 mg tablet Take 70 mg by mouth every Monday.       clonazePAM (KlonoPIN) 0.5 mg tablet Take  by mouth two (2) times daily as needed for Anxiety. losartan (COZAAR) 25 mg tablet Take 25 mg by mouth daily. levothyroxine (SYNTHROID) 50 mcg tablet Take 50 mcg by mouth Daily (before breakfast). ascorbic acid (VITAMIN C) 250 mg chewable tablet Take 250 mg by mouth daily. STOP taking these medications       nitrofurantoin (MACRODANTIN) 100 mg capsule Comments:   Reason for Stopping:         metoprolol succinate (TOPROL-XL) 50 mg XL tablet Comments:   Reason for Stopping:         lidocaine (Lidoderm) 5 % Comments:   Reason for Stopping:                 NOTIFY YOUR PHYSICIAN FOR ANY OF THE FOLLOWING:   Fever over 101 degrees for 24 hours. Chest pain, shortness of breath, fever, chills, nausea, vomiting, diarrhea, change in mentation, falling, weakness, bleeding. Severe pain or pain not relieved by medications. Or, any other signs or symptoms that you may have questions about.     DISPOSITION:   [address] [place] [address] With:   OT  PT  Jatin Angel  RN      x Long term SNF/Inpatient Rehab    Independent/assisted living    Hospice    Other:       PATIENT CONDITION AT DISCHARGE:     Functional status   x Poor     Deconditioned     Independent      Cognition     Lucid     Forgetful    x Dementia      Catheters/lines (plus indication)    Holt     PICC     PEG    x None      Code status Full Code       PHYSICAL EXAMINATION AT DISCHARGE:    General : alert x 3, awake, no acute distress,   HEENT: PEERL, EOMI, moist mucus membrane, TM clear  Neck: supple, no JVD, no meningeal signs  Chest: Clear to auscultation bilaterally   CVS: S1 S2 heard, Capillary refill less than 2 seconds  Abd: soft/ Non tender, non distended, BS physiological,   Ext: no clubbing, no cyanosis, no edema, brisk 2+ DP pulses  Neuro/Psych: pleasant mood and affect, CN 2-12 grossly intact, sensory grossly within normal limit, Strength 5/5 in all extremities, DTR 1+ x 4  Skin: warm     CHRONIC MEDICAL DIAGNOSES:  Problem List as of 2/28/2023 Date Reviewed: 10/7/2022            Codes Class Noted - Resolved    Acute metabolic encephalopathy RNN-25-DOMENICO: G93.41  ICD-9-CM: 348.31  2/17/2023 - Present        On anticoagulant therapy ICD-10-CM: Z79.01  ICD-9-CM: V58.61  5/2/2022 - Present    Overview Signed 5/2/2022  9:16 AM by Annalisa Sexton NP     11/5/21: on anticoagulation for DVT (Eliquis and ASA). LESLYE (acute kidney injury) ([facility] [place] [address] ICD-10-CM: N17.9  ICD-9-CM: 584.9  2/20/2021 - Present        Postmenopausal urethral atrophy ICD-10-CM: N95.8, N36.8  ICD-9-CM: 627.8, 599.84  2/11/2021 - Present    Overview Addendum 9/29/2022 12:14 PM by Annalisa Sexton NP     12/10/2020: Urethra: Prolapse (some prolapse and flat caruncle) present. No urethral pain, urethral swelling or urethral lesion. 6/9/22: urethra appears atrophic at this point. Likely contributes to ISD and incontinence. Continue with Kegel's and pad usage. Stress incontinence ICD-10-CM: N39.3  ICD-9-CM: [id number]  12/10/2020 - Present    Overview Addendum 9/29/2022 12:16 PM by Annalisa Sexton NP     She has mild leak with strain without mobility. She was advised on kegels on 12/10/21.    4/9/21: More likely ISD than hypermobility. 6/9/22: urge related more so than stress. No obvious leakage on stress on examination today. Intrinsic sphincter deficiency (ISD) ICD-10-CM: N36.42  ICD-9-CM: 599.82  12/10/2020 - Present    Overview Addendum 11/3/2021 11:14 AM by Annalisa Sexton NP     12/10/2020: Mild YEIMI without hypermobility. She is continued to be encouraged on kegel exercises. 4/9/21: ISD and stress incontinence. Mild. She has continued to be advised on Kegel exercises. Incontinence may worsen with correcting vaginal prolapse. She may need urethral bulking if not improved.              Urge incontinence ICD-10-CM: N39.41  ICD-9-CM: 788.31  10/30/2020 - Present    Overview Addendum 10/8/2022  1:20 PM by Taylor Nunez, Mary Rowe MD     She has incontinence, related to urge more than stress. More functional than overactivity. She can have hesitancy and be unable to void. I suspect this is mainly due to dehydration. She wears a Depends in case she cannot get to the toilet in time. It can be every other week when she has an accident. 12/10/2020: Ditropan XL started. She could not tolerate the Ditropan secondary to \"jitters. \"       6/9/22: low normal capacity. Leakage managed by bedside commode and fluid management. Hesitancy of micturition ICD-10-CM: R39.11  ICD-9-CM: 788.64  10/30/2020 - Present    Overview Addendum 6/8/2022 11:33 AM by Hammad Torres NP     12/10/20: She has some feelings of hesitancy but can empty. Dizziness ICD-10-CM: R42  ICD-9-CM: 780.4  10/5/2020 - Present        Anxiety ICD-10-CM: F41.9  ICD-9-CM: 300.00  Unknown - Present        Arthritis ICD-10-CM: M19.90  ICD-9-CM: 716.90  Unknown - Present        Depression ICD-10-CM: F32. A  ICD-9-CM: 311  Unknown - Present        High cholesterol ICD-10-CM: E78.00  ICD-9-CM: 272.0  Unknown - Present        Hypertension ICD-10-CM: I10  ICD-9-CM: 401.9  Unknown - Present        Thyroid disease ICD-10-CM: E07.9  ICD-9-CM: 343. 9  Unknown - Present        GERD (gastroesophageal reflux disease) ICD-10-CM: K21.9  ICD-9-CM: 530.81  Unknown - Present        Diverticulitis ICD-10-CM: K57.92  ICD-9-CM: 562.11  Unknown - Present        Hx of dizziness ICD-10-CM: Z87.898  ICD-9-CM: V13.89  6/16/2020 - Present        Primary cancer of lower-inner quadrant of left female breast ([facility] [address] ICD-10-CM: C50.312  ICD-9-CM: 174.3  12/18/2018 - Present        RESOLVED: Acute kidney injury ([address] ICD-10-CM: N17.9  ICD-9-CM: 584.9  2/20/2021 - 8/3/2021        RESOLVED: Vaginal prolapse ICD-10-CM: N81.10  ICD-9-CM: 618.00  10/30/2020 - 6/9/2022    Overview Addendum 6/8/2022 11:33 AM by Hammad Torres NP     She reports vaginal prolapse 14 years.   She managed with a pessary for 9 years but could no longer manage with her arthritis. She feels a bulge and dryness. 10/30/2020: Urethra appears well supported. No obvious descent or prolapse in the lithotomy position. 12/10/20 exam:   Mild cystocele. Prolapse out the introitus at rest after standing. Vagina: Prolapsed vaginal walls (Out the introitus at rest with squamous changes. Reduces well) present. She will consider sacrocolpopexy. 4/9/21: Vaginal vault prolapse, unable to manage with a pessary at this point. She has been counseled on sacrocolpopexy and has been considering treatment. 11/5/21: she is concerned about surgical management secondary to Eliquis and ASA (recent DVT). She is not a good surgical candidate for at least 6 months. Perform Kegels. Re-evaluate bladder and voiding in 6 months.              RESOLVED: Palpitations ICD-10-CM: R00.2  ICD-9-CM: 785.1  10/5/2020 - 10/7/2020           Greater than 31 minutes were spent with the patient on counseling and coordination of care    Signed:   Adonay Huber MD  2/28/2023  10:31 AM    .

## 2023-03-07 ENCOUNTER — APPOINTMENT (OUTPATIENT)
Dept: CT IMAGING | Age: 88
End: 2023-03-07
Attending: NURSE PRACTITIONER
Payer: MEDICARE

## 2023-03-07 ENCOUNTER — HOSPITAL ENCOUNTER (EMERGENCY)
Age: 88
Discharge: HOME | End: 2023-03-07
Attending: EMERGENCY MEDICINE
Payer: MEDICARE

## 2023-03-07 VITALS
HEART RATE: 72 BPM | OXYGEN SATURATION: 98 % | SYSTOLIC BLOOD PRESSURE: 145 MMHG | RESPIRATION RATE: 18 BRPM | WEIGHT: 120 LBS | BODY MASS INDEX: 22.66 KG/M2 | TEMPERATURE: 97.6 F | HEIGHT: 61 IN | DIASTOLIC BLOOD PRESSURE: 74 MMHG

## 2023-03-07 DIAGNOSIS — S01.112A LACERATION OF LEFT EYEBROW, INITIAL ENCOUNTER: ICD-10-CM
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
## 2023-03-07 PROCEDURE — 72125 CT NECK SPINE W/O DYE: CPT

## 2023-03-07 PROCEDURE — 70450 CT HEAD/BRAIN W/O DYE: CPT

## 2023-03-07 PROCEDURE — 99284 EMERGENCY DEPT VISIT MOD MDM: CPT

## 2023-03-07 PROCEDURE — 70486 CT MAXILLOFACIAL W/O DYE: CPT

## 2023-03-07 PROCEDURE — 75810000293 HC SIMP/SUPERF WND  RPR

## 2023-03-07 RX ORDER — TRAMADOL HYDROCHLORIDE 50 MG/1
50 TABLET ORAL
Status: DISCONTINUED | OUTPATIENT
Start: 2023-03-07 | End: 2023-03-08 | Stop reason: HOSPADM

## 2023-03-07 NOTE — ED TRIAGE NOTES
Patient arrives via ems from [facility] and rehab, unwittnessed unknown   LOC fall about 45mins ago arrives in c-collar lac to back of head

## 2023-03-07 NOTE — ED PROVIDER NOTES
[facility] EMERGENCY DEPT  EMERGENCY DEPARTMENT HISTORY AND PHYSICAL EXAM      Date: 3/7/2023  Patient Name: Jeni Chong  MRN: [id number]  [place]: 1935  Date of evaluation: 3/7/2023  Provider: Kayli Morris NP   Note Started: 5:07 PM 3/7/23    HISTORY OF PRESENT ILLNESS     Chief Complaint   Patient presents with    Fall       History Provided By: Patient    HPI: Jeni Chong is a 82 y.o. female ***    PAST MEDICAL HISTORY   Past Medical History:  Past Medical History:   Diagnosis Date    Anxiety 6/16/2020    Arthritis     [facility] [address]     [facility] [address]     Depression 6/16/2020    Diverticulitis 6/16/2020    GERD (gastroesophageal reflux disease) 6/16/2020    High cholesterol 6/16/2020    Hx of dizziness 6/16/2020    Hypertension 6/16/2020    Primary cancer of lower-inner quadrant of left female breast ([facility] [address] 12/18/2018    breast     Thyroid disease 6/16/2020       Past Surgical History:  Past Surgical History:   Procedure Laterality Date    HX BREAST LUMPECTOMY Left 01/23/2019    with SLNB    HX HEENT  06/25/2018    Cataract    HX HYSTERECTOMY  1976    HX ORTHOPAEDIC  2014    Left knee    HX ORTHOPAEDIC  08/09/2017    Right knee    HX ORTHOPAEDIC  2013    Right Shoulder    HX UROLOGICAL  12/10/2020    cystoscopy        Family History:  Family History   Problem Relation Age of Onset    Cancer Mother 80        Esophagas    Heart Disease Father     Prostate Cancer Brother     Breast Cancer Maternal Grandmother 71    Breast Cancer Maternal Cousin 58       Social History:  Social History     Tobacco Use    Smoking status: Never    Smokeless tobacco: Never   Vaping Use    Vaping Use: Never used   Substance Use Topics    Alcohol use: Not Currently    Drug use: Never       Allergies:   Allergies   Allergen Reactions    Iodinated Contrast Media Shortness of Breath       PCP: Aspen Colmenares MD    Current Meds:   Previous Medications    ALENDRONATE (FOSAMAX) 70 MG TABLET    Take 70 mg by mouth every Monday. ASCORBIC ACID (VITAMIN C) 250 MG CHEWABLE TABLET    Take 250 mg by mouth daily. ASPIRIN DELAYED-RELEASE 81 MG TABLET    Take 81 mg by mouth daily. AZO CRANBERRY 114-09-28 MG-MG-MILLION TAB    Take 1 Tablet by mouth daily. CARBIDOPA-LEVODOPA (SINEMET)  MG PER TABLET    Take 1 Tablet by mouth three (3) times daily. CLONAZEPAM (KLONOPIN) 0.5 MG TABLET    Take  by mouth two (2) times daily as needed for Anxiety. ELDERBERRY FRUIT (ELDERBERRY PO)    Take 1 Tablet by mouth two (2) times a day. LABETALOL (NORMODYNE) 200 MG TABLET    Take 1 Tablet by mouth two (2) times a day for 30 days. LETROZOLE (FEMARA) 2.5 MG TABLET    Take 2.5 mg by mouth every evening. LEVOTHYROXINE (SYNTHROID) 50 MCG TABLET    Take 50 mcg by mouth Daily (before breakfast). LOSARTAN (COZAAR) 25 MG TABLET    Take 25 mg by mouth daily. MULTIVIT-MIN/IRON/FOLIC/LUTEIN (CENTRUM SILVER WOMEN PO)    Take 1 Dose by mouth daily. The patient takes the gummies  One dose = 2    QUETIAPINE (SEROQUEL) 25 MG TABLET    Take 0.5 Tablets by mouth nightly for 30 days. ZINC PICOLINATE PO    Take 1 Dose by mouth daily. PHYSICAL EXAM     ED Triage Vitals [03/07/23 1403]   ED Encounter Vitals Group      BP (!) 150/84      Pulse (Heart Rate) 89      Resp Rate 18      Temp 97.6 °F (36.4 °C)      Temp src       O2 Sat (%) 97 %      Weight 120 lb      Height 5' 1\"      Physical Exam  ***    SCREENINGS        No data recorded      LAB, EKG AND DIAGNOSTIC RESULTS   Labs:  No results found for this or any previous visit (from the past 12 hour(s)). EKG: Initial EKG interpreted by me.  Shows ***    Radiologic Studies:  Non-plain film images such as CT, Ultrasound and MRI are read by the radiologist. Plain radiographic images are visualized and preliminarily interpreted by the ED Physician with the following findings: ***Not applicable    Interpretation per the Radiologist below, if available at the time of this note:  CT HEAD WO CONT    Result Date: 3/7/2023  EXAM: CT HEAD WO CONT INDICATION: fall COMPARISON: 2/16/2023. CONTRAST: None. TECHNIQUE: Unenhanced CT of the head was performed using 5 mm images. Brain and bone windows were generated. Coronal and sagittal reformats. CT dose reduction was achieved through use of a standardized protocol tailored for this examination and automatic exposure control for dose modulation. FINDINGS: The ventricles and sulci are stable in size, shape and configuration. There is mild periventricular white matter hypodensity. There is no intracranial hemorrhage, extra-axial collection, or mass effect. The basilar cisterns are open. No CT evidence of acute infarct. The bone windows demonstrate no abnormalities. The visualized portions of the paranasal sinuses and mastoid air cells are clear. No acute intracranial process identified. CT MAXILLOFACIAL WO CONT    Result Date: 3/7/2023  EXAM: CT MAXILLOFACIAL WO CONT INDICATION: left cheek pain COMPARISON: None. CONTRAST:   None. TECHNIQUE:  Multislice helical CT of the facial bones was performed in the axial plane without intravenous contrast administration. Coronal and sagittal reformations were generated. CT dose reduction was achieved through use of a standardized protocol tailored for this examination and automatic exposure control for dose modulation. FINDINGS: Bones: There is no fracture or other acute osseous abnormality. Diffuse osteopenia with cervical spine degenerative changes at atlantoaxial subluxation is shown corresponding with concurrent CT cervical spine imaging findings. Paranasal sinuses: Clear Orbits: The globes, optic nerves, and extraocular muscles are within normal limits. Soft tissues: There is left basilar subcutaneous swelling with confluence along the lateral orbital wall and zygomatic body. Miscellaneous: No acute intracranial findings demonstrated in field-of-view. No facial fracture.     CT SPINE CERV WO CONT    Result Date: 3/7/2023  EXAM:  CT CERVICAL SPINE WITHOUT CONTRAST INDICATION: fall. COMPARISON: Soft tissue neck CT 8/1/2022 CONTRAST:  None. TECHNIQUE: Multislice helical CT of the cervical spine was performed without intravenous contrast administration. Sagittal and coronal reformats were generated. CT dose reduction was achieved through use of a standardized protocol tailored for this examination and automatic exposure control for dose modulation. FINDINGS: The bones are moderately osteopenic. Vertebral body heights are normal. There is straightening of cervical lordosis with minimal anterolisthesis at C4-5 and 2 mm anterolisthesis at C7-T1 appearing unchanged. Degenerative changes at the anterior atlantodental interval are again shown with severe right C1-2 facet osteoarthrosis. Atlantoaxial subluxation is again shown with the apical margin of the odontoid process projecting 12 mm above the line bisecting the anterior and posterior rings of C1. The superior dens projects posterior to the basion in similar location to that shown previously with reduction in AP dimension of the foramen magnum. Mild C2-3, severe C3-4, moderate C4-5, severe C5-6, severe C6-7 and mild C7-T1 degenerative disc space narrowing is again shown. Posterior processes are intact. There is bilateral facet osteoarthrosis again demonstrated at all levels. There is no osseous canal stenosis. Foraminal stenosis is again shown on the left at C3-4, bilaterally at C4-5, on the left at C5-6 and C6-7, and bilaterally at C7-T1. A right thyroid nodule is again shown measuring 2.2 x 2.0 cm in size. No paraspinal swelling or collection is shown. 1. No acute fracture or dislocation demonstrated. 2. Diffuse osteopenia and multilevel degenerative findings again shown.  3. Atlantoaxial subluxation is again demonstrated with the dens extending 12 mm above the line bisecting the anterior posterior rings of C1, with its tip posterior to the angela. PROCEDURES   Unless otherwise noted below, none.   Wound Closure by Adhesive    Date/Time: 3/7/2023 5:08 PM  Performed by: Effie Cardozo NP  Authorized by: Effie Cardozo NP     Consent:     Consent obtained:  Verbal    Consent given by:  Patient    Risks discussed:  Infection, need for additional repair, nerve damage, poor wound healing, poor cosmetic result, pain, retained foreign body, tendon damage and vascular damage    Alternatives discussed:  No treatment, delayed treatment, observation and referral  Laceration details:     Location:  Face    Face location:  L eyebrow    Length (cm):  1    Depth (mm):  1  Pre-procedure details:     Preparation:  Imaging obtained to evaluate for foreign bodies and patient was prepped and draped in usual sterile fashion  Exploration:     Hemostasis achieved with:  Direct pressure    Imaging outcome: foreign body not noted      Wound extent: areolar tissue violated    Treatment:     Area cleansed with:  Povidone-iodine    Amount of cleaning:  Standard  Skin repair:     Repair method:  Sutures    Suture size:  5-0    Suture material:  Nylon    Suture technique:  Simple interrupted    Number of sutures:  1  Approximation:     Approximation:  Close  Repair type:     Repair type:  Simple  Post-procedure details:     Dressing:  Open (no dressing)    Procedure completion:  Tolerated well, no immediate complications      CRITICAL CARE TIME   None    ED COURSE and DIFFERENTIAL DIAGNOSIS/MDM   CC/HPI Summary, DDx, ED Course, and Reassessment: ***    Disposition Considerations (Tests not done, Shared Decision Making, Pt Expectation of Test or Treatment.): ***     Records Reviewed (source and summary of external notes): Prior medical records and Nursing notes    Vitals:    Vitals:    03/07/23 1403 03/07/23 1503   BP: (!) 150/84 (!) 160/69   Pulse: 89    Resp: 18    Temp: 97.6 °F (36.4 °C)    SpO2: 97% 97%   Weight: 54.4 kg (120 lb)    Height: 5' 1\" (1.549 m) Patient was given the following medications:  Medications - No data to display    CONSULTS: (Who and What was discussed)  None     Social Determinants affecting Dx or Tx: {Social Barriers:59640}    FINAL IMPRESSION   No diagnosis found. DISPOSITION/PLAN       {Disposition:06186}     PATIENT REFERRED TO:  Follow-up Information    None           DISCHARGE MEDICATIONS:  Current Discharge Medication List            DISCONTINUED MEDICATIONS:  Current Discharge Medication List          I am the Primary Clinician of Record: Lexi Louis NP (electronically signed)    (Please note that parts of this dictation were completed with voice recognition software. Quite often unanticipated grammatical, syntax, homophones, and other interpretive errors are inadvertently transcribed by the computer software. Please disregards these errors.  Please excuse any errors that have escaped final proofreading.)

## 2023-03-10 ENCOUNTER — APPOINTMENT (OUTPATIENT)
Dept: GENERAL RADIOLOGY | Age: 88
End: 2023-03-10
Attending: NURSE PRACTITIONER
Payer: MEDICARE

## 2023-03-10 ENCOUNTER — HOSPITAL ENCOUNTER (EMERGENCY)
Age: 88
Discharge: HOME | End: 2023-03-10
Attending: STUDENT IN AN ORGANIZED HEALTH CARE EDUCATION/TRAINING PROGRAM
Payer: MEDICARE

## 2023-03-10 ENCOUNTER — APPOINTMENT (OUTPATIENT)
Dept: CT IMAGING | Age: 88
End: 2023-03-10
Attending: NURSE PRACTITIONER
Payer: MEDICARE

## 2023-03-10 VITALS
SYSTOLIC BLOOD PRESSURE: 153 MMHG | RESPIRATION RATE: 16 BRPM | DIASTOLIC BLOOD PRESSURE: 80 MMHG | OXYGEN SATURATION: 98 % | BODY MASS INDEX: 21.14 KG/M2 | HEIGHT: 61 IN | HEART RATE: 77 BPM | WEIGHT: 112 LBS | TEMPERATURE: 98 F

## 2023-03-10 DIAGNOSIS — R29.6 FREQUENT FALLS: ICD-10-CM

## 2023-03-10 DIAGNOSIS — E87.6 HYPOKALEMIA: ICD-10-CM

## 2023-03-10 DIAGNOSIS — F02.80 DEMENTIA DUE TO PARKINSON'S DISEASE, WITHOUT BEHAVIORAL DISTURBANCE, PSYCHOTIC DISTURBANCE, MOOD DISTURBANCE, OR ANXIETY, UNSPECIFIED DEMENTIA SEVERITY (HCC): ICD-10-CM

## 2023-03-10 DIAGNOSIS — M25.552 ACUTE HIP PAIN, LEFT: ICD-10-CM

## 2023-03-10 DIAGNOSIS — R41.82 ALTERED MENTAL STATUS, UNSPECIFIED ALTERED MENTAL STATUS TYPE: Primary | ICD-10-CM

## 2023-03-10 DIAGNOSIS — G20 DEMENTIA DUE TO PARKINSON'S DISEASE, WITHOUT BEHAVIORAL DISTURBANCE, PSYCHOTIC DISTURBANCE, MOOD DISTURBANCE, OR ANXIETY, UNSPECIFIED DEMENTIA SEVERITY (HCC): ICD-10-CM

## 2023-03-10 LAB
ALBUMIN SERPL-MCNC: 3.3 G/DL (ref 3.5–5)
ALBUMIN/GLOB SERPL: 0.8 (ref 1.1–2.2)
ALP SERPL-CCNC: 54 U/L (ref 45–117)
ALT SERPL-CCNC: 9 U/L (ref 12–78)
ANION GAP SERPL CALC-SCNC: 4 MMOL/L (ref 5–15)
APPEARANCE UR: CLEAR
AST SERPL W P-5'-P-CCNC: 16 U/L (ref 15–37)
ATRIAL RATE: 92 BPM
BACTERIA URNS QL MICRO: NEGATIVE /HPF
BASOPHILS # BLD: 0 K/UL (ref 0–0.1)
BASOPHILS NFR BLD: 1 % (ref 0–1)
BILIRUB SERPL-MCNC: 0.6 MG/DL (ref 0.2–1)
BILIRUB UR QL: NEGATIVE
BUN SERPL-MCNC: 26 MG/DL (ref 6–20)
BUN/CREAT SERPL: 30 (ref 12–20)
CA-I BLD-MCNC: 9.8 MG/DL (ref 8.5–10.1)
CALCULATED P AXIS, ECG09: 89 DEGREES
CALCULATED R AXIS, ECG10: 52 DEGREES
CALCULATED T AXIS, ECG11: 76 DEGREES
CHLORIDE SERPL-SCNC: 110 MMOL/L (ref 97–108)
CO2 SERPL-SCNC: 26 MMOL/L (ref 21–32)
COLOR UR: ABNORMAL
CREAT SERPL-MCNC: 0.86 MG/DL (ref 0.55–1.02)
DIAGNOSIS, 93000: NORMAL
DIFFERENTIAL METHOD BLD: ABNORMAL
EOSINOPHIL # BLD: 0 K/UL (ref 0–0.4)
EOSINOPHIL NFR BLD: 1 % (ref 0–7)
EPITH CASTS URNS QL MICRO: ABNORMAL /LPF
ERYTHROCYTE [DISTWIDTH] IN BLOOD BY AUTOMATED COUNT: 14 % (ref 11.5–14.5)
GLOBULIN SER CALC-MCNC: 3.9 G/DL (ref 2–4)
GLUCOSE SERPL-MCNC: 90 MG/DL (ref 65–100)
GLUCOSE UR STRIP.AUTO-MCNC: NEGATIVE MG/DL
HCT VFR BLD AUTO: 31.2 % (ref 35–47)
HGB BLD-MCNC: 9.8 G/DL (ref 11.5–16)
HGB UR QL STRIP: NEGATIVE
IMM GRANULOCYTES # BLD AUTO: 0 K/UL (ref 0–0.04)
IMM GRANULOCYTES NFR BLD AUTO: 0 % (ref 0–0.5)
KETONES UR QL STRIP.AUTO: 20 MG/DL
LEUKOCYTE ESTERASE UR QL STRIP.AUTO: NEGATIVE
LYMPHOCYTES # BLD: 0.7 K/UL (ref 0.8–3.5)
LYMPHOCYTES NFR BLD: 14 % (ref 12–49)
MAGNESIUM SERPL-MCNC: 2.4 MG/DL (ref 1.6–2.4)
MCH RBC QN AUTO: 29.4 PG (ref 26–34)
MCHC RBC AUTO-ENTMCNC: 31.4 G/DL (ref 30–36.5)
MCV RBC AUTO: 93.7 FL (ref 80–99)
MONOCYTES # BLD: 0.8 K/UL (ref 0–1)
MONOCYTES NFR BLD: 15 % (ref 5–13)
MUCOUS THREADS URNS QL MICRO: ABNORMAL /LPF
NEUTS SEG # BLD: 3.6 K/UL (ref 1.8–8)
NEUTS SEG NFR BLD: 69 % (ref 32–75)
NITRITE UR QL STRIP.AUTO: NEGATIVE
NRBC # BLD: 0 K/UL (ref 0–0.01)
NRBC BLD-RTO: 0 PER 100 WBC
P-R INTERVAL, ECG05: 124 MS
PH UR STRIP: 5 (ref 5–8)
PLATELET # BLD AUTO: 362 K/UL (ref 150–400)
PMV BLD AUTO: 10.1 FL (ref 8.9–12.9)
POTASSIUM SERPL-SCNC: 3.3 MMOL/L (ref 3.5–5.1)
PROT SERPL-MCNC: 7.2 G/DL (ref 6.4–8.2)
PROT UR STRIP-MCNC: 30 MG/DL
Q-T INTERVAL, ECG07: 328 MS
QRS DURATION, ECG06: 68 MS
QTC CALCULATION (BEZET), ECG08: 405 MS
RBC # BLD AUTO: 3.33 M/UL (ref 3.8–5.2)
RBC #/AREA URNS HPF: ABNORMAL /HPF (ref 0–5)
SODIUM SERPL-SCNC: 140 MMOL/L (ref 136–145)
SP GR UR REFRACTOMETRY: 1.02 (ref 1–1.03)
TSH SERPL DL<=0.05 MIU/L-ACNC: 0.6 UIU/ML (ref 0.36–3.74)
UA: UC IF INDICATED,UAUC: ABNORMAL
UROBILINOGEN UR QL STRIP.AUTO: 4 EU/DL (ref 0.1–1)
VENTRICULAR RATE, ECG03: 92 BPM
WBC # BLD AUTO: 5.2 K/UL (ref 3.6–11)
WBC URNS QL MICRO: ABNORMAL /HPF (ref 0–4)

## 2023-03-10 PROCEDURE — 36415 COLL VENOUS BLD VENIPUNCTURE: CPT

## 2023-03-10 PROCEDURE — 93005 ELECTROCARDIOGRAM TRACING: CPT

## 2023-03-10 PROCEDURE — 74011250637 HC RX REV CODE- 250/637: Performed by: NURSE PRACTITIONER

## 2023-03-10 PROCEDURE — 70450 CT HEAD/BRAIN W/O DYE: CPT

## 2023-03-10 PROCEDURE — 73502 X-RAY EXAM HIP UNI 2-3 VIEWS: CPT

## 2023-03-10 PROCEDURE — 80053 COMPREHEN METABOLIC PANEL: CPT

## 2023-03-10 PROCEDURE — 84443 ASSAY THYROID STIM HORMONE: CPT

## 2023-03-10 PROCEDURE — 83735 ASSAY OF MAGNESIUM: CPT

## 2023-03-10 PROCEDURE — 71045 X-RAY EXAM CHEST 1 VIEW: CPT

## 2023-03-10 PROCEDURE — 99285 EMERGENCY DEPT VISIT HI MDM: CPT

## 2023-03-10 PROCEDURE — 85025 COMPLETE CBC W/AUTO DIFF WBC: CPT

## 2023-03-10 PROCEDURE — 81001 URINALYSIS AUTO W/SCOPE: CPT

## 2023-03-10 PROCEDURE — 73501 X-RAY EXAM HIP UNI 1 VIEW: CPT

## 2023-03-10 PROCEDURE — 74011250636 HC RX REV CODE- 250/636: Performed by: NURSE PRACTITIONER

## 2023-03-10 RX ORDER — POTASSIUM CHLORIDE 1.5 G/1.77G
40 POWDER, FOR SOLUTION ORAL
Status: COMPLETED | OUTPATIENT
Start: 2023-03-10 | End: 2023-03-10

## 2023-03-10 RX ADMIN — POTASSIUM CHLORIDE 40 MEQ: 1.5 POWDER, FOR SOLUTION ORAL at 17:45

## 2023-03-10 RX ADMIN — SODIUM CHLORIDE 500 ML: 9 INJECTION, SOLUTION INTRAVENOUS at 17:45

## 2023-03-10 NOTE — ED PROVIDER NOTES
[facility] EMERGENCY DEPT  EMERGENCY DEPARTMENT HISTORY AND PHYSICAL EXAM      Date: 3/10/2023  Patient Name: Mainor Weiss  MRN: [id number]  [place]: 1935  Date of evaluation: 3/10/2023  Provider: Sae Garcia NP   Note Started: 4:42 PM 3/10/23    HISTORY OF PRESENT ILLNESS     Chief Complaint   Patient presents with    Altered mental status       History Provided By: Patient    HPI: Mainor Weiss is a 82 y.o. female past medical history significant for Parkinson's disease, dementia, breast cancer, thyroid disease and other history reviewed as listed below presents with her daughter for concerns for continued altered mental status present for the last several weeks and worsening over the last few days. She was hospitalized at [address] [place] [address] for approximately 2 weeks and discharged to [facility] and rehab where she has had several falls including a fall with head injury on 7 March she was seen here with negative head CT and received suture. Daughter states she was told that the patient fell yesterday while bending over but onto her buttocks and not hitting her head or injuring any other part of her body other than patient complaining of pain in her left hip and buttocks. Daughter also states that she had requested a urine test for UTI and was told by the rehab facility would not be done until Monday of next week and she was concerned and brought her to the hospital this afternoon. The patient is alert to person, place, but is disoriented to year and initially thought her daughter was her mother. She is otherwise cooperative and denies any other complaints. Daughter denies any reports of or known fever, nausea vomiting diarrhea, medication dose changes or missed doses and she is not sure if the patient has received any of the tramadol prescribed at her previous ER visit. Patient is otherwise pleasantly confused which limits any further HPI information.   Daughter reports that her baseline is alert and oriented with only sporadic periods of confusion or forgetfulness and walking and talking and taking care of herself prior to the hospitalization resulting in the rehab stay. PAST MEDICAL HISTORY   Past Medical History:  Past Medical History:   Diagnosis Date    Anxiety 6/16/2020    Arthritis     Breast cancer ([facility] [address]     Breast cancer ([facility] [address]     Depression 6/16/2020    Diverticulitis 6/16/2020    GERD (gastroesophageal reflux disease) 6/16/2020    High cholesterol 6/16/2020    Hx of dizziness 6/16/2020    Hypertension 6/16/2020    Primary cancer of lower-inner quadrant of left female breast ([facility] [address] 12/18/2018    breast     Thyroid disease 6/16/2020       Past Surgical History:  Past Surgical History:   Procedure Laterality Date    HX BREAST LUMPECTOMY Left 01/23/2019    with SLNB    HX HEENT  06/25/2018    Cataract    HX HYSTERECTOMY  1976    HX ORTHOPAEDIC  2014    Left knee    HX ORTHOPAEDIC  08/09/2017    Right knee    HX ORTHOPAEDIC  2013    Right Shoulder    HX UROLOGICAL  12/10/2020    cystoscopy        Family History:  Family History   Problem Relation Age of Onset    Cancer Mother 80        Esophagas    Heart Disease Father     Prostate Cancer Brother     Breast Cancer Maternal Grandmother 71    Breast Cancer Maternal Cousin 58       Social History:  Social History     Tobacco Use    Smoking status: Never    Smokeless tobacco: Never   Vaping Use    Vaping Use: Never used   Substance Use Topics    Alcohol use: Not Currently    Drug use: Never       Allergies: Allergies   Allergen Reactions    Iodinated Contrast Media Shortness of Breath       PCP: Nelle Holter, MD    Current Meds:   Previous Medications    ALENDRONATE (FOSAMAX) 70 MG TABLET    Take 70 mg by mouth every Monday. ASCORBIC ACID (VITAMIN C) 250 MG CHEWABLE TABLET    Take 250 mg by mouth daily. ASPIRIN DELAYED-RELEASE 81 MG TABLET    Take 81 mg by mouth daily. AZO CRANBERRY 721-84-13 MG-MG-MILLION TAB    Take 1 Tablet by mouth daily. CARBIDOPA-LEVODOPA (SINEMET)  MG PER TABLET    Take 1 Tablet by mouth three (3) times daily. CLONAZEPAM (KLONOPIN) 0.5 MG TABLET    Take  by mouth two (2) times daily as needed for Anxiety. ELDERBERRY FRUIT (ELDERBERRY PO)    Take 1 Tablet by mouth two (2) times a day. LABETALOL (NORMODYNE) 200 MG TABLET    Take 1 Tablet by mouth two (2) times a day for 30 days. LETROZOLE (FEMARA) 2.5 MG TABLET    Take 2.5 mg by mouth every evening. LEVOTHYROXINE (SYNTHROID) 50 MCG TABLET    Take 50 mcg by mouth Daily (before breakfast). LOSARTAN (COZAAR) 25 MG TABLET    Take 25 mg by mouth daily. MULTIVIT-MIN/IRON/FOLIC/LUTEIN (CENTRUM SILVER WOMEN PO)    Take 1 Dose by mouth daily. The patient takes the gummies  One dose = 2    QUETIAPINE (SEROQUEL) 25 MG TABLET    Take 0.5 Tablets by mouth nightly for 30 days. ZINC PICOLINATE PO    Take 1 Dose by mouth daily. PHYSICAL EXAM     ED Triage Vitals [03/10/23 1548]   ED Encounter Vitals Group      /68      Pulse (Heart Rate) 77      Resp Rate 16      Temp 98 °F (36.7 °C)      Temp src       O2 Sat (%) 98 %      Weight 112 lb      Height 5' 1\"      Review of Systems   Unable to perform ROS: Dementia       Physical Exam  Vitals and nursing note reviewed. Constitutional:       General: She is not in acute distress. Appearance: She is well-developed and normal weight. She is not ill-appearing, toxic-appearing or diaphoretic. HENT:      Head: Normocephalic. Contusion and laceration present. No raccoon eyes or Sapp's sign. Eyes:      Pupils: Pupils are equal, round, and reactive to light. Cardiovascular:      Rate and Rhythm: Normal rate and regular rhythm. Heart sounds: Normal heart sounds. Pulmonary:      Effort: Pulmonary effort is normal. No respiratory distress. Breath sounds: Normal breath sounds. Chest:      Chest wall: No tenderness. Abdominal:      General: Bowel sounds are normal. There is no distension. Palpations: Abdomen is soft. Tenderness: There is no abdominal tenderness. There is no guarding. Musculoskeletal:         General: Normal range of motion. Cervical back: Neck supple. Right hip: Normal.      Left hip: Tenderness and bony tenderness present. No deformity or crepitus. Normal range of motion. Right lower leg: Normal.      Left lower leg: Normal.   Skin:     General: Skin is warm and dry. Neurological:      Mental Status: She is alert. She is confused. Cranial Nerves: No dysarthria or facial asymmetry. Sensory: No sensory deficit. Psychiatric:         Mood and Affect: Mood normal.         Behavior: Behavior normal.         Cognition and Memory: Memory is impaired. SCREENINGS        No data recorded      LAB, EKG AND DIAGNOSTIC RESULTS   Labs:  Recent Results (from the past 12 hour(s))   CBC WITH AUTOMATED DIFF    Collection Time: 03/10/23  4:35 PM   Result Value Ref Range    WBC 5.2 3.6 - 11.0 K/uL    RBC 3.33 (L) 3.80 - 5.20 M/uL    HGB 9.8 (L) 11.5 - 16.0 g/dL    HCT 31.2 (L) 35.0 - 47.0 %    MCV 93.7 80.0 - 99.0 FL    MCH 29.4 26.0 - 34.0 PG    MCHC 31.4 30.0 - 36.5 g/dL    RDW 14.0 11.5 - 14.5 %    PLATELET 000 296 - 306 K/uL    MPV 10.1 8.9 - 12.9 FL    NRBC 0.0 0.0  WBC    ABSOLUTE NRBC 0.00 0.00 - 0.01 K/uL    NEUTROPHILS 69 32 - 75 %    LYMPHOCYTES 14 12 - 49 %    MONOCYTES 15 (H) 5 - 13 %    EOSINOPHILS 1 0 - 7 %    BASOPHILS 1 0 - 1 %    IMMATURE GRANULOCYTES 0 0 - 0.5 %    ABS. NEUTROPHILS 3.6 1.8 - 8.0 K/UL    ABS. LYMPHOCYTES 0.7 (L) 0.8 - 3.5 K/UL    ABS. MONOCYTES 0.8 0.0 - 1.0 K/UL    ABS. EOSINOPHILS 0.0 0.0 - 0.4 K/UL    ABS. BASOPHILS 0.0 0.0 - 0.1 K/UL    ABS. IMM.  GRANS. 0.0 0.00 - 0.04 K/UL    DF AUTOMATED     METABOLIC PANEL, COMPREHENSIVE    Collection Time: 03/10/23  4:35 PM   Result Value Ref Range    Sodium 140 136 - 145 mmol/L    Potassium 3.3 (L) 3.5 - 5.1 mmol/L    Chloride 110 (H) 97 - 108 mmol/L    CO2 26 21 - 32 mmol/L Anion gap 4 (L) 5 - 15 mmol/L    Glucose 90 65 - 100 mg/dL    BUN 26 (H) 6 - 20 mg/dL    Creatinine 0.86 0.55 - 1.02 mg/dL    BUN/Creatinine ratio 30 (H) 12 - 20      eGFR >60 >60 ml/min/1.73m2    Calcium 9.8 8.5 - 10.1 mg/dL    Bilirubin, total 0.6 0.2 - 1.0 mg/dL    AST (SGOT) 16 15 - 37 U/L    ALT (SGPT) 9 (L) 12 - 78 U/L    Alk.  phosphatase 54 45 - 117 U/L    Protein, total 7.2 6.4 - 8.2 g/dL    Albumin 3.3 (L) 3.5 - 5.0 g/dL    Globulin 3.9 2.0 - 4.0 g/dL    A-G Ratio 0.8 (L) 1.1 - 2.2     TSH 3RD GENERATION    Collection Time: 03/10/23  4:35 PM   Result Value Ref Range    TSH 0.60 0.36 - 3.74 uIU/mL   MAGNESIUM    Collection Time: 03/10/23  4:35 PM   Result Value Ref Range    Magnesium 2.4 1.6 - 2.4 mg/dL   EKG, 12 LEAD, INITIAL    Collection Time: 03/10/23  5:37 PM   Result Value Ref Range    Ventricular Rate 92 BPM    Atrial Rate 92 BPM    P-R Interval 124 ms    QRS Duration 68 ms    Q-T Interval 328 ms    QTC Calculation (Bezet) 405 ms    Calculated P Axis 89 degrees    Calculated R Axis 52 degrees    Calculated T Axis 76 degrees    Diagnosis       Normal sinus rhythm  Nonspecific T wave abnormality  Abnormal ECG  No previous ECGs available  Confirmed by BIRGIT MORLEY, Nigel Watson ([id number]) on 3/10/2023 9:22:37 PM     URINALYSIS W/ REFLEX CULTURE    Collection Time: 03/10/23  8:16 PM    Specimen: Urine   Result Value Ref Range    Color Yellow/Straw      Appearance Clear Clear      Specific gravity 1.024 1.003 - 1.030      pH (UA) 5.0 5.0 - 8.0      Protein 30 (A) Negative mg/dL    Glucose Negative Negative mg/dL    Ketone 20 (A) Negative mg/dL    Bilirubin Negative Negative      Blood Negative Negative      Urobilinogen 4.0 (H) 0.1 - 1.0 EU/dL    Nitrites Negative Negative      Leukocyte Esterase Negative Negative      Epithelial cells Few Few /lpf    Bacteria Negative Negative /hpf    UA:UC IF INDICATED Culture not indicated by UA result Culture not indicated by UA result      WBC 0-4 0 - 4 /hpf    RBC 0-5 0 - 5 /hpf Mucus Trace /lpf       EKG: Initial EKG interpreted by me. Not Applicable    Radiologic Studies:  Non-plain film images such as CT, Ultrasound and MRI are read by the radiologist. Plain radiographic images are visualized and preliminarily interpreted by the ED Physician with the following findings: Not applicable    Interpretation per the Radiologist below, if available at the time of this note:  XR HIP RT W OR WO PELV 2-3 VWS    Result Date: 3/10/2023  EXAM: XR HIP RT W OR WO PELV 2-3 VWS INDICATION: Pain post fall. COMPARISON: None. FINDINGS: AP views radiograph of the right hip demonstrate no fracture, dislocation or other acute abnormality. No acute abnormality. CT HEAD WO CONT    Result Date: 3/10/2023  EXAM: CT HEAD WO CONT INDICATION: Fall, AMS COMPARISON: 3/7/2022 head CT. CONTRAST: None. TECHNIQUE: Unenhanced CT of the head was performed using 5 mm images. Brain and bone windows were generated. Coronal and sagittal reformats. CT dose reduction was achieved through use of a standardized protocol tailored for this examination and automatic exposure control for dose modulation. FINDINGS: Mild diffuse parenchymal volume loss with exvacuodilatation of the ventricles and extraventricular CSF spaces. . Scattered periventricular and subcortical white matter T2/FLAIR hyperintensity, nonspecific and likely microangiopathic ischemic changes. . There is no intracranial hemorrhage, extra-axial collection, or mass effect. The basilar cisterns are open. No CT evidence of acute infarct. No acute fracture or significant scalp abnormality. Status post bilateral lense replacement. The orbits are otherwise unremarkable. The visualized portions of the paranasal sinuses and mastoid air cells are clear. No acute intracranial abnormality. Chronic changes include diffuse parenchymal volume loss and chronic microangiopathic white matter changes.      XR CHEST PORT    Result Date: 3/10/2023  EXAM:  XR CHEST PORT INDICATION:   AMS COMPARISON: 6/5/2021 chest radiograph. FINDINGS: AP radiograph of the chest was obtained. No evidence of focal consolidation. No pleural effusion or pneumothorax. Heart, amber, mediastinum are within normal limits. Surgical clips project over the lower left axilla and left lower mediastinum, similar to prior study. No acute osseous abnormalities. Degenerative changes of the bilateral acromioclavicular joints. No acute cardiopulmonary process. XR HIP LT W OR WO PELV  1 VW    Result Date: 3/10/2023  EXAM: XR HIP LT W OR WO PELV  1 VW INDICATION: Pain post fall. COMPARISON: None. FINDINGS: AP radiographic views of the pelvis and of the left hip demonstrate no fracture, dislocation or other acute abnormality. Degenerative changes of the symphysis pubis. No acute abnormality. PROCEDURES   Unless otherwise noted below, none. Procedures      CRITICAL CARE TIME   None    ED COURSE and DIFFERENTIAL DIAGNOSIS/MDM   CC/HPI Summary, DDx, ED Course, and Reassessment: Patient is a 82 y.o. female presenting for AMS. Vitals reveal no significant abnormalities and physical exam reveals  no significant abnormalities with complete findings documented in physical exam . EKG showed no significant abnormalities. Based on the history, physical exam, risk factors, and vitals signs, differential diagnoses include UTI, metabolic causes (i.e., hyper/hyponatremia, hyper/hypoglycemia, hypercalcemia, hyper/hypothyroidism, hypoxia/hypercapnea, hepatic encephalopathy, uremic encephalopathy; structural lesions (malignancy, intracranial hemorrhage, cerebral edema, CVA, TIA), Hematologic abnormalities (anemia); arrhythmias, medication side effect, dehydration, LESLYE. Less likely acute drug or alcohol intoxication given patient's lack of history of same and she currently is in a rehab facility and no evidence of alcohol or drug abuse on review of records.   She was given a prescription for tramadol however there is no documentation provided by the [facility] ER nursing [facility] notating administration. Will initiate workup with labs, imaging of her pelvis given recent fall but will also repeat CT of the head to rule out any delayed bleeding or other abnormalities given her continued altered mental status and recent fall. See ED Course and Reassessment for results and interpretations. Disposition Considerations (Tests not done, Shared Decision Making, Pt Expectation of Test or Treatment.):      Records Reviewed (source and summary of external notes): Prior medical records and Nursing notes    Vitals:    Vitals:    03/10/23 1652 03/10/23 1754 03/10/23 1900 03/10/23 2000   BP: (!) 174/87 (!) 171/88 (!) 155/92 (!) 153/80   Pulse:       Resp:       Temp:       SpO2: 98% 98%     Weight:       Height:            ED Course as of 03/10/23 2132   Fri Mar 10, 2023   1712 Imaging of bilateral hips, pelvis and chest x-ray without evidence of fracture, dislocation, bony abnormality or joint concerns. Chest x-ray without evidence of infection, infiltration, fluid overload or other acute abnormality. CBC with no leukocytosis or evidence of acute infection or inflammation. Hemoglobin and hematocrit are consistent with patient's baseline on previous labs reviewed with no evidence of thrombocytopenia or other acute abnormality. [KR]   46 CMP with hypokalemia noted but otherwise no acute electrolyte or metabolic disturbance. Magnesium pending. Renal function intact with normal creatinine and no evidence of LESLYE. Hepatic function without significant abnormalities concerning for hepatic encephalopathy or indication for ammonia testing. [KR]   1720 TSH 3RD GENERATION:    TSH 0.60 [KR]   1720 MAGNESIUM:    Magnesium 2.4 [KR]   1721 Reviewed results thus far with daughter.  No urine output yet and discussed with daughter who agreed to small fluid bolus of 500mL NS to assist with urine output and for any dehydration not evident on labs given her uncertainty of patients actual po intake at the SNF. [KR]   1847 CT of the head without any acute findings or abnormalities. Chronic changes noted. No change from CT on 3/7. [KR]   2117 Urinalysis with 20 ketones, negative blood, negative leukocyte esterase, nitrates, bacteria or pyuria and no indication of acute UTI. [KR]      ED Course User Index  [KR] Kassidy Evangelista, NP       Patient was given the following medications:  Medications   sodium chloride 0.9 % bolus infusion 500 mL (0 mL IntraVENous IV Completed 3/10/23 1932)   potassium chloride (KLOR-CON) packet for solution 40 mEq (40 mEq Oral Given 3/10/23 1745)       CONSULTS: (Who and What was discussed)  None     Social Determinants affecting Dx or Tx: None    FINAL IMPRESSION     1. Altered mental status, unspecified altered mental status type    2. Hypokalemia    3. Frequent falls    4. Acute hip pain, left    5. Dementia due to Parkinson's disease, without behavioral disturbance, psychotic disturbance, mood disturbance, or anxiety, unspecified dementia severity [facility])          DISPOSITION/PLAN   Discharged    Discharge Note: The patient is stable for discharge home. The signs, symptoms, diagnosis, and discharge instructions have been discussed, understanding conveyed, and agreed upon. The patient is to follow up as recommended or return to ER should their symptoms worsen.       PATIENT REFERRED TO:  Follow-up Information       Follow up With Specialties Details Why Contact Info    Velia Gan MD Internal Medicine Physician   [address] [facility]  [phone]      Follow up with primary care, urgent care, or this Emergency department                  DISCHARGE MEDICATIONS:  Current Discharge Medication List            DISCONTINUED MEDICATIONS:  Current Discharge Medication List          I am the Primary Clinician of Record: Johan Raman NP (electronically signed)    (Please note that parts of this dictation were completed with voice recognition software. Quite often unanticipated grammatical, syntax, homophones, and other interpretive errors are inadvertently transcribed by the computer software. Please disregards these errors.  Please excuse any errors that have escaped final proofreading.)

## 2023-03-10 NOTE — ED TRIAGE NOTES
Pt arrives with daughter who removed her from [address] [place] [address] and Rehab due to altered mental status. Reports she doesn't understand what is going on with her mother and they were not able to provide the answers she is requesting. Pt arrives with increased confusion per daughters report. Pt has had multiple falls recently. Usually A&OX4, ambulatory.

## 2023-04-25 ENCOUNTER — OFFICE VISIT (OUTPATIENT)
Dept: UROLOGY | Age: 88
End: 2023-04-25
Payer: MEDICARE

## 2023-04-25 VITALS
SYSTOLIC BLOOD PRESSURE: 124 MMHG | WEIGHT: 114 LBS | HEART RATE: 82 BPM | DIASTOLIC BLOOD PRESSURE: 76 MMHG | BODY MASS INDEX: 21.52 KG/M2 | OXYGEN SATURATION: 100 % | HEIGHT: 61 IN

## 2023-04-25 DIAGNOSIS — N39.3 STRESS INCONTINENCE: ICD-10-CM

## 2023-04-25 DIAGNOSIS — N36.42 INTRINSIC SPHINCTER DEFICIENCY (ISD): ICD-10-CM

## 2023-04-25 DIAGNOSIS — R82.81 PYURIA: ICD-10-CM

## 2023-04-25 DIAGNOSIS — N39.41 URGE INCONTINENCE: Primary | ICD-10-CM

## 2023-04-25 LAB
BILIRUB UR QL: NEGATIVE
GLUCOSE UR-MCNC: NEGATIVE MG/DL
KETONES P FAST UR STRIP-MCNC: NORMAL MG/DL
PH UR STRIP: 5.5 [PH] (ref 4.6–8)
PROT UR QL STRIP: 30
SP GR UR STRIP: 1.03 (ref 1–1.03)
UA UROBILINOGEN AMB POC: NORMAL (ref 0.2–1)
URINALYSIS CLARITY POC: NORMAL
URINALYSIS COLOR POC: YELLOW
URINE BLOOD POC: NORMAL
URINE LEUKOCYTES POC: NEGATIVE
URINE NITRITES POC: NEGATIVE

## 2023-04-25 PROCEDURE — G8427 DOCREV CUR MEDS BY ELIG CLIN: HCPCS | Performed by: UROLOGY

## 2023-04-25 PROCEDURE — 1123F ACP DISCUSS/DSCN MKR DOCD: CPT | Performed by: UROLOGY

## 2023-04-25 PROCEDURE — G8400 PT W/DXA NO RESULTS DOC: HCPCS | Performed by: UROLOGY

## 2023-04-25 PROCEDURE — 99214 OFFICE O/P EST MOD 30 MIN: CPT | Performed by: UROLOGY

## 2023-04-25 PROCEDURE — G8536 NO DOC ELDER MAL SCRN: HCPCS | Performed by: UROLOGY

## 2023-04-25 PROCEDURE — 1101F PT FALLS ASSESS-DOCD LE1/YR: CPT | Performed by: UROLOGY

## 2023-04-25 PROCEDURE — G8420 CALC BMI NORM PARAMETERS: HCPCS | Performed by: UROLOGY

## 2023-04-25 PROCEDURE — 1090F PRES/ABSN URINE INCON ASSESS: CPT | Performed by: UROLOGY

## 2023-04-25 PROCEDURE — 81003 URINALYSIS AUTO W/O SCOPE: CPT | Performed by: UROLOGY

## 2023-04-25 PROCEDURE — G9717 DOC PT DX DEP/BP F/U NT REQ: HCPCS | Performed by: UROLOGY

## 2023-04-25 NOTE — ASSESSMENT & PLAN NOTE
She wears depends. Nighttime urination is issues. She drinks up until 4pm and sleeps 10-11pm.   She elevating her legs. She wears compression stockings at times.

## 2023-04-25 NOTE — PROGRESS NOTES
HISTORY OF PRESENT ILLNESS  Boyd Danielson is a 82 y.o. female. has a past medical history of Anxiety (6/16/2020), Arthritis, Breast cancer ([facility] [address] Breast cancer ([facility] [address] Depression (6/16/2020), Diverticulitis (6/16/2020), GERD (gastroesophageal reflux disease) (6/16/2020), High cholesterol (6/16/2020), dizziness (6/16/2020), Hypertension (6/16/2020), Primary cancer of lower-inner quadrant of left female breast ([facility] [address] (12/18/2018), and Thyroid disease (6/16/2020). has a past surgical history that includes hx hysterectomy (1976); hx breast lumpectomy (Left, 01/23/2019); hx orthopaedic (2014); hx orthopaedic (08/09/2017); hx orthopaedic (2013); hx heent (06/25/2018); and hx urological (12/10/2020). Chief Complaint   Patient presents with    Follow-up    Urinary Incontinence    Stress Incontinence    Other     URETHERAL PROLAPSE      HPI she had been treated with amoxicillin 2 days. She did not tolerate oxybutynin. She manages with depends. Chronic Conditions Addressed Today       1. Urge incontinence - Primary     Overview      She was found to have low normal capacity. Leakage managed by bedside commode and fluid management. Ditropan XL not tolerated due to \"jitters. \"  More functional than overactivity. Current Assessment & Plan      She wears depends. Nighttime urination is issues. She drinks up until 4pm and sleeps 10-11pm.   She elevating her legs. She wears compression stockings at times. Relevant Orders     URINALYSIS W/MICROSCOPIC     AMB POC URINALYSIS DIP STICK AUTO W/O MICRO (Completed)    2. Stress incontinence     Overview      She has mild leak with strain without mobility. 3. Intrinsic sphincter deficiency (ISD)     Overview      ISD and stress incontinence. Mild. She has continued to be advised on Kegel exercises.             Current Assessment & Plan       Stable, controlled          Relevant Orders     URINALYSIS W/MICROSCOPIC     AMB POC URINALYSIS DIP STICK AUTO W/O MICRO (Completed)    4. RESOLVED: Pyuria       Past Medical History:    PMHx (including negatives):  has a past medical history of Anxiety (6/16/2020), Arthritis, Breast cancer ([facility] [address] Breast cancer ([facility] [address] Depression (6/16/2020), Diverticulitis (6/16/2020), GERD (gastroesophageal reflux disease) (6/16/2020), High cholesterol (6/16/2020), dizziness (6/16/2020), Hypertension (6/16/2020), Primary cancer of lower-inner quadrant of left female breast ([facility] [address] (12/18/2018), and Thyroid disease (6/16/2020). PSurgHx:  has a past surgical history that includes hx hysterectomy (1976); hx breast lumpectomy (Left, 01/23/2019); hx orthopaedic (2014); hx orthopaedic (08/09/2017); hx orthopaedic (2013); hx heent (06/25/2018); and hx urological (12/10/2020). PSocHx:  reports that she has never smoked. She has never used smokeless tobacco. She reports that she does not currently use alcohol. She reports that she does not use drugs. FamilyHX:   Family History   Problem Relation Age of Onset    Cancer Mother 80        Esophagas    Heart Disease Father     Prostate Cancer Brother     Breast Cancer Maternal Grandmother 71    Breast Cancer Maternal Cousin 58     ROS  Physical Exam  Allergies   Allergen Reactions    Iodinated Contrast Media Shortness of Breath     Current Outpatient Medications   Medication Sig Dispense Refill    AZO CRANBERRY 776-33-52 mg-mg-million tab Take 1 Tablet by mouth daily. carbidopa-levodopa (SINEMET)  mg per tablet Take 1 Tablet by mouth three (3) times daily. aspirin delayed-release 81 mg tablet Take 1 Tablet by mouth daily. letrozole (FEMARA) 2.5 mg tablet Take 1 Tablet by mouth every evening.      multivit-min/iron/folic/lutein (CENTRUM SILVER WOMEN PO) Take 1 Dose by mouth daily. The patient takes the gummies  One dose = 2      elderberry fruit (ELDERBERRY PO) Take 1 Tablet by mouth two (2) times a day.       alendronate (FOSAMAX) 70 mg tablet Take 1 Tablet by mouth every Monday. clonazePAM (KlonoPIN) 0.5 mg tablet Take  by mouth two (2) times daily as needed for Anxiety. losartan (COZAAR) 25 mg tablet Take 1 Tablet by mouth daily. levothyroxine (SYNTHROID) 50 mcg tablet Take 1 Tablet by mouth Daily (before breakfast). ascorbic acid (VITAMIN C) 250 mg chewable tablet Take 1 Tablet by mouth daily. ZINC PICOLINATE PO Take 1 Dose by mouth daily. (Patient not taking: Reported on 4/25/2023)        Results for orders placed or performed in visit on 04/25/23   AMB POC URINALYSIS DIP STICK AUTO W/O MICRO   Result Value Ref Range    Color (UA POC) Yellow     Clarity (UA POC) Cloudy     Glucose (UA POC) Negative Negative mg/dL    Bilirubin (UA POC) Negative Negative    Ketones (UA POC) Trace Negative    Specific gravity (UA POC) 1.030 1.001 - 1.035    Blood (UA POC) Trace-intact Negative    pH (UA POC) 5.5 4.6 - 8.0    Protein (UA POC) 30 Negative    Urobilinogen (UA POC) 0.2 mg/dL 0.2 - 1    Nitrites (UA POC) Negative Negative    Leukocyte esterase (UA POC) Negative Negative       ASSESSMENT and PLAN  Diagnoses and all orders for this visit:    1. Urge incontinence  Comments:  Stable, fluid dependent nocturia  Assessment & Plan:  She wears depends. Nighttime urination is issues. She drinks up until 4pm and sleeps 10-11pm.   She elevating her legs. She wears compression stockings at times. Orders:  -     URINALYSIS W/MICROSCOPIC  -     AMB POC URINALYSIS DIP STICK AUTO W/O MICRO    2. Intrinsic sphincter deficiency (ISD)  Comments:  Stable. She is advised to continue Kegel exercises. Assessment & Plan:   Stable, controlled    Orders:  -     URINALYSIS W/MICROSCOPIC  -     AMB POC URINALYSIS DIP STICK AUTO W/O MICRO    3. Stress incontinence  Comments:  Mixed incontinence, worse at nighttime. She is managing with pads. 4. Pyuria  Comments:  Pyuria on urinalysis today. Sent for culture. Will treat if positive.          Eloy Kent MD       Please note that portions of this note was potentially completed with Dragon Cloudary, the computer voice recognition software. Quite often unanticipated grammatical, syntax, homophones, and other interpretive errors are inadvertently transcribed by the computer software. Please disregard these errors. Please excuse any errors that have escaped final proofreading. Thank you.

## 2023-04-25 NOTE — PROGRESS NOTES
Chief Complaint   Patient presents with    Follow-up    Urinary Incontinence    Stress Incontinence    Other     URETHERAL PROLAPSE      1. Have you been to the ER, urgent care clinic since your last visit? Hospitalized since your last visit? No    2. Have you seen or consulted any other health care providers outside of the [address] [place] [address] since your last visit? Include any pap smears or colon screening.  No  Visit Vitals  /76   Pulse 82   Ht 5' 1\" (1.549 m)   Wt 114 lb (51.7 kg)   SpO2 100%   BMI 21.54 kg/m²

## 2023-04-26 LAB
APPEARANCE UR: ABNORMAL
BACTERIA #/AREA URNS HPF: ABNORMAL /[HPF]
BILIRUB UR QL STRIP: NEGATIVE
CASTS URNS QL MICRO: ABNORMAL /LPF
COLOR UR: YELLOW
EPI CELLS #/AREA URNS HPF: ABNORMAL /HPF (ref 0–10)
GLUCOSE UR QL STRIP: NEGATIVE
HGB UR QL STRIP: NEGATIVE
KETONES UR QL STRIP: ABNORMAL
LEUKOCYTE ESTERASE UR QL STRIP: NEGATIVE
MICRO URNS: ABNORMAL
NITRITE UR QL STRIP: NEGATIVE
PH UR STRIP: 5.5 [PH] (ref 5–7.5)
PROT UR QL STRIP: ABNORMAL
RBC #/AREA URNS HPF: >30 /HPF (ref 0–2)
SP GR UR STRIP: 1.03 (ref 1–1.03)
UROBILINOGEN UR STRIP-MCNC: 0.2 MG/DL (ref 0.2–1)
WBC #/AREA URNS HPF: ABNORMAL /HPF (ref 0–5)

## 2023-05-20 ENCOUNTER — HOSPITAL ENCOUNTER (EMERGENCY)
Facility: HOSPITAL | Age: 88
Discharge: HOME | End: 2023-05-21
Attending: EMERGENCY MEDICINE
Payer: MEDICARE

## 2023-05-20 VITALS
SYSTOLIC BLOOD PRESSURE: 176 MMHG | BODY MASS INDEX: 22.58 KG/M2 | WEIGHT: 115 LBS | TEMPERATURE: 99.4 F | DIASTOLIC BLOOD PRESSURE: 88 MMHG | HEART RATE: 94 BPM | RESPIRATION RATE: 18 BRPM | HEIGHT: 60 IN | OXYGEN SATURATION: 99 %
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
## 2023-05-20 DIAGNOSIS — S40.022A CONTUSION OF LEFT UPPER ARM, INITIAL ENCOUNTER: ICD-10-CM

## 2023-05-20 DIAGNOSIS — S00.83XA CONTUSION OF FACE, INITIAL ENCOUNTER: ICD-10-CM

## 2023-05-20 DIAGNOSIS — S20.212A CONTUSION OF LEFT CHEST WALL, INITIAL ENCOUNTER: ICD-10-CM

## 2023-05-20 PROCEDURE — 99284 EMERGENCY DEPT VISIT MOD MDM: CPT

## 2023-05-20 RX ORDER — LOSARTAN POTASSIUM 25 MG/1
TABLET ORAL
COMMUNITY
Start: 2023-05-16

## 2023-05-20 RX ORDER — ASPIRIN 81 MG/1
81 TABLET ORAL DAILY
COMMUNITY

## 2023-05-20 RX ORDER — ALENDRONATE SODIUM 70 MG/1
TABLET ORAL
COMMUNITY
Start: 2023-05-15

## 2023-05-20 RX ORDER — LETROZOLE 2.5 MG/1
1 TABLET, FILM COATED ORAL NIGHTLY
COMMUNITY

## 2023-05-20 RX ORDER — HYDROXYZINE PAMOATE 25 MG/1
CAPSULE ORAL
COMMUNITY
Start: 2023-03-30

## 2023-05-20 RX ORDER — LABETALOL 100 MG/1
TABLET, FILM COATED ORAL
COMMUNITY
Start: 2023-03-24

## 2023-05-20 RX ORDER — LEVOTHYROXINE SODIUM 0.05 MG/1
TABLET ORAL
COMMUNITY
Start: 2020-10-08

## 2023-05-20 RX ORDER — QUETIAPINE FUMARATE 25 MG/1
TABLET, FILM COATED ORAL
COMMUNITY
Start: 2023-03-14

## 2023-05-20 ASSESSMENT — PAIN - FUNCTIONAL ASSESSMENT: PAIN_FUNCTIONAL_ASSESSMENT: 0-10

## 2023-05-20 ASSESSMENT — LIFESTYLE VARIABLES
HOW OFTEN DO YOU HAVE A DRINK CONTAINING ALCOHOL: NEVER
HOW MANY STANDARD DRINKS CONTAINING ALCOHOL DO YOU HAVE ON A TYPICAL DAY: PATIENT DOES NOT DRINK

## 2023-05-21 ENCOUNTER — APPOINTMENT (OUTPATIENT)
Facility: HOSPITAL | Age: 88
End: 2023-05-21
Payer: MEDICARE

## 2023-05-21 PROCEDURE — 71250 CT THORAX DX C-: CPT

## 2023-05-21 PROCEDURE — 72125 CT NECK SPINE W/O DYE: CPT

## 2023-05-21 PROCEDURE — 70486 CT MAXILLOFACIAL W/O DYE: CPT

## 2023-05-21 PROCEDURE — 6370000000 HC RX 637 (ALT 250 FOR IP): Performed by: EMERGENCY MEDICINE

## 2023-05-21 PROCEDURE — 73060 X-RAY EXAM OF HUMERUS: CPT

## 2023-05-21 PROCEDURE — 70450 CT HEAD/BRAIN W/O DYE: CPT

## 2023-05-21 RX ORDER — ACETAMINOPHEN 500 MG
1000 TABLET ORAL
Status: COMPLETED | OUTPATIENT
Start: 2023-05-21 | End: 2023-05-21

## 2023-05-21 RX ADMIN — ACETAMINOPHEN 1000 MG: 500 TABLET ORAL at 01:49

## 2023-05-30 ENCOUNTER — APPOINTMENT (OUTPATIENT)
Facility: HOSPITAL | Age: 88
End: 2023-05-30
Payer: MEDICARE

## 2023-05-30 ENCOUNTER — HOSPITAL ENCOUNTER (EMERGENCY)
Facility: HOSPITAL | Age: 88
Discharge: HOME | End: 2023-05-30
Attending: EMERGENCY MEDICINE
Payer: MEDICARE

## 2023-05-30 VITALS
BODY MASS INDEX: 22.38 KG/M2 | RESPIRATION RATE: 17 BRPM | HEIGHT: 60 IN | WEIGHT: 114 LBS | TEMPERATURE: 97.8 F | OXYGEN SATURATION: 100 % | DIASTOLIC BLOOD PRESSURE: 82 MMHG | SYSTOLIC BLOOD PRESSURE: 145 MMHG | HEART RATE: 92 BPM

## 2023-05-30 DIAGNOSIS — R26.2 DIFFICULTY WALKING: Primary | ICD-10-CM

## 2023-05-30 DIAGNOSIS — G20 PARKINSON'S DISEASE (HCC): ICD-10-CM

## 2023-05-30 LAB
ALBUMIN SERPL-MCNC: 3.5 G/DL (ref 3.5–5)
ALBUMIN/GLOB SERPL: 0.9 (ref 1.1–2.2)
ALP SERPL-CCNC: 80 U/L (ref 45–117)
ALT SERPL-CCNC: 13 U/L (ref 12–78)
ANION GAP SERPL CALC-SCNC: 5 MMOL/L (ref 5–15)
APPEARANCE UR: CLEAR
AST SERPL-CCNC: 16 U/L (ref 15–37)
BACTERIA URNS QL MICRO: NEGATIVE /HPF
BASOPHILS # BLD: 0 K/UL (ref 0–0.1)
BASOPHILS NFR BLD: 0 % (ref 0–1)
BILIRUB SERPL-MCNC: 0.4 MG/DL (ref 0.2–1)
BILIRUB UR QL: NEGATIVE
BUN SERPL-MCNC: 27 MG/DL (ref 6–20)
BUN/CREAT SERPL: 26 (ref 12–20)
CALCIUM SERPL-MCNC: 9.2 MG/DL (ref 8.5–10.1)
CHLORIDE SERPL-SCNC: 111 MMOL/L (ref 97–108)
CO2 SERPL-SCNC: 23 MMOL/L (ref 21–32)
COLOR UR: ABNORMAL
COMMENT:: NORMAL
CREAT SERPL-MCNC: 1.04 MG/DL (ref 0.55–1.02)
DIFFERENTIAL METHOD BLD: ABNORMAL
EOSINOPHIL # BLD: 0.2 K/UL (ref 0–0.4)
EOSINOPHIL NFR BLD: 3 % (ref 0–7)
EPITH CASTS URNS QL MICRO: ABNORMAL /LPF
ERYTHROCYTE [DISTWIDTH] IN BLOOD BY AUTOMATED COUNT: 13.4 % (ref 11.5–14.5)
GLOBULIN SER CALC-MCNC: 4.1 G/DL (ref 2–4)
GLUCOSE SERPL-MCNC: 112 MG/DL (ref 65–100)
GLUCOSE UR STRIP.AUTO-MCNC: NEGATIVE MG/DL
HCT VFR BLD AUTO: 35 % (ref 35–47)
HGB BLD-MCNC: 11.1 G/DL (ref 11.5–16)
HGB UR QL STRIP: NEGATIVE
HYALINE CASTS URNS QL MICRO: ABNORMAL /LPF (ref 0–2)
IMM GRANULOCYTES # BLD AUTO: 0 K/UL
IMM GRANULOCYTES NFR BLD AUTO: 0 %
KETONES UR QL STRIP.AUTO: NEGATIVE MG/DL
LEUKOCYTE ESTERASE UR QL STRIP.AUTO: ABNORMAL
LYMPHOCYTES # BLD: 0.6 K/UL (ref 0.8–3.5)
LYMPHOCYTES NFR BLD: 9 % (ref 12–49)
MCH RBC QN AUTO: 28.6 PG (ref 26–34)
MCHC RBC AUTO-ENTMCNC: 31.7 G/DL (ref 30–36.5)
MCV RBC AUTO: 90.2 FL (ref 80–99)
MONOCYTES # BLD: 0.5 K/UL (ref 0–1)
MONOCYTES NFR BLD: 7 % (ref 5–13)
NEUTS BAND NFR BLD MANUAL: 1 % (ref 0–6)
NEUTS SEG # BLD: 5.3 K/UL (ref 1.8–8)
NEUTS SEG NFR BLD: 80 % (ref 32–75)
NITRITE UR QL STRIP.AUTO: NEGATIVE
NRBC # BLD: 0 K/UL (ref 0–0.01)
NRBC BLD-RTO: 0 PER 100 WBC
PH UR STRIP: 6 (ref 5–8)
PLATELET # BLD AUTO: 453 K/UL (ref 150–400)
PLATELET COMMENT: ABNORMAL
PMV BLD AUTO: 9.2 FL (ref 8.9–12.9)
POTASSIUM SERPL-SCNC: 3.9 MMOL/L (ref 3.5–5.1)
PROT SERPL-MCNC: 7.6 G/DL (ref 6.4–8.2)
PROT UR STRIP-MCNC: NEGATIVE MG/DL
RBC # BLD AUTO: 3.88 M/UL (ref 3.8–5.2)
RBC #/AREA URNS HPF: ABNORMAL /HPF (ref 0–5)
RBC MORPH BLD: ABNORMAL
SODIUM SERPL-SCNC: 139 MMOL/L (ref 136–145)
SP GR UR REFRACTOMETRY: 1.01 (ref 1–1.03)
SPECIMEN HOLD: NORMAL
TROPONIN I SERPL HS-MCNC: 11 NG/L (ref 0–51)
URINE CULTURE IF INDICATED: ABNORMAL
UROBILINOGEN UR QL STRIP.AUTO: 0.2 EU/DL (ref 0.2–1)
WBC # BLD AUTO: 6.6 K/UL (ref 3.6–11)
WBC URNS QL MICRO: ABNORMAL /HPF (ref 0–4)

## 2023-05-30 PROCEDURE — 81001 URINALYSIS AUTO W/SCOPE: CPT

## 2023-05-30 PROCEDURE — 99284 EMERGENCY DEPT VISIT MOD MDM: CPT

## 2023-05-30 PROCEDURE — 36415 COLL VENOUS BLD VENIPUNCTURE: CPT

## 2023-05-30 PROCEDURE — 70450 CT HEAD/BRAIN W/O DYE: CPT

## 2023-05-30 PROCEDURE — 85025 COMPLETE CBC W/AUTO DIFF WBC: CPT

## 2023-05-30 PROCEDURE — 93005 ELECTROCARDIOGRAM TRACING: CPT | Performed by: EMERGENCY MEDICINE

## 2023-05-30 PROCEDURE — 80053 COMPREHEN METABOLIC PANEL: CPT

## 2023-05-30 PROCEDURE — 84484 ASSAY OF TROPONIN QUANT: CPT

## 2023-05-30 NOTE — ED TRIAGE NOTES
Pt family reports baseline dementia, reports 2 falls in 1 week. Evaluated after first fall and cleared. Second fall yesterday assisted by daughter. Hx of parkinson.       Daughter reports she noticed unsteady gait today after carbidopa/levodopa which has happened in the past.

## 2023-05-30 NOTE — ED PROVIDER NOTES
[facility] EMERGENCY DEPT  EMERGENCY DEPARTMENT ENCOUNTER      Pt Name: Ernst Mccloud  MRN: [id number]  [place] 1935  Date of evaluation: 5/30/2023  Provider: Milana Thomas MD    CHIEF COMPLAINT       Chief Complaint   Patient presents with    Gait Problem         HISTORY OF PRESENT ILLNESS   (Location/Symptom, Timing/Onset, Context/Setting, Quality, Duration, Modifying Factors, Severity)  Note limiting factors. 68-year-old with a history of hypertension, hyperlipidemia, anxiety, arthritis, breast cancer, depression, diverticulitis, GERD, Parkinson's disease, hypothyroidism. She presents accompanied by her daughter who provides most of the history. Her daughter states that the patient has had trouble walking today. She has had intermittent trouble walking in the past with her Parkinson's. When she does not take her carbidopa levodopa, her daughter reports that her legs become stiff, and she has trouble walking. When she does take it, her daughter states that she becomes \"careless\" and tries to go too fast.  Today after taking the medication, she had trouble walking at all. She seemed better and was able to bear weight when she got to the hospital.  She has had multiple recent falls. Her last fall was yesterday. Her daughter states that she is currently in the process of moving into assisted living. Review of External Medical Records:     Nursing Notes were reviewed. REVIEW OF SYSTEMS    (2-9 systems for level 4, 10 or more for level 5)     Review of Systems    Except as noted above the remainder of the review of systems was reviewed and negative.        PAST MEDICAL HISTORY     Past Medical History:   Diagnosis Date    Anxiety 6/16/2020    Arthritis     Breast cancer ([facility] [address]     Breast cancer ([facility] [address]     Depression 6/16/2020    Diverticulitis 6/16/2020    GERD (gastroesophageal reflux disease) 6/16/2020    High cholesterol 6/16/2020    Hx of dizziness 6/16/2020    Hypertension 6/16/2020

## 2023-05-31 LAB
EKG ATRIAL RATE: 84 BPM
EKG DIAGNOSIS: NORMAL
EKG P AXIS: 81 DEGREES
EKG P-R INTERVAL: 132 MS
EKG Q-T INTERVAL: 344 MS
EKG QRS DURATION: 64 MS
EKG QTC CALCULATION (BAZETT): 406 MS
EKG R AXIS: 54 DEGREES
EKG T AXIS: 78 DEGREES
EKG VENTRICULAR RATE: 84 BPM

## 2023-05-31 NOTE — ED NOTES
Discharge instructions given to patient relative. Patient not in distress. No complaint. Assisted via wheelchair from bed going to car.      Devaughn Wesley RN  05/30/23 2002

## 2023-06-22 ENCOUNTER — HOSPITAL ENCOUNTER (EMERGENCY)
Facility: HOSPITAL | Age: 88
Discharge: HOME | End: 2023-06-23
Attending: EMERGENCY MEDICINE
Payer: MEDICARE

## 2023-06-22 ENCOUNTER — APPOINTMENT (OUTPATIENT)
Facility: HOSPITAL | Age: 88
End: 2023-06-22
Payer: MEDICARE

## 2023-06-22 VITALS
BODY MASS INDEX: 21.71 KG/M2 | WEIGHT: 115 LBS | OXYGEN SATURATION: 98 % | RESPIRATION RATE: 18 BRPM | DIASTOLIC BLOOD PRESSURE: 74 MMHG | HEART RATE: 93 BPM | SYSTOLIC BLOOD PRESSURE: 136 MMHG | TEMPERATURE: 98 F | HEIGHT: 61 IN

## 2023-06-22 DIAGNOSIS — S00.83XA CONTUSION OF FACE, INITIAL ENCOUNTER: Primary | ICD-10-CM

## 2023-06-22 PROCEDURE — 99284 EMERGENCY DEPT VISIT MOD MDM: CPT

## 2023-06-22 PROCEDURE — 72125 CT NECK SPINE W/O DYE: CPT

## 2023-06-22 PROCEDURE — 70450 CT HEAD/BRAIN W/O DYE: CPT

## 2023-06-22 ASSESSMENT — PAIN SCALES - GENERAL: PAINLEVEL_OUTOF10: 10

## 2023-06-22 ASSESSMENT — PAIN DESCRIPTION - LOCATION: LOCATION: HEAD

## 2023-06-22 NOTE — ED PROVIDER NOTES
reactive to light. Cardiovascular:      Rate and Rhythm: Normal rate. Pulmonary:      Effort: Pulmonary effort is normal.   Abdominal:      General: Abdomen is flat. Skin:     Comments: Small contusion overlying the left eyebrow where she struck her head. Surrounding ecchymosis. Neurological:      Mental Status: She is alert and oriented to person, place, and time. Psychiatric:         Mood and Affect: Mood normal.       DIAGNOSTIC RESULTS     EKG: All EKG's are interpreted by the Emergency Department Physician who either signs or Co-signs this chart in the absence of a cardiologist.        RADIOLOGY:   Non-plain film images such as CT, Ultrasound and MRI are read by the radiologist. Plain radiographic images are visualized and preliminarily interpreted by the emergency physician with the below findings:        Interpretation per the Radiologist below, if available at the time of this note:    CT HEAD WO CONTRAST   Final Result   No acute intracranial findings. CT CERVICAL SPINE WO CONTRAST   Final Result   No acute fracture or dislocation demonstrated. Degenerative findings   again shown with severe right and moderate left C1-2 arthrosis again   demonstrated associated with atlantoaxial subluxation. LABS:  Labs Reviewed - No data to display    All other labs were within normal range or not returned as of this dictation. EMERGENCY DEPARTMENT COURSE and DIFFERENTIAL DIAGNOSIS/MDM:   Vitals:    Vitals:    06/22/23 1600 06/22/23 1957   BP: (!) 182/98 (!) 162/93   Pulse: 100 89   Resp: 18 18   Temp: 98 °F (36.7 °C) 98 °F (36.7 °C)   TempSrc: Oral Oral   SpO2: 99% 98%   Weight: 52.2 kg (115 lb)    Height: 1.549 m (5' 1\")            Medical Decision Making  Stable patient. Well-appearing here, slight contusion to the head she had better throughout her stay. Imaging does not reveal any obvious acute intracranial abnormality. Will discharge patient home.   Follow-up and return

## 2023-06-22 NOTE — ED TRIAGE NOTES
Pt arrives via EMS w/ c/c of GLF. Pt reports she hit her head when she fell. Pt is from SNF. Denies LOC. No anticoags per EMS. Hematoma to L forehead.  for EMS.

## 2023-06-23 NOTE — ED NOTES
Transportation arrived and transported pt      Kris Huizar, [address] [facility]  06/22/23 8001

## 2023-07-06 ENCOUNTER — HOSPITAL ENCOUNTER (EMERGENCY)
Facility: HOSPITAL | Age: 88
Discharge: HOME | End: 2023-07-06
Attending: EMERGENCY MEDICINE
Payer: MEDICARE

## 2023-07-06 ENCOUNTER — APPOINTMENT (OUTPATIENT)
Facility: HOSPITAL | Age: 88
End: 2023-07-06
Payer: MEDICARE

## 2023-07-06 VITALS
WEIGHT: 115 LBS | HEIGHT: 61 IN | OXYGEN SATURATION: 96 % | DIASTOLIC BLOOD PRESSURE: 75 MMHG | TEMPERATURE: 98.8 F | BODY MASS INDEX: 21.71 KG/M2 | HEART RATE: 79 BPM | RESPIRATION RATE: 16 BRPM | SYSTOLIC BLOOD PRESSURE: 171 MMHG

## 2023-07-06 DIAGNOSIS — S01.81XA FACIAL LACERATION, INITIAL ENCOUNTER: ICD-10-CM

## 2023-07-06 DIAGNOSIS — S09.90XA INJURY OF HEAD, INITIAL ENCOUNTER: Primary | ICD-10-CM

## 2023-07-06 PROCEDURE — 12011 RPR F/E/E/N/L/M 2.5 CM/<: CPT

## 2023-07-06 PROCEDURE — 99284 EMERGENCY DEPT VISIT MOD MDM: CPT

## 2023-07-06 PROCEDURE — 70450 CT HEAD/BRAIN W/O DYE: CPT

## 2023-07-06 RX ORDER — LIDOCAINE HYDROCHLORIDE AND EPINEPHRINE BITARTRATE 20; .01 MG/ML; MG/ML
20 INJECTION, SOLUTION SUBCUTANEOUS
Status: DISCONTINUED | OUTPATIENT
Start: 2023-07-06 | End: 2023-07-06 | Stop reason: HOSPADM

## 2023-07-06 ASSESSMENT — ENCOUNTER SYMPTOMS
CHEST TIGHTNESS: 0
FACIAL SWELLING: 0
ABDOMINAL PAIN: 0
EYE DISCHARGE: 0
EYE PAIN: 0
SORE THROAT: 0
DIARRHEA: 0
BACK PAIN: 0
COUGH: 0
VOMITING: 0
NAUSEA: 0
SHORTNESS OF BREATH: 0

## 2023-07-06 ASSESSMENT — PAIN SCALES - GENERAL: PAINLEVEL_OUTOF10: 2

## 2023-07-06 ASSESSMENT — PAIN - FUNCTIONAL ASSESSMENT: PAIN_FUNCTIONAL_ASSESSMENT: 0-10

## 2023-07-06 NOTE — ED TRIAGE NOTES
Patient arrives to the ED via EMS with complaints of a fall that occurred 30 ago. Patient was seen last week for similar fall and reopened injury. Patient uses wheelchair and fell forward. Patient has laceration and bruising to left eyebrow. Patient reports taking aspirin. PMH of Parkinsons.

## 2023-07-06 NOTE — ED NOTES
Kimberli  [phone]    Lives in [place]. Can call for updates or any complications.      Concepción Viveros RN  07/06/23 8042

## 2023-07-06 NOTE — ED NOTES
Verbal shift change report given to [facility] (oncoming nurse) by Debbie Mccurdy (offgoing nurse). Report included the following information Nurse Handoff Report, Index, Intake/Output, MAR, and Recent Results.         Camille Luna RN  07/06/23 3063

## 2023-07-07 NOTE — ED NOTES
SBAR report given to [facility] staff. [facility] staff here to transport pt home.       Mohinder Christian RN  07/06/23 2006

## 2023-08-07 ENCOUNTER — HOSPITAL ENCOUNTER (INPATIENT)
Facility: HOSPITAL | Age: 88
LOS: 10 days | Discharge: NURSING, REHAB OR LONG-TERM CARE | DRG: 177 | End: 2023-08-17
Attending: EMERGENCY MEDICINE | Admitting: INTERNAL MEDICINE
Payer: MEDICARE

## 2023-08-07 ENCOUNTER — APPOINTMENT (OUTPATIENT)
Facility: HOSPITAL | Age: 88
DRG: 177 | End: 2023-08-07
Attending: INTERNAL MEDICINE
Payer: MEDICARE

## 2023-08-07 ENCOUNTER — APPOINTMENT (OUTPATIENT)
Facility: HOSPITAL | Age: 88
DRG: 177 | End: 2023-08-07
Payer: MEDICARE

## 2023-08-07 DIAGNOSIS — R77.8 TROPONIN LEVEL ELEVATED: ICD-10-CM

## 2023-08-07 DIAGNOSIS — R41.0 DELIRIUM: ICD-10-CM

## 2023-08-07 DIAGNOSIS — R55 SYNCOPE, UNSPECIFIED SYNCOPE TYPE: Primary | ICD-10-CM

## 2023-08-07 PROBLEM — A41.9 SEPSIS (HCC): Status: ACTIVE | Noted: 2023-08-07

## 2023-08-07 PROBLEM — Z87.898 HX OF DIZZINESS: Status: RESOLVED | Noted: 2020-06-16 | Resolved: 2023-08-07

## 2023-08-07 PROBLEM — N17.9 AKI (ACUTE KIDNEY INJURY) (HCC): Status: RESOLVED | Noted: 2021-02-20 | Resolved: 2023-08-07

## 2023-08-07 PROBLEM — R42 DIZZINESS: Status: RESOLVED | Noted: 2020-10-05 | Resolved: 2023-08-07

## 2023-08-07 PROBLEM — G93.41 ACUTE METABOLIC ENCEPHALOPATHY: Status: RESOLVED | Noted: 2023-02-17 | Resolved: 2023-08-07

## 2023-08-07 LAB
ALBUMIN SERPL-MCNC: 3.1 G/DL (ref 3.5–5)
ALBUMIN/GLOB SERPL: 0.8 (ref 1.1–2.2)
ALP SERPL-CCNC: 119 U/L (ref 45–117)
ALT SERPL-CCNC: 67 U/L (ref 12–78)
AMMONIA PLAS-SCNC: 11 UMOL/L
ANION GAP SERPL CALC-SCNC: 10 MMOL/L (ref 5–15)
AST SERPL-CCNC: 140 U/L (ref 15–37)
BASOPHILS # BLD: 0 K/UL (ref 0–0.1)
BASOPHILS NFR BLD: 0 % (ref 0–1)
BILIRUB SERPL-MCNC: 0.4 MG/DL (ref 0.2–1)
BUN SERPL-MCNC: 17 MG/DL (ref 6–20)
BUN/CREAT SERPL: 15 (ref 12–20)
CALCIUM SERPL-MCNC: 9.1 MG/DL (ref 8.5–10.1)
CHLORIDE SERPL-SCNC: 109 MMOL/L (ref 97–108)
CO2 SERPL-SCNC: 23 MMOL/L (ref 21–32)
COMMENT:: NORMAL
CREAT SERPL-MCNC: 1.12 MG/DL (ref 0.55–1.02)
D DIMER PPP FEU-MCNC: 17.86 MG/L FEU (ref 0–0.65)
DIFFERENTIAL METHOD BLD: ABNORMAL
EOSINOPHIL # BLD: 0 K/UL (ref 0–0.4)
EOSINOPHIL NFR BLD: 1 % (ref 0–7)
ERYTHROCYTE [DISTWIDTH] IN BLOOD BY AUTOMATED COUNT: 13.9 % (ref 11.5–14.5)
GLOBULIN SER CALC-MCNC: 4 G/DL (ref 2–4)
GLUCOSE SERPL-MCNC: 138 MG/DL (ref 65–100)
HCT VFR BLD AUTO: 36 % (ref 35–47)
HGB BLD-MCNC: 11.3 G/DL (ref 11.5–16)
IMM GRANULOCYTES # BLD AUTO: 0.1 K/UL (ref 0–0.04)
IMM GRANULOCYTES NFR BLD AUTO: 2 % (ref 0–0.5)
LACTATE BLD-SCNC: 2.32 MMOL/L (ref 0.4–2)
LACTATE SERPL-SCNC: 1.5 MMOL/L (ref 0.4–2)
LYMPHOCYTES # BLD: 1.4 K/UL (ref 0.8–3.5)
LYMPHOCYTES NFR BLD: 25 % (ref 12–49)
MAGNESIUM SERPL-MCNC: 2.1 MG/DL (ref 1.6–2.4)
MCH RBC QN AUTO: 28.5 PG (ref 26–34)
MCHC RBC AUTO-ENTMCNC: 31.4 G/DL (ref 30–36.5)
MCV RBC AUTO: 90.7 FL (ref 80–99)
MONOCYTES # BLD: 0.8 K/UL (ref 0–1)
MONOCYTES NFR BLD: 13 % (ref 5–13)
NEUTS SEG # BLD: 3.3 K/UL (ref 1.8–8)
NEUTS SEG NFR BLD: 59 % (ref 32–75)
NRBC # BLD: 0 K/UL (ref 0–0.01)
NRBC BLD-RTO: 0 PER 100 WBC
NT PRO BNP: 1822 PG/ML
NT PRO BNP: 581 PG/ML
PLATELET # BLD AUTO: 388 K/UL (ref 150–400)
PMV BLD AUTO: 9.2 FL (ref 8.9–12.9)
POTASSIUM SERPL-SCNC: 4 MMOL/L (ref 3.5–5.1)
PROT SERPL-MCNC: 7.1 G/DL (ref 6.4–8.2)
RBC # BLD AUTO: 3.97 M/UL (ref 3.8–5.2)
SARS-COV-2 RDRP RESP QL NAA+PROBE: DETECTED
SODIUM SERPL-SCNC: 142 MMOL/L (ref 136–145)
SOURCE: ABNORMAL
SPECIMEN HOLD: NORMAL
TROPONIN I SERPL HS-MCNC: 3756 NG/L (ref 0–51)
TROPONIN I SERPL HS-MCNC: 93 NG/L (ref 0–51)
TSH SERPL DL<=0.05 MIU/L-ACNC: 2.81 UIU/ML (ref 0.36–3.74)
WBC # BLD AUTO: 5.7 K/UL (ref 3.6–11)

## 2023-08-07 PROCEDURE — 94761 N-INVAS EAR/PLS OXIMETRY MLT: CPT

## 2023-08-07 PROCEDURE — 96368 THER/DIAG CONCURRENT INF: CPT

## 2023-08-07 PROCEDURE — 2580000003 HC RX 258

## 2023-08-07 PROCEDURE — 70450 CT HEAD/BRAIN W/O DYE: CPT

## 2023-08-07 PROCEDURE — 82607 VITAMIN B-12: CPT

## 2023-08-07 PROCEDURE — 82140 ASSAY OF AMMONIA: CPT

## 2023-08-07 PROCEDURE — 6360000002 HC RX W HCPCS: Performed by: INTERNAL MEDICINE

## 2023-08-07 PROCEDURE — 93005 ELECTROCARDIOGRAM TRACING: CPT | Performed by: INTERNAL MEDICINE

## 2023-08-07 PROCEDURE — 2500000003 HC RX 250 WO HCPCS

## 2023-08-07 PROCEDURE — 84484 ASSAY OF TROPONIN QUANT: CPT

## 2023-08-07 PROCEDURE — 6360000002 HC RX W HCPCS: Performed by: EMERGENCY MEDICINE

## 2023-08-07 PROCEDURE — 84443 ASSAY THYROID STIM HORMONE: CPT

## 2023-08-07 PROCEDURE — 83605 ASSAY OF LACTIC ACID: CPT

## 2023-08-07 PROCEDURE — 2580000003 HC RX 258: Performed by: EMERGENCY MEDICINE

## 2023-08-07 PROCEDURE — 2580000003 HC RX 258: Performed by: INTERNAL MEDICINE

## 2023-08-07 PROCEDURE — 87040 BLOOD CULTURE FOR BACTERIA: CPT

## 2023-08-07 PROCEDURE — 99285 EMERGENCY DEPT VISIT HI MDM: CPT

## 2023-08-07 PROCEDURE — 78580 LUNG PERFUSION IMAGING: CPT

## 2023-08-07 PROCEDURE — 71045 X-RAY EXAM CHEST 1 VIEW: CPT

## 2023-08-07 PROCEDURE — 36415 COLL VENOUS BLD VENIPUNCTURE: CPT

## 2023-08-07 PROCEDURE — 85025 COMPLETE CBC W/AUTO DIFF WBC: CPT

## 2023-08-07 PROCEDURE — 87150 DNA/RNA AMPLIFIED PROBE: CPT

## 2023-08-07 PROCEDURE — 87635 SARS-COV-2 COVID-19 AMP PRB: CPT

## 2023-08-07 PROCEDURE — 83880 ASSAY OF NATRIURETIC PEPTIDE: CPT

## 2023-08-07 PROCEDURE — 96372 THER/PROPH/DIAG INJ SC/IM: CPT

## 2023-08-07 PROCEDURE — 96365 THER/PROPH/DIAG IV INF INIT: CPT

## 2023-08-07 PROCEDURE — 93005 ELECTROCARDIOGRAM TRACING: CPT | Performed by: EMERGENCY MEDICINE

## 2023-08-07 PROCEDURE — 2700000000 HC OXYGEN THERAPY PER DAY

## 2023-08-07 PROCEDURE — 85379 FIBRIN DEGRADATION QUANT: CPT

## 2023-08-07 PROCEDURE — 83735 ASSAY OF MAGNESIUM: CPT

## 2023-08-07 PROCEDURE — 6360000002 HC RX W HCPCS

## 2023-08-07 PROCEDURE — 80053 COMPREHEN METABOLIC PANEL: CPT

## 2023-08-07 PROCEDURE — 6370000000 HC RX 637 (ALT 250 FOR IP): Performed by: INTERNAL MEDICINE

## 2023-08-07 PROCEDURE — 96375 TX/PRO/DX INJ NEW DRUG ADDON: CPT

## 2023-08-07 PROCEDURE — 2000000000 HC ICU R&B

## 2023-08-07 RX ORDER — ASPIRIN 81 MG/1
81 TABLET, CHEWABLE ORAL DAILY
Status: DISCONTINUED | OUTPATIENT
Start: 2023-08-07 | End: 2023-08-17 | Stop reason: HOSPADM

## 2023-08-07 RX ORDER — SODIUM CHLORIDE 9 MG/ML
INJECTION, SOLUTION INTRAVENOUS PRN
Status: DISCONTINUED | OUTPATIENT
Start: 2023-08-07 | End: 2023-08-17 | Stop reason: HOSPADM

## 2023-08-07 RX ORDER — SODIUM CHLORIDE, SODIUM LACTATE, POTASSIUM CHLORIDE, CALCIUM CHLORIDE 600; 310; 30; 20 MG/100ML; MG/100ML; MG/100ML; MG/100ML
INJECTION, SOLUTION INTRAVENOUS CONTINUOUS
Status: DISCONTINUED | OUTPATIENT
Start: 2023-08-07 | End: 2023-08-09

## 2023-08-07 RX ORDER — ENOXAPARIN SODIUM 100 MG/ML
40 INJECTION SUBCUTANEOUS DAILY
Status: DISCONTINUED | OUTPATIENT
Start: 2023-08-07 | End: 2023-08-08

## 2023-08-07 RX ORDER — NOREPINEPHRINE BITARTRATE 0.06 MG/ML
1-100 INJECTION, SOLUTION INTRAVENOUS CONTINUOUS
Status: DISCONTINUED | OUTPATIENT
Start: 2023-08-07 | End: 2023-08-08

## 2023-08-07 RX ORDER — ONDANSETRON 2 MG/ML
4 INJECTION INTRAMUSCULAR; INTRAVENOUS EVERY 6 HOURS PRN
Status: DISCONTINUED | OUTPATIENT
Start: 2023-08-07 | End: 2023-08-17 | Stop reason: HOSPADM

## 2023-08-07 RX ORDER — QUETIAPINE FUMARATE 25 MG/1
25 TABLET, FILM COATED ORAL NIGHTLY
Status: DISCONTINUED | OUTPATIENT
Start: 2023-08-07 | End: 2023-08-17 | Stop reason: HOSPADM

## 2023-08-07 RX ORDER — CARBIDOPA/LEVODOPA 25MG-250MG
1 TABLET ORAL 3 TIMES DAILY
Status: DISCONTINUED | OUTPATIENT
Start: 2023-08-07 | End: 2023-08-08

## 2023-08-07 RX ORDER — DEXAMETHASONE SODIUM PHOSPHATE 10 MG/ML
6 INJECTION, SOLUTION INTRAMUSCULAR; INTRAVENOUS EVERY 12 HOURS
Status: DISCONTINUED | OUTPATIENT
Start: 2023-08-08 | End: 2023-08-09

## 2023-08-07 RX ORDER — SODIUM CHLORIDE 0.9 % (FLUSH) 0.9 %
5-40 SYRINGE (ML) INJECTION PRN
Status: DISCONTINUED | OUTPATIENT
Start: 2023-08-07 | End: 2023-08-17 | Stop reason: HOSPADM

## 2023-08-07 RX ORDER — SODIUM CHLORIDE 0.9 % (FLUSH) 0.9 %
5-40 SYRINGE (ML) INJECTION EVERY 12 HOURS SCHEDULED
Status: DISCONTINUED | OUTPATIENT
Start: 2023-08-07 | End: 2023-08-17 | Stop reason: HOSPADM

## 2023-08-07 RX ORDER — ACETAMINOPHEN 325 MG/1
650 TABLET ORAL ONCE
Status: COMPLETED | OUTPATIENT
Start: 2023-08-07 | End: 2023-08-07

## 2023-08-07 RX ORDER — 0.9 % SODIUM CHLORIDE 0.9 %
500 INTRAVENOUS SOLUTION INTRAVENOUS ONCE
Status: COMPLETED | OUTPATIENT
Start: 2023-08-07 | End: 2023-08-07

## 2023-08-07 RX ORDER — SODIUM CHLORIDE 9 MG/ML
INJECTION, SOLUTION INTRAVENOUS CONTINUOUS
Status: DISCONTINUED | OUTPATIENT
Start: 2023-08-07 | End: 2023-08-07

## 2023-08-07 RX ORDER — SODIUM CHLORIDE, SODIUM LACTATE, POTASSIUM CHLORIDE, AND CALCIUM CHLORIDE .6; .31; .03; .02 G/100ML; G/100ML; G/100ML; G/100ML
30 INJECTION, SOLUTION INTRAVENOUS ONCE
Status: COMPLETED | OUTPATIENT
Start: 2023-08-07 | End: 2023-08-07

## 2023-08-07 RX ORDER — ACETAMINOPHEN 325 MG/1
650 TABLET ORAL EVERY 4 HOURS PRN
Status: DISCONTINUED | OUTPATIENT
Start: 2023-08-07 | End: 2023-08-17 | Stop reason: HOSPADM

## 2023-08-07 RX ORDER — POLYETHYLENE GLYCOL 3350 17 G/17G
17 POWDER, FOR SOLUTION ORAL DAILY PRN
Status: DISCONTINUED | OUTPATIENT
Start: 2023-08-07 | End: 2023-08-17 | Stop reason: HOSPADM

## 2023-08-07 RX ORDER — LABETALOL 100 MG/1
100 TABLET, FILM COATED ORAL EVERY 12 HOURS SCHEDULED
Status: DISCONTINUED | OUTPATIENT
Start: 2023-08-07 | End: 2023-08-07

## 2023-08-07 RX ORDER — 0.9 % SODIUM CHLORIDE 0.9 %
1000 INTRAVENOUS SOLUTION INTRAVENOUS ONCE
Status: COMPLETED | OUTPATIENT
Start: 2023-08-07 | End: 2023-08-07

## 2023-08-07 RX ORDER — ONDANSETRON 4 MG/1
4 TABLET, ORALLY DISINTEGRATING ORAL EVERY 8 HOURS PRN
Status: DISCONTINUED | OUTPATIENT
Start: 2023-08-07 | End: 2023-08-17 | Stop reason: HOSPADM

## 2023-08-07 RX ORDER — LORAZEPAM 2 MG/ML
1 INJECTION INTRAMUSCULAR ONCE
Status: COMPLETED | OUTPATIENT
Start: 2023-08-07 | End: 2023-08-07

## 2023-08-07 RX ORDER — DEXAMETHASONE SODIUM PHOSPHATE 10 MG/ML
6 INJECTION, SOLUTION INTRAMUSCULAR; INTRAVENOUS DAILY
Status: DISCONTINUED | OUTPATIENT
Start: 2023-08-07 | End: 2023-08-07

## 2023-08-07 RX ORDER — LEVOTHYROXINE SODIUM 0.03 MG/1
25 TABLET ORAL
Status: DISCONTINUED | OUTPATIENT
Start: 2023-08-08 | End: 2023-08-08

## 2023-08-07 RX ADMIN — ENOXAPARIN SODIUM 40 MG: 100 INJECTION SUBCUTANEOUS at 17:20

## 2023-08-07 RX ADMIN — DEXAMETHASONE SODIUM PHOSPHATE 6 MG: 10 INJECTION, SOLUTION INTRAMUSCULAR; INTRAVENOUS at 19:47

## 2023-08-07 RX ADMIN — NOREPINEPHRINE BITARTRATE 5 MCG/MIN: 1 SOLUTION INTRAVENOUS at 21:13

## 2023-08-07 RX ADMIN — VANCOMYCIN HYDROCHLORIDE 1500 MG: 1.5 INJECTION, POWDER, LYOPHILIZED, FOR SOLUTION INTRAVENOUS at 21:07

## 2023-08-07 RX ADMIN — SODIUM CHLORIDE 500 ML: 9 INJECTION, SOLUTION INTRAVENOUS at 18:11

## 2023-08-07 RX ADMIN — ASPIRIN 81 MG: 81 TABLET, CHEWABLE ORAL at 17:20

## 2023-08-07 RX ADMIN — ACETAMINOPHEN 650 MG: 325 TABLET ORAL at 17:19

## 2023-08-07 RX ADMIN — SODIUM CHLORIDE 500 ML: 9 INJECTION, SOLUTION INTRAVENOUS at 20:33

## 2023-08-07 RX ADMIN — CEFEPIME 2000 MG: 2 INJECTION, POWDER, FOR SOLUTION INTRAVENOUS at 20:40

## 2023-08-07 RX ADMIN — PHENYLEPHRINE HYDROCHLORIDE 30 MCG/MIN: 10 INJECTION INTRAVENOUS at 20:32

## 2023-08-07 RX ADMIN — LORAZEPAM 1 MG: 2 INJECTION INTRAMUSCULAR; INTRAVENOUS at 20:08

## 2023-08-07 RX ADMIN — SODIUM CHLORIDE, POTASSIUM CHLORIDE, SODIUM LACTATE AND CALCIUM CHLORIDE 1434 ML: 600; 310; 30; 20 INJECTION, SOLUTION INTRAVENOUS at 21:00

## 2023-08-07 RX ADMIN — SODIUM CHLORIDE 1000 ML: 9 INJECTION, SOLUTION INTRAVENOUS at 15:18

## 2023-08-07 NOTE — H&P
[address] [place] [address] 1788 [phone]    Admission History and Physical      NAME:  Shadi Evans   :   1935   MRN:  [id number]     PCP:  Sadaf Shrestha     Date/Time:  2023         Subjective:     CHIEF COMPLAINT: \"I don't know\"     HISTORY OF PRESENT ILLNESS:     Ms. Quan Hall is a 82 y.o.  female with PMH of anxiety/depression, breast CA, HTN admitted s/p syncopal event. Per report pt was having a BM and became unresponsive. CPR was apparently started. Pt at this time has no complaints. Head CT, CXR and labs to date WNL. Past Medical History:   Diagnosis Date    Anxiety 2020    Arthritis     Breast cancer ([facility])     Breast cancer ([address])     Depression 2020    Diverticulitis 2020    GERD (gastroesophageal reflux disease) 2020    High cholesterol 2020    Hx of dizziness 2020    Hypertension 2020    Primary cancer of lower-inner quadrant of left female breast ([address]) 2018    breast     Thyroid disease 2020        Past Surgical History:   Procedure Laterality Date    BREAST LUMPECTOMY Left 2019    with SLNB    HEENT  2018    Cataract    HYSTERECTOMY (CERVIX STATUS UNKNOWN)  [address]      Left knee    ORTHOPEDIC SURGERY  2017    Right knee    ORTHOPEDIC SURGERY  2013    Right Shoulder    UROLOGICAL SURGERY  12/10/2020    cystoscopy        Social History     Tobacco Use    Smoking status: Never    Smokeless tobacco: Never   Substance Use Topics    Alcohol use: Not Currently        Family History   Problem Relation Age of Onset    Breast Cancer Maternal Cousin 58    Breast Cancer Maternal Grandmother 71    Prostate Cancer Brother     Heart Disease Father     Cancer Mother 80        Esophagas        Allergies   Allergen Reactions    Iodinated Contrast Media Other (See Comments) and Shortness Of Breath     Reaction Type:  Intolerance; Severity:

## 2023-08-07 NOTE — ED TRIAGE NOTES
Patient to ER after syncopal episode while trying to use the restroom today. Family reported to EMS patient stopped breathing and they initiated CPR. EMS reports sinus tach. Patient placed on NC via EMS.

## 2023-08-07 NOTE — ED PROVIDER NOTES
[facility] EMERGENCY DEPT  EMERGENCY DEPARTMENT ENCOUNTER      Pt Name: Ethan Marcelo  MRN: [id number]  [facility] 1935  Date of evaluation: 8/7/2023  Provider: Mert Heard MD    CHIEF COMPLAINT     No chief complaint on file. HISTORY OF PRESENT ILLNESS    Donald Hilliard is an 81 yo F with h/o breast cancer, dementia and thyroid disease who is a resident at an assisted living facility. Staff reported to EMS that they had just assisted her to the bathroom to urinate after she has been outside and she became unresponsive on the toilet. She was assisted to the ground by staff and did not hit her head. The noticed that she was not breathing so started chest compressions but EMS notes that she has spontaneous circulation in sinus tach when they arrived but was unresponsive. They note that she became more responsive and agitated during transport but has remained non-verbal.          Additional history from independent historians:     Review of External Medical Records:     Nursing Notes were reviewed. REVIEW OF SYSTEMS       Review of Systems   Unable to perform ROS: Mental status change     Except as noted above the remainder of the review of systems was reviewed and negative.        PAST MEDICAL HISTORY     Past Medical History:   Diagnosis Date    Anxiety 6/16/2020    Arthritis     Breast cancer ([address])     Breast cancer ([address])     Depression 6/16/2020    Diverticulitis 6/16/2020    GERD (gastroesophageal reflux disease) 6/16/2020    High cholesterol 6/16/2020    Hx of dizziness 6/16/2020    Hypertension 6/16/2020    Primary cancer of lower-inner quadrant of left female breast ([address]) 12/18/2018    breast     Thyroid disease 6/16/2020         SURGICAL HISTORY       Past Surgical History:   Procedure Laterality Date    BREAST LUMPECTOMY Left 01/23/2019    with SLNB    HEENT  06/25/2018    Cataract    HYSTERECTOMY (CERVIX STATUS UNKNOWN)  [address]  2014    Left knee    ORTHOPEDIC

## 2023-08-08 ENCOUNTER — APPOINTMENT (OUTPATIENT)
Facility: HOSPITAL | Age: 88
DRG: 177 | End: 2023-08-08
Payer: MEDICARE

## 2023-08-08 ENCOUNTER — APPOINTMENT (OUTPATIENT)
Facility: HOSPITAL | Age: 88
DRG: 177 | End: 2023-08-08
Attending: INTERNAL MEDICINE
Payer: MEDICARE

## 2023-08-08 PROBLEM — I21.4 NSTEMI (NON-ST ELEVATED MYOCARDIAL INFARCTION) (HCC): Status: ACTIVE | Noted: 2023-08-08

## 2023-08-08 LAB
ANION GAP SERPL CALC-SCNC: 7 MMOL/L (ref 5–15)
APPEARANCE UR: CLEAR
BACTERIA URNS QL MICRO: ABNORMAL /HPF
BASOPHILS # BLD: 0 K/UL (ref 0–0.1)
BASOPHILS NFR BLD: 0 % (ref 0–1)
BILIRUB UR QL: NEGATIVE
BUN SERPL-MCNC: 22 MG/DL (ref 6–20)
BUN/CREAT SERPL: 19 (ref 12–20)
CALCIUM SERPL-MCNC: 8.6 MG/DL (ref 8.5–10.1)
CHLORIDE SERPL-SCNC: 112 MMOL/L (ref 97–108)
CO2 SERPL-SCNC: 21 MMOL/L (ref 21–32)
COLOR UR: ABNORMAL
CREAT SERPL-MCNC: 1.18 MG/DL (ref 0.55–1.02)
CRP SERPL-MCNC: 3.04 MG/DL (ref 0–0.6)
D DIMER PPP FEU-MCNC: 11.19 MG/L FEU (ref 0–0.65)
DIFFERENTIAL METHOD BLD: ABNORMAL
EKG ATRIAL RATE: 113 BPM
EKG DIAGNOSIS: NORMAL
EKG P AXIS: 75 DEGREES
EKG P-R INTERVAL: 146 MS
EKG Q-T INTERVAL: 324 MS
EKG QRS DURATION: 68 MS
EKG QTC CALCULATION (BAZETT): 444 MS
EKG R AXIS: 60 DEGREES
EKG T AXIS: 74 DEGREES
EKG VENTRICULAR RATE: 113 BPM
EOSINOPHIL # BLD: 0 K/UL (ref 0–0.4)
EOSINOPHIL NFR BLD: 0 % (ref 0–7)
EPITH CASTS URNS QL MICRO: ABNORMAL /LPF
ERYTHROCYTE [DISTWIDTH] IN BLOOD BY AUTOMATED COUNT: 14.1 % (ref 11.5–14.5)
GLUCOSE BLD STRIP.AUTO-MCNC: 150 MG/DL (ref 65–117)
GLUCOSE SERPL-MCNC: 153 MG/DL (ref 65–100)
GLUCOSE UR STRIP.AUTO-MCNC: NEGATIVE MG/DL
HCT VFR BLD AUTO: 34.7 % (ref 35–47)
HGB BLD-MCNC: 10.9 G/DL (ref 11.5–16)
HGB UR QL STRIP: NEGATIVE
HYALINE CASTS URNS QL MICRO: ABNORMAL /LPF (ref 0–5)
IMM GRANULOCYTES # BLD AUTO: 0.1 K/UL (ref 0–0.04)
IMM GRANULOCYTES NFR BLD AUTO: 1 % (ref 0–0.5)
KETONES UR QL STRIP.AUTO: ABNORMAL MG/DL
LACTATE SERPL-SCNC: 2 MMOL/L (ref 0.4–2)
LACTATE SERPL-SCNC: 2.2 MMOL/L (ref 0.4–2)
LACTATE SERPL-SCNC: 4.9 MMOL/L (ref 0.4–2)
LEUKOCYTE ESTERASE UR QL STRIP.AUTO: NEGATIVE
LYMPHOCYTES # BLD: 0.6 K/UL (ref 0.8–3.5)
LYMPHOCYTES NFR BLD: 9 % (ref 12–49)
MCH RBC QN AUTO: 28.5 PG (ref 26–34)
MCHC RBC AUTO-ENTMCNC: 31.4 G/DL (ref 30–36.5)
MCV RBC AUTO: 90.8 FL (ref 80–99)
MONOCYTES # BLD: 0.2 K/UL (ref 0–1)
MONOCYTES NFR BLD: 3 % (ref 5–13)
NEUTS SEG # BLD: 5.5 K/UL (ref 1.8–8)
NEUTS SEG NFR BLD: 87 % (ref 32–75)
NITRITE UR QL STRIP.AUTO: NEGATIVE
NRBC # BLD: 0 K/UL (ref 0–0.01)
NRBC BLD-RTO: 0 PER 100 WBC
NT PRO BNP: 5006 PG/ML
PH UR STRIP: 6 (ref 5–8)
PLATELET # BLD AUTO: 371 K/UL (ref 150–400)
PMV BLD AUTO: 9.8 FL (ref 8.9–12.9)
POTASSIUM SERPL-SCNC: 4.1 MMOL/L (ref 3.5–5.1)
PROCALCITONIN SERPL-MCNC: 0.23 NG/ML
PROT UR STRIP-MCNC: 100 MG/DL
RBC # BLD AUTO: 3.82 M/UL (ref 3.8–5.2)
RBC #/AREA URNS HPF: ABNORMAL /HPF (ref 0–5)
RBC MORPH BLD: ABNORMAL
SERVICE CMNT-IMP: ABNORMAL
SODIUM SERPL-SCNC: 140 MMOL/L (ref 136–145)
SP GR UR REFRACTOMETRY: 1.03 (ref 1–1.03)
TROPONIN I SERPL HS-MCNC: 1575 NG/L (ref 0–51)
TROPONIN I SERPL HS-MCNC: 2137 NG/L (ref 0–51)
URINE CULTURE IF INDICATED: ABNORMAL
UROBILINOGEN UR QL STRIP.AUTO: 1 EU/DL (ref 0.2–1)
VIT B12 SERPL-MCNC: 1029 PG/ML (ref 193–986)
WBC # BLD AUTO: 6.4 K/UL (ref 3.6–11)
WBC URNS QL MICRO: ABNORMAL /HPF (ref 0–4)

## 2023-08-08 PROCEDURE — 51701 INSERT BLADDER CATHETER: CPT

## 2023-08-08 PROCEDURE — 94761 N-INVAS EAR/PLS OXIMETRY MLT: CPT

## 2023-08-08 PROCEDURE — 97530 THERAPEUTIC ACTIVITIES: CPT

## 2023-08-08 PROCEDURE — 2580000003 HC RX 258: Performed by: INTERNAL MEDICINE

## 2023-08-08 PROCEDURE — 2000000000 HC ICU R&B

## 2023-08-08 PROCEDURE — 3430000000 HC RX DIAGNOSTIC RADIOPHARMACEUTICAL: Performed by: INTERNAL MEDICINE

## 2023-08-08 PROCEDURE — 85379 FIBRIN DEGRADATION QUANT: CPT

## 2023-08-08 PROCEDURE — 81001 URINALYSIS AUTO W/SCOPE: CPT

## 2023-08-08 PROCEDURE — 83605 ASSAY OF LACTIC ACID: CPT

## 2023-08-08 PROCEDURE — 82962 GLUCOSE BLOOD TEST: CPT

## 2023-08-08 PROCEDURE — 51798 US URINE CAPACITY MEASURE: CPT

## 2023-08-08 PROCEDURE — 83880 ASSAY OF NATRIURETIC PEPTIDE: CPT

## 2023-08-08 PROCEDURE — 93005 ELECTROCARDIOGRAM TRACING: CPT

## 2023-08-08 PROCEDURE — 71045 X-RAY EXAM CHEST 1 VIEW: CPT

## 2023-08-08 PROCEDURE — 36415 COLL VENOUS BLD VENIPUNCTURE: CPT

## 2023-08-08 PROCEDURE — 6370000000 HC RX 637 (ALT 250 FOR IP): Performed by: INTERNAL MEDICINE

## 2023-08-08 PROCEDURE — 97163 PT EVAL HIGH COMPLEX 45 MIN: CPT

## 2023-08-08 PROCEDURE — 97165 OT EVAL LOW COMPLEX 30 MIN: CPT

## 2023-08-08 PROCEDURE — 93005 ELECTROCARDIOGRAM TRACING: CPT | Performed by: INTERNAL MEDICINE

## 2023-08-08 PROCEDURE — A9540 TC99M MAA: HCPCS | Performed by: INTERNAL MEDICINE

## 2023-08-08 PROCEDURE — 84484 ASSAY OF TROPONIN QUANT: CPT

## 2023-08-08 PROCEDURE — 84145 PROCALCITONIN (PCT): CPT

## 2023-08-08 PROCEDURE — 6360000002 HC RX W HCPCS

## 2023-08-08 PROCEDURE — 93010 ELECTROCARDIOGRAM REPORT: CPT | Performed by: INTERNAL MEDICINE

## 2023-08-08 PROCEDURE — 2580000003 HC RX 258

## 2023-08-08 PROCEDURE — 2700000000 HC OXYGEN THERAPY PER DAY

## 2023-08-08 PROCEDURE — 85025 COMPLETE CBC W/AUTO DIFF WBC: CPT

## 2023-08-08 PROCEDURE — 80048 BASIC METABOLIC PNL TOTAL CA: CPT

## 2023-08-08 PROCEDURE — 95813 EEG EXTND MNTR 61-119 MIN: CPT | Performed by: PSYCHIATRY & NEUROLOGY

## 2023-08-08 PROCEDURE — 86140 C-REACTIVE PROTEIN: CPT

## 2023-08-08 RX ORDER — LABETALOL HYDROCHLORIDE 5 MG/ML
5 INJECTION, SOLUTION INTRAVENOUS ONCE
Status: COMPLETED | OUTPATIENT
Start: 2023-08-08 | End: 2023-08-08

## 2023-08-08 RX ORDER — CARBIDOPA/LEVODOPA 25MG-250MG
1 TABLET ORAL 2 TIMES DAILY
Status: DISCONTINUED | OUTPATIENT
Start: 2023-08-08 | End: 2023-08-09

## 2023-08-08 RX ORDER — LEVOTHYROXINE SODIUM 0.05 MG/1
50 TABLET ORAL
Status: DISCONTINUED | OUTPATIENT
Start: 2023-08-09 | End: 2023-08-17 | Stop reason: HOSPADM

## 2023-08-08 RX ORDER — BUSPIRONE HYDROCHLORIDE 5 MG/1
5 TABLET ORAL 2 TIMES DAILY
COMMUNITY

## 2023-08-08 RX ORDER — CITALOPRAM HYDROBROMIDE 10 MG/1
10 TABLET ORAL DAILY
COMMUNITY

## 2023-08-08 RX ORDER — ENOXAPARIN SODIUM 100 MG/ML
1 INJECTION SUBCUTANEOUS 2 TIMES DAILY
Status: DISCONTINUED | OUTPATIENT
Start: 2023-08-08 | End: 2023-08-10

## 2023-08-08 RX ORDER — PANTOPRAZOLE SODIUM 40 MG/1
40 TABLET, DELAYED RELEASE ORAL
Status: DISCONTINUED | OUTPATIENT
Start: 2023-08-08 | End: 2023-08-17 | Stop reason: HOSPADM

## 2023-08-08 RX ORDER — CARBIDOPA/LEVODOPA 25MG-250MG
1 TABLET ORAL 2 TIMES DAILY
Status: ON HOLD | COMMUNITY
End: 2023-08-10 | Stop reason: HOSPADM

## 2023-08-08 RX ADMIN — ENOXAPARIN SODIUM 60 MG: 60 INJECTION SUBCUTANEOUS at 02:30

## 2023-08-08 RX ADMIN — LABETALOL HYDROCHLORIDE 5 MG: 5 INJECTION, SOLUTION INTRAVENOUS at 06:52

## 2023-08-08 RX ADMIN — ASPIRIN 81 MG: 81 TABLET, CHEWABLE ORAL at 08:14

## 2023-08-08 RX ADMIN — ENOXAPARIN SODIUM 60 MG: 60 INJECTION SUBCUTANEOUS at 08:47

## 2023-08-08 RX ADMIN — CARBIDOPA AND LEVODOPA 1 TABLET: 25; 250 TABLET ORAL at 10:17

## 2023-08-08 RX ADMIN — CEFEPIME 2000 MG: 2 INJECTION, POWDER, FOR SOLUTION INTRAVENOUS at 21:18

## 2023-08-08 RX ADMIN — DEXAMETHASONE SODIUM PHOSPHATE 6 MG: 10 INJECTION, SOLUTION INTRAMUSCULAR; INTRAVENOUS at 21:14

## 2023-08-08 RX ADMIN — ENOXAPARIN SODIUM 60 MG: 60 INJECTION SUBCUTANEOUS at 21:13

## 2023-08-08 RX ADMIN — POLYETHYLENE GLYCOL 3350 17 G: 17 POWDER, FOR SOLUTION ORAL at 16:17

## 2023-08-08 RX ADMIN — SODIUM CHLORIDE, PRESERVATIVE FREE 10 ML: 5 INJECTION INTRAVENOUS at 21:22

## 2023-08-08 RX ADMIN — LEVOTHYROXINE SODIUM 25 MCG: 0.03 TABLET ORAL at 06:18

## 2023-08-08 RX ADMIN — SODIUM CHLORIDE, PRESERVATIVE FREE 10 ML: 5 INJECTION INTRAVENOUS at 00:54

## 2023-08-08 RX ADMIN — CEFEPIME 2000 MG: 2 INJECTION, POWDER, FOR SOLUTION INTRAVENOUS at 08:47

## 2023-08-08 RX ADMIN — DEXAMETHASONE SODIUM PHOSPHATE 6 MG: 10 INJECTION, SOLUTION INTRAMUSCULAR; INTRAVENOUS at 08:14

## 2023-08-08 RX ADMIN — SODIUM CHLORIDE, PRESERVATIVE FREE 10 ML: 5 INJECTION INTRAVENOUS at 10:17

## 2023-08-08 RX ADMIN — QUETIAPINE FUMARATE 25 MG: 25 TABLET ORAL at 21:13

## 2023-08-08 RX ADMIN — CARBIDOPA AND LEVODOPA 1 TABLET: 25; 250 TABLET ORAL at 21:13

## 2023-08-08 RX ADMIN — SODIUM CHLORIDE, POTASSIUM CHLORIDE, SODIUM LACTATE AND CALCIUM CHLORIDE: 600; 310; 30; 20 INJECTION, SOLUTION INTRAVENOUS at 00:54

## 2023-08-08 RX ADMIN — ACETAMINOPHEN 650 MG: 325 TABLET ORAL at 21:13

## 2023-08-08 RX ADMIN — KIT FOR THE PREPARATION OF TECHNETIUM TC 99M ALBUMIN AGGREGATED 4.4 MILLICURIE: 2.5 INJECTION, POWDER, FOR SOLUTION INTRAVENOUS at 00:54

## 2023-08-08 RX ADMIN — PANTOPRAZOLE SODIUM 40 MG: 40 TABLET, DELAYED RELEASE ORAL at 08:14

## 2023-08-08 NOTE — PROCEDURES
[facility]     Electroencephalogram Report    Procedure ID:  Procedure Date: 08/08/2023   Patient Name: Micha Sears YOB: 1935   Procedure Type:  Rapid EEG Medical Record No: [id number]     INDICATION: Syncope    STATE: Drowsy and asleep. Description of procedure: This EEG was obtained using a 10 lead, 8 channel system positioned circumferentially without any parasagittal coverage (rapid EEG). Computer selected EEG is reviewed as well as background features and all clinically significant events. Clarity algorithm utilized and implemented to provide analysis of underlying activity and seizure detection used to facilitate reading. A total of 01:34:38 recording was obtained. Description of recording:   Recording mainly shows sleep spindles and then theta activity but no awake patterns. No epileptiform discharges, focal slowing, or electrographic seizures were noted throughout the recording. Impression: Normal EEG during the drowsy and sleep states. Comment: A normal EEG does not rule out the diagnosis of a seizure disorder; clinical  correlation is advised. If there is still persistent suspicion for continued seizure-like  activity, would advise obtaining an electroencephalogram with the 10-20 international  system for improved spatial resolution and parasagittal coverage. Medications:  Current Facility-Administered Medications   Medication Dose Route Frequency    enoxaparin (LOVENOX) injection 60 mg  1 mg/kg SubCUTAneous BID    pantoprazole (PROTONIX) tablet 40 mg  40 mg Oral QAM AC    Vancomycin - Please draw level with AM labs on 8/9. Thanks!    Other RX Placeholder    QUEtiapine (SEROQUEL) tablet 25 mg  25 mg Oral Nightly    lactated ringers IV soln infusion   IntraVENous Continuous    sodium chloride flush 0.9 % injection 5-40 mL  5-40 mL IntraVENous 2 times per day    sodium chloride flush 0.9 % injection 5-40 mL  5-40 mL IntraVENous PRN

## 2023-08-08 NOTE — ED NOTES
Code sepsis was called     Felipa Frankel, RN  08/07/23 1960 0 = understands/communicates without difficulty

## 2023-08-08 NOTE — ED NOTES
As per Caprice Severance NP, keep the Levo going for now.  She was notified that Jt was discontinued     Kate Silverio RN  08/07/23 2523

## 2023-08-08 NOTE — CARDIO/PULMONARY
[facility] Cardiac [facility]- Chart review completed as patient is listed on the daily elevated troponin list.   Events noted of this hospitalization as well as patient's cognitive status (dementia). Pt is also not dependent in ADL's. These findings as well as current NSTEMI may be related to a non CAD situation. Pt is not a candidate for participation in outpatient cardiac rehab (exercise, education, nutrition).   Юлия Kinney RN

## 2023-08-08 NOTE — ED NOTES
Code sepsis called at 2101 after receiving lactic acid 2.32. In house NP informed of lactic acid result.       Beck Maciel RN  08/07/23 2104

## 2023-08-08 NOTE — FLOWSHEET NOTE
Called to bedside for rapid response. Upon arrival, ER staff offers seizure-like activity. Ativan given. Patient hypotensive. Patient offers no complaints. LILIANA BURNS at bedside. Sepsis protocol initiated. Seizure precautions. Admit to inpatient and transfer to ICU. Nursing aware and will continue to monitor. UPDATE: Levo infusing and BP responding approp - 135/99. Wean as BP will tolerate. Awaiting EKG. Patient can not receive contrast for CTA chest due to allergy - order for VQ scan to r/o PE placed. Lovenox as ordered. LACTIC - 2.32  TROP - 3,756  Dimer - 17.86    UPDATE: Awaiting EKG. VQ scan prelim reading w/ high probability for acute pulmonary emboli. Will transition Lovenox to therapeutic dosage. Patient no longer requiring BP support. Nursing aware and will continue to monitor.

## 2023-08-09 ENCOUNTER — APPOINTMENT (OUTPATIENT)
Facility: HOSPITAL | Age: 88
DRG: 177 | End: 2023-08-09
Attending: INTERNAL MEDICINE
Payer: MEDICARE

## 2023-08-09 PROBLEM — F02.80 DEMENTIA DUE TO PARKINSON'S DISEASE (HCC): Status: ACTIVE | Noted: 2023-08-09

## 2023-08-09 PROBLEM — R55 SYNCOPE: Status: ACTIVE | Noted: 2023-08-09

## 2023-08-09 PROBLEM — G20 DEMENTIA DUE TO PARKINSON'S DISEASE (HCC): Status: ACTIVE | Noted: 2023-08-09

## 2023-08-09 PROBLEM — R53.81 DEBILITY: Status: ACTIVE | Noted: 2023-08-09

## 2023-08-09 PROBLEM — Z71.89 DNR (DO NOT RESUSCITATE) DISCUSSION: Status: ACTIVE | Noted: 2023-08-09

## 2023-08-09 PROBLEM — G20.A1 DEMENTIA DUE TO PARKINSON'S DISEASE (HCC): Status: ACTIVE | Noted: 2023-08-09

## 2023-08-09 PROBLEM — Z71.89 GOALS OF CARE, COUNSELING/DISCUSSION: Status: ACTIVE | Noted: 2023-08-09

## 2023-08-09 LAB
ANION GAP SERPL CALC-SCNC: 6 MMOL/L (ref 5–15)
BACTERIA SPEC CULT: NORMAL
BACTERIA SPEC CULT: NORMAL
BASOPHILS # BLD: 0 K/UL (ref 0–0.1)
BASOPHILS NFR BLD: 0 % (ref 0–1)
BUN SERPL-MCNC: 27 MG/DL (ref 6–20)
BUN/CREAT SERPL: 26 (ref 12–20)
CALCIUM SERPL-MCNC: 8.6 MG/DL (ref 8.5–10.1)
CHLORIDE SERPL-SCNC: 113 MMOL/L (ref 97–108)
CO2 SERPL-SCNC: 21 MMOL/L (ref 21–32)
CREAT SERPL-MCNC: 1.05 MG/DL (ref 0.55–1.02)
CRP SERPL-MCNC: 2.95 MG/DL (ref 0–0.6)
DIFFERENTIAL METHOD BLD: ABNORMAL
ECHO AV MEAN GRADIENT: 5 MMHG
ECHO AV MEAN VELOCITY: 1.1 M/S
ECHO AV PEAK GRADIENT: 9 MMHG
ECHO AV PEAK VELOCITY: 1.5 M/S
ECHO AV VELOCITY RATIO: 0.8
ECHO AV VTI: 23.6 CM
ECHO BSA: 1.64 M2
ECHO LV FRACTIONAL SHORTENING: 35 % (ref 28–44)
ECHO LV INTERNAL DIMENSION DIASTOLE INDEX: 1.93 CM/M2
ECHO LV INTERNAL DIMENSION DIASTOLIC: 3.1 CM (ref 3.9–5.3)
ECHO LV INTERNAL DIMENSION SYSTOLIC INDEX: 1.24 CM/M2
ECHO LV INTERNAL DIMENSION SYSTOLIC: 2 CM
ECHO LV IVSD: 1.1 CM (ref 0.6–0.9)
ECHO LV MASS 2D: 99.7 G (ref 67–162)
ECHO LV MASS INDEX 2D: 61.9 G/M2 (ref 43–95)
ECHO LV POSTERIOR WALL DIASTOLIC: 1.1 CM (ref 0.6–0.9)
ECHO LV RELATIVE WALL THICKNESS RATIO: 0.71
ECHO LVOT AV VTI INDEX: 0.82
ECHO LVOT MEAN GRADIENT: 3 MMHG
ECHO LVOT PEAK GRADIENT: 5 MMHG
ECHO LVOT PEAK VELOCITY: 1.2 M/S
ECHO LVOT VTI: 19.4 CM
ECHO TV REGURGITANT MAX VELOCITY: 2.48 M/S
ECHO TV REGURGITANT PEAK GRADIENT: 25 MMHG
EOSINOPHIL # BLD: 0 K/UL (ref 0–0.4)
EOSINOPHIL NFR BLD: 0 % (ref 0–7)
ERYTHROCYTE [DISTWIDTH] IN BLOOD BY AUTOMATED COUNT: 14.3 % (ref 11.5–14.5)
GLUCOSE SERPL-MCNC: 139 MG/DL (ref 65–100)
HCT VFR BLD AUTO: 31.3 % (ref 35–47)
HGB BLD-MCNC: 9.8 G/DL (ref 11.5–16)
IMM GRANULOCYTES # BLD AUTO: 0.2 K/UL (ref 0–0.04)
IMM GRANULOCYTES NFR BLD AUTO: 2 % (ref 0–0.5)
LYMPHOCYTES # BLD: 0.6 K/UL (ref 0.8–3.5)
LYMPHOCYTES NFR BLD: 6 % (ref 12–49)
MCH RBC QN AUTO: 28.7 PG (ref 26–34)
MCHC RBC AUTO-ENTMCNC: 31.3 G/DL (ref 30–36.5)
MCV RBC AUTO: 91.5 FL (ref 80–99)
MONOCYTES # BLD: 0.5 K/UL (ref 0–1)
MONOCYTES NFR BLD: 5 % (ref 5–13)
NEUTS SEG # BLD: 8.9 K/UL (ref 1.8–8)
NEUTS SEG NFR BLD: 87 % (ref 32–75)
NRBC # BLD: 0 K/UL (ref 0–0.01)
NRBC BLD-RTO: 0 PER 100 WBC
PLATELET # BLD AUTO: 346 K/UL (ref 150–400)
PMV BLD AUTO: 9.7 FL (ref 8.9–12.9)
POTASSIUM SERPL-SCNC: 4 MMOL/L (ref 3.5–5.1)
RBC # BLD AUTO: 3.42 M/UL (ref 3.8–5.2)
RBC MORPH BLD: ABNORMAL
SERVICE CMNT-IMP: NORMAL
SODIUM SERPL-SCNC: 140 MMOL/L (ref 136–145)
WBC # BLD AUTO: 10.2 K/UL (ref 3.6–11)

## 2023-08-09 PROCEDURE — 93306 TTE W/DOPPLER COMPLETE: CPT

## 2023-08-09 PROCEDURE — 2580000003 HC RX 258: Performed by: INTERNAL MEDICINE

## 2023-08-09 PROCEDURE — 93005 ELECTROCARDIOGRAM TRACING: CPT | Performed by: INTERNAL MEDICINE

## 2023-08-09 PROCEDURE — 2000000000 HC ICU R&B

## 2023-08-09 PROCEDURE — 6370000000 HC RX 637 (ALT 250 FOR IP): Performed by: INTERNAL MEDICINE

## 2023-08-09 PROCEDURE — 2500000003 HC RX 250 WO HCPCS: Performed by: INTERNAL MEDICINE

## 2023-08-09 PROCEDURE — 99223 1ST HOSP IP/OBS HIGH 75: CPT | Performed by: INTERNAL MEDICINE

## 2023-08-09 PROCEDURE — 6360000002 HC RX W HCPCS

## 2023-08-09 PROCEDURE — 6370000000 HC RX 637 (ALT 250 FOR IP): Performed by: NURSE PRACTITIONER

## 2023-08-09 PROCEDURE — 2060000000 HC ICU INTERMEDIATE R&B

## 2023-08-09 PROCEDURE — 6360000002 HC RX W HCPCS: Performed by: INTERNAL MEDICINE

## 2023-08-09 PROCEDURE — 2580000003 HC RX 258

## 2023-08-09 PROCEDURE — 6360000002 HC RX W HCPCS: Performed by: NURSE PRACTITIONER

## 2023-08-09 PROCEDURE — 99223 1ST HOSP IP/OBS HIGH 75: CPT | Performed by: NURSE PRACTITIONER

## 2023-08-09 PROCEDURE — 36415 COLL VENOUS BLD VENIPUNCTURE: CPT

## 2023-08-09 PROCEDURE — 86140 C-REACTIVE PROTEIN: CPT

## 2023-08-09 PROCEDURE — 80048 BASIC METABOLIC PNL TOTAL CA: CPT

## 2023-08-09 PROCEDURE — 85025 COMPLETE CBC W/AUTO DIFF WBC: CPT

## 2023-08-09 PROCEDURE — 2700000000 HC OXYGEN THERAPY PER DAY

## 2023-08-09 RX ORDER — METOPROLOL TARTRATE 5 MG/5ML
2.5 INJECTION INTRAVENOUS ONCE
Status: COMPLETED | OUTPATIENT
Start: 2023-08-09 | End: 2023-08-09

## 2023-08-09 RX ORDER — DEXAMETHASONE SODIUM PHOSPHATE 10 MG/ML
6 INJECTION, SOLUTION INTRAMUSCULAR; INTRAVENOUS EVERY 24 HOURS
Status: DISCONTINUED | OUTPATIENT
Start: 2023-08-09 | End: 2023-08-10

## 2023-08-09 RX ORDER — METOPROLOL TARTRATE 5 MG/5ML
5 INJECTION INTRAVENOUS EVERY 6 HOURS
Status: DISCONTINUED | OUTPATIENT
Start: 2023-08-09 | End: 2023-08-09

## 2023-08-09 RX ORDER — METOPROLOL TARTRATE 5 MG/5ML
2.5 INJECTION INTRAVENOUS EVERY 6 HOURS
Status: DISCONTINUED | OUTPATIENT
Start: 2023-08-09 | End: 2023-08-09

## 2023-08-09 RX ORDER — METOPROLOL TARTRATE 5 MG/5ML
2.5 INJECTION INTRAVENOUS EVERY 6 HOURS
Status: DISCONTINUED | OUTPATIENT
Start: 2023-08-10 | End: 2023-08-10

## 2023-08-09 RX ORDER — LORAZEPAM 2 MG/ML
1 INJECTION INTRAMUSCULAR ONCE
Status: COMPLETED | OUTPATIENT
Start: 2023-08-09 | End: 2023-08-09

## 2023-08-09 RX ORDER — HYDRALAZINE HYDROCHLORIDE 20 MG/ML
10 INJECTION INTRAMUSCULAR; INTRAVENOUS EVERY 6 HOURS PRN
Status: DISCONTINUED | OUTPATIENT
Start: 2023-08-09 | End: 2023-08-13

## 2023-08-09 RX ADMIN — LORAZEPAM 1 MG: 2 INJECTION INTRAMUSCULAR; INTRAVENOUS at 01:38

## 2023-08-09 RX ADMIN — SODIUM CHLORIDE, PRESERVATIVE FREE 10 ML: 5 INJECTION INTRAVENOUS at 20:55

## 2023-08-09 RX ADMIN — SODIUM CHLORIDE, POTASSIUM CHLORIDE, SODIUM LACTATE AND CALCIUM CHLORIDE: 600; 310; 30; 20 INJECTION, SOLUTION INTRAVENOUS at 06:46

## 2023-08-09 RX ADMIN — ENOXAPARIN SODIUM 60 MG: 60 INJECTION SUBCUTANEOUS at 08:02

## 2023-08-09 RX ADMIN — CARBIDOPA AND LEVODOPA 1 TABLET: 25; 100 TABLET ORAL at 20:54

## 2023-08-09 RX ADMIN — METOPROLOL TARTRATE 12.5 MG: 25 TABLET, FILM COATED ORAL at 08:03

## 2023-08-09 RX ADMIN — DEXAMETHASONE SODIUM PHOSPHATE 6 MG: 10 INJECTION, SOLUTION INTRAMUSCULAR; INTRAVENOUS at 20:55

## 2023-08-09 RX ADMIN — METOPROLOL TARTRATE 2.5 MG: 5 INJECTION INTRAVENOUS at 12:30

## 2023-08-09 RX ADMIN — ENOXAPARIN SODIUM 60 MG: 60 INJECTION SUBCUTANEOUS at 20:54

## 2023-08-09 RX ADMIN — QUETIAPINE FUMARATE 25 MG: 25 TABLET ORAL at 20:54

## 2023-08-09 RX ADMIN — SODIUM CHLORIDE, PRESERVATIVE FREE 10 ML: 5 INJECTION INTRAVENOUS at 08:15

## 2023-08-09 RX ADMIN — PANTOPRAZOLE SODIUM 40 MG: 40 TABLET, DELAYED RELEASE ORAL at 06:35

## 2023-08-09 RX ADMIN — METOPROLOL TARTRATE 2.5 MG: 5 INJECTION INTRAVENOUS at 16:39

## 2023-08-09 RX ADMIN — LEVOTHYROXINE SODIUM 50 MCG: 0.05 TABLET ORAL at 06:36

## 2023-08-09 NOTE — ACP (ADVANCE CARE PLANNING)
Primary Decision MakeManuel Colon - Child - [phone]    Secondary Decision Maker: Trevor Ta - Brother/Sister - [phone]    Pt has AMD on file dated 3/31/2015 which appoints  Charlene Adamson, dtr Mignon Colon, and sister Trevor Ta as Medical POAs in respective order.  reportedly has advanced dementia and also resides in memory care. Dtr would therefore be primary decision maker on pt's behalf. Pt used initials to indicate her health care wishes but appears to have changed those wishes on 11/2/2015, did not have witness signatures for changes.

## 2023-08-10 LAB
ACCESSION NUMBER, LLC1M: ABNORMAL
ACINETOBACTER CALCOAC BAUMANNII COMPLEX BY PCR: NOT DETECTED
ANION GAP SERPL CALC-SCNC: 6 MMOL/L (ref 5–15)
B FRAGILIS DNA BLD POS QL NAA+NON-PROBE: NOT DETECTED
BASOPHILS # BLD: 0 K/UL (ref 0–0.1)
BASOPHILS NFR BLD: 0 % (ref 0–1)
BIOFIRE TEST COMMENT: ABNORMAL
BUN SERPL-MCNC: 24 MG/DL (ref 6–20)
BUN/CREAT SERPL: 29 (ref 12–20)
C ALBICANS DNA BLD POS QL NAA+NON-PROBE: NOT DETECTED
C AURIS DNA BLD POS QL NAA+NON-PROBE: NOT DETECTED
C GATTII+NEOFOR DNA BLD POS QL NAA+N-PRB: NOT DETECTED
C GLABRATA DNA BLD POS QL NAA+NON-PROBE: NOT DETECTED
C KRUSEI DNA BLD POS QL NAA+NON-PROBE: NOT DETECTED
C PARAP DNA BLD POS QL NAA+NON-PROBE: NOT DETECTED
C TROPICLS DNA BLD POS QL NAA+NON-PROBE: NOT DETECTED
CALCIUM SERPL-MCNC: 8.9 MG/DL (ref 8.5–10.1)
CHLORIDE SERPL-SCNC: 108 MMOL/L (ref 97–108)
CO2 SERPL-SCNC: 25 MMOL/L (ref 21–32)
CREAT SERPL-MCNC: 0.82 MG/DL (ref 0.55–1.02)
DIFFERENTIAL METHOD BLD: ABNORMAL
E CLOAC COMP DNA BLD POS NAA+NON-PROBE: NOT DETECTED
E COLI DNA BLD POS QL NAA+NON-PROBE: NOT DETECTED
E FAECALIS DNA BLD POS QL NAA+NON-PROBE: NOT DETECTED
E FAECIUM DNA BLD POS QL NAA+NON-PROBE: NOT DETECTED
EKG ATRIAL RATE: 113 BPM
EKG ATRIAL RATE: 123 BPM
EKG ATRIAL RATE: 93 BPM
EKG DIAGNOSIS: NORMAL
EKG P AXIS: 73 DEGREES
EKG P AXIS: 74 DEGREES
EKG P AXIS: 76 DEGREES
EKG P-R INTERVAL: 136 MS
EKG P-R INTERVAL: 144 MS
EKG P-R INTERVAL: 152 MS
EKG Q-T INTERVAL: 310 MS
EKG Q-T INTERVAL: 336 MS
EKG Q-T INTERVAL: 346 MS
EKG QRS DURATION: 70 MS
EKG QRS DURATION: 72 MS
EKG QRS DURATION: 72 MS
EKG QTC CALCULATION (BAZETT): 430 MS
EKG QTC CALCULATION (BAZETT): 443 MS
EKG QTC CALCULATION (BAZETT): 460 MS
EKG R AXIS: 61 DEGREES
EKG R AXIS: 62 DEGREES
EKG R AXIS: 65 DEGREES
EKG T AXIS: -78 DEGREES
EKG T AXIS: 73 DEGREES
EKG T AXIS: 74 DEGREES
EKG VENTRICULAR RATE: 113 BPM
EKG VENTRICULAR RATE: 123 BPM
EKG VENTRICULAR RATE: 93 BPM
ENTEROBACTERALES DNA BLD POS NAA+N-PRB: NOT DETECTED
EOSINOPHIL # BLD: 0 K/UL (ref 0–0.4)
EOSINOPHIL NFR BLD: 0 % (ref 0–7)
ERYTHROCYTE [DISTWIDTH] IN BLOOD BY AUTOMATED COUNT: 14.2 % (ref 11.5–14.5)
GLUCOSE SERPL-MCNC: 125 MG/DL (ref 65–100)
GP B STREP DNA BLD POS QL NAA+NON-PROBE: NOT DETECTED
HAEM INFLU DNA BLD POS QL NAA+NON-PROBE: NOT DETECTED
HCT VFR BLD AUTO: 31.1 % (ref 35–47)
HGB BLD-MCNC: 9.9 G/DL (ref 11.5–16)
IMM GRANULOCYTES # BLD AUTO: 0.2 K/UL (ref 0–0.04)
IMM GRANULOCYTES NFR BLD AUTO: 2 % (ref 0–0.5)
K OXYTOCA DNA BLD POS QL NAA+NON-PROBE: NOT DETECTED
KLEBSIELLA SP DNA BLD POS QL NAA+NON-PRB: NOT DETECTED
KLEBSIELLA SP DNA BLD POS QL NAA+NON-PRB: NOT DETECTED
L MONOCYTOG DNA BLD POS QL NAA+NON-PROBE: NOT DETECTED
LYMPHOCYTES # BLD: 0.4 K/UL (ref 0.8–3.5)
LYMPHOCYTES NFR BLD: 5 % (ref 12–49)
MCH RBC QN AUTO: 28.6 PG (ref 26–34)
MCHC RBC AUTO-ENTMCNC: 31.8 G/DL (ref 30–36.5)
MCV RBC AUTO: 89.9 FL (ref 80–99)
MONOCYTES # BLD: 0.3 K/UL (ref 0–1)
MONOCYTES NFR BLD: 4 % (ref 5–13)
N MEN DNA BLD POS QL NAA+NON-PROBE: NOT DETECTED
NEUTS SEG # BLD: 7.6 K/UL (ref 1.8–8)
NEUTS SEG NFR BLD: 89 % (ref 32–75)
NRBC # BLD: 0.06 K/UL (ref 0–0.01)
NRBC BLD-RTO: 0.7 PER 100 WBC
P AERUGINOSA DNA BLD POS NAA+NON-PROBE: NOT DETECTED
PLATELET # BLD AUTO: 401 K/UL (ref 150–400)
PMV BLD AUTO: 9.5 FL (ref 8.9–12.9)
POTASSIUM SERPL-SCNC: 3.7 MMOL/L (ref 3.5–5.1)
PROTEUS SP DNA BLD POS QL NAA+NON-PROBE: NOT DETECTED
RBC # BLD AUTO: 3.46 M/UL (ref 3.8–5.2)
RESISTANT GENE TARGETS: ABNORMAL
S AUREUS DNA BLD POS QL NAA+NON-PROBE: NOT DETECTED
S AUREUS+CONS DNA BLD POS NAA+NON-PROBE: DETECTED
S EPIDERMIDIS DNA BLD POS QL NAA+NON-PRB: NOT DETECTED
S LUGDUNENSIS DNA BLD POS QL NAA+NON-PRB: NOT DETECTED
S MALTOPHILIA DNA BLD POS QL NAA+NON-PRB: NOT DETECTED
S MARCESCENS DNA BLD POS NAA+NON-PROBE: NOT DETECTED
S PNEUM DNA BLD POS QL NAA+NON-PROBE: NOT DETECTED
S PYO DNA BLD POS QL NAA+NON-PROBE: NOT DETECTED
SALMONELLA DNA BLD POS QL NAA+NON-PROBE: NOT DETECTED
SODIUM SERPL-SCNC: 139 MMOL/L (ref 136–145)
STREPTOCOCCUS DNA BLD POS NAA+NON-PROBE: NOT DETECTED
WBC # BLD AUTO: 8.6 K/UL (ref 3.6–11)

## 2023-08-10 PROCEDURE — 6370000000 HC RX 637 (ALT 250 FOR IP): Performed by: NURSE PRACTITIONER

## 2023-08-10 PROCEDURE — 85025 COMPLETE CBC W/AUTO DIFF WBC: CPT

## 2023-08-10 PROCEDURE — 97535 SELF CARE MNGMENT TRAINING: CPT

## 2023-08-10 PROCEDURE — 2500000003 HC RX 250 WO HCPCS: Performed by: INTERNAL MEDICINE

## 2023-08-10 PROCEDURE — 2060000000 HC ICU INTERMEDIATE R&B

## 2023-08-10 PROCEDURE — 80048 BASIC METABOLIC PNL TOTAL CA: CPT

## 2023-08-10 PROCEDURE — 6360000002 HC RX W HCPCS

## 2023-08-10 PROCEDURE — 36415 COLL VENOUS BLD VENIPUNCTURE: CPT

## 2023-08-10 PROCEDURE — 97530 THERAPEUTIC ACTIVITIES: CPT

## 2023-08-10 PROCEDURE — 2000000000 HC ICU R&B

## 2023-08-10 PROCEDURE — 6370000000 HC RX 637 (ALT 250 FOR IP): Performed by: INTERNAL MEDICINE

## 2023-08-10 PROCEDURE — 93010 ELECTROCARDIOGRAM REPORT: CPT | Performed by: SPECIALIST

## 2023-08-10 PROCEDURE — 99232 SBSQ HOSP IP/OBS MODERATE 35: CPT | Performed by: NURSE PRACTITIONER

## 2023-08-10 PROCEDURE — 2580000003 HC RX 258: Performed by: INTERNAL MEDICINE

## 2023-08-10 RX ORDER — LABETALOL 100 MG/1
100 TABLET, FILM COATED ORAL 2 TIMES DAILY PRN
Qty: 60 TABLET | Refills: 3 | Status: SHIPPED | OUTPATIENT
Start: 2023-08-10

## 2023-08-10 RX ORDER — DEXAMETHASONE 6 MG/1
6 TABLET ORAL
Qty: 8 TABLET | Refills: 0 | Status: SHIPPED | OUTPATIENT
Start: 2023-08-10 | End: 2023-08-18

## 2023-08-10 RX ORDER — LABETALOL 100 MG/1
50 TABLET, FILM COATED ORAL EVERY 12 HOURS SCHEDULED
Status: DISCONTINUED | OUTPATIENT
Start: 2023-08-10 | End: 2023-08-17 | Stop reason: HOSPADM

## 2023-08-10 RX ORDER — PANTOPRAZOLE SODIUM 40 MG/1
40 TABLET, DELAYED RELEASE ORAL
Qty: 30 TABLET | Refills: 0 | Status: SHIPPED | OUTPATIENT
Start: 2023-08-11 | End: 2023-09-10

## 2023-08-10 RX ORDER — DEXAMETHASONE 6 MG/1
6 TABLET ORAL
Status: DISCONTINUED | OUTPATIENT
Start: 2023-08-10 | End: 2023-08-11

## 2023-08-10 RX ADMIN — ASPIRIN 81 MG: 81 TABLET, CHEWABLE ORAL at 11:07

## 2023-08-10 RX ADMIN — PANTOPRAZOLE SODIUM 40 MG: 40 TABLET, DELAYED RELEASE ORAL at 06:11

## 2023-08-10 RX ADMIN — APIXABAN 10 MG: 5 TABLET, FILM COATED ORAL at 21:30

## 2023-08-10 RX ADMIN — METOPROLOL TARTRATE 2.5 MG: 5 INJECTION INTRAVENOUS at 11:07

## 2023-08-10 RX ADMIN — CARBIDOPA AND LEVODOPA 1 TABLET: 25; 100 TABLET ORAL at 11:06

## 2023-08-10 RX ADMIN — LABETALOL HYDROCHLORIDE 50 MG: 100 TABLET, FILM COATED ORAL at 18:52

## 2023-08-10 RX ADMIN — METOPROLOL TARTRATE 2.5 MG: 5 INJECTION INTRAVENOUS at 05:21

## 2023-08-10 RX ADMIN — QUETIAPINE FUMARATE 25 MG: 25 TABLET ORAL at 21:30

## 2023-08-10 RX ADMIN — METOPROLOL TARTRATE 2.5 MG: 5 INJECTION INTRAVENOUS at 01:29

## 2023-08-10 RX ADMIN — CARBIDOPA AND LEVODOPA 1 TABLET: 25; 100 TABLET ORAL at 21:30

## 2023-08-10 RX ADMIN — SODIUM CHLORIDE, PRESERVATIVE FREE 10 ML: 5 INJECTION INTRAVENOUS at 09:22

## 2023-08-10 RX ADMIN — LEVOTHYROXINE SODIUM 50 MCG: 0.05 TABLET ORAL at 06:11

## 2023-08-10 RX ADMIN — SODIUM CHLORIDE, PRESERVATIVE FREE 10 ML: 5 INJECTION INTRAVENOUS at 21:33

## 2023-08-10 RX ADMIN — ENOXAPARIN SODIUM 60 MG: 60 INJECTION SUBCUTANEOUS at 09:22

## 2023-08-10 RX ADMIN — DEXAMETHASONE 6 MG: 6 TABLET ORAL at 18:52

## 2023-08-10 NOTE — DISCHARGE INSTRUCTIONS
HOSPITALIST DISCHARGE INSTRUCTIONS  NAME:  Mancil Kussmaul   :  1935   MRN:  [id number]     Date/Time:  8/10/2023 11:04 AM    ADMIT DATE: 2023     DISCHARGE DATE: 8/10/2023     DISCHARGE DIAGNOSIS:  Dysautonomia (orthostatic hypotension and vasovagal syncope) => PERMISSIVE HYPERTENSION. PRN meds for SBP > 175  Pulmonary embolism; 3 months of anticoagulation   COVID infection    DISCHARGE INSTRUCTIONS:  Thank you for allowing us to participate in your care. Your discharging Hospitalist is Arben Mcclure MD. Sydney Huerta were admitted for evaluation and treatment of the above. ACUTE DIAGNOSES:  Syncope and collapse [R55]  Delirium [R41.0]  Troponin level elevated [R77.8]  Sepsis ([address]) [A41.9]  Syncope, unspecified syncope type [R55]  NSTEMI (non-ST elevated myocardial infarction) ([address]) [I21.4]    CHRONIC MEDICAL DIAGNOSES:  [unfilled]    DISCHARGE MEDICATIONS:   [unfilled]    It is important that you take the medication exactly as they are prescribed. Keep your medication in the bottles provided by the pharmacist and keep a list of the medication names, dosages, and times to be taken in your wallet. Do not take other medications without consulting your doctor. DIET:  regular diet    ACTIVITY: activity as tolerated    ADDITIONAL INFORMATION: If you experience any of the following symptoms then please call your primary care physician or return to the emergency room if you cannot get hold of your doctor: Fever, chills, nausea, vomiting, diarrhea, change in mentation, falling, bleeding, shortness of breath. FOLLOW UP CARE:   @FIC@  you are to call and set up an appointment to see them in 5 days. Follow-up  No follow-up provider specified. Information obtained by :  I understand that if any problems occur once I am at home I am to contact my physician. I understand and acknowledge receipt of the instructions indicated above.

## 2023-08-11 LAB
BACTERIA SPEC CULT: ABNORMAL
SERVICE CMNT-IMP: ABNORMAL

## 2023-08-11 PROCEDURE — 99232 SBSQ HOSP IP/OBS MODERATE 35: CPT | Performed by: STUDENT IN AN ORGANIZED HEALTH CARE EDUCATION/TRAINING PROGRAM

## 2023-08-11 PROCEDURE — 6370000000 HC RX 637 (ALT 250 FOR IP): Performed by: NURSE PRACTITIONER

## 2023-08-11 PROCEDURE — 1100000000 HC RM PRIVATE

## 2023-08-11 PROCEDURE — 6370000000 HC RX 637 (ALT 250 FOR IP): Performed by: INTERNAL MEDICINE

## 2023-08-11 PROCEDURE — 2580000003 HC RX 258: Performed by: INTERNAL MEDICINE

## 2023-08-11 RX ADMIN — ACETAMINOPHEN 650 MG: 325 TABLET ORAL at 22:02

## 2023-08-11 RX ADMIN — CARBIDOPA AND LEVODOPA 1 TABLET: 25; 100 TABLET ORAL at 08:19

## 2023-08-11 RX ADMIN — LABETALOL HYDROCHLORIDE 50 MG: 100 TABLET, FILM COATED ORAL at 21:58

## 2023-08-11 RX ADMIN — LABETALOL HYDROCHLORIDE 50 MG: 100 TABLET, FILM COATED ORAL at 08:17

## 2023-08-11 RX ADMIN — PANTOPRAZOLE SODIUM 40 MG: 40 TABLET, DELAYED RELEASE ORAL at 08:20

## 2023-08-11 RX ADMIN — SODIUM CHLORIDE, PRESERVATIVE FREE 10 ML: 5 INJECTION INTRAVENOUS at 21:59

## 2023-08-11 RX ADMIN — SODIUM CHLORIDE, PRESERVATIVE FREE 10 ML: 5 INJECTION INTRAVENOUS at 08:20

## 2023-08-11 RX ADMIN — QUETIAPINE FUMARATE 25 MG: 25 TABLET ORAL at 21:57

## 2023-08-11 RX ADMIN — ASPIRIN 81 MG: 81 TABLET, CHEWABLE ORAL at 08:20

## 2023-08-11 RX ADMIN — CARBIDOPA AND LEVODOPA 1 TABLET: 25; 100 TABLET ORAL at 21:57

## 2023-08-11 RX ADMIN — LEVOTHYROXINE SODIUM 50 MCG: 0.05 TABLET ORAL at 08:20

## 2023-08-11 RX ADMIN — APIXABAN 10 MG: 5 TABLET, FILM COATED ORAL at 21:57

## 2023-08-11 RX ADMIN — APIXABAN 10 MG: 5 TABLET, FILM COATED ORAL at 08:19

## 2023-08-11 ASSESSMENT — PAIN DESCRIPTION - LOCATION: LOCATION: BACK

## 2023-08-11 ASSESSMENT — PAIN SCALES - GENERAL: PAINLEVEL_OUTOF10: 4

## 2023-08-11 NOTE — CONSULTS
ABISAI SECOURS: [address] [facility]    Amie Diaz, [address] [facility]  [facility] [address]  [phone]        Name:   Dick Quick record #: [id number]  Admission Date: 8/7/2023     Who Consulted: Dr. Beverly Aguirre    Reason for Consult:  Seizure activity    HISTORY OF PRESENT ILLNESS:     This is a 82 y.o. female who is admitted for syncope on 8/7/2023. This walker was brought to the ED by EMS after she became unresponsive while using the toilet at her assisted living facility. At the facility CPR was started however when EMS arrived they did notice that she was in sinus rhythm and was perfusing on her own. While en route to the facility she became more responsive and agitated but remained nonverbal.  Upon arrival to [facility] she continued to be nonverbal, the provider found her exam to be otherwise nonfocal with a presenting blood pressure of 188/128. Approximately 6 hours after arriving in the ED a code sepsis was called as his walker was found to be hypotensive down to the 60s with a lactate of 2.32. Yesterday while working with therapy she had another episode of hypertension and unresponsiveness followed by bradycardia and hypotension down to 53/35. Cerebel was applied after the event but was noncontributory. Lactate level done around the time of the event was 4.9. Otherwise chemistries and CBC done yesterday morning before the event were noncontributory the Neurology Service is asked to evaluate for Seizure. She is followed outpatient by Dr. Nubia Krishnamurthy for Parkinson's and lewy body dementia. She was last seen in May 2023, unfortunately that documentation is not available for that visit, it does appear that she has a an appointment with [facility] neurology in late September of this year.   Long discussion with her daughter, Kyle Wright, who tells me that initially her mother was on Sinemet 25/100, but that it was increased to 25/250 at the last
Brief Palliative Note    Discussed with ICU RN Rhiannon Michele. Chart reviewed. Neuro consult pending and daughter traveling in from [place] today. Will plan to discuss with family once Neuro consult completed and family arrives; anticipate will likely be tomorrow, 8/9. Thank you for the consult, happy to assist in the care of Mrs. Mckeon.
Completed on 8/9/2023
Palliative Medicine Consult  Srin[place]: [phone])    Patient Name: Rashaun Hernandez  YOB: 1935    Date of Initial Consult: 8/8/23  Date of Today's Visit: 8/9/2023  Reason for Consult: goals of care, code status  Requesting Provider: Lali Domínguez MD      SUMMARY:   Rashaun Hernandez is a 82 y.o. with a past history of breast cancer, HTN, Parkinsons, dementia who was admitted on 8/7/2023 from assisted living with a diagnosis of syncope. Pt initially thought to have had a cardiac arrest at assisted living facility however she never lost a pulse, felt to have a vasovagal episode. Upon arrival she was found to have COVID-19. She had an episode of hypotension in the hospital requiring vasopressors. CT of chest found PE.  EEG normal.    Current medical issues leading to Palliative Medicine involvement include: goals of care, code status. Social: pt ,  has alzheimers. Recently moved into assisted living, in past few months has had increased falls with several ER visits     PALLIATIVE DIAGNOSES:   Palliative care encounter  Hypotension, ? autonomic instability  Parkinsons  Debility  Dnr discussion  Goals of care       PLAN:   Chart reviewed including labs, notes, imaging. Met with pt and daughter Maudry Fleischer at bedside. Pt currently does not have capacity to participate in MDM  Discussed how pt has been doing, over the past three months she has had many falls leading to ER visits. Overall debility has increased, recently moved into assisted living  Reviewed events of hospital stay. Daughter shares many concerns regarding the sinemet and feels her mother got worse with increased dosing.  Encouraged her to discuss these concerns with neurology  DNR discussion: reviewed advance directive, per daughter mother changed her wishes after the initial document  We reviewed code status in the setting of advanced age, dementia, parkinsons and overall decline in the past few months  Daughter
QAM AC    QUEtiapine (SEROQUEL) tablet 25 mg  25 mg Oral Nightly    sodium chloride flush 0.9 % injection 5-40 mL  5-40 mL IntraVENous 2 times per day    sodium chloride flush 0.9 % injection 5-40 mL  5-40 mL IntraVENous PRN    0.9 % sodium chloride infusion   IntraVENous PRN    ondansetron (ZOFRAN-ODT) disintegrating tablet 4 mg  4 mg Oral Q8H PRN    Or    ondansetron (ZOFRAN) injection 4 mg  4 mg IntraVENous Q6H PRN    polyethylene glycol (GLYCOLAX) packet 17 g  17 g Oral Daily PRN    acetaminophen (TYLENOL) tablet 650 mg  650 mg Oral Q4H PRN    aspirin chewable tablet 81 mg  81 mg Oral Daily          LAB AND IMAGING FINDINGS:     Lab Results   Component Value Date/Time    WBC 8.6 08/10/2023 04:04 AM    HGB 9.9 08/10/2023 04:04 AM     08/10/2023 04:04 AM     Lab Results   Component Value Date/Time     08/10/2023 04:04 AM    K 3.7 08/10/2023 04:04 AM     08/10/2023 04:04 AM    CO2 25 08/10/2023 04:04 AM    BUN 24 08/10/2023 04:04 AM    MG 2.1 08/07/2023 03:01 PM    PHOS 2.8 02/24/2023 02:58 AM      Lab Results   Component Value Date/Time    GLOB 4.0 08/07/2023 03:01 PM     Lab Results   Component Value Date/Time    INR 1.1 02/16/2023 03:43 PM    APTT 26.1 02/20/2021 04:10 PM      Lab Results   Component Value Date/Time    IRON 75 10/05/2020 11:00 AM    TIBC 392 10/05/2020 11:00 AM      No results found for: PH, PCO2, PO2  No components found for: [place]   Lab Results   Component Value Date/Time    TROPONINI <0.05 06/05/2021 12:45 AM              Only check if applicable and billing time based rather than MDM    [x]   The total encounter time on this service date was 40 minutes which was spent performing a face-to-face encounter and personally completing the provider-level activities documented in the note. This includes time spent prior to the visit and after the visit in direct care of the patient. This time does not include time spent in any separately reportable services.

## 2023-08-12 PROCEDURE — 6370000000 HC RX 637 (ALT 250 FOR IP): Performed by: INTERNAL MEDICINE

## 2023-08-12 PROCEDURE — 98960 EDU&TRN PT SELF-MGMT NQHP 1: CPT

## 2023-08-12 PROCEDURE — 1100000000 HC RM PRIVATE

## 2023-08-12 PROCEDURE — 94761 N-INVAS EAR/PLS OXIMETRY MLT: CPT

## 2023-08-12 PROCEDURE — 6370000000 HC RX 637 (ALT 250 FOR IP): Performed by: HOSPITALIST

## 2023-08-12 PROCEDURE — 2580000003 HC RX 258: Performed by: INTERNAL MEDICINE

## 2023-08-12 PROCEDURE — 6370000000 HC RX 637 (ALT 250 FOR IP): Performed by: NURSE PRACTITIONER

## 2023-08-12 RX ORDER — LOSARTAN POTASSIUM 25 MG/1
25 TABLET ORAL DAILY
Status: DISCONTINUED | OUTPATIENT
Start: 2023-08-12 | End: 2023-08-17 | Stop reason: HOSPADM

## 2023-08-12 RX ADMIN — LABETALOL HYDROCHLORIDE 50 MG: 100 TABLET, FILM COATED ORAL at 20:23

## 2023-08-12 RX ADMIN — APIXABAN 10 MG: 5 TABLET, FILM COATED ORAL at 09:15

## 2023-08-12 RX ADMIN — LEVOTHYROXINE SODIUM 50 MCG: 0.05 TABLET ORAL at 06:52

## 2023-08-12 RX ADMIN — SODIUM CHLORIDE, PRESERVATIVE FREE 10 ML: 5 INJECTION INTRAVENOUS at 20:26

## 2023-08-12 RX ADMIN — APIXABAN 10 MG: 5 TABLET, FILM COATED ORAL at 20:23

## 2023-08-12 RX ADMIN — SODIUM CHLORIDE, PRESERVATIVE FREE 10 ML: 5 INJECTION INTRAVENOUS at 09:47

## 2023-08-12 RX ADMIN — POLYETHYLENE GLYCOL 3350 17 G: 17 POWDER, FOR SOLUTION ORAL at 13:03

## 2023-08-12 RX ADMIN — QUETIAPINE FUMARATE 25 MG: 25 TABLET ORAL at 20:24

## 2023-08-12 RX ADMIN — CARBIDOPA AND LEVODOPA 1 TABLET: 25; 100 TABLET ORAL at 09:15

## 2023-08-12 RX ADMIN — ASPIRIN 81 MG: 81 TABLET, CHEWABLE ORAL at 09:14

## 2023-08-12 RX ADMIN — LABETALOL HYDROCHLORIDE 50 MG: 100 TABLET, FILM COATED ORAL at 09:15

## 2023-08-12 RX ADMIN — PANTOPRAZOLE SODIUM 40 MG: 40 TABLET, DELAYED RELEASE ORAL at 06:52

## 2023-08-12 RX ADMIN — LOSARTAN POTASSIUM 25 MG: 25 TABLET, FILM COATED ORAL at 09:43

## 2023-08-12 RX ADMIN — CARBIDOPA AND LEVODOPA 1 TABLET: 25; 100 TABLET ORAL at 20:24

## 2023-08-13 LAB
BACTERIA SPEC CULT: NORMAL
SERVICE CMNT-IMP: NORMAL

## 2023-08-13 PROCEDURE — 98960 EDU&TRN PT SELF-MGMT NQHP 1: CPT

## 2023-08-13 PROCEDURE — 94761 N-INVAS EAR/PLS OXIMETRY MLT: CPT

## 2023-08-13 PROCEDURE — 6360000002 HC RX W HCPCS: Performed by: HOSPITALIST

## 2023-08-13 PROCEDURE — 6370000000 HC RX 637 (ALT 250 FOR IP): Performed by: HOSPITALIST

## 2023-08-13 PROCEDURE — 2580000003 HC RX 258: Performed by: INTERNAL MEDICINE

## 2023-08-13 PROCEDURE — 1100000000 HC RM PRIVATE

## 2023-08-13 PROCEDURE — 6370000000 HC RX 637 (ALT 250 FOR IP): Performed by: INTERNAL MEDICINE

## 2023-08-13 PROCEDURE — 6370000000 HC RX 637 (ALT 250 FOR IP): Performed by: NURSE PRACTITIONER

## 2023-08-13 RX ORDER — BISACODYL 10 MG
10 SUPPOSITORY, RECTAL RECTAL DAILY PRN
Status: DISCONTINUED | OUTPATIENT
Start: 2023-08-13 | End: 2023-08-17 | Stop reason: HOSPADM

## 2023-08-13 RX ORDER — HYDRALAZINE HYDROCHLORIDE 20 MG/ML
10 INJECTION INTRAMUSCULAR; INTRAVENOUS EVERY 6 HOURS PRN
Status: DISCONTINUED | OUTPATIENT
Start: 2023-08-13 | End: 2023-08-17 | Stop reason: HOSPADM

## 2023-08-13 RX ORDER — SENNOSIDES A AND B 8.6 MG/1
1 TABLET, FILM COATED ORAL NIGHTLY PRN
Status: DISCONTINUED | OUTPATIENT
Start: 2023-08-13 | End: 2023-08-17 | Stop reason: HOSPADM

## 2023-08-13 RX ADMIN — SODIUM CHLORIDE, PRESERVATIVE FREE 10 ML: 5 INJECTION INTRAVENOUS at 21:46

## 2023-08-13 RX ADMIN — CARBIDOPA AND LEVODOPA 1 TABLET: 25; 100 TABLET ORAL at 21:45

## 2023-08-13 RX ADMIN — LOSARTAN POTASSIUM 25 MG: 25 TABLET, FILM COATED ORAL at 08:49

## 2023-08-13 RX ADMIN — POLYETHYLENE GLYCOL 3350 17 G: 17 POWDER, FOR SOLUTION ORAL at 16:35

## 2023-08-13 RX ADMIN — APIXABAN 10 MG: 5 TABLET, FILM COATED ORAL at 21:45

## 2023-08-13 RX ADMIN — PANTOPRAZOLE SODIUM 40 MG: 40 TABLET, DELAYED RELEASE ORAL at 05:00

## 2023-08-13 RX ADMIN — LABETALOL HYDROCHLORIDE 50 MG: 100 TABLET, FILM COATED ORAL at 08:49

## 2023-08-13 RX ADMIN — CARBIDOPA AND LEVODOPA 1 TABLET: 25; 100 TABLET ORAL at 08:49

## 2023-08-13 RX ADMIN — LABETALOL HYDROCHLORIDE 50 MG: 100 TABLET, FILM COATED ORAL at 21:43

## 2023-08-13 RX ADMIN — LEVOTHYROXINE SODIUM 50 MCG: 0.05 TABLET ORAL at 05:00

## 2023-08-13 RX ADMIN — SODIUM CHLORIDE, PRESERVATIVE FREE 10 ML: 5 INJECTION INTRAVENOUS at 08:54

## 2023-08-13 RX ADMIN — HYDRALAZINE HYDROCHLORIDE 10 MG: 20 INJECTION INTRAMUSCULAR; INTRAVENOUS at 12:09

## 2023-08-13 RX ADMIN — APIXABAN 10 MG: 5 TABLET, FILM COATED ORAL at 08:49

## 2023-08-13 RX ADMIN — QUETIAPINE FUMARATE 25 MG: 25 TABLET ORAL at 21:45

## 2023-08-13 RX ADMIN — ASPIRIN 81 MG: 81 TABLET, CHEWABLE ORAL at 08:49

## 2023-08-14 LAB
EKG ATRIAL RATE: 146 BPM
EKG DIAGNOSIS: NORMAL
EKG P AXIS: 68 DEGREES
EKG P-R INTERVAL: 134 MS
EKG Q-T INTERVAL: 268 MS
EKG QRS DURATION: 70 MS
EKG QTC CALCULATION (BAZETT): 417 MS
EKG R AXIS: 67 DEGREES
EKG T AXIS: 106 DEGREES
EKG VENTRICULAR RATE: 146 BPM

## 2023-08-14 PROCEDURE — 97530 THERAPEUTIC ACTIVITIES: CPT

## 2023-08-14 PROCEDURE — 6370000000 HC RX 637 (ALT 250 FOR IP): Performed by: INTERNAL MEDICINE

## 2023-08-14 PROCEDURE — 6370000000 HC RX 637 (ALT 250 FOR IP): Performed by: HOSPITALIST

## 2023-08-14 PROCEDURE — 1100000000 HC RM PRIVATE

## 2023-08-14 PROCEDURE — 93010 ELECTROCARDIOGRAM REPORT: CPT | Performed by: SPECIALIST

## 2023-08-14 PROCEDURE — 6370000000 HC RX 637 (ALT 250 FOR IP): Performed by: NURSE PRACTITIONER

## 2023-08-14 PROCEDURE — 2580000003 HC RX 258: Performed by: INTERNAL MEDICINE

## 2023-08-14 RX ADMIN — PANTOPRAZOLE SODIUM 40 MG: 40 TABLET, DELAYED RELEASE ORAL at 06:29

## 2023-08-14 RX ADMIN — QUETIAPINE FUMARATE 25 MG: 25 TABLET ORAL at 23:09

## 2023-08-14 RX ADMIN — SODIUM CHLORIDE, PRESERVATIVE FREE 10 ML: 5 INJECTION INTRAVENOUS at 23:40

## 2023-08-14 RX ADMIN — LABETALOL HYDROCHLORIDE 50 MG: 100 TABLET, FILM COATED ORAL at 09:26

## 2023-08-14 RX ADMIN — SODIUM CHLORIDE, PRESERVATIVE FREE 10 ML: 5 INJECTION INTRAVENOUS at 09:32

## 2023-08-14 RX ADMIN — ASPIRIN 81 MG: 81 TABLET, CHEWABLE ORAL at 09:26

## 2023-08-14 RX ADMIN — APIXABAN 10 MG: 5 TABLET, FILM COATED ORAL at 09:26

## 2023-08-14 RX ADMIN — CARBIDOPA AND LEVODOPA 1 TABLET: 25; 100 TABLET ORAL at 23:10

## 2023-08-14 RX ADMIN — APIXABAN 10 MG: 5 TABLET, FILM COATED ORAL at 23:09

## 2023-08-14 RX ADMIN — CARBIDOPA AND LEVODOPA 1 TABLET: 25; 100 TABLET ORAL at 09:27

## 2023-08-14 RX ADMIN — POLYETHYLENE GLYCOL 3350 17 G: 17 POWDER, FOR SOLUTION ORAL at 09:45

## 2023-08-14 RX ADMIN — LOSARTAN POTASSIUM 25 MG: 25 TABLET, FILM COATED ORAL at 09:26

## 2023-08-14 RX ADMIN — LEVOTHYROXINE SODIUM 50 MCG: 0.05 TABLET ORAL at 06:29

## 2023-08-14 RX ADMIN — LABETALOL HYDROCHLORIDE 50 MG: 100 TABLET, FILM COATED ORAL at 23:09

## 2023-08-15 PROCEDURE — 6370000000 HC RX 637 (ALT 250 FOR IP): Performed by: HOSPITALIST

## 2023-08-15 PROCEDURE — 6370000000 HC RX 637 (ALT 250 FOR IP): Performed by: NURSE PRACTITIONER

## 2023-08-15 PROCEDURE — 6360000002 HC RX W HCPCS: Performed by: HOSPITALIST

## 2023-08-15 PROCEDURE — 97530 THERAPEUTIC ACTIVITIES: CPT

## 2023-08-15 PROCEDURE — 98960 EDU&TRN PT SELF-MGMT NQHP 1: CPT

## 2023-08-15 PROCEDURE — 94761 N-INVAS EAR/PLS OXIMETRY MLT: CPT

## 2023-08-15 PROCEDURE — 6370000000 HC RX 637 (ALT 250 FOR IP): Performed by: INTERNAL MEDICINE

## 2023-08-15 PROCEDURE — 1100000000 HC RM PRIVATE

## 2023-08-15 PROCEDURE — 2580000003 HC RX 258: Performed by: INTERNAL MEDICINE

## 2023-08-15 RX ORDER — HYDROXYZINE HYDROCHLORIDE 25 MG/1
25 TABLET, FILM COATED ORAL 3 TIMES DAILY PRN
Status: DISCONTINUED | OUTPATIENT
Start: 2023-08-15 | End: 2023-08-17 | Stop reason: HOSPADM

## 2023-08-15 RX ADMIN — QUETIAPINE FUMARATE 25 MG: 25 TABLET ORAL at 20:51

## 2023-08-15 RX ADMIN — CARBIDOPA AND LEVODOPA 1 TABLET: 25; 100 TABLET ORAL at 20:50

## 2023-08-15 RX ADMIN — APIXABAN 10 MG: 5 TABLET, FILM COATED ORAL at 08:27

## 2023-08-15 RX ADMIN — HYDROXYZINE HYDROCHLORIDE 25 MG: 25 TABLET, FILM COATED ORAL at 16:50

## 2023-08-15 RX ADMIN — ASPIRIN 81 MG: 81 TABLET, CHEWABLE ORAL at 08:27

## 2023-08-15 RX ADMIN — PANTOPRAZOLE SODIUM 40 MG: 40 TABLET, DELAYED RELEASE ORAL at 07:11

## 2023-08-15 RX ADMIN — LOSARTAN POTASSIUM 25 MG: 25 TABLET, FILM COATED ORAL at 08:27

## 2023-08-15 RX ADMIN — HYDRALAZINE HYDROCHLORIDE 10 MG: 20 INJECTION INTRAMUSCULAR; INTRAVENOUS at 03:55

## 2023-08-15 RX ADMIN — LEVOTHYROXINE SODIUM 50 MCG: 0.05 TABLET ORAL at 07:11

## 2023-08-15 RX ADMIN — SODIUM CHLORIDE, PRESERVATIVE FREE 10 ML: 5 INJECTION INTRAVENOUS at 08:28

## 2023-08-15 RX ADMIN — CARBIDOPA AND LEVODOPA 1 TABLET: 25; 100 TABLET ORAL at 08:27

## 2023-08-15 RX ADMIN — LABETALOL HYDROCHLORIDE 50 MG: 100 TABLET, FILM COATED ORAL at 08:27

## 2023-08-15 RX ADMIN — SODIUM CHLORIDE, PRESERVATIVE FREE 10 ML: 5 INJECTION INTRAVENOUS at 20:53

## 2023-08-15 RX ADMIN — APIXABAN 10 MG: 5 TABLET, FILM COATED ORAL at 20:50

## 2023-08-16 PROCEDURE — 2580000003 HC RX 258: Performed by: INTERNAL MEDICINE

## 2023-08-16 PROCEDURE — 6370000000 HC RX 637 (ALT 250 FOR IP): Performed by: NURSE PRACTITIONER

## 2023-08-16 PROCEDURE — 6370000000 HC RX 637 (ALT 250 FOR IP): Performed by: INTERNAL MEDICINE

## 2023-08-16 PROCEDURE — 6370000000 HC RX 637 (ALT 250 FOR IP): Performed by: HOSPITALIST

## 2023-08-16 PROCEDURE — 98960 EDU&TRN PT SELF-MGMT NQHP 1: CPT

## 2023-08-16 PROCEDURE — 94761 N-INVAS EAR/PLS OXIMETRY MLT: CPT

## 2023-08-16 PROCEDURE — 1100000000 HC RM PRIVATE

## 2023-08-16 RX ADMIN — CARBIDOPA AND LEVODOPA 1 TABLET: 25; 100 TABLET ORAL at 20:26

## 2023-08-16 RX ADMIN — PANTOPRAZOLE SODIUM 40 MG: 40 TABLET, DELAYED RELEASE ORAL at 06:50

## 2023-08-16 RX ADMIN — LEVOTHYROXINE SODIUM 50 MCG: 0.05 TABLET ORAL at 06:50

## 2023-08-16 RX ADMIN — APIXABAN 10 MG: 5 TABLET, FILM COATED ORAL at 09:26

## 2023-08-16 RX ADMIN — ASPIRIN 81 MG: 81 TABLET, CHEWABLE ORAL at 09:27

## 2023-08-16 RX ADMIN — LABETALOL HYDROCHLORIDE 50 MG: 100 TABLET, FILM COATED ORAL at 09:26

## 2023-08-16 RX ADMIN — LOSARTAN POTASSIUM 25 MG: 25 TABLET, FILM COATED ORAL at 09:26

## 2023-08-16 RX ADMIN — APIXABAN 10 MG: 5 TABLET, FILM COATED ORAL at 20:26

## 2023-08-16 RX ADMIN — CARBIDOPA AND LEVODOPA 1 TABLET: 25; 100 TABLET ORAL at 09:27

## 2023-08-16 RX ADMIN — SODIUM CHLORIDE, PRESERVATIVE FREE 10 ML: 5 INJECTION INTRAVENOUS at 20:29

## 2023-08-16 RX ADMIN — QUETIAPINE FUMARATE 25 MG: 25 TABLET ORAL at 20:26

## 2023-08-17 VITALS
DIASTOLIC BLOOD PRESSURE: 63 MMHG | SYSTOLIC BLOOD PRESSURE: 113 MMHG | WEIGHT: 138 LBS | OXYGEN SATURATION: 97 % | BODY MASS INDEX: 26.06 KG/M2 | HEIGHT: 61 IN | TEMPERATURE: 98.5 F | HEART RATE: 88 BPM | RESPIRATION RATE: 19 BRPM

## 2023-08-17 PROBLEM — A41.9 SEPSIS (HCC): Status: RESOLVED | Noted: 2023-08-07 | Resolved: 2023-08-17

## 2023-08-17 PROCEDURE — 6370000000 HC RX 637 (ALT 250 FOR IP): Performed by: HOSPITALIST

## 2023-08-17 PROCEDURE — 94761 N-INVAS EAR/PLS OXIMETRY MLT: CPT

## 2023-08-17 PROCEDURE — 98960 EDU&TRN PT SELF-MGMT NQHP 1: CPT

## 2023-08-17 PROCEDURE — 6370000000 HC RX 637 (ALT 250 FOR IP): Performed by: NURSE PRACTITIONER

## 2023-08-17 PROCEDURE — 6370000000 HC RX 637 (ALT 250 FOR IP): Performed by: INTERNAL MEDICINE

## 2023-08-17 PROCEDURE — 2580000003 HC RX 258: Performed by: INTERNAL MEDICINE

## 2023-08-17 RX ORDER — HYDROXYZINE HYDROCHLORIDE 25 MG/1
25 TABLET, FILM COATED ORAL 3 TIMES DAILY PRN
Qty: 20 TABLET | Refills: 0 | Status: SHIPPED | OUTPATIENT
Start: 2023-08-17 | End: 2023-08-27

## 2023-08-17 RX ORDER — LABETALOL 100 MG/1
50 TABLET, FILM COATED ORAL EVERY 12 HOURS SCHEDULED
Qty: 60 TABLET | Refills: 3 | Status: SHIPPED | OUTPATIENT
Start: 2023-08-17

## 2023-08-17 RX ORDER — LOSARTAN POTASSIUM 25 MG/1
25 TABLET ORAL DAILY
Qty: 30 TABLET | Refills: 3 | Status: SHIPPED | OUTPATIENT
Start: 2023-08-18

## 2023-08-17 RX ADMIN — APIXABAN 10 MG: 5 TABLET, FILM COATED ORAL at 11:06

## 2023-08-17 RX ADMIN — LABETALOL HYDROCHLORIDE 50 MG: 100 TABLET, FILM COATED ORAL at 09:42

## 2023-08-17 RX ADMIN — ASPIRIN 81 MG: 81 TABLET, CHEWABLE ORAL at 09:42

## 2023-08-17 RX ADMIN — SODIUM CHLORIDE, PRESERVATIVE FREE 10 ML: 5 INJECTION INTRAVENOUS at 09:47

## 2023-08-17 RX ADMIN — LOSARTAN POTASSIUM 25 MG: 25 TABLET, FILM COATED ORAL at 09:42

## 2023-08-17 RX ADMIN — LEVOTHYROXINE SODIUM 50 MCG: 0.05 TABLET ORAL at 06:40

## 2023-08-17 RX ADMIN — PANTOPRAZOLE SODIUM 40 MG: 40 TABLET, DELAYED RELEASE ORAL at 06:40

## 2023-08-17 RX ADMIN — CARBIDOPA AND LEVODOPA 1 TABLET: 25; 100 TABLET ORAL at 09:43

## 2023-08-17 NOTE — DISCHARGE SUMMARY
Hospitalist Discharge Summary     Patient ID:    Bebeto Deshpande  [id number]  82 y.o.  1935    Admit date of service: 8/7/2023    Discharge date of service: 8/17/2023    Admission Diagnoses: Syncope and collapse [R55]  Delirium [R41.0]  Troponin level elevated [R77.8]  Sepsis ([address]) [A41.9]  Syncope, unspecified syncope type [R55]  NSTEMI (non-ST elevated myocardial infarction) ([address]) [I21.4]    Chronic Diagnoses:      Discharge Medications:   Current Discharge Medication List        START taking these medications    Details   hydrOXYzine HCl (ATARAX) 25 MG tablet Take 1 tablet by mouth 3 times daily as needed for Itching  Qty: 20 tablet, Refills: 0      pantoprazole (PROTONIX) 40 MG tablet Take 1 tablet by mouth every morning (before breakfast)  Qty: 30 tablet, Refills: 0      apixaban (ELIQUIS) 5 MG TABS tablet Please take 10mg BID x 7 days then decrease to 5mg BID  Qty: 60 tablet, Refills: 0      dexamethasone (DECADRON) 6 MG tablet Take 1 tablet by mouth daily (with breakfast) for 8 days  Qty: 8 tablet, Refills: 0           CONTINUE these medications which have CHANGED    Details   !! labetalol (NORMODYNE) 100 MG tablet Take 0.5 tablets by mouth every 12 hours  Qty: 60 tablet, Refills: 3      losartan (COZAAR) 25 MG tablet Take 1 tablet by mouth daily  Qty: 30 tablet, Refills: 3      !! labetalol (NORMODYNE) 100 MG tablet Take 1 tablet by mouth 2 times daily as needed (FOR SYSTOLIC BLOOD PRESSURE GREATER THAN 175)  Qty: 60 tablet, Refills: 3      carbidopa-levodopa (SINEMET)  MG per tablet Take 1 tablet by mouth 2 times daily  Qty: 60 tablet, Refills: 0       !! - Potential duplicate medications found. Please discuss with provider.         CONTINUE these medications which have NOT CHANGED    Details   citalopram (CELEXA) 10 MG tablet Take 1 tablet by mouth daily      busPIRone (BUSPAR) 5 MG tablet Take 1 tablet by mouth 2 times daily      alendronate (FOSAMAX) 70 MG tablet Take 1 tablet by mouth every 7

## 2023-08-22 ENCOUNTER — TELEPHONE (OUTPATIENT)
Age: 88
End: 2023-08-22

## 2023-08-22 NOTE — TELEPHONE ENCOUNTER
Patient needs to be scheduled to be seen with tremors with Jessica for 2nd to 3rd week in September.

## 2023-08-24 NOTE — TELEPHONE ENCOUNTER
I spoke with the patients daughter and she has an appointment in September at [address] to establish care with a neurologist.

## 2024-04-24 ENCOUNTER — HOSPITAL ENCOUNTER (EMERGENCY)
Facility: HOSPITAL | Age: 89
Discharge: HOME | End: 2024-04-24
Attending: STUDENT IN AN ORGANIZED HEALTH CARE EDUCATION/TRAINING PROGRAM
Payer: MEDICARE

## 2024-04-24 VITALS
TEMPERATURE: 97.8 F | BODY MASS INDEX: 22.66 KG/M2 | SYSTOLIC BLOOD PRESSURE: 183 MMHG | HEART RATE: 68 BPM | RESPIRATION RATE: 16 BRPM | HEIGHT: 61 IN | DIASTOLIC BLOOD PRESSURE: 89 MMHG | OXYGEN SATURATION: 97 % | WEIGHT: 120 LBS

## 2024-04-24 DIAGNOSIS — R04.0 EPISTAXIS: Primary | ICD-10-CM

## 2024-04-24 DIAGNOSIS — Z79.01 LONG TERM (CURRENT) USE OF ANTICOAGULANTS: ICD-10-CM

## 2024-04-24 LAB
ALBUMIN SERPL-MCNC: 3.5 G/DL (ref 3.5–5)
ALBUMIN/GLOB SERPL: 0.9 (ref 1.1–2.2)
ALP SERPL-CCNC: 96 U/L (ref 45–117)
ALT SERPL-CCNC: 7 U/L (ref 12–78)
ANION GAP SERPL CALC-SCNC: 6 MMOL/L (ref 5–15)
APTT PPP: 26.9 SEC (ref 22.1–31)
AST SERPL-CCNC: 17 U/L (ref 15–37)
BASOPHILS # BLD: 0 K/UL (ref 0–0.1)
BASOPHILS NFR BLD: 1 % (ref 0–1)
BILIRUB SERPL-MCNC: 0.5 MG/DL (ref 0.2–1)
BUN SERPL-MCNC: 20 MG/DL (ref 6–20)
BUN/CREAT SERPL: 20 (ref 12–20)
CALCIUM SERPL-MCNC: 9.6 MG/DL (ref 8.5–10.1)
CHLORIDE SERPL-SCNC: 109 MMOL/L (ref 97–108)
CO2 SERPL-SCNC: 26 MMOL/L (ref 21–32)
CREAT SERPL-MCNC: 1.01 MG/DL (ref 0.55–1.02)
DIFFERENTIAL METHOD BLD: ABNORMAL
EOSINOPHIL # BLD: 0.1 K/UL (ref 0–0.4)
EOSINOPHIL NFR BLD: 3 % (ref 0–7)
ERYTHROCYTE [DISTWIDTH] IN BLOOD BY AUTOMATED COUNT: 13.9 % (ref 11.5–14.5)
GLOBULIN SER CALC-MCNC: 4.1 G/DL (ref 2–4)
GLUCOSE SERPL-MCNC: 85 MG/DL (ref 65–100)
HCT VFR BLD AUTO: 37.6 % (ref 35–47)
HGB BLD-MCNC: 12 G/DL (ref 11.5–16)
IMM GRANULOCYTES # BLD AUTO: 0 K/UL (ref 0–0.04)
IMM GRANULOCYTES NFR BLD AUTO: 0 % (ref 0–0.5)
INR PPP: 1.1 (ref 0.9–1.1)
LYMPHOCYTES # BLD: 0.6 K/UL (ref 0.8–3.5)
LYMPHOCYTES NFR BLD: 16 % (ref 12–49)
MCH RBC QN AUTO: 27 PG (ref 26–34)
MCHC RBC AUTO-ENTMCNC: 31.9 G/DL (ref 30–36.5)
MCV RBC AUTO: 84.7 FL (ref 80–99)
MONOCYTES # BLD: 0.5 K/UL (ref 0–1)
MONOCYTES NFR BLD: 14 % (ref 5–13)
NEUTS SEG # BLD: 2.6 K/UL (ref 1.8–8)
NEUTS SEG NFR BLD: 66 % (ref 32–75)
NRBC # BLD: 0 K/UL (ref 0–0.01)
NRBC BLD-RTO: 0 PER 100 WBC
PLATELET # BLD AUTO: 461 K/UL (ref 150–400)
PMV BLD AUTO: 10.2 FL (ref 8.9–12.9)
POTASSIUM SERPL-SCNC: 4 MMOL/L (ref 3.5–5.1)
PROT SERPL-MCNC: 7.6 G/DL (ref 6.4–8.2)
PROTHROMBIN TIME: 11 SEC (ref 9–11.1)
RBC # BLD AUTO: 4.44 M/UL (ref 3.8–5.2)
RBC MORPH BLD: ABNORMAL
SODIUM SERPL-SCNC: 141 MMOL/L (ref 136–145)
THERAPEUTIC RANGE: NORMAL SECS (ref 58–77)
WBC # BLD AUTO: 3.8 K/UL (ref 3.6–11)

## 2024-04-24 PROCEDURE — 85025 COMPLETE CBC W/AUTO DIFF WBC: CPT

## 2024-04-24 PROCEDURE — 80053 COMPREHEN METABOLIC PANEL: CPT

## 2024-04-24 PROCEDURE — 36415 COLL VENOUS BLD VENIPUNCTURE: CPT

## 2024-04-24 PROCEDURE — 85610 PROTHROMBIN TIME: CPT

## 2024-04-24 PROCEDURE — 85730 THROMBOPLASTIN TIME PARTIAL: CPT

## 2024-04-24 PROCEDURE — 99283 EMERGENCY DEPT VISIT LOW MDM: CPT

## 2024-04-24 ASSESSMENT — LIFESTYLE VARIABLES
HOW MANY STANDARD DRINKS CONTAINING ALCOHOL DO YOU HAVE ON A TYPICAL DAY: PATIENT DOES NOT DRINK
HOW OFTEN DO YOU HAVE A DRINK CONTAINING ALCOHOL: NEVER

## 2024-04-24 ASSESSMENT — PAIN - FUNCTIONAL ASSESSMENT: PAIN_FUNCTIONAL_ASSESSMENT: NONE - DENIES PAIN

## 2024-04-24 NOTE — ED NOTES
Patient sts she did not get her blood pressure medications prior to coming to ed and the staff would give them to her upon arrival to facility. Pt denies headache, dizziness, visual changes or any further symptoms. Dr rubio aware of this and sts no need to treat HTN here.

## 2024-04-24 NOTE — ED NOTES
TRANSFER - OUT REPORT:    Verbal report given to Hospital to home staff on Calli BELL Walker  being transferred to assisted care facility for routine progression of patient care       Report consisted of patient's Situation, Background, Assessment and   Recommendations(SBAR).     Information from the following report(s) Nurse Handoff Report, ED Encounter Summary, ED SBAR, and Recent Results was reviewed with the receiving nurse.    Clark Fall Assessment:    Presents to emergency department  because of falls (Syncope, seizure, or loss of consciousness): No  Age > 70: Yes  Altered Mental Status, Intoxication with alcohol or substance confusion (Disorientation, impaired judgment, poor safety awaremess, or inability to follow instructions): No  Impaired Mobility: Ambulates or transfers with assistive devices or assistance; Unable to ambulate or transer.: Yes  Nursing Judgement: Yes          Lines:   Peripheral IV 04/24/24 Right Antecubital (Active)   Site Assessment Clean, dry & intact 04/24/24 1032   Line Status Blood return noted 04/24/24 1032   Phlebitis Assessment No symptoms 04/24/24 1032   Infiltration Assessment 0 04/24/24 1032   Dressing Status New dressing applied 04/24/24 1032        Opportunity for questions and clarification was provided.

## 2024-04-24 NOTE — ED TRIAGE NOTES
Patient arrived via EMS from [facility] with c/c epistaxis that started spontaneously around 0900 when she woke up. Newly started eliquis 1 week ago d/t afib/flutter. Patient reports this is the 3rd time since starting eliquis. Bleeding controlled pta

## 2024-04-24 NOTE — ED PROVIDER NOTES
Pulse Respirations SpO2 Height Weight - Scale   (!) 173/93 97.6 °F (36.4 °C) Oral 68 16 98 % 1.549 m (5' 1\") 54.4 kg (120 lb)       Body mass index is 22.67 kg/m².    Physical Exam  Vitals and nursing note reviewed.   Constitutional:       Appearance: Normal appearance.   HENT:      Head: Normocephalic.      Nose:      Comments: Dried blood in the left nare     Mouth/Throat:      Mouth: Mucous membranes are moist.   Eyes:      Extraocular Movements: Extraocular movements intact.   Cardiovascular:      Rate and Rhythm: Normal rate.   Pulmonary:      Effort: Pulmonary effort is normal.   Musculoskeletal:         General: Normal range of motion.      Cervical back: Normal range of motion.   Skin:     General: Skin is warm and dry.   Neurological:      General: No focal deficit present.      Mental Status: She is alert.   Psychiatric:         Mood and Affect: Mood normal.         Behavior: Behavior normal.         DIAGNOSTIC RESULTS     EKG: All EKG's are interpreted by the Emergency Department Physician who either signs or Co-signs this chart in the absence of a cardiologist.        RADIOLOGY:   Interpretation per the Radiologist below, if available at the time of this note:    No orders to display        LABS:  Labs Reviewed   COMPREHENSIVE METABOLIC PANEL - Abnormal; Notable for the following components:       Result Value    Chloride 109 (*)     Est, Glom Filt Rate 54 (*)     ALT 7 (*)     Globulin 4.1 (*)     Albumin/Globulin Ratio 0.9 (*)     All other components within normal limits   CBC WITH AUTO DIFFERENTIAL - Abnormal; Notable for the following components:    Platelets 461 (*)     Monocytes % 14 (*)     Lymphocytes Absolute 0.6 (*)     All other components within normal limits   PROTIME-INR   APTT       All other labs were within normal range or not returned as of this dictation.          COURSE/REASSESSMENT     ED Course as of 04/24/24 1138   Wed Apr 24, 2024   1052 I spoke with pt's daughter on the phone

## 2024-05-09 ENCOUNTER — TELEPHONE (OUTPATIENT)
Age: 89
End: 2024-05-09

## 2024-05-09 NOTE — TELEPHONE ENCOUNTER
Pt daughter called stating that pt is having severe nosebleeds and needs nose cauterized. She also stated that she is on eliquis.

## 2024-05-17 ENCOUNTER — OFFICE VISIT (OUTPATIENT)
Age: 89
End: 2024-05-17

## 2024-05-17 VITALS
HEART RATE: 67 BPM | OXYGEN SATURATION: 98 % | DIASTOLIC BLOOD PRESSURE: 60 MMHG | SYSTOLIC BLOOD PRESSURE: 118 MMHG | RESPIRATION RATE: 16 BRPM

## 2024-05-17 DIAGNOSIS — E04.1 THYROID NODULE: ICD-10-CM

## 2024-05-17 DIAGNOSIS — H61.23 BILATERAL IMPACTED CERUMEN: ICD-10-CM

## 2024-05-17 DIAGNOSIS — R04.0 EPISTAXIS: Primary | ICD-10-CM

## 2024-05-17 DIAGNOSIS — Z79.01 ANTICOAGULATED: ICD-10-CM

## 2024-05-17 RX ORDER — LANOLIN ALCOHOL/MO/W.PET/CERES
3 CREAM (GRAM) TOPICAL DAILY
COMMUNITY

## 2024-05-17 RX ORDER — CARBIDOPA AND LEVODOPA 25; 100 MG/1; MG/1
1 TABLET, EXTENDED RELEASE ORAL 2 TIMES DAILY
COMMUNITY

## 2024-05-17 RX ORDER — GALANTAMINE HYDROBROMIDE 8 MG/1
TABLET, FILM COATED ORAL
COMMUNITY
Start: 2023-10-23

## 2024-05-17 NOTE — PROGRESS NOTES
Otolaryngology-Head and Neck Surgery  Follow Up Patient Visit     Patient: Calli Mckeon  YOB: 1935  MRN: [id number]  Date of Service:  5/17/2024    Chief Complaint: No chief complaint on file.        History of Present Illness: Calli Mckeon is a 88 y.o. female who was last seen in 2022 for left neck swelling with benign findings    Presents today with recent epistaxis     Started on eliquis recently for new onset of atrial fibrillation - admitted to [facility]    Since then has developed recurrent L epistaxis  Spontaneous  Seen in ER 4/24 - did not require packing    Using humidifier, saline spray, PRN phenylephrine nasal spray     On eliquis , follows with cardiology Dr Ino Ramirez     Hx of Lewy Body Dementia - follows with neurology [facility]    Past Medical History:  Past Medical History:   Diagnosis Date    Anxiety 6/16/2020    Arthritis     Breast cancer (HCC)     Breast cancer (HCC)     Depression 6/16/2020    Diverticulitis 6/16/2020    GERD (gastroesophageal reflux disease) 6/16/2020    High cholesterol 6/16/2020    Hx of dizziness 6/16/2020    Hypertension 6/16/2020    Primary cancer of lower-inner quadrant of left female breast (HCC) 12/18/2018    breast     Thyroid disease 6/16/2020       Past Surgical History:   Past Surgical History:   Procedure Laterality Date    BREAST LUMPECTOMY Left 01/23/2019    with SLNB    HEENT  06/25/2018    Cataract    HYSTERECTOMY (CERVIX STATUS UNKNOWN)  1976    ORTHOPEDIC SURGERY  2014    Left knee    ORTHOPEDIC SURGERY  08/09/2017    Right knee    ORTHOPEDIC SURGERY  2013    Right Shoulder    UROLOGICAL SURGERY  12/10/2020    cystoscopy        Medications:   Current Outpatient Medications   Medication Instructions    alendronate (FOSAMAX) 70 mg, Oral, EVERY 7 DAYS    apixaban (ELIQUIS) 5 MG TABS tablet Please take 10mg BID x 7 days then decrease to 5mg BID    busPIRone (BUSPAR) 5 mg, Oral, 2 TIMES DAILY    carbidopa-levodopa (SINEMET)  MG per

## 2024-06-18 ENCOUNTER — HOSPITAL ENCOUNTER (OUTPATIENT)
Facility: HOSPITAL | Age: 89
Discharge: HOME | End: 2024-06-21
Attending: OTOLARYNGOLOGY
Payer: MEDICARE

## 2024-06-18 DIAGNOSIS — E04.1 THYROID NODULE: ICD-10-CM

## 2024-06-18 PROCEDURE — 76536 US EXAM OF HEAD AND NECK: CPT

## 2024-06-28 DIAGNOSIS — E04.1 THYROID NODULE: Primary | ICD-10-CM

## 2024-06-28 NOTE — PROGRESS NOTES
Called and spoke with daughter (lives in [place])    Will order thyroid FNA     MD Nitish Menendez [facility]  [address]  [place] [address]  Office Phone: [phone]

## 2024-08-30 ENCOUNTER — OFFICE VISIT (OUTPATIENT)
Age: 89
End: 2024-08-30
Payer: MEDICARE

## 2024-08-30 VITALS
SYSTOLIC BLOOD PRESSURE: 156 MMHG | HEIGHT: 61 IN | RESPIRATION RATE: 16 BRPM | BODY MASS INDEX: 22.66 KG/M2 | WEIGHT: 120 LBS | DIASTOLIC BLOOD PRESSURE: 80 MMHG | OXYGEN SATURATION: 98 % | HEART RATE: 74 BPM

## 2024-08-30 DIAGNOSIS — H61.23 BILATERAL IMPACTED CERUMEN: Primary | ICD-10-CM

## 2024-08-30 DIAGNOSIS — R04.0 EPISTAXIS: ICD-10-CM

## 2024-08-30 DIAGNOSIS — E04.1 THYROID NODULE: ICD-10-CM

## 2024-08-30 PROCEDURE — 69210 REMOVE IMPACTED EAR WAX UNI: CPT | Performed by: OTOLARYNGOLOGY

## 2024-08-30 PROCEDURE — 99213 OFFICE O/P EST LOW 20 MIN: CPT | Performed by: OTOLARYNGOLOGY

## 2024-08-30 PROCEDURE — 1123F ACP DISCUSS/DSCN MKR DOCD: CPT | Performed by: OTOLARYNGOLOGY

## 2024-08-30 NOTE — PROGRESS NOTES
Otolaryngology-Head and Neck Surgery  Follow Up Patient Visit     Patient: Calli Mckeon  YOB: 1935  MRN: [id number]  Date of Service:  8/30/2024    Chief Complaint:   Chief Complaint   Patient presents with    Follow-up    Ear Problem    Cerumen Impaction     Interval hx 8/30/2024  No further epistaxis concerns  Had repeat thyroid US     History of Present Illness: Calli Mckeon is a 88 y.o. female who was last seen in 2022 for left neck swelling with benign findings    Presents today with recent epistaxis     Started on eliquis recently for new onset of atrial fibrillation - admitted to [facility]    Since then has developed recurrent L epistaxis  Spontaneous  Seen in ER 4/24 - did not require packing    Using humidifier, saline spray, PRN phenylephrine nasal spray     On eliquis , follows with cardiology Dr Ino Ramirez     Hx of Lewy Body Dementia - follows with neurology [facility]    Past Medical History:  Past Medical History:   Diagnosis Date    Anxiety 6/16/2020    Arthritis     Breast cancer (HCC)     Breast cancer (HCC)     Depression 6/16/2020    Diverticulitis 6/16/2020    GERD (gastroesophageal reflux disease) 6/16/2020    High cholesterol 6/16/2020    Hx of dizziness 6/16/2020    Hypertension 6/16/2020    Nosebleed     Primary cancer of lower-inner quadrant of left female breast (HCC) 12/18/2018    breast     Thyroid disease 6/16/2020       Past Surgical History:   Past Surgical History:   Procedure Laterality Date    BREAST LUMPECTOMY Left 01/23/2019    with SLNB    HEENT  06/25/2018    Cataract    HYSTERECTOMY (CERVIX STATUS UNKNOWN)  1976    ORTHOPEDIC SURGERY  2014    Left knee    ORTHOPEDIC SURGERY  08/09/2017    Right knee    ORTHOPEDIC SURGERY  2013    Right Shoulder    UROLOGICAL SURGERY  12/10/2020    cystoscopy        Medications:   Current Outpatient Medications   Medication Instructions    alendronate (FOSAMAX) 70 mg, EVERY 7 DAYS    apixaban (ELIQUIS) 5 MG TABS tablet

## 2024-09-11 ENCOUNTER — TELEPHONE (OUTPATIENT)
Age: 89
End: 2024-09-11

## 2024-10-03 ENCOUNTER — HOSPITAL ENCOUNTER (OUTPATIENT)
Facility: HOSPITAL | Age: 89
Discharge: HOME | End: 2024-10-06
Attending: OTOLARYNGOLOGY
Payer: MEDICARE

## 2024-10-03 DIAGNOSIS — E04.1 THYROID NODULE: ICD-10-CM

## 2024-10-03 PROCEDURE — 88173 CYTOPATH EVAL FNA REPORT: CPT

## 2024-10-03 PROCEDURE — 2500000003 HC RX 250 WO HCPCS: Performed by: PHYSICIAN ASSISTANT

## 2024-10-03 PROCEDURE — 10005 FNA BX W/US GDN 1ST LES: CPT

## 2024-10-03 PROCEDURE — 88172 CYTP DX EVAL FNA 1ST EA SITE: CPT

## 2024-10-03 RX ORDER — LIDOCAINE HYDROCHLORIDE 10 MG/ML
7 INJECTION, SOLUTION EPIDURAL; INFILTRATION; INTRACAUDAL; PERINEURAL ONCE
Status: COMPLETED | OUTPATIENT
Start: 2024-10-03 | End: 2024-10-03

## 2024-10-03 RX ADMIN — LIDOCAINE HYDROCHLORIDE 7 ML: 10 INJECTION, SOLUTION EPIDURAL; INFILTRATION; INTRACAUDAL; PERINEURAL at 14:01

## 2024-10-09 NOTE — RESULT ENCOUNTER NOTE
Can we let patient/daughter know her thyroid biopsy was benign which is great     I would plan for another thyroid US in about 1 year just to monitor for growth or changes

## 2024-10-29 ENCOUNTER — APPOINTMENT (OUTPATIENT)
Facility: HOSPITAL | Age: 89
End: 2024-10-29
Payer: MEDICARE

## 2024-10-29 ENCOUNTER — HOSPITAL ENCOUNTER (EMERGENCY)
Facility: HOSPITAL | Age: 89
Discharge: HOME | End: 2024-10-29
Attending: EMERGENCY MEDICINE
Payer: MEDICARE

## 2024-10-29 VITALS
BODY MASS INDEX: 19.26 KG/M2 | SYSTOLIC BLOOD PRESSURE: 173 MMHG | OXYGEN SATURATION: 99 % | HEART RATE: 79 BPM | RESPIRATION RATE: 13 BRPM | WEIGHT: 102 LBS | HEIGHT: 61 IN | TEMPERATURE: 98.8 F | DIASTOLIC BLOOD PRESSURE: 91 MMHG

## 2024-10-29 DIAGNOSIS — K59.00 CONSTIPATION, UNSPECIFIED CONSTIPATION TYPE: Primary | ICD-10-CM

## 2024-10-29 DIAGNOSIS — K52.9 COLITIS: ICD-10-CM

## 2024-10-29 LAB
ALBUMIN SERPL-MCNC: 3.6 G/DL (ref 3.5–5)
ALBUMIN/GLOB SERPL: 1 (ref 1.1–2.2)
ALP SERPL-CCNC: 82 U/L (ref 45–117)
ALT SERPL-CCNC: 7 U/L (ref 12–78)
ANION GAP SERPL CALC-SCNC: 4 MMOL/L (ref 2–12)
APPEARANCE UR: ABNORMAL
AST SERPL-CCNC: ABNORMAL U/L (ref 15–37)
BACTERIA URNS QL MICRO: NEGATIVE /HPF
BASOPHILS # BLD: 0 K/UL (ref 0–0.1)
BASOPHILS NFR BLD: 1 % (ref 0–1)
BILIRUB SERPL-MCNC: 0.7 MG/DL (ref 0.2–1)
BILIRUB UR QL: NEGATIVE
BUN SERPL-MCNC: 15 MG/DL (ref 6–20)
BUN/CREAT SERPL: 17 (ref 12–20)
CALCIUM SERPL-MCNC: 9.7 MG/DL (ref 8.5–10.1)
CHLORIDE SERPL-SCNC: 109 MMOL/L (ref 97–108)
CO2 SERPL-SCNC: 26 MMOL/L (ref 21–32)
COLOR UR: ABNORMAL
COMMENT:: NORMAL
CREAT SERPL-MCNC: 0.87 MG/DL (ref 0.55–1.02)
DIFFERENTIAL METHOD BLD: ABNORMAL
EOSINOPHIL # BLD: 0 K/UL (ref 0–0.4)
EOSINOPHIL NFR BLD: 1 % (ref 0–7)
EPITH CASTS URNS QL MICRO: ABNORMAL /LPF
ERYTHROCYTE [DISTWIDTH] IN BLOOD BY AUTOMATED COUNT: 13.2 % (ref 11.5–14.5)
GLOBULIN SER CALC-MCNC: 3.6 G/DL (ref 2–4)
GLUCOSE SERPL-MCNC: 100 MG/DL (ref 65–100)
GLUCOSE UR STRIP.AUTO-MCNC: NEGATIVE MG/DL
HCT VFR BLD AUTO: 41.9 % (ref 35–47)
HGB BLD-MCNC: 13.1 G/DL (ref 11.5–16)
HGB UR QL STRIP: NEGATIVE
HYALINE CASTS URNS QL MICRO: ABNORMAL /LPF (ref 0–2)
IMM GRANULOCYTES # BLD AUTO: 0 K/UL (ref 0–0.04)
IMM GRANULOCYTES NFR BLD AUTO: 0 % (ref 0–0.5)
KETONES UR QL STRIP.AUTO: NEGATIVE MG/DL
LEUKOCYTE ESTERASE UR QL STRIP.AUTO: ABNORMAL
LIPASE SERPL-CCNC: 27 U/L (ref 13–75)
LYMPHOCYTES # BLD: 0.6 K/UL (ref 0.8–3.5)
LYMPHOCYTES NFR BLD: 16 % (ref 12–49)
MCH RBC QN AUTO: 28.2 PG (ref 26–34)
MCHC RBC AUTO-ENTMCNC: 31.3 G/DL (ref 30–36.5)
MCV RBC AUTO: 90.3 FL (ref 80–99)
MONOCYTES # BLD: 0.4 K/UL (ref 0–1)
MONOCYTES NFR BLD: 10 % (ref 5–13)
NEUTS SEG # BLD: 2.9 K/UL (ref 1.8–8)
NEUTS SEG NFR BLD: 72 % (ref 32–75)
NITRITE UR QL STRIP.AUTO: NEGATIVE
NRBC # BLD: 0 K/UL (ref 0–0.01)
NRBC BLD-RTO: 0 PER 100 WBC
PH UR STRIP: 7.5 (ref 5–8)
PLATELET # BLD AUTO: 284 K/UL (ref 150–400)
PMV BLD AUTO: 10.1 FL (ref 8.9–12.9)
POTASSIUM SERPL-SCNC: ABNORMAL MMOL/L (ref 3.5–5.1)
PROT SERPL-MCNC: 7.2 G/DL (ref 6.4–8.2)
PROT UR STRIP-MCNC: NEGATIVE MG/DL
RBC # BLD AUTO: 4.64 M/UL (ref 3.8–5.2)
RBC #/AREA URNS HPF: ABNORMAL /HPF (ref 0–5)
RBC MORPH BLD: ABNORMAL
SODIUM SERPL-SCNC: 139 MMOL/L (ref 136–145)
SP GR UR REFRACTOMETRY: 1.01 (ref 1–1.03)
SPECIMEN HOLD: NORMAL
URINE CULTURE IF INDICATED: ABNORMAL
UROBILINOGEN UR QL STRIP.AUTO: 1 EU/DL (ref 0.2–1)
WBC # BLD AUTO: 3.9 K/UL (ref 3.6–11)
WBC URNS QL MICRO: ABNORMAL /HPF (ref 0–4)

## 2024-10-29 PROCEDURE — 96374 THER/PROPH/DIAG INJ IV PUSH: CPT

## 2024-10-29 PROCEDURE — 36415 COLL VENOUS BLD VENIPUNCTURE: CPT

## 2024-10-29 PROCEDURE — 6360000002 HC RX W HCPCS: Performed by: EMERGENCY MEDICINE

## 2024-10-29 PROCEDURE — 74176 CT ABD & PELVIS W/O CONTRAST: CPT

## 2024-10-29 PROCEDURE — 93005 ELECTROCARDIOGRAM TRACING: CPT | Performed by: EMERGENCY MEDICINE

## 2024-10-29 PROCEDURE — 80053 COMPREHEN METABOLIC PANEL: CPT

## 2024-10-29 PROCEDURE — 81001 URINALYSIS AUTO W/SCOPE: CPT

## 2024-10-29 PROCEDURE — 99284 EMERGENCY DEPT VISIT MOD MDM: CPT

## 2024-10-29 PROCEDURE — 85025 COMPLETE CBC W/AUTO DIFF WBC: CPT

## 2024-10-29 PROCEDURE — 83690 ASSAY OF LIPASE: CPT

## 2024-10-29 RX ORDER — ONDANSETRON 2 MG/ML
4 INJECTION INTRAMUSCULAR; INTRAVENOUS ONCE
Status: COMPLETED | OUTPATIENT
Start: 2024-10-29 | End: 2024-10-29

## 2024-10-29 RX ORDER — METRONIDAZOLE 250 MG/1
250 TABLET ORAL 3 TIMES DAILY
Qty: 21 TABLET | Refills: 0 | Status: SHIPPED | OUTPATIENT
Start: 2024-10-29 | End: 2024-11-05

## 2024-10-29 RX ORDER — METRONIDAZOLE 250 MG/1
250 TABLET ORAL 3 TIMES DAILY
Qty: 21 TABLET | Refills: 0 | Status: SHIPPED | OUTPATIENT
Start: 2024-10-29 | End: 2024-10-29

## 2024-10-29 RX ORDER — CIPROFLOXACIN 500 MG/1
500 TABLET, FILM COATED ORAL 2 TIMES DAILY
Qty: 14 TABLET | Refills: 0 | Status: SHIPPED | OUTPATIENT
Start: 2024-10-29 | End: 2024-10-29

## 2024-10-29 RX ORDER — CIPROFLOXACIN 500 MG/1
500 TABLET, FILM COATED ORAL 2 TIMES DAILY
Qty: 14 TABLET | Refills: 0 | Status: SHIPPED | OUTPATIENT
Start: 2024-10-29 | End: 2024-11-05

## 2024-10-29 RX ADMIN — ONDANSETRON 4 MG: 2 INJECTION INTRAMUSCULAR; INTRAVENOUS at 15:24

## 2024-10-29 ASSESSMENT — ENCOUNTER SYMPTOMS
VOMITING: 0
ABDOMINAL PAIN: 1
COUGH: 0
NAUSEA: 0
FACIAL SWELLING: 0
SHORTNESS OF BREATH: 0
BACK PAIN: 0
EYE DISCHARGE: 0
EYE PAIN: 0
SORE THROAT: 0
CHEST TIGHTNESS: 0
DIARRHEA: 0

## 2024-10-29 ASSESSMENT — PAIN - FUNCTIONAL ASSESSMENT: PAIN_FUNCTIONAL_ASSESSMENT: NONE - DENIES PAIN

## 2024-10-29 NOTE — ED NOTES
Spoke to Ammy Mckeon (daughter) [phone] and updated on plan of care. Daughter states pt received flu shot last week and started feeling unwell after her flu shot and pt vomited this AM - toprol and losartan and she is unsure if she kept her medications down. [place] her with any questions.

## 2024-10-29 NOTE — ED TRIAGE NOTES
Pt presents to ED via EMS with c/o weakness, nausea and vomiting x3 days from [email or web address]. GCS 15 on arrival. Pt denies any abdominal pain, dysuria, or other sx on arrival.     Fargo negative   mg/dl  Elevated BP en route by EMS.

## 2024-10-29 NOTE — ED PROVIDER NOTES
[facility] EMERGENCY DEPT  EMERGENCY DEPARTMENT ENCOUNTER      Pt Name: Calli Mckeon  MRN: [id number]  Birthdate 1935  Date of evaluation: 10/29/2024  Provider: Rosas Marques MD    CHIEF COMPLAINT     No chief complaint on file.        HISTORY OF PRESENT ILLNESS   (Location/Symptom, Timing/Onset, Context/Setting, Quality, Duration, Modifying Factors, Severity)  Note limiting factors.   88-year-old female comes from nursing home for 3 days of weakness and some intermittent nausea and vomiting.  No diarrhea.  She has constipation on and off constantly.  No history of  fever though she does have a history of diverticulitis.    The history is provided by the patient.   Abdominal Pain  Pain location:  Generalized  Pain quality: aching    Pain radiates to:  Does not radiate  Onset quality:  Gradual  Duration:  1 week  Timing:  Constant  Progression:  Worsening  Chronicity:  New  Context: not alcohol use, not awakening from sleep, not eating, not medication withdrawal, not previous surgeries, not recent illness and not recent sexual activity    Relieved by:  Nothing  Worsened by:  Nothing  Ineffective treatments:  None tried  Associated symptoms: no chest pain, no chills, no cough, no diarrhea, no dysuria, no hematuria, no nausea, no shortness of breath, no sore throat, no vaginal discharge and no vomiting          Review of External Medical Records:     Nursing Notes were reviewed.    REVIEW OF SYSTEMS    (2-9 systems for level 4, 10 or more for level 5)     Review of Systems   Constitutional:  Negative for activity change, appetite change, chills and diaphoresis.   HENT:  Negative for congestion, ear pain, facial swelling and sore throat.    Eyes:  Negative for pain, discharge and visual disturbance.   Respiratory:  Negative for cough, chest tightness and shortness of breath.    Cardiovascular:  Negative for chest pain and palpitations.   Gastrointestinal:  Positive for abdominal pain. Negative for diarrhea,

## 2024-10-30 LAB
EKG ATRIAL RATE: 70 BPM
EKG DIAGNOSIS: NORMAL
EKG P-R INTERVAL: 118 MS
EKG Q-T INTERVAL: 392 MS
EKG QRS DURATION: 80 MS
EKG QTC CALCULATION (BAZETT): 423 MS
EKG R AXIS: 79 DEGREES
EKG T AXIS: 120 DEGREES
EKG VENTRICULAR RATE: 70 BPM

## 2024-10-30 PROCEDURE — 93010 ELECTROCARDIOGRAM REPORT: CPT | Performed by: SPECIALIST

## 2024-11-08 ENCOUNTER — APPOINTMENT (OUTPATIENT)
Facility: HOSPITAL | Age: 89
End: 2024-11-08
Payer: MEDICARE

## 2024-11-08 ENCOUNTER — HOSPITAL ENCOUNTER (EMERGENCY)
Facility: HOSPITAL | Age: 89
Discharge: HOME | End: 2024-11-08
Attending: EMERGENCY MEDICINE
Payer: MEDICARE

## 2024-11-08 VITALS
BODY MASS INDEX: 19.26 KG/M2 | HEART RATE: 69 BPM | TEMPERATURE: 97.9 F | OXYGEN SATURATION: 94 % | WEIGHT: 102 LBS | RESPIRATION RATE: 14 BRPM | SYSTOLIC BLOOD PRESSURE: 160 MMHG | DIASTOLIC BLOOD PRESSURE: 72 MMHG | HEIGHT: 61 IN

## 2024-11-08 DIAGNOSIS — R19.7 DIARRHEA, UNSPECIFIED TYPE: Primary | ICD-10-CM

## 2024-11-08 LAB
ALBUMIN SERPL-MCNC: 3.6 G/DL (ref 3.5–5)
ALBUMIN/GLOB SERPL: 1.1 (ref 1.1–2.2)
ALP SERPL-CCNC: 69 U/L (ref 45–117)
ALT SERPL-CCNC: 6 U/L (ref 12–78)
ANION GAP SERPL CALC-SCNC: 4 MMOL/L (ref 2–12)
AST SERPL-CCNC: 18 U/L (ref 15–37)
BASOPHILS # BLD: 0 K/UL (ref 0–0.1)
BASOPHILS NFR BLD: 1 % (ref 0–1)
BILIRUB SERPL-MCNC: 0.6 MG/DL (ref 0.2–1)
BUN SERPL-MCNC: 19 MG/DL (ref 6–20)
BUN/CREAT SERPL: 19 (ref 12–20)
CALCIUM SERPL-MCNC: 9.4 MG/DL (ref 8.5–10.1)
CHLORIDE SERPL-SCNC: 111 MMOL/L (ref 97–108)
CO2 SERPL-SCNC: 26 MMOL/L (ref 21–32)
CREAT SERPL-MCNC: 1.02 MG/DL (ref 0.55–1.02)
DIFFERENTIAL METHOD BLD: ABNORMAL
EOSINOPHIL # BLD: 0.2 K/UL (ref 0–0.4)
EOSINOPHIL NFR BLD: 5 % (ref 0–7)
ERYTHROCYTE [DISTWIDTH] IN BLOOD BY AUTOMATED COUNT: 13.7 % (ref 11.5–14.5)
GLOBULIN SER CALC-MCNC: 3.3 G/DL (ref 2–4)
GLUCOSE SERPL-MCNC: 88 MG/DL (ref 65–100)
HCT VFR BLD AUTO: 39.8 % (ref 35–47)
HGB BLD-MCNC: 12.8 G/DL (ref 11.5–16)
IMM GRANULOCYTES # BLD AUTO: 0 K/UL (ref 0–0.04)
IMM GRANULOCYTES NFR BLD AUTO: 0 % (ref 0–0.5)
LYMPHOCYTES # BLD: 0.8 K/UL (ref 0.8–3.5)
LYMPHOCYTES NFR BLD: 23 % (ref 12–49)
MCH RBC QN AUTO: 28.6 PG (ref 26–34)
MCHC RBC AUTO-ENTMCNC: 32.2 G/DL (ref 30–36.5)
MCV RBC AUTO: 89 FL (ref 80–99)
MONOCYTES # BLD: 0.7 K/UL (ref 0–1)
MONOCYTES NFR BLD: 20 % (ref 5–13)
NEUTS SEG # BLD: 1.6 K/UL (ref 1.8–8)
NEUTS SEG NFR BLD: 51 % (ref 32–75)
NRBC # BLD: 0 K/UL (ref 0–0.01)
NRBC BLD-RTO: 0 PER 100 WBC
PLATELET # BLD AUTO: 296 K/UL (ref 150–400)
PLATELET COMMENT: ABNORMAL
PMV BLD AUTO: 10.5 FL (ref 8.9–12.9)
POTASSIUM SERPL-SCNC: 4.1 MMOL/L (ref 3.5–5.1)
PROT SERPL-MCNC: 6.9 G/DL (ref 6.4–8.2)
RBC # BLD AUTO: 4.47 M/UL (ref 3.8–5.2)
RBC MORPH BLD: ABNORMAL
SODIUM SERPL-SCNC: 141 MMOL/L (ref 136–145)
WBC # BLD AUTO: 3.3 K/UL (ref 3.6–11)

## 2024-11-08 PROCEDURE — 74176 CT ABD & PELVIS W/O CONTRAST: CPT

## 2024-11-08 PROCEDURE — 36415 COLL VENOUS BLD VENIPUNCTURE: CPT

## 2024-11-08 PROCEDURE — 80053 COMPREHEN METABOLIC PANEL: CPT

## 2024-11-08 PROCEDURE — 99284 EMERGENCY DEPT VISIT MOD MDM: CPT

## 2024-11-08 PROCEDURE — 85025 COMPLETE CBC W/AUTO DIFF WBC: CPT

## 2024-11-08 ASSESSMENT — PAIN DESCRIPTION - LOCATION: LOCATION: ABDOMEN

## 2024-11-08 ASSESSMENT — PAIN SCALES - GENERAL: PAINLEVEL_OUTOF10: 0

## 2024-11-08 ASSESSMENT — PAIN - FUNCTIONAL ASSESSMENT: PAIN_FUNCTIONAL_ASSESSMENT: 0-10

## 2024-11-08 ASSESSMENT — PAIN DESCRIPTION - ORIENTATION: ORIENTATION: MID

## 2024-11-08 NOTE — ED PROVIDER NOTES
ED SIGN OUT NOTE  Care assumed at [facility] 3:15 PM EST    Patient was signed out to me by Dr. Miranda.     Patient is awaiting CT abdomen/pelvis.    BP (!) 174/84   Pulse 69   Temp 97.9 °F (36.6 °C) (Oral)   Resp 11   Ht 1.549 m (5' 1\")   Wt 46.3 kg (102 lb)   SpO2 99%   BMI 19.27 kg/m²     Labs Reviewed   CBC WITH AUTO DIFFERENTIAL - Abnormal; Notable for the following components:       Result Value    WBC 3.3 (*)     Monocytes % 20 (*)     Neutrophils Absolute 1.6 (*)     All other components within normal limits   COMPREHENSIVE METABOLIC PANEL - Abnormal; Notable for the following components:    Chloride 111 (*)     Est, Glom Filt Rate 54 (*)     ALT 6 (*)     All other components within normal limits     CT ABDOMEN PELVIS WO CONTRAST Additional Contrast? None   Final Result   No acute intra-abdominal pathology. Nonobstructing left renal stone.      Electronically signed by Rolando Padgett               Diagnosis:   1. Diarrhea, unspecified type        Disposition:   Decision To Discharge 11/08/2024 03:47:41 PM    Plan:   The patient is resting comfortably and feels better, is alert and in no distress. The repeat examination is unremarkable and benign; in particular, there is no discomfort at McBurney's point. The history, exam, diagnostic testing, and current condition do not suggest acute appendicitis, bowel obstruction, incarcerated hernia, acute cholecystitis, bowel perforation, major gastrointestinal bleeding, severe diverticulitis, sepsis, or other significant pathology to warrant further testing, continued ED treatment, admission, or surgical evaluation at this point. The vital signs have been stable and are within normal limits at this time. The patient does not have uncontrollable pain, intractable vomiting, or other significant symptoms. The patient's condition is stable and appropriate for discharge. The patient will pursue further outpatient evaluation with the primary care

## 2024-11-08 NOTE — ED PROVIDER NOTES
[facility] EMERGENCY DEPT  EMERGENCY DEPARTMENT ENCOUNTER      Pt Name: Calli Mckeon  MRN: [id number]  Birthdate 1935  Date of evaluation: 11/8/2024  Provider: Himanshu Miranda MD      HISTORY OF PRESENT ILLNESS      88-year-old with a history of arthritis, depression, GERD, hypertension presents to the emergency department by EMS with chief plaint diarrhea, fatigue, not feeling well.  She was seen about 9 days ago in this emergency department and diagnosed with colitis and started on Cipro Flagyl.  She should have finished that a few days ago.  She tells me that she is feeling somewhat better now.  No fevers.  No abdominal pain.  No nausea or vomiting now.    The history is provided by the patient, the EMS personnel and medical records.           Nursing Notes were reviewed.    REVIEW OF SYSTEMS         Review of Systems        PAST MEDICAL HISTORY     Past Medical History:   Diagnosis Date    Anxiety 6/16/2020    Arthritis     Breast cancer (HCC)     Breast cancer (HCC)     Depression 6/16/2020    Diverticulitis 6/16/2020    GERD (gastroesophageal reflux disease) 6/16/2020    High cholesterol 6/16/2020    Hx of dizziness 6/16/2020    Hypertension 6/16/2020    Nosebleed     Primary cancer of lower-inner quadrant of left female breast (HCC) 12/18/2018    breast     Thyroid disease 6/16/2020         SURGICAL HISTORY       Past Surgical History:   Procedure Laterality Date    BREAST LUMPECTOMY Left 01/23/2019    with SLNB    HEENT  06/25/2018    Cataract    HYSTERECTOMY (CERVIX STATUS UNKNOWN)  1976    ORTHOPEDIC SURGERY  2014    Left knee    ORTHOPEDIC SURGERY  08/09/2017    Right knee    ORTHOPEDIC SURGERY  2013    Right Shoulder    UROLOGICAL SURGERY  12/10/2020    cystoscopy          CURRENT MEDICATIONS       Previous Medications    ALENDRONATE (FOSAMAX) 70 MG TABLET    Take 1 tablet by mouth every 7 days    APIXABAN (ELIQUIS) 5 MG TABS TABLET    Please take 10mg BID x 7 days then decrease to 5mg BID     -- -- -- --      Height Weight         -- --             There is no height or weight on file to calculate BMI.    Physical Exam  Vitals and nursing note reviewed.   Constitutional:       General: She is not in acute distress.     Appearance: She is not ill-appearing.   Cardiovascular:      Rate and Rhythm: Normal rate.   Pulmonary:      Effort: Pulmonary effort is normal. No respiratory distress.   Abdominal:      Palpations: Abdomen is soft.      Tenderness: There is no abdominal tenderness.   Neurological:      Mental Status: She is alert.             EMERGENCY DEPARTMENT COURSE and DIFFERENTIAL DIAGNOSIS/MDM:   Vitals:  There were no vitals filed for this visit.      Medical Decision Making  88-year-old female presents to the emergency department above with complaint of abdominal pain and diarrhea.  May be due to antibiotics.  Turned over to oncoming physician pending CT scan    3:04 PM  Change of shift.  Care of patient signed over to Dr Saleh.  Handoff complete.      Amount and/or Complexity of Data Reviewed  Labs: ordered.  Radiology: ordered.            REASSESSMENT          CONSULTS:  None    PROCEDURES:     Procedures            (Please note that portions of this note were completed with a voice recognition program.  Efforts were made to edit the dictations but occasionally words are mis-transcribed.)    Himanshu Miranda MD (electronically signed)  Emergency Attending Physician              Himanshu Miranda MD  11/08/24 [id number]

## 2024-11-08 NOTE — ED TRIAGE NOTES
Pt arrives via EMS reporting she was discharged on 10/29, where she was admitted for nausea/vomiting.  Pt is on antibiotics.      Pt has been having diarrhea since discharge and is now weak and dizzy.

## 2024-12-05 ENCOUNTER — TELEPHONE (OUTPATIENT)
Age: 89
End: 2024-12-05

## 2024-12-05 NOTE — TELEPHONE ENCOUNTER
Pt daughter called stating that pt has bad nosebleeds and has had it cauterized. Daughter stated she needs something soon preferably [facility].

## 2024-12-06 ENCOUNTER — OFFICE VISIT (OUTPATIENT)
Age: 89
End: 2024-12-06

## 2024-12-06 VITALS
HEIGHT: 61 IN | HEART RATE: 62 BPM | OXYGEN SATURATION: 98 % | SYSTOLIC BLOOD PRESSURE: 120 MMHG | WEIGHT: 102 LBS | BODY MASS INDEX: 19.26 KG/M2 | RESPIRATION RATE: 16 BRPM | DIASTOLIC BLOOD PRESSURE: 60 MMHG

## 2024-12-06 DIAGNOSIS — E04.1 THYROID NODULE: Primary | ICD-10-CM

## 2024-12-06 DIAGNOSIS — R04.0 EPISTAXIS: ICD-10-CM

## 2024-12-06 DIAGNOSIS — Z79.01 ANTICOAGULATED: ICD-10-CM

## 2024-12-06 PROCEDURE — 1159F MED LIST DOCD IN RCRD: CPT | Performed by: OTOLARYNGOLOGY

## 2024-12-06 PROCEDURE — 1123F ACP DISCUSS/DSCN MKR DOCD: CPT | Performed by: OTOLARYNGOLOGY

## 2024-12-06 NOTE — PROGRESS NOTES
Otolaryngology-Head and Neck Surgery  Follow Up Patient Visit     Patient: Calli Mckeon  YOB: 1935  MRN: [id number]  Date of Service:  12/6/2024    Chief Complaint:   Chief Complaint   Patient presents with    Follow-up    Epistaxis     Interval hx 12/6/2024  Left epistaxis started again with severe episode yesterday, stopped with use of nasal decongestant spray ultimately     Interval hx 10/2024  No further epistaxis concerns  Had repeat thyroid US     History of Present Illness: Calli Mckeon is a 88 y.o. female who was last seen in 2022 for left neck swelling with benign findings    Presents today with recent epistaxis     Started on eliquis recently for new onset of atrial fibrillation - admitted to [facility]    Since then has developed recurrent L epistaxis  Spontaneous  Seen in ER 4/24 - did not require packing    Using humidifier, saline spray, PRN phenylephrine nasal spray     On eliquis , follows with cardiology Dr Ino Ramirez     Hx of Lewy Body Dementia - follows with neurology [facility]    Past Medical History:  Past Medical History:   Diagnosis Date    Anxiety 6/16/2020    Arthritis     Breast cancer (HCC)     Breast cancer (HCC)     Depression 6/16/2020    Diverticulitis 6/16/2020    GERD (gastroesophageal reflux disease) 6/16/2020    High cholesterol 6/16/2020    Hx of dizziness 6/16/2020    Hypertension 6/16/2020    Nosebleed     Primary cancer of lower-inner quadrant of left female breast (HCC) 12/18/2018    breast     Thyroid disease 6/16/2020       Past Surgical History:   Past Surgical History:   Procedure Laterality Date    BREAST LUMPECTOMY Left 01/23/2019    with SLNB    HEENT  06/25/2018    Cataract    HYSTERECTOMY (CERVIX STATUS UNKNOWN)  1976    ORTHOPEDIC SURGERY  2014    Left knee    ORTHOPEDIC SURGERY  08/09/2017    Right knee    ORTHOPEDIC SURGERY  2013    Right Shoulder    UROLOGICAL SURGERY  12/10/2020    cystoscopy        Medications:   Current Outpatient

## 2024-12-10 ENCOUNTER — HOSPITAL ENCOUNTER (EMERGENCY)
Facility: HOSPITAL | Age: 89
Discharge: HOME | End: 2024-12-10
Attending: EMERGENCY MEDICINE
Payer: MEDICARE

## 2024-12-10 VITALS
BODY MASS INDEX: 20.03 KG/M2 | WEIGHT: 102 LBS | HEART RATE: 73 BPM | RESPIRATION RATE: 13 BRPM | TEMPERATURE: 97.9 F | OXYGEN SATURATION: 97 % | SYSTOLIC BLOOD PRESSURE: 165 MMHG | HEIGHT: 60 IN | DIASTOLIC BLOOD PRESSURE: 84 MMHG

## 2024-12-10 DIAGNOSIS — I10 HYPERTENSION, UNSPECIFIED TYPE: Primary | ICD-10-CM

## 2024-12-10 LAB
ALBUMIN SERPL-MCNC: 3.8 G/DL (ref 3.5–5)
ALBUMIN/GLOB SERPL: 1 (ref 1.1–2.2)
ALP SERPL-CCNC: 82 U/L (ref 45–117)
ALT SERPL-CCNC: 11 U/L (ref 12–78)
ANION GAP SERPL CALC-SCNC: 6 MMOL/L (ref 2–12)
AST SERPL-CCNC: 16 U/L (ref 15–37)
BASOPHILS # BLD: 0 K/UL (ref 0–0.1)
BASOPHILS NFR BLD: 1 % (ref 0–1)
BILIRUB SERPL-MCNC: 0.5 MG/DL (ref 0.2–1)
BUN SERPL-MCNC: 22 MG/DL (ref 6–20)
BUN/CREAT SERPL: 18 (ref 12–20)
CALCIUM SERPL-MCNC: 9.5 MG/DL (ref 8.5–10.1)
CHLORIDE SERPL-SCNC: 107 MMOL/L (ref 97–108)
CO2 SERPL-SCNC: 25 MMOL/L (ref 21–32)
COMMENT:: NORMAL
CREAT SERPL-MCNC: 1.22 MG/DL (ref 0.55–1.02)
DIFFERENTIAL METHOD BLD: ABNORMAL
EOSINOPHIL # BLD: 0.1 K/UL (ref 0–0.4)
EOSINOPHIL NFR BLD: 3 % (ref 0–7)
ERYTHROCYTE [DISTWIDTH] IN BLOOD BY AUTOMATED COUNT: 13.4 % (ref 11.5–14.5)
GLOBULIN SER CALC-MCNC: 4 G/DL (ref 2–4)
GLUCOSE SERPL-MCNC: 128 MG/DL (ref 65–100)
HCT VFR BLD AUTO: 40.4 % (ref 35–47)
HGB BLD-MCNC: 12.7 G/DL (ref 11.5–16)
IMM GRANULOCYTES # BLD AUTO: 0 K/UL (ref 0–0.04)
IMM GRANULOCYTES NFR BLD AUTO: 0 % (ref 0–0.5)
LYMPHOCYTES # BLD: 0.8 K/UL (ref 0.8–3.5)
LYMPHOCYTES NFR BLD: 24 % (ref 12–49)
MAGNESIUM SERPL-MCNC: 2.2 MG/DL (ref 1.6–2.4)
MCH RBC QN AUTO: 28.2 PG (ref 26–34)
MCHC RBC AUTO-ENTMCNC: 31.4 G/DL (ref 30–36.5)
MCV RBC AUTO: 89.8 FL (ref 80–99)
MONOCYTES # BLD: 0.6 K/UL (ref 0–1)
MONOCYTES NFR BLD: 17 % (ref 5–13)
NEUTS SEG # BLD: 1.9 K/UL (ref 1.8–8)
NEUTS SEG NFR BLD: 55 % (ref 32–75)
NRBC # BLD: 0 K/UL (ref 0–0.01)
NRBC BLD-RTO: 0 PER 100 WBC
PLATELET # BLD AUTO: 283 K/UL (ref 150–400)
PMV BLD AUTO: 10.2 FL (ref 8.9–12.9)
POTASSIUM SERPL-SCNC: 4.1 MMOL/L (ref 3.5–5.1)
PROT SERPL-MCNC: 7.8 G/DL (ref 6.4–8.2)
RBC # BLD AUTO: 4.5 M/UL (ref 3.8–5.2)
SODIUM SERPL-SCNC: 138 MMOL/L (ref 136–145)
SPECIMEN HOLD: NORMAL
WBC # BLD AUTO: 3.5 K/UL (ref 3.6–11)

## 2024-12-10 PROCEDURE — 93005 ELECTROCARDIOGRAM TRACING: CPT | Performed by: EMERGENCY MEDICINE

## 2024-12-10 PROCEDURE — 99284 EMERGENCY DEPT VISIT MOD MDM: CPT

## 2024-12-10 PROCEDURE — 80053 COMPREHEN METABOLIC PANEL: CPT

## 2024-12-10 PROCEDURE — 6370000000 HC RX 637 (ALT 250 FOR IP): Performed by: EMERGENCY MEDICINE

## 2024-12-10 PROCEDURE — 36415 COLL VENOUS BLD VENIPUNCTURE: CPT

## 2024-12-10 PROCEDURE — 85025 COMPLETE CBC W/AUTO DIFF WBC: CPT

## 2024-12-10 PROCEDURE — 83735 ASSAY OF MAGNESIUM: CPT

## 2024-12-10 RX ORDER — METOPROLOL TARTRATE 50 MG
50 TABLET ORAL
Status: COMPLETED | OUTPATIENT
Start: 2024-12-10 | End: 2024-12-10

## 2024-12-10 RX ORDER — LORAZEPAM 0.5 MG/1
0.5 TABLET ORAL
Status: COMPLETED | OUTPATIENT
Start: 2024-12-10 | End: 2024-12-10

## 2024-12-10 RX ADMIN — LORAZEPAM 0.5 MG: 0.5 TABLET ORAL at 20:20

## 2024-12-10 RX ADMIN — METOPROLOL TARTRATE 50 MG: 50 TABLET, FILM COATED ORAL at 20:19

## 2024-12-10 ASSESSMENT — ENCOUNTER SYMPTOMS
COUGH: 0
VOMITING: 0
SORE THROAT: 0

## 2024-12-10 ASSESSMENT — PAIN SCALES - GENERAL: PAINLEVEL_OUTOF10: 0

## 2024-12-10 ASSESSMENT — PAIN - FUNCTIONAL ASSESSMENT: PAIN_FUNCTIONAL_ASSESSMENT: 0-10

## 2024-12-11 NOTE — ED PROVIDER NOTES
[facility] EMERGENCY DEPT  EMERGENCY DEPARTMENT ENCOUNTER      Pt Name: Calli Mckeon  MRN: [id number]  Birthdate 1935  Date of evaluation: 12/10/2024  Provider: Himanshu Feldman MD    CHIEF COMPLAINT       Chief Complaint   Patient presents with    Hypertension         HISTORY OF PRESENT ILLNESS   (Location/Symptom, Timing/Onset, Context/Setting, Quality, Duration, Modifying Factors, Severity)  Note limiting factors.   88-year-old female presents from [facility] via EMS with complaints of high blood pressure.  Patient states she was feeling very shaky and tremulous.  Blood pressure prior to EMS arrival was reportedly 200 systolic over 100.  Patient denied any chest pain, shortness of breath.  Review of EMR shows that she has a history of hypertension, anxiety, breast cancer, GERD, dizziness.    The history is provided by the patient, the EMS personnel and medical records.         Review of External Medical Records:     Nursing Notes were reviewed.    REVIEW OF SYSTEMS    (2-9 systems for level 4, 10 or more for level 5)     Review of Systems   Constitutional:  Negative for fatigue.   HENT:  Negative for sore throat.    Eyes:  Negative for visual disturbance.   Respiratory:  Negative for cough.    Cardiovascular:  Negative for palpitations.   Gastrointestinal:  Negative for vomiting.   Genitourinary:  Negative for difficulty urinating.   Musculoskeletal:  Negative for myalgias.   Skin:  Negative for rash.   Neurological:  Negative for weakness.       Except as noted above the remainder of the review of systems was reviewed and negative.       PAST MEDICAL HISTORY     Past Medical History:   Diagnosis Date    Anxiety 6/16/2020    Arthritis     Breast cancer (HCC)     Breast cancer (HCC)     Depression 6/16/2020    Diverticulitis 6/16/2020    GERD (gastroesophageal reflux disease) 6/16/2020    High cholesterol 6/16/2020    Hx of dizziness 6/16/2020    Hypertension 6/16/2020    Nosebleed

## 2024-12-11 NOTE — ED TRIAGE NOTES
Patient arrives to ED via EMS from [facility] with c/o HTN.     EMS states patient's BP en route was in the 200s systolic.

## 2024-12-11 NOTE — ED NOTES
Patient assisted to wheelchair at discharge. Discharge instructions reviewed with patient and daughter and opportunity to answer questions given.

## 2024-12-13 LAB
EKG ATRIAL RATE: 84 BPM
EKG DIAGNOSIS: NORMAL
EKG P AXIS: 28 DEGREES
EKG P-R INTERVAL: 162 MS
EKG Q-T INTERVAL: 362 MS
EKG QRS DURATION: 66 MS
EKG QTC CALCULATION (BAZETT): 427 MS
EKG R AXIS: 47 DEGREES
EKG T AXIS: 92 DEGREES
EKG VENTRICULAR RATE: 84 BPM

## 2024-12-13 PROCEDURE — 93010 ELECTROCARDIOGRAM REPORT: CPT | Performed by: SPECIALIST

## 2024-12-20 ENCOUNTER — OFFICE VISIT (OUTPATIENT)
Age: 89
End: 2024-12-20
Payer: MEDICARE

## 2024-12-20 VITALS
SYSTOLIC BLOOD PRESSURE: 128 MMHG | HEIGHT: 60 IN | BODY MASS INDEX: 19.92 KG/M2 | DIASTOLIC BLOOD PRESSURE: 78 MMHG | HEART RATE: 69 BPM | OXYGEN SATURATION: 99 % | RESPIRATION RATE: 16 BRPM

## 2024-12-20 DIAGNOSIS — Z79.01 ANTICOAGULATED: ICD-10-CM

## 2024-12-20 DIAGNOSIS — R04.0 EPISTAXIS: Primary | ICD-10-CM

## 2024-12-20 DIAGNOSIS — E04.1 THYROID NODULE: ICD-10-CM

## 2024-12-20 PROCEDURE — 99213 OFFICE O/P EST LOW 20 MIN: CPT | Performed by: OTOLARYNGOLOGY

## 2024-12-20 PROCEDURE — 1123F ACP DISCUSS/DSCN MKR DOCD: CPT | Performed by: OTOLARYNGOLOGY

## 2024-12-20 PROCEDURE — 1159F MED LIST DOCD IN RCRD: CPT | Performed by: OTOLARYNGOLOGY

## 2024-12-20 RX ORDER — METOPROLOL TARTRATE 50 MG
TABLET ORAL
COMMUNITY
Start: 2024-12-16

## 2024-12-20 RX ORDER — DORZOLAMIDE HCL 20 MG/ML
SOLUTION/ DROPS OPHTHALMIC
COMMUNITY
Start: 2024-11-26

## 2024-12-20 RX ORDER — BRIMONIDINE TARTRATE 2 MG/ML
SOLUTION/ DROPS OPHTHALMIC
COMMUNITY
Start: 2024-12-04

## 2024-12-20 RX ORDER — MULTIVIT WITH MINERALS/LUTEIN
TABLET ORAL
COMMUNITY

## 2024-12-20 RX ORDER — HYDROXYZINE HYDROCHLORIDE 25 MG/1
TABLET, FILM COATED ORAL
COMMUNITY

## 2024-12-20 RX ORDER — MULTIVIT-MIN/IRON/FOLIC ACID/K 18-600-40
CAPSULE ORAL
COMMUNITY

## 2024-12-20 RX ORDER — CRANBERRY FRUIT EXTRACT 425 MG
CAPSULE ORAL
COMMUNITY

## 2024-12-20 RX ORDER — B-COMPLEX WITH VITAMIN C
TABLET ORAL
COMMUNITY

## 2024-12-20 NOTE — PROGRESS NOTES
thirst.  Respiratory: denies cough, hemoptysis, wheezing  Gastrointestinal: denies trouble swallowing, nausea, emesis, regurgitation  Musculoskeletal: denies muscle weakness or wasting  Cardiovascular: denies chest pain, shortness of breath  Neurologic: denies seizures, numbness or tingling, syncope  Hematologic: denies easy bleeding or bruising    Physical Examination:   There were no vitals taken for this visit.      General: Comfortable, pleasant, appears stated age, stoic  Voice: Strained   Face: No masses or lesions, facial strength symmetric   Ears: External ears unremarkable. Bilateral ear canal clear   Nose: External nose unremarkable. Dorsum midline. Anterior rhinoscopy demonstrates healed left cautery site, slight residual vessel, left anterior septum was cauterized again. Septum with right deviation and left inferior spur. Turbinates without hypertrophy.   Oral Cavity / Oropharynx: No trismus. Mucosa pink and moist. No lesions. Tongue is midline and mobile. Palate elevates symmetrically. Uvula midline. Tonsils unremarkable. Base of tongue soft. Floor of mouth soft.   Neck: Supple. No adenopathy. Thyroid unremarkable. Prior thyroid scar. Palpable laryngeal landmarks. Full neck range of motion   Neurologic: CN II - XI intact. Wheelchair         Thyroid US   IMPRESSION:  1.  Right inferior 2.4 cm TR-4 thyroid nodule, for which FNA is recommended.  2.  Left superior 1.1 cm TR-4 thyroid nodule, which follow-up in 1 year is  recommended.     23X      Thyroid FNA   CYTOLOGIC INTERPRETATION:   Thyroid, right, fine-needle aspiration:   Consistent with a benign follicular nodule (includes adenomatoid nodule,   colloid nodule, etc.)     General Categorization   Benign     Assessment and Plan:   Epistaxis  Anticoagulation   Chronic rhinitis   - S/p repeat cautery 2 weeks ago, left nare. Additional silver nitrate applied today  - Epistaxis precautions reviewed   - Daughter does feel that rhinitis is worsening

## 2025-01-20 RX ORDER — IPRATROPIUM BROMIDE 21 UG/1
1 SPRAY, METERED NASAL 2 TIMES DAILY PRN
Qty: 30 ML | Refills: 3 | Status: SHIPPED | OUTPATIENT
Start: 2025-01-20

## 2025-07-31 ENCOUNTER — HOSPITAL ENCOUNTER (EMERGENCY)
Facility: HOSPITAL | Age: 89
Discharge: HOME | End: 2025-07-31
Attending: EMERGENCY MEDICINE
Payer: MEDICARE

## 2025-07-31 ENCOUNTER — APPOINTMENT (OUTPATIENT)
Facility: HOSPITAL | Age: 89
End: 2025-07-31
Payer: MEDICARE

## 2025-07-31 VITALS
TEMPERATURE: 98.3 F | BODY MASS INDEX: 19.83 KG/M2 | SYSTOLIC BLOOD PRESSURE: 156 MMHG | HEIGHT: 61 IN | OXYGEN SATURATION: 97 % | HEART RATE: 71 BPM | RESPIRATION RATE: 16 BRPM | WEIGHT: 105 LBS | DIASTOLIC BLOOD PRESSURE: 75 MMHG

## 2025-07-31 DIAGNOSIS — N30.01 ACUTE CYSTITIS WITH HEMATURIA: Primary | ICD-10-CM

## 2025-07-31 LAB
ALBUMIN SERPL-MCNC: 3.4 G/DL (ref 3.5–5.2)
ALBUMIN/GLOB SERPL: 1 (ref 1.1–2.2)
ALP SERPL-CCNC: 72 U/L (ref 35–104)
ALT SERPL-CCNC: <5 U/L (ref 10–35)
ANION GAP SERPL CALC-SCNC: 10 MMOL/L (ref 2–14)
APPEARANCE UR: CLEAR
AST SERPL-CCNC: 21 U/L (ref 10–35)
BACTERIA URNS QL MICRO: ABNORMAL /HPF
BASOPHILS # BLD: 0.02 K/UL (ref 0–0.1)
BASOPHILS NFR BLD: 0.6 % (ref 0–1)
BILIRUB SERPL-MCNC: 0.4 MG/DL (ref 0–1.2)
BILIRUB UR QL: NEGATIVE
BUN SERPL-MCNC: 18 MG/DL (ref 8–23)
BUN/CREAT SERPL: 19 (ref 12–20)
CALCIUM SERPL-MCNC: 10.3 MG/DL (ref 8.8–10.2)
CHLORIDE SERPL-SCNC: 107 MMOL/L (ref 98–107)
CO2 SERPL-SCNC: 27 MMOL/L (ref 20–29)
COLOR UR: ABNORMAL
CREAT SERPL-MCNC: 0.93 MG/DL (ref 0.6–1)
DIFFERENTIAL METHOD BLD: ABNORMAL
EOSINOPHIL # BLD: 0.07 K/UL (ref 0–0.4)
EOSINOPHIL NFR BLD: 1.9 % (ref 0–7)
EPITH CASTS URNS QL MICRO: ABNORMAL /LPF
ERYTHROCYTE [DISTWIDTH] IN BLOOD BY AUTOMATED COUNT: 13.2 % (ref 11.5–14.5)
GLOBULIN SER CALC-MCNC: 3.4 G/DL (ref 2–4)
GLUCOSE SERPL-MCNC: 130 MG/DL (ref 65–100)
GLUCOSE UR STRIP.AUTO-MCNC: NEGATIVE MG/DL
HCT VFR BLD AUTO: 40.5 % (ref 35–47)
HGB BLD-MCNC: 12.6 G/DL (ref 11.5–16)
HGB UR QL STRIP: ABNORMAL
HYALINE CASTS URNS QL MICRO: ABNORMAL /LPF (ref 0–2)
IMM GRANULOCYTES # BLD AUTO: 0 K/UL (ref 0–0.04)
IMM GRANULOCYTES NFR BLD AUTO: 0 % (ref 0–0.5)
KETONES UR QL STRIP.AUTO: NEGATIVE MG/DL
LEUKOCYTE ESTERASE UR QL STRIP.AUTO: NEGATIVE
LYMPHOCYTES # BLD: 0.67 K/UL (ref 0.8–3.5)
LYMPHOCYTES NFR BLD: 18.6 % (ref 12–49)
MCH RBC QN AUTO: 28.2 PG (ref 26–34)
MCHC RBC AUTO-ENTMCNC: 31.1 G/DL (ref 30–36.5)
MCV RBC AUTO: 90.6 FL (ref 80–99)
MONOCYTES # BLD: 0.48 K/UL (ref 0–1)
MONOCYTES NFR BLD: 13.3 % (ref 5–13)
NEUTS SEG # BLD: 2.36 K/UL (ref 1.8–8)
NEUTS SEG NFR BLD: 65.6 % (ref 32–75)
NITRITE UR QL STRIP.AUTO: NEGATIVE
NRBC # BLD: 0 K/UL (ref 0–0.01)
NRBC BLD-RTO: 0 PER 100 WBC
PH UR STRIP: 7.5 (ref 5–8)
PLATELET # BLD AUTO: 271 K/UL (ref 150–400)
PMV BLD AUTO: 10.5 FL (ref 8.9–12.9)
POTASSIUM SERPL-SCNC: 4.1 MMOL/L (ref 3.5–5.1)
PROT SERPL-MCNC: 6.8 G/DL (ref 6.4–8.3)
PROT UR STRIP-MCNC: ABNORMAL MG/DL
RBC # BLD AUTO: 4.47 M/UL (ref 3.8–5.2)
RBC #/AREA URNS HPF: >100 /HPF (ref 0–5)
RBC MORPH BLD: ABNORMAL
SODIUM SERPL-SCNC: 143 MMOL/L (ref 136–145)
SP GR UR REFRACTOMETRY: 1.01 (ref 1–1.03)
URINE CULTURE IF INDICATED: ABNORMAL
UROBILINOGEN UR QL STRIP.AUTO: 0.2 EU/DL (ref 0.2–1)
WBC # BLD AUTO: 3.6 K/UL (ref 3.6–11)
WBC URNS QL MICRO: ABNORMAL /HPF (ref 0–4)

## 2025-07-31 PROCEDURE — 74176 CT ABD & PELVIS W/O CONTRAST: CPT

## 2025-07-31 PROCEDURE — 81001 URINALYSIS AUTO W/SCOPE: CPT

## 2025-07-31 PROCEDURE — 80053 COMPREHEN METABOLIC PANEL: CPT

## 2025-07-31 PROCEDURE — 2580000003 HC RX 258: Performed by: EMERGENCY MEDICINE

## 2025-07-31 PROCEDURE — 99284 EMERGENCY DEPT VISIT MOD MDM: CPT

## 2025-07-31 PROCEDURE — 85025 COMPLETE CBC W/AUTO DIFF WBC: CPT

## 2025-07-31 PROCEDURE — 36415 COLL VENOUS BLD VENIPUNCTURE: CPT

## 2025-07-31 RX ORDER — SODIUM CHLORIDE, SODIUM LACTATE, POTASSIUM CHLORIDE, AND CALCIUM CHLORIDE .6; .31; .03; .02 G/100ML; G/100ML; G/100ML; G/100ML
1000 INJECTION, SOLUTION INTRAVENOUS ONCE
Status: COMPLETED | OUTPATIENT
Start: 2025-07-31 | End: 2025-07-31

## 2025-07-31 RX ORDER — CEPHALEXIN 500 MG/1
500 CAPSULE ORAL 2 TIMES DAILY
Qty: 14 CAPSULE | Refills: 0 | Status: SHIPPED | OUTPATIENT
Start: 2025-07-31 | End: 2025-08-07

## 2025-07-31 RX ADMIN — SODIUM CHLORIDE, SODIUM LACTATE, POTASSIUM CHLORIDE, AND CALCIUM CHLORIDE 1000 ML: .6; .31; .03; .02 INJECTION, SOLUTION INTRAVENOUS at 13:23

## 2025-07-31 ASSESSMENT — PAIN SCALES - GENERAL: PAINLEVEL_OUTOF10: 0

## 2025-07-31 ASSESSMENT — ENCOUNTER SYMPTOMS
CONSTIPATION: 0
COLOR CHANGE: 0
DIARRHEA: 0
ABDOMINAL PAIN: 0
VOMITING: 0
SHORTNESS OF BREATH: 0
NAUSEA: 0
BACK PAIN: 0

## 2025-07-31 ASSESSMENT — PAIN - FUNCTIONAL ASSESSMENT: PAIN_FUNCTIONAL_ASSESSMENT: 0-10

## 2025-07-31 NOTE — ED PROVIDER NOTES
ED SIGN OUT NOTE  Care assumed at [facility] 3:13 PM EDT    Patient was signed out to me by Dr. Hickman.     Patient is awaiting CT/UA.    BP (!) 156/75   Pulse 71   Temp 98.3 °F (36.8 °C) (Oral)   Resp 16   Ht 1.549 m (5' 1\")   Wt 47.6 kg (105 lb)   SpO2 97%   BMI 19.84 kg/m²     Labs Reviewed   URINALYSIS WITH REFLEX TO CULTURE - Abnormal; Notable for the following components:       Result Value    Protein, UA TRACE (*)     Blood, Urine LARGE (*)     RBC, UA >100 (*)     BACTERIA, URINE 2+ (*)     All other components within normal limits   CBC WITH AUTO DIFFERENTIAL - Abnormal; Notable for the following components:    Monocytes % 13.3 (*)     Lymphocytes Absolute 0.67 (*)     All other components within normal limits   COMPREHENSIVE METABOLIC PANEL - Abnormal; Notable for the following components:    Glucose 130 (*)     Est, Glom Filt Rate 60 (*)     Calcium 10.3 (*)     ALT <5 (*)     Albumin 3.4 (*)     Albumin/Globulin Ratio 1.0 (*)     All other components within normal limits     CT ABDOMEN PELVIS WO CONTRAST Additional Contrast? None   Final Result   1. No acute process.   2. Bilateral nonobstructing renal calculi.   3. Constipation.      Electronically signed by Anibal Romero               Diagnosis:   1. Acute cystitis with hematuria        Disposition:   Decision To Discharge 07/31/2025 04:28:47 PM    Plan:   Patient awaiting CT/UA for disposition.    4:33 PM    CT abdomen pelvis without contrast shows no obstructing renal calculi constipation like pattern UA 2+ bacteria with gross hematuria will send urine for culture start patient on Keflex I have spoken to the patient's daughter who was appreciative of update and she will follow-up with patient's facility to ensure that her antibiotics are started.    MD Jose Bonilla Dustin W, MD  07/31/25 1849       Ovidio Garcia MD  07/31/25 3197    
display         ED BEDSIDE ULTRASOUND:   Performed by ED Physician - none    LABS:  Labs Reviewed   URINALYSIS WITH REFLEX TO CULTURE   CBC WITH AUTO DIFFERENTIAL   COMPREHENSIVE METABOLIC PANEL       All other labs were within normal range or not returned as of this dictation.    EMERGENCY DEPARTMENT COURSE and DIFFERENTIAL DIAGNOSIS/MDM:   Vitals:    Vitals:    07/31/25 1229   BP: (!) 156/75   Pulse: 71   Resp: 16   Temp: 98.3 °F (36.8 °C)   TempSrc: Oral   SpO2: 97%   Weight: 47.6 kg (105 lb)   Height: 1.549 m (5' 1\")           Medical Decision Making  Amount and/or Complexity of Data Reviewed  Labs: ordered.  Radiology: ordered.    This is a 89-year-old female with past medical history, review of systems, physical exam as above, presenting with complaints of hematuria, decreased urine output.  Patient states approximately 2 days of inability to urinate, denying fevers, chills, nausea, vomiting, abdominal pain.  Patient states she was able to produce a small amount of bloody urine today.  She endorses history of rare urinary tract infections, currently denies pain.  Upon arrival she is noted to be hypertensive, afebrile without tachycardia, satting well on room air.  She is awake and alert in no acute distress with soft nontender abdomen.  Discussed with patient differential including urinary tract infection, acute kidney injury.  Plan to obtain CMP, CBC, UA.  Will reassess, and make a disposition.        REASSESSMENT      SIGN OUT:  3:00 PM  Discussed pt's hx, disposition, and available diagnostic and imaging results with Dr. Garcia. Reviewed care plans. Both providers and patient are in agreement with care plan. Dr. Hickman is transferring care of the pt to Dr. Garcia at this time.       CONSULTS:  None    PROCEDURES:  Unless otherwise noted below, none     Procedures        FINAL IMPRESSION    No diagnosis found.      DISPOSITION/PLAN   DISPOSITION        PATIENT REFERRED TO:  No follow-up provider

## 2025-08-25 SDOH — HEALTH STABILITY: PHYSICAL HEALTH: ON AVERAGE, HOW MANY DAYS PER WEEK DO YOU ENGAGE IN MODERATE TO STRENUOUS EXERCISE (LIKE A BRISK WALK)?: 0 DAYS

## 2025-08-29 PROBLEM — N39.46 MIXED STRESS AND URGE URINARY INCONTINENCE: Status: ACTIVE | Noted: 2020-12-10

## 2025-09-01 SDOH — HEALTH STABILITY: PHYSICAL HEALTH: ON AVERAGE, HOW MANY DAYS PER WEEK DO YOU ENGAGE IN MODERATE TO STRENUOUS EXERCISE (LIKE A BRISK WALK)?: 0 DAYS

## 2025-09-03 ENCOUNTER — OFFICE VISIT (OUTPATIENT)
Age: 89
End: 2025-09-03
Payer: MEDICARE

## 2025-09-03 VITALS
WEIGHT: 114 LBS | SYSTOLIC BLOOD PRESSURE: 169 MMHG | BODY MASS INDEX: 21.52 KG/M2 | HEIGHT: 61 IN | OXYGEN SATURATION: 98 % | DIASTOLIC BLOOD PRESSURE: 78 MMHG | HEART RATE: 67 BPM

## 2025-09-03 DIAGNOSIS — R31.0 GROSS HEMATURIA: Primary | ICD-10-CM

## 2025-09-03 DIAGNOSIS — N20.0 KIDNEY STONE: ICD-10-CM

## 2025-09-03 DIAGNOSIS — N39.46 MIXED STRESS AND URGE URINARY INCONTINENCE: ICD-10-CM

## 2025-09-03 DIAGNOSIS — N39.41 URGE INCONTINENCE: ICD-10-CM

## 2025-09-03 PROCEDURE — 1123F ACP DISCUSS/DSCN MKR DOCD: CPT | Performed by: UROLOGY

## 2025-09-03 PROCEDURE — 99214 OFFICE O/P EST MOD 30 MIN: CPT | Performed by: UROLOGY

## 2025-09-03 PROCEDURE — 1159F MED LIST DOCD IN RCRD: CPT | Performed by: UROLOGY

## 2025-09-03 PROCEDURE — 1126F AMNT PAIN NOTED NONE PRSNT: CPT | Performed by: UROLOGY

## 2025-09-03 RX ORDER — GALANTAMINE 4 MG/1
TABLET, FILM COATED ORAL
COMMUNITY
Start: 2025-08-25

## 2025-09-03 RX ORDER — POLYETHYLENE GLYCOL 3350 17 G/17G
17 POWDER, FOR SOLUTION ORAL DAILY PRN
COMMUNITY
End: 2025-09-05

## 2025-09-03 RX ORDER — APIXABAN 2.5 MG/1
TABLET, FILM COATED ORAL
COMMUNITY
Start: 2025-08-25

## 2025-09-03 RX ORDER — DORZOLAMIDE HCL 20 MG/ML
2 SOLUTION/ DROPS OPHTHALMIC 2 TIMES DAILY
COMMUNITY

## 2025-09-03 RX ORDER — MUPIROCIN 2 %
OINTMENT (GRAM) TOPICAL DAILY
COMMUNITY

## 2025-09-05 ENCOUNTER — OFFICE VISIT (OUTPATIENT)
Age: 89
End: 2025-09-05
Payer: MEDICARE

## 2025-09-05 VITALS
TEMPERATURE: 97.6 F | OXYGEN SATURATION: 97 % | RESPIRATION RATE: 15 BRPM | HEIGHT: 61 IN | DIASTOLIC BLOOD PRESSURE: 86 MMHG | BODY MASS INDEX: 21.54 KG/M2 | HEART RATE: 65 BPM | SYSTOLIC BLOOD PRESSURE: 130 MMHG

## 2025-09-05 DIAGNOSIS — Z23 NEED FOR VACCINATION: ICD-10-CM

## 2025-09-05 DIAGNOSIS — G20.A1 DEMENTIA DUE TO PARKINSON'S DISEASE, UNSPECIFIED DEMENTIA SEVERITY, UNSPECIFIED WHETHER BEHAVIORAL, PSYCHOTIC, OR MOOD DISTURBANCE OR ANXIETY (HCC): ICD-10-CM

## 2025-09-05 DIAGNOSIS — N39.46 MIXED STRESS AND URGE URINARY INCONTINENCE: ICD-10-CM

## 2025-09-05 DIAGNOSIS — Z76.89 ENCOUNTER TO ESTABLISH CARE: Primary | ICD-10-CM

## 2025-09-05 DIAGNOSIS — F02.80 DEMENTIA DUE TO PARKINSON'S DISEASE, UNSPECIFIED DEMENTIA SEVERITY, UNSPECIFIED WHETHER BEHAVIORAL, PSYCHOTIC, OR MOOD DISTURBANCE OR ANXIETY (HCC): ICD-10-CM

## 2025-09-05 DIAGNOSIS — I10 HYPERTENSION, UNSPECIFIED TYPE: ICD-10-CM

## 2025-09-05 DIAGNOSIS — E07.9 THYROID DISEASE: ICD-10-CM

## 2025-09-05 PROBLEM — C50.312: Status: RESOLVED | Noted: 2018-12-18 | Resolved: 2025-09-05

## 2025-09-05 PROBLEM — I21.4 NSTEMI (NON-ST ELEVATED MYOCARDIAL INFARCTION) (HCC): Status: RESOLVED | Noted: 2023-08-08 | Resolved: 2025-09-05

## 2025-09-05 PROCEDURE — 99387 INIT PM E/M NEW PAT 65+ YRS: CPT

## 2025-09-05 SDOH — ECONOMIC STABILITY: FOOD INSECURITY: WITHIN THE PAST 12 MONTHS, YOU WORRIED THAT YOUR FOOD WOULD RUN OUT BEFORE YOU GOT MONEY TO BUY MORE.: NEVER TRUE

## 2025-09-05 SDOH — ECONOMIC STABILITY: FOOD INSECURITY: WITHIN THE PAST 12 MONTHS, THE FOOD YOU BOUGHT JUST DIDN'T LAST AND YOU DIDN'T HAVE MONEY TO GET MORE.: NEVER TRUE

## 2025-09-05 ASSESSMENT — ENCOUNTER SYMPTOMS
SHORTNESS OF BREATH: 0
CONSTIPATION: 1
WHEEZING: 0
ABDOMINAL PAIN: 0
DIARRHEA: 0
NAUSEA: 0
BLOOD IN STOOL: 0
VOMITING: 0
CHEST TIGHTNESS: 0

## 2025-09-05 ASSESSMENT — PATIENT HEALTH QUESTIONNAIRE - PHQ9: DEPRESSION UNABLE TO ASSESS: PT REFUSES
